# Patient Record
Sex: FEMALE | Race: WHITE | NOT HISPANIC OR LATINO | Employment: FULL TIME | URBAN - METROPOLITAN AREA
[De-identification: names, ages, dates, MRNs, and addresses within clinical notes are randomized per-mention and may not be internally consistent; named-entity substitution may affect disease eponyms.]

---

## 2017-01-05 ENCOUNTER — MYC MEDICAL ADVICE (OUTPATIENT)
Dept: ORTHOPEDICS | Facility: CLINIC | Age: 42
End: 2017-01-05

## 2017-01-05 DIAGNOSIS — Z98.890 S/P CARPAL TUNNEL RELEASE: Primary | ICD-10-CM

## 2017-01-11 ENCOUNTER — MYC MEDICAL ADVICE (OUTPATIENT)
Dept: ORTHOPEDICS | Facility: CLINIC | Age: 42
End: 2017-01-11

## 2017-01-11 ENCOUNTER — OFFICE VISIT (OUTPATIENT)
Dept: FAMILY MEDICINE | Facility: CLINIC | Age: 42
End: 2017-01-11
Payer: COMMERCIAL

## 2017-01-11 VITALS
BODY MASS INDEX: 34.83 KG/M2 | DIASTOLIC BLOOD PRESSURE: 78 MMHG | WEIGHT: 204 LBS | SYSTOLIC BLOOD PRESSURE: 130 MMHG | RESPIRATION RATE: 18 BRPM | HEIGHT: 64 IN | HEART RATE: 106 BPM | TEMPERATURE: 98.9 F | OXYGEN SATURATION: 98 %

## 2017-01-11 DIAGNOSIS — G56.03 BILATERAL CARPAL TUNNEL SYNDROME: Primary | ICD-10-CM

## 2017-01-11 DIAGNOSIS — G89.18 POST-OP PAIN: Primary | ICD-10-CM

## 2017-01-11 PROCEDURE — 99024 POSTOP FOLLOW-UP VISIT: CPT | Performed by: FAMILY MEDICINE

## 2017-01-11 RX ORDER — TRAMADOL HYDROCHLORIDE 50 MG/1
50-100 TABLET ORAL EVERY 6 HOURS PRN
Qty: 30 TABLET | Refills: 0 | Status: SHIPPED | OUTPATIENT
Start: 2017-01-11 | End: 2017-02-22

## 2017-01-11 NOTE — Clinical Note
[address] [address]  [phone]        January 11, 2017    Regarding:  Virginia BEN Valenzuela  [address] [facility] [place] [address]              To Whom It May Concern;  She is having some issues with recovery from her R wrist surgery. She needs time to attend appointments. She also needs to have restricted work: no use of R extremity for the next week. If improved, then may advance to 1 lb restriction, but minimal flexion/externsion. If not improved, she will continue her non use for another week. Expect healing process to take 3-4 wks.          Sincerely,        Marlon Marx MD

## 2017-01-11 NOTE — MR AVS SNAPSHOT
After Visit Summary   1/11/2017    Virginia Valenzuela    MRN: [id number]           Patient Information     Date Of Birth          1975        Visit Information        Provider Department      1/11/2017 10:00 AM Marlon Marx MD [facility]        Today's Diagnoses     Bilateral carpal tunnel syndrome    -  1        Follow-ups after your visit        Your next 10 appointments already scheduled     Jan 19, 2017  9:30 AM   Evaluation with Nidia Smith OT   [facility] ([facility])    [address] [place] [id number] [phone]            Jan 20, 2017  8:40 AM   SHORT with Marlon Marx MD   [facility] ([facility])    [address] [facility] [id number] [phone]              Who to contact     If you have questions or need follow up information about today's clinic visit or your schedule please contact [facility] directly at [phone].  Normal or non-critical lab and imaging results will be communicated to you by [facility], letter or phone within 4 business days after the clinic has received the results. If you do not hear from us within 7 days, please contact the clinic through [employer] or phone. If you have a critical or abnormal lab result, we will notify you by phone as soon as possible.  Submit refill requests through [employer] or call your pharmacy and they will forward the refill request to us. Please allow 3 business days for your refill to be completed.          Additional Information About Your Visit        [facility] Information     [employer]! gives you secure access to your electronic health record. If you see a primary care provider, you can also send messages to your care team and make appointments. If you have questions, please call your primary care clinic.  If you do not have a primary care provider, please call [phone] and they will assist  "you.        Care EveryWhere ID     This is your Care EveryWhere ID. This could be used by other organizations to access your [facility] medical records  [id number]        Your Vitals Were     Pulse Temperature Respirations Height BMI (Body Mass Index) Pulse Oximetry    106 98.9  F (37.2  C) (Temporal) 18 5' 4.33\" (1.634 m) 34.66 kg/m2 98%       Blood Pressure from Last 3 Encounters:   01/11/17 130/78   12/07/16 136/78   11/17/16 126/80    Weight from Last 3 Encounters:   01/11/17 204 lb (92.534 kg)   11/17/16 203 lb (92.08 kg)   11/04/16 190 lb (86.183 kg)              Today, you had the following     No orders found for display         Today's Medication Changes          These changes are accurate as of: 1/11/17 10:41 AM.  If you have any questions, ask your nurse or doctor.               Start taking these medicines.        Dose/Directions    traMADol 50 MG tablet   Commonly known as:  ULTRAM   Used for:  Bilateral carpal tunnel syndrome   Started by:  Marlon Marx MD        Dose:   mg   Take 1-2 tablets ( mg) by mouth every 6 hours as needed for pain   Quantity:  30 tablet   Refills:  0            Where to get your medicines      Some of these will need a paper prescription and others can be bought over the counter.  Ask your nurse if you have questions.     Bring a paper prescription for each of these medications    - traMADol 50 MG tablet             Primary Care Provider    None Specified       No primary provider on file.        Thank you!     Thank you for choosing [facility]  for your care. Our goal is always to provide you with excellent care. Hearing back from our patients is one way we can continue to improve our services. Please take a few minutes to complete the written survey that you may receive in the mail after your visit with us. Thank you!             Your Updated Medication List - Protect others around you: Learn how to safely use, store and throw away your " medicines at www.disposemymeds.org.          This list is accurate as of: 1/11/17 10:41 AM.  Always use your most recent med list.                   Brand Name Dispense Instructions for use    cetirizine 10 MG tablet    zyrTEC    90 tablet    Take 1 tablet (10 mg) by mouth every evening       EPINEPHrine 0.3 MG/0.3ML injection     1 each    Inject 0.3 mLs (0.3 mg) into the muscle once as needed       hydrOXYzine 25 MG capsule    VISTARIL    120 capsule    Take 1-2 capsules (25-50 mg) by mouth 3 times daily as needed for itching (hives)       ibuprofen 600 MG tablet    ADVIL/MOTRIN    60 tablet    Take 1 tablet (600 mg) by mouth every 6 hours as needed for moderate pain       losartan-hydrochlorothiazide 50-12.5 MG per tablet    HYZAAR     Take 1 tablet by mouth daily       NORVASC PO      Take 10 mg by mouth       oxyCODONE 5 MG IR tablet    ROXICODONE    30 tablet    Take 1-2 tablets (5-10 mg) by mouth every 4 hours as needed for pain or other (Moderate to Severe)       PRILOSEC PO      Take 20 mg by mouth every morning       PROBIOTIC DAILY PO      Take by mouth daily       ranitidine 150 MG tablet    ZANTAC    180 tablet    Take 1 tablet (150 mg) by mouth 2 times daily       traMADol 50 MG tablet    ULTRAM    30 tablet    Take 1-2 tablets ( mg) by mouth every 6 hours as needed for pain       vitamin D 2000 UNITS tablet     100 tablet    Take 2,000 Units by mouth daily

## 2017-01-11 NOTE — PROGRESS NOTES
"  SUBJECTIVE:                                                    Virginia Valenzuela is a 41 year old female who presents to clinic today for the following health issues:      Joint Pain     Onset: 12/19/2016     Description:   Location: right wrist  Character: Dull ache    Intensity: moderate, severe    Progression of Symptoms: worse    Accompanying Signs & Symptoms:  Other symptoms: swelling   History:   Previous similar pain: YES- Carp surgery on 12/07/2016    Precipitating factors:   Trauma or overuse: no     Alleviating factors:  Improved by: nothing       Therapies Tried and outcome: tylenol and ibuprofen, but hasn't been taking due to infectiveness.           Problem list and histories reviewed & adjusted, as indicated.  Additional history: as documented        ROS:      OBJECTIVE:                                                    /78 mmHg  Pulse 106  Temp(Src) 98.9  F (37.2  C) (Temporal)  Resp 18  Ht 5' 4.33\" (1.634 m)  Wt 204 lb (92.534 kg)  BMI 34.66 kg/m2  SpO2 98%  Body mass index is 34.66 kg/(m^2).  Risks bilaterally. Normal. There is a well-healed incision present. No drainage or redness or tenderness.    Diagnostic Test Results:  none      ASSESSMENT/PLAN:                                                              ICD-10-CM    1. Bilateral carpal tunnel syndrome G56.03 traMADol (ULTRAM) 50 MG tablet     She is enrolled in occupational therapy. She is following up with her orthopedics team. Continue conservative measures. Discussed options for pain control, and she elects for some tramadol which I think is reasonable. See me back as needed.      Marlon Marx MD  [facility]    "

## 2017-01-11 NOTE — NURSING NOTE
"Chief Complaint   Patient presents with     Musculoskeletal Problem       Initial /78 mmHg  Pulse 106  Temp(Src) 98.9  F (37.2  C) (Temporal)  Resp 18  Ht 5' 4.33\" (1.634 m)  Wt 204 lb (92.534 kg)  BMI 34.66 kg/m2  SpO2 98% Estimated body mass index is 34.66 kg/(m^2) as calculated from the following:    Height as of this encounter: 5' 4.33\" (1.634 m).    Weight as of this encounter: 204 lb (92.534 kg).  BP completed using cuff size: regular    "

## 2017-01-18 DIAGNOSIS — G89.18 POST-OP PAIN: Primary | ICD-10-CM

## 2017-01-18 RX ORDER — GABAPENTIN 300 MG/1
CAPSULE ORAL
Qty: 30 CAPSULE | Refills: 1 | Status: SHIPPED | OUTPATIENT
Start: 2017-01-18 | End: 2017-01-18

## 2017-01-18 RX ORDER — OXYCODONE HYDROCHLORIDE 5 MG/1
5 TABLET ORAL EVERY 4 HOURS PRN
Qty: 20 TABLET | Refills: 0 | Status: SHIPPED | OUTPATIENT
Start: 2017-01-18 | End: 2017-02-03

## 2017-01-19 ENCOUNTER — HOSPITAL ENCOUNTER (OUTPATIENT)
Dept: OCCUPATIONAL THERAPY | Facility: CLINIC | Age: 42
Setting detail: THERAPIES SERIES
End: 2017-01-19
Attending: PHYSICIAN ASSISTANT
Payer: COMMERCIAL

## 2017-01-19 DIAGNOSIS — Z98.890 S/P CARPAL TUNNEL RELEASE: Primary | ICD-10-CM

## 2017-01-19 PROCEDURE — 97165 OT EVAL LOW COMPLEX 30 MIN: CPT | Mod: GO | Performed by: OCCUPATIONAL THERAPIST

## 2017-01-19 PROCEDURE — 97140 MANUAL THERAPY 1/> REGIONS: CPT | Mod: GO | Performed by: OCCUPATIONAL THERAPIST

## 2017-01-19 PROCEDURE — 97035 APP MDLTY 1+ULTRASOUND EA 15: CPT | Mod: GO | Performed by: OCCUPATIONAL THERAPIST

## 2017-01-19 PROCEDURE — 40000839 ZZH STATISTIC HAND THERAPY VISIT: Performed by: OCCUPATIONAL THERAPIST

## 2017-01-19 PROCEDURE — 97110 THERAPEUTIC EXERCISES: CPT | Mod: GO | Performed by: OCCUPATIONAL THERAPIST

## 2017-01-19 NOTE — PROGRESS NOTES
01/19/17 0900   Quick Adds   Quick Adds Fall Risk Screen   General Information/History   Start Of Care Date 01/19/17   Referring Physician Dr. Gucci Hairston    Orders Evaluate And Treat As Indicated   Orders Date 01/05/17   Medical Diagnosis S/P right carpal tunnel release    Additional Occupational Profile Info/Pertinent history of current problem Patient is a left hand dominant female presenting with persistance pain and weakness post carpal tunnel release on 12/7/16     Previous treatment or current condition Splints, Cortizone injections, L carpal tunnel    How/Where did it occur With repetition/overuse   Onset date of current episode/exacerbation 12/07/16   Date of surgery 12/07/16   Surgical procedure Right Carpal Tunnel Release    Chronicity New   Hand Dominance Left   Affected side Right   Functional limitations perform desired leisure / sports activities;perform required work activities;perform activities of daily living   Reported Symptoms Pain;Loss of Motion/Stiffness;Loss of strength   Prior level of function Independent ADL;Independent IADL   Important Activities Working on her loom, work    Living environment [place]   Patient role/Employment history Employed   Occupation  at a hair salon    Employment Status Working in normal job without restrictions   Primary Job Tasks Keyboarding;Gripping/pinching;Pushing/pulling;Repetitive tasks;Prolonged sitting;Lifting;Reaching;Using a mouse;Carrying   Occupation Comments Patient works part time, 5 hour shifts.  Normal tasks include sweeping the floors, laundry and computer use for appts.    Patient/Family goals statement To be pain free    Fall Risk Screen   Fall screen completed by OT   Have you fallen 2 or more times in the last year? No   Have you fallen and had an injury in the past year? No   Is the patient a fall risk? No   Pain   Pain Primary Pain Report   Primary Pain Report   Location Volar surface (CT area) of right wrist and thenar  group    Radiation Does Not Radiate   Pain Quality Aching;Stabbing;Throbbing   Frequency Intermittent   Scale 6/10   Pain Is Worse In The P.m.   Pain Is Exacerbated By Lifting;Carrying;Pinching;Pushing;Gripping;Twisting , Pulling;Activity/movement   Pain Is Relieved By Splints;Immobilization;Heat;Rest   Progression Since Onset Gradually Improving   Edema   Edema Assessed;Distal Wrist Crease   Overall  - Left None   Overall  - Right Mild   Distal Wrist Crease (measured in cm)   Distal Wrist Crease - - Left 15.6    Distal Wrist Crease - - Right 16.0    Scar/Wound   Scar/Wound Scar   Scar   Scar location Volar right wrist    Appearance White And Pink   Sensitivity Mild   Quality Adhered;Hypertrophic   Tenderness   Overall - Right Tender along incision    ROM   ROM (Bilateral UE ROM WNL, painful at end range on right )   Sensation Findings   Sensation Findings Other (see comments)   Sensation Comments Patient reports improved sensation post-op, no reports of numbness/tinglying    Strength   Strength (Plan to test )   Education Assessment   Preferred Learning Style Listening;Demonstration;Pictures/video   Barriers to Learning No barriers   Therapy Interventions   Planned Therapy Interventions Cryotherapy;Ultrasound;Iontophoresis (list drug);Light Therapy;Paraffin;Strengthening;ROM;Coordination;Stretching;Manual Therapy;Scar Management;Desensitization;Edema Management;Home Program;Self Care/Home Management   Clinical Impression   Criteria for Skilled Therapeutic Interventions Met yes;treatment indicated   OT Diagnosis Decreased ease with I/ADL tasks    Influenced by the following impairments Pain;Edema;Decreased range of motion;Decreased strength   Assessment of Occupational Performance 1-3 Performance Deficits   Identified Performance Deficits Work tasks, household management tasks    Clinical Decision Making (Complexity) Low complexity   Therapy Frequency 1x/week    Predicted Duration of Therapy Intervention (days/wks)  3 weeks    Risks and Benefits of Treatment have been explained. Yes   Patient, Family & other staff in agreement with plan of care Yes   Clinical Impression Comments Virginia is a left hand dominant female presenting with persisting pain and weakness localized to the volar aspect and thenar musculature of her right wrist following carpal tunnel release on 12/7/16.  OT intervention is indicated for a brief time to educated on edema and scar management techniques as well as to progress ROM and strength as needed for activity.     Hand Goals   Hand Goals Work;Household Chores;Sleeping;Driving   Household Chores   Current Functional Task Sweeping;Cooking;Gripping;Turning;Carrying;Pushing   Previous Performance Level Independent   Current Performance Level Moderate difficulty   Goal Target Task Sweep floors;Hold spoon for stirring;Hold and lift pan;Hold and lift plate;Open a tight or new jar;Turn key in lock to open door; and turn to open a door;Push a shopping cart;Carry a shopping bag   Goal Target Performance Level Pain free   Due Date 02/18/17   Work   Current Functional Task Computer use;Keyboarding;Repetitive tasks;Lifting   Previous Performance Level Independent   Current Performance Level Moderate difficulty   Goal Target Task Type;Mouse;Hold and manipulate tools;Hold and assemble parts;Complete repetitive tasks;Lift to counter height - lbs   Goal Target Performance Level Pain free   Due Date 02/18/17   Driving   Current Functional Task Holding steering wheel;Shifting   Previous Performance Level Independent   Current Performance Level Moderate difficulty   Goal Target Task Shift into gear;Turn car key to start car; steering wheel   Goal Target Performance Level Pain free   Due Date 02/18/17   Sleeping   Current Functional Task Sleeping through the night   Previous Performance Level No difficulty   Current Performance Level Moderate difficulty   Goal Target Task Staying asleep   Goal Target Performance Level  No difficulty   Due Date 02/18/17   Total Evaluation Time   Total Evaluation Time (Minutes) 15        Thank you for referring Virginia to Occupational Therapy,     Nidia HICKMAN/LESLIE

## 2017-01-20 ENCOUNTER — OFFICE VISIT (OUTPATIENT)
Dept: FAMILY MEDICINE | Facility: CLINIC | Age: 42
End: 2017-01-20
Payer: COMMERCIAL

## 2017-01-20 VITALS
RESPIRATION RATE: 18 BRPM | DIASTOLIC BLOOD PRESSURE: 72 MMHG | TEMPERATURE: 98.9 F | BODY MASS INDEX: 34.28 KG/M2 | WEIGHT: 201.8 LBS | HEART RATE: 107 BPM | SYSTOLIC BLOOD PRESSURE: 112 MMHG | OXYGEN SATURATION: 98 %

## 2017-01-20 DIAGNOSIS — G56.03 BILATERAL CARPAL TUNNEL SYNDROME: Primary | ICD-10-CM

## 2017-01-20 PROCEDURE — 99024 POSTOP FOLLOW-UP VISIT: CPT | Performed by: FAMILY MEDICINE

## 2017-01-20 ASSESSMENT — PAIN SCALES - GENERAL: PAINLEVEL: MILD PAIN (3)

## 2017-01-20 NOTE — PROGRESS NOTES
SUBJECTIVE:                                                    Virginia Valenzuela is a 41 year old female who presents to clinic today for the following health issues:      Chief Complaint   Patient presents with     RECHECK     right wrist pain-states pain is arounda 3/10.  Pain is improving.      Returns for recheck of her right wrist and carpal tunnel surgery. She is improving occupational therapy. She is back in touch with orthopedics team. She is on the right track and I really have nothing else to offer her. She may return if she has anymore problems.

## 2017-01-25 ENCOUNTER — HOSPITAL ENCOUNTER (OUTPATIENT)
Dept: OCCUPATIONAL THERAPY | Facility: CLINIC | Age: 42
Setting detail: THERAPIES SERIES
End: 2017-01-25
Attending: PHYSICIAN ASSISTANT
Payer: COMMERCIAL

## 2017-01-25 PROCEDURE — 97140 MANUAL THERAPY 1/> REGIONS: CPT | Mod: GO | Performed by: OCCUPATIONAL THERAPIST

## 2017-01-25 PROCEDURE — 97035 APP MDLTY 1+ULTRASOUND EA 15: CPT | Mod: GO | Performed by: OCCUPATIONAL THERAPIST

## 2017-01-25 PROCEDURE — 40000839 ZZH STATISTIC HAND THERAPY VISIT: Performed by: OCCUPATIONAL THERAPIST

## 2017-01-25 PROCEDURE — 97110 THERAPEUTIC EXERCISES: CPT | Mod: GO | Performed by: OCCUPATIONAL THERAPIST

## 2017-01-31 ENCOUNTER — TELEPHONE (OUTPATIENT)
Dept: FAMILY MEDICINE | Facility: CLINIC | Age: 42
End: 2017-01-31

## 2017-01-31 NOTE — TELEPHONE ENCOUNTER
RN tried to reach pt , but NA. Unable to leave message as unidentified answering machine came on........RONNIE Hayes

## 2017-01-31 NOTE — TELEPHONE ENCOUNTER
----- Message from [employer] sent at 1/30/2017  4:44 PM CST -----  Regarding: Appointment scheduled from [place]  Contact: [phone]  Appointment For: DIXON MARQUES ([id number])  Visit Type: [facility] OFFICE VISIT SHORT (910)    2/7/2017    10:20 AM  20 mins.  Marlon Marx MD  FAMILY PRACTICE    Patient Comments:  Primary Care  Burning/pain in upper abdomen for last several months.   Have tried Prilosec with no change.

## 2017-02-01 ENCOUNTER — HOSPITAL ENCOUNTER (OUTPATIENT)
Dept: OCCUPATIONAL THERAPY | Facility: CLINIC | Age: 42
Setting detail: THERAPIES SERIES
End: 2017-02-01
Attending: PHYSICIAN ASSISTANT
Payer: COMMERCIAL

## 2017-02-01 PROCEDURE — 40000839 ZZH STATISTIC HAND THERAPY VISIT: Performed by: OCCUPATIONAL THERAPIST

## 2017-02-01 PROCEDURE — 97140 MANUAL THERAPY 1/> REGIONS: CPT | Mod: GO | Performed by: OCCUPATIONAL THERAPIST

## 2017-02-01 PROCEDURE — 97110 THERAPEUTIC EXERCISES: CPT | Mod: GO | Performed by: OCCUPATIONAL THERAPIST

## 2017-02-01 NOTE — TELEPHONE ENCOUNTER
RN tried cell phone. Got message she is unavailable. Unable to leave message. Will close this encounter. She has scheduled an appt........................................RONNIE Hayes

## 2017-02-01 NOTE — TELEPHONE ENCOUNTER
Second attempt to reach pt at home.  I see she has a My Chart office visit scheduled for 2/7/17 with DR. Marx.

## 2017-02-03 ENCOUNTER — HOSPITAL ENCOUNTER (EMERGENCY)
Facility: CLINIC | Age: 42
Discharge: HOME | End: 2017-02-03
Attending: EMERGENCY MEDICINE | Admitting: EMERGENCY MEDICINE
Payer: COMMERCIAL

## 2017-02-03 VITALS
DIASTOLIC BLOOD PRESSURE: 70 MMHG | OXYGEN SATURATION: 98 % | HEART RATE: 103 BPM | RESPIRATION RATE: 12 BRPM | TEMPERATURE: 97.6 F | SYSTOLIC BLOOD PRESSURE: 137 MMHG

## 2017-02-03 DIAGNOSIS — M25.512 ACUTE PAIN OF LEFT SHOULDER: ICD-10-CM

## 2017-02-03 DIAGNOSIS — M75.50 SUBACROMIAL BURSITIS: ICD-10-CM

## 2017-02-03 DIAGNOSIS — G89.18 POST-OP PAIN: ICD-10-CM

## 2017-02-03 PROCEDURE — 99284 EMERGENCY DEPT VISIT MOD MDM: CPT | Mod: 25

## 2017-02-03 PROCEDURE — 25000125 ZZHC RX 250: Performed by: EMERGENCY MEDICINE

## 2017-02-03 PROCEDURE — 25000132 ZZH RX MED GY IP 250 OP 250 PS 637: Performed by: EMERGENCY MEDICINE

## 2017-02-03 PROCEDURE — 20610 DRAIN/INJ JOINT/BURSA W/O US: CPT

## 2017-02-03 PROCEDURE — 99284 EMERGENCY DEPT VISIT MOD MDM: CPT | Mod: 25 | Performed by: EMERGENCY MEDICINE

## 2017-02-03 PROCEDURE — 20610 DRAIN/INJ JOINT/BURSA W/O US: CPT | Performed by: EMERGENCY MEDICINE

## 2017-02-03 PROCEDURE — 25000128 H RX IP 250 OP 636: Performed by: EMERGENCY MEDICINE

## 2017-02-03 RX ORDER — TRIAMCINOLONE ACETONIDE 40 MG/ML
40 INJECTION, SUSPENSION INTRA-ARTICULAR; INTRAMUSCULAR ONCE
Status: COMPLETED | OUTPATIENT
Start: 2017-02-03 | End: 2017-02-03

## 2017-02-03 RX ORDER — OXYCODONE HYDROCHLORIDE 5 MG/1
5 TABLET ORAL ONCE
Status: COMPLETED | OUTPATIENT
Start: 2017-02-03 | End: 2017-02-03

## 2017-02-03 RX ORDER — OXYCODONE HYDROCHLORIDE 5 MG/1
5 TABLET ORAL EVERY 6 HOURS PRN
Qty: 20 TABLET | Refills: 0 | Status: SHIPPED | OUTPATIENT
Start: 2017-02-03 | End: 2017-02-22

## 2017-02-03 RX ORDER — BUPIVACAINE HYDROCHLORIDE 2.5 MG/ML
4 INJECTION, SOLUTION INFILTRATION; PERINEURAL ONCE
Status: COMPLETED | OUTPATIENT
Start: 2017-02-03 | End: 2017-02-03

## 2017-02-03 RX ORDER — KETOROLAC TROMETHAMINE 30 MG/ML
30 INJECTION, SOLUTION INTRAMUSCULAR; INTRAVENOUS ONCE
Status: COMPLETED | OUTPATIENT
Start: 2017-02-03 | End: 2017-02-03

## 2017-02-03 RX ADMIN — TRIAMCINOLONE ACETONIDE 40 MG: 40 INJECTION, SUSPENSION INTRA-ARTICULAR; INTRAMUSCULAR at 13:09

## 2017-02-03 RX ADMIN — KETOROLAC TROMETHAMINE 30 MG: 30 INJECTION, SOLUTION INTRAMUSCULAR at 14:07

## 2017-02-03 RX ADMIN — BUPIVACAINE HYDROCHLORIDE 10 MG: 2.5 INJECTION, SOLUTION INFILTRATION; PERINEURAL at 13:09

## 2017-02-03 RX ADMIN — OXYCODONE HYDROCHLORIDE 5 MG: 5 TABLET ORAL at 14:56

## 2017-02-03 ASSESSMENT — ENCOUNTER SYMPTOMS
ARTHRALGIAS: 1
HEADACHES: 0
NUMBNESS: 0
NECK PAIN: 0
BACK PAIN: 0
WEAKNESS: 0
JOINT SWELLING: 1

## 2017-02-03 NOTE — ED AVS SNAPSHOT
[facility]    [address] [place] [address]    Phone:  [phone]    Fax:  [phone]                                       Virginia Valenzuela   MRN: [id number]    Department:  [facility]   Date of Visit:  2/3/2017           After Visit Summary Signature Page     I have received my discharge instructions, and my questions have been answered. I have discussed any challenges I see with this plan with the nurse or doctor.    ..........................................................................................................................................  Patient/Patient Representative Signature      ..........................................................................................................................................  Patient Representative Print Name and Relationship to Patient    ..................................................               ................................................  Date                                            Time    ..........................................................................................................................................  Reviewed by Signature/Title    ...................................................              ..............................................  Date                                                            Time

## 2017-02-03 NOTE — DISCHARGE INSTRUCTIONS
Bursitis  You have bursitis. This is an inflammation of the bursa. These are small, fluid-filled sacs that surround the larger joints of the body. The bursa help the muscles and tendons move smoothly over the joints.  Bursitis often happens in the shoulder. But it can also affect the elbows, hips, pelvis, knees, toes, and heels. Bursitis can be caused by injury, overuse of the joint, or infection of the bursa. Symptoms include pain and tenderness over a joint. Symptoms get worse with movement.  Bursitis is treated with an anti-inflammatory medicine and by resting the joint. More severe cases require injection of medicine directly into the bursa.    Home care    Rest the painful joint and protect it from movement. This will allow the inflammation to heal faster.    Apply an ice pack over the injured area for no more than 15 to 20 minutes. Do this every 3 to 6 hours for the first 24 to 48 hours. Keep using ice packs 3 to 4 times a day until the pain and swelling improves.     To make an ice pack, put ice cubes in a sealed plastic zip-lock bag. Wrap the bag in a clean, thin towel or cloth. Never put ice or an ice pack directly on the skin. As the ice melts, be careful to avoid getting any wrap or splint wet.    You may take over-the-counter pain medicine to treat pain and inflammation, unless another medicine was prescribed. Anti-inflammatory pain medicines may be more effective. Talk with your provider beforeusing these medicines if you have chronic liver or kidney disease, or ever had a stomach ulcer or GI (gastrointestinal) bleeding.    As your symptoms improve, slowly begin to move the joint. Do not overuse the joint. This may cause the symptoms to flare up again.  When to seek medical advice  Call your healthcare provider right away if any of these occur:    Redness over the painful area    Increasing pain or swelling at the joint    Fever of 100.4 F (38 C) or above lasting for 24 to 48 hours    [id number] [employer]. [address] [place] [address], [place] [address]. All rights reserved. This information is not intended as a substitute for professional medical care. Always follow your healthcare professional's instructions.

## 2017-02-03 NOTE — ED PROVIDER NOTES
History     Chief Complaint   Patient presents with     Shoulder Pain     The history is provided by the patient.     Virginia Valenzuela is a 41 year old female who presents to the ED for evaluation of left shoulder pain.  Patient reports that the left shoulder was bit stiff yesterday and thinks she may have slept on the shoulder with the arm extended above her head because she awoke this morning with severe left shoulder pain and marked reduction in range of motion.  She has a history in the past of subacromial bursitis which has been treated once before with a steroid injection.  She denies any significant trauma.  She denies any paresthesia or weakness.    I have reviewed the Medications, Allergies, Past Medical and Surgical History, and Social History in the [facility] system.    Past Medical History   Diagnosis Date     Unspecified essential hypertension      Obstructive sleep apnea (adult) (pediatric)      Tobacco use disorder      Unspecified vitamin D deficiency      Idiopathic urticaria      Special screening examination for human papillomavirus (HPV)      Other malaise and fatigue      Dysfunction of eustachian tube      Pain in joint, shoulder region      Encounter for therapeutic drug monitoring      Threatened , unspecified as to episode of care      Generalized anxiety disorder      Depressive disorder, not elsewhere classified      Other chronic allergic conjunctivitis      Allergic rhinitis, cause unspecified      History of appendectomy      S/P laparoscopic cholecystectomy      Sprain of tibiofibular (ligament), distal of ankle      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     ASCUS with positive high risk HPV 3/12/13     Hypertension        Past Surgical History   Procedure Laterality Date     Cholecystectomy       Appendectomy       Eye surgery       Genitourinary surgery  2013     Ovary, fallopian tube     Gyn surgery  2013     Ovary, Fallopian tube     Abdomen surgery  2013      ovaian cyst and right ovary removal     Laparoscopic lysis adhesions Left 3/1/2016     Procedure: LAPAROSCOPIC LYSIS ADHESIONS;  Surgeon: Nate Mishra MD;  Location: [facility] OR     Release carpal tunnel Left 8/31/2016     Procedure: RELEASE CARPAL TUNNEL;  Surgeon: Gucci Hairston MD;  Location: [facility] OR     Release carpal tunnel Right 12/7/2016     Procedure: RELEASE CARPAL TUNNEL;  Surgeon: Gucci Hairston MD;  Location: [facility] OR       Family History   Problem Relation Age of Onset     DIABETES Father      Hypertension Father      Cardiovascular Mother 29     myocarditis      Other Cancer Brother      unknown origin       Social History   Substance Use Topics     Smoking status: Current Every Day Smoker -- 0.75 packs/day for 10 years     Types: Cigarettes     Smokeless tobacco: Never Used     Alcohol Use: No        Immunization History   Administered Date(s) Administered     Influenza (IIV3) 09/23/2014     TD (ADULT, 7+) 09/19/2008          Allergies   Allergen Reactions     Amoxicillin Itching     Bees Swelling     FINGER TIPS TO ELBOWS       Current Outpatient Prescriptions   Medication Sig Dispense Refill     oxyCODONE (ROXICODONE) 5 MG IR tablet Take 1 tablet (5 mg) by mouth every 6 hours as needed for moderate to severe pain 20 tablet 0     Omeprazole (PRILOSEC PO) Take 20 mg by mouth every morning       AmLODIPine Besylate (NORVASC PO) Take 10 mg by mouth       losartan-hydrochlorothiazide (HYZAAR) 50-12.5 MG per tablet Take 1 tablet by mouth daily       Cholecalciferol (VITAMIN D) 2000 UNITS tablet Take 2,000 Units by mouth daily 100 tablet 5     cetirizine (ZYRTEC) 10 MG tablet Take 1 tablet (10 mg) by mouth every evening 90 tablet 1     Probiotic Product (PROBIOTIC DAILY PO) Take by mouth daily       ranitidine (ZANTAC) 150 MG tablet Take 1 tablet (150 mg) by mouth 2 times daily 180 tablet 0     traMADol (ULTRAM) 50 MG tablet Take 1-2 tablets ( mg) by mouth every 6 hours as needed for  "pain 30 tablet 0     ibuprofen (ADVIL,MOTRIN) 600 MG tablet Take 1 tablet (600 mg) by mouth every 6 hours as needed for moderate pain 60 tablet 1     EPINEPHrine (EPIPEN) 0.3 MG/0.3ML injection Inject 0.3 mLs (0.3 mg) into the muscle once as needed 1 each 0     hydrOXYzine (VISTARIL) 25 MG capsule Take 1-2 capsules (25-50 mg) by mouth 3 times daily as needed for itching (hives) 120 capsule 0      Review of Systems   Musculoskeletal: Positive for joint swelling and arthralgias. Negative for back pain and neck pain.   Neurological: Negative for weakness, numbness and headaches.   All other systems reviewed and are negative.      Physical Exam   BP: 133/79 mmHg  Pulse: 103  Temp: 97.6  F (36.4  C)  Resp: 12  SpO2: 98 %  Physical Exam   Constitutional: She is oriented to person, place, and time. No distress.   HENT:   Head: Normocephalic.   Musculoskeletal:        Left shoulder: She exhibits decreased range of motion, tenderness, swelling and pain. She exhibits normal pulse.        Arms:  Neurological: She is alert and oriented to person, place, and time. She has normal strength and normal reflexes. No sensory deficit. GCS eye subscore is 4. GCS verbal subscore is 5. GCS motor subscore is 6.   Skin: She is not diaphoretic.   Nursing note and vitals reviewed.      ED Course   Arthrocentesis  Date/Time: 2/3/2017 1:10 PM  Performed by: ADITHYA STACY  Authorized by: ADITHYA STACY  Consent: Written consent obtained.  Risks and benefits: risks, benefits and alternatives were discussed  Consent given by: patient  Patient identity confirmed: verbally with patient and arm band  Time out: Immediately prior to procedure a \"time out\" was called to verify the correct patient, procedure, equipment, support staff and site/side marked as required.  Indications: pain and joint swelling   Body area: shoulder  Joint: left subacromial bursa  Local anesthesia used: no  Patient sedated: no  Preparation: Patient was " prepped and draped in the usual sterile fashion.  Needle gauge: 22 G  Ultrasound guidance: no  Approach: posterior  Triamcinolone amount: 40 mg  Bupivacaine 0.25% amount: 3 ml  Patient tolerance: Patient tolerated the procedure well with no immediate complications                     Labs Ordered and Resulted from Time of ED Arrival Up to the Time of Departure from the ED - No data to display    Assessments & Plan (with Medical Decision Making)  Virginia Valenzuela is a 41 year old female who presents to the ED for evaluation of left shoulder pain.  Patient reports that the left shoulder was bit stiff yesterday and thinks she may have slept on the shoulder with the arm extended above her head because she awoke this morning with severe left shoulder pain and marked reduction in range of motion.  She has a history in the past of subacromial bursitis which has been treated once before with a steroid injection.  Her past history is also significant for hypertension, anxiety and depression, obstructive sleep apnea, appendectomy and cholecystectomy.  On examination, the patient has significant reduction in range of motion of her shoulder with lateral extension, forward flexion, and external rotation.  The pain is accentuated with palpation over the subacromial bursa.  I discussed the use of a steroid injection into this bursa which the patient is in approval to proceed with.  Informed consent was obtained and the patient was prepped and procedure was done.  Unfortunately, the patient did not immediately respond to the injection.  She was subsequently given Toradol 30 mg IM with little benefit and then given oxycodone 5 mg by mouth with improvement in her symptoms.  I do believe that we will need to give the story to chance to work, therefore, I am sending her home with a small prescription of oxycodone 5 mg that she may take every 6 hours as needed to control her pain.  Total of 20 tablets was distributed.  I did review with  her indications return to the ED for reevaluation.  She has a follow-up with her primary care provider, Dr. Marlon Marx in 5 days.  All questions from the patient are answered and she was suitable for discharge.       I have reviewed the nursing notes.    I have reviewed the findings, diagnosis, plan and need for follow up with the patient.    Discharge Medication List as of 2/3/2017  3:48 PM          Final diagnoses:   Acute pain of left shoulder   Subacromial bursitis       2/3/2017   [facility] EMERGENCY DEPARTMENT      Skyler Alejandro MD  02/03/17 [id number]

## 2017-02-03 NOTE — ED AVS SNAPSHOT
[facility] Emergency Department    [address] [place] [address]    Phone:  [phone]    Fax:  [phone]                                       Virginia Valenzuela   MRN: [id number]    Department:  [facility] Emergency Department   Date of Visit:  2/3/2017           Patient Information     Date Of Birth          1975        Your diagnoses for this visit were:     Acute pain of left shoulder     Subacromial bursitis     Post-op pain        You were seen by Skyler Alejandro MD.      Follow-up Information     Follow up with Clinic, [facility] In 5 days.    Contact information:    [phone]          Discharge Instructions         Bursitis  You have bursitis. This is an inflammation of the bursa. These are small, fluid-filled sacs that surround the larger joints of the body. The bursa help the muscles and tendons move smoothly over the joints.  Bursitis often happens in the shoulder. But it can also affect the elbows, hips, pelvis, knees, toes, and heels. Bursitis can be caused by injury, overuse of the joint, or infection of the bursa. Symptoms include pain and tenderness over a joint. Symptoms get worse with movement.  Bursitis is treated with an anti-inflammatory medicine and by resting the joint. More severe cases require injection of medicine directly into the bursa.    Home care    Rest the painful joint and protect it from movement. This will allow the inflammation to heal faster.    Apply an ice pack over the injured area for no more than 15 to 20 minutes. Do this every 3 to 6 hours for the first 24 to 48 hours. Keep using ice packs 3 to 4 times a day until the pain and swelling improves.     To make an ice pack, put ice cubes in a sealed plastic zip-lock bag. Wrap the bag in a clean, thin towel or cloth. Never put ice or an ice pack directly on the skin. As the ice melts, be careful to avoid getting any wrap or splint wet.    You may  take over-the-counter pain medicine to treat pain and inflammation, unless another medicine was prescribed. Anti-inflammatory pain medicines may be more effective. Talk with your provider beforeusing these medicines if you have chronic liver or kidney disease, or ever had a stomach ulcer or GI (gastrointestinal) bleeding.    As your symptoms improve, slowly begin to move the joint. Do not overuse the joint. This may cause the symptoms to flare up again.  When to seek medical advice  Call your healthcare provider right away if any of these occur:    Redness over the painful area    Increasing pain or swelling at the joint    Fever of 100.4 F (38 C) or above lasting for 24 to 48 hours    [id number] [employer] [address] [place] [address], [place] [address]. All rights reserved. This information is not intended as a substitute for professional medical care. Always follow your healthcare professional's instructions.          Future Appointments        Provider Department Dept Phone Center    2/7/2017 10:20 AM Marlon Marx MD [facility] [phone] [facility]    2/8/2017 2:45 PM Nidia Smith OT [facility] Occupational Therapy [phone] [place] [facility]    2/16/2017 9:00 AM Nidia Smith OT [facility] Occupational Therapy [phone] [facility]      24 Hour Appointment Hotline       To make an appointment at any [facility], call [phone] ([phone]). If you don't have a family doctor or clinic, we will help you find one. [facility] are conveniently located to serve the needs of you and your family.             Review of your medicines      Our records show that you are taking the medicines listed below. If these are incorrect, please call your family doctor or clinic.        Dose / Directions Last dose taken    cetirizine 10 MG tablet   Commonly known as:  zyrTEC   Dose:  10 mg   Quantity:  90 tablet        Take 1 tablet (10 mg) by mouth every evening    Refills:  1        EPINEPHrine 0.3 MG/0.3ML injection   Dose:  0.3 mg   Quantity:  1 each        Inject 0.3 mLs (0.3 mg) into the muscle once as needed   Refills:  0        hydrOXYzine 25 MG capsule   Commonly known as:  VISTARIL   Dose:  25-50 mg   Quantity:  120 capsule        Take 1-2 capsules (25-50 mg) by mouth 3 times daily as needed for itching (hives)   Refills:  0        ibuprofen 600 MG tablet   Commonly known as:  ADVIL/MOTRIN   Dose:  600 mg   Quantity:  60 tablet        Take 1 tablet (600 mg) by mouth every 6 hours as needed for moderate pain   Refills:  1        losartan-hydrochlorothiazide 50-12.5 MG per tablet   Commonly known as:  HYZAAR   Dose:  1 tablet        Take 1 tablet by mouth daily   Refills:  0        NORVASC PO   Dose:  10 mg        Take 10 mg by mouth   Refills:  0        oxyCODONE 5 MG IR tablet   Commonly known as:  ROXICODONE   Dose:  5 mg   Quantity:  20 tablet        Take 1 tablet (5 mg) by mouth every 4 hours as needed for pain   Refills:  0        PRILOSEC PO   Dose:  20 mg        Take 20 mg by mouth every morning   Refills:  0        PROBIOTIC DAILY PO        Take by mouth daily   Refills:  0        ranitidine 150 MG tablet   Commonly known as:  ZANTAC   Dose:  150 mg   Quantity:  180 tablet        Take 1 tablet (150 mg) by mouth 2 times daily   Refills:  0        traMADol 50 MG tablet   Commonly known as:  ULTRAM   Dose:   mg   Quantity:  30 tablet        Take 1-2 tablets ( mg) by mouth every 6 hours as needed for pain   Refills:  0        vitamin D 2000 UNITS tablet   Dose:  2000 Units   Quantity:  100 tablet        Take 2,000 Units by mouth daily   Refills:  5                Procedures and tests performed during your visit     Obtain affirmation of informed consent      Orders Needing Specimen Collection     None      Pending Results     No orders found from 2/2/2017 to 2/4/2017.            Pending Culture Results     No orders found from 2/2/2017 to 2/4/2017.             Thank you for choosing [facility]       Thank you for choosing [facility] for your care. Our goal is always to provide you with excellent care. Hearing back from our patients is one way we can continue to improve our services. Please take a few minutes to complete the written survey that you may receive in the mail after you visit with us. Thank you!        [employer] Information     [employer] gives you secure access to your electronic health record. If you see a primary care provider, you can also send messages to your care team and make appointments. If you have questions, please call your primary care clinic.  If you do not have a primary care provider, please call [phone] and they will assist you.        Care EveryWhere ID     This is your Care EveryWhere ID. This could be used by other organizations to access your [facility] medical records  [id number]        After Visit Summary       This is your record. Keep this with you and show to your community pharmacist(s) and doctor(s) at your next visit.

## 2017-02-03 NOTE — ED NOTES
Pt here with left shoulder pain. Started getting stiff yesterday, and then overnight when she tried to put her arm up she was awakened with severe left shoulder pain.

## 2017-02-07 ENCOUNTER — MYC MEDICAL ADVICE (OUTPATIENT)
Dept: FAMILY MEDICINE | Facility: CLINIC | Age: 42
End: 2017-02-07

## 2017-02-07 ENCOUNTER — OFFICE VISIT (OUTPATIENT)
Dept: FAMILY MEDICINE | Facility: CLINIC | Age: 42
End: 2017-02-07
Payer: COMMERCIAL

## 2017-02-07 VITALS
BODY MASS INDEX: 34.74 KG/M2 | SYSTOLIC BLOOD PRESSURE: 122 MMHG | DIASTOLIC BLOOD PRESSURE: 84 MMHG | OXYGEN SATURATION: 97 % | TEMPERATURE: 97.6 F | HEART RATE: 88 BPM | WEIGHT: 204.5 LBS | RESPIRATION RATE: 18 BRPM

## 2017-02-07 DIAGNOSIS — D72.829 LEUKOCYTOSIS, UNSPECIFIED TYPE: ICD-10-CM

## 2017-02-07 DIAGNOSIS — R10.13 ABDOMINAL PAIN, EPIGASTRIC: Primary | ICD-10-CM

## 2017-02-07 LAB
ALBUMIN SERPL-MCNC: 3.8 G/DL (ref 3.4–5)
ALP SERPL-CCNC: 127 U/L (ref 40–150)
ALT SERPL W P-5'-P-CCNC: 38 U/L (ref 0–50)
ANION GAP SERPL CALCULATED.3IONS-SCNC: 8 MMOL/L (ref 3–14)
AST SERPL W P-5'-P-CCNC: 21 U/L (ref 0–45)
BASOPHILS # BLD AUTO: 0.1 10E9/L (ref 0–0.2)
BASOPHILS NFR BLD AUTO: 0.6 %
BILIRUB SERPL-MCNC: 0.2 MG/DL (ref 0.2–1.3)
BUN SERPL-MCNC: 10 MG/DL (ref 7–30)
CALCIUM SERPL-MCNC: 9.3 MG/DL (ref 8.5–10.1)
CHLORIDE SERPL-SCNC: 100 MMOL/L (ref 94–109)
CO2 SERPL-SCNC: 33 MMOL/L (ref 20–32)
CREAT SERPL-MCNC: 0.63 MG/DL (ref 0.52–1.04)
DIFFERENTIAL METHOD BLD: ABNORMAL
EOSINOPHIL # BLD AUTO: 0.2 10E9/L (ref 0–0.7)
EOSINOPHIL NFR BLD AUTO: 1.1 %
ERYTHROCYTE [DISTWIDTH] IN BLOOD BY AUTOMATED COUNT: 12.6 % (ref 10–15)
GFR SERPL CREATININE-BSD FRML MDRD: ABNORMAL ML/MIN/1.7M2
GLUCOSE SERPL-MCNC: 90 MG/DL (ref 70–99)
HCT VFR BLD AUTO: 41.9 % (ref 35–47)
HGB BLD-MCNC: 14.4 G/DL (ref 11.7–15.7)
IMM GRANULOCYTES # BLD: 0.1 10E9/L (ref 0–0.4)
IMM GRANULOCYTES NFR BLD: 0.3 %
LIPASE SERPL-CCNC: 131 U/L (ref 73–393)
LYMPHOCYTES # BLD AUTO: 4.3 10E9/L (ref 0.8–5.3)
LYMPHOCYTES NFR BLD AUTO: 28.5 %
MCH RBC QN AUTO: 30 PG (ref 26.5–33)
MCHC RBC AUTO-ENTMCNC: 34.4 G/DL (ref 31.5–36.5)
MCV RBC AUTO: 87 FL (ref 78–100)
MONOCYTES # BLD AUTO: 1 10E9/L (ref 0–1.3)
MONOCYTES NFR BLD AUTO: 6.7 %
NEUTROPHILS # BLD AUTO: 9.5 10E9/L (ref 1.6–8.3)
NEUTROPHILS NFR BLD AUTO: 62.8 %
PLATELET # BLD AUTO: 377 10E9/L (ref 150–450)
POTASSIUM SERPL-SCNC: 3.1 MMOL/L (ref 3.4–5.3)
PROT SERPL-MCNC: 7.9 G/DL (ref 6.8–8.8)
RBC # BLD AUTO: 4.8 10E12/L (ref 3.8–5.2)
RETICS # AUTO: 91.1 10E9/L (ref 25–95)
RETICS/RBC NFR AUTO: 1.9 % (ref 0.5–2)
SODIUM SERPL-SCNC: 141 MMOL/L (ref 133–144)
WBC # BLD AUTO: 15.2 10E9/L (ref 4–11)

## 2017-02-07 PROCEDURE — 85045 AUTOMATED RETICULOCYTE COUNT: CPT | Performed by: FAMILY MEDICINE

## 2017-02-07 PROCEDURE — 85060 BLOOD SMEAR INTERPRETATION: CPT | Performed by: FAMILY MEDICINE

## 2017-02-07 PROCEDURE — 80053 COMPREHEN METABOLIC PANEL: CPT | Performed by: FAMILY MEDICINE

## 2017-02-07 PROCEDURE — 99214 OFFICE O/P EST MOD 30 MIN: CPT | Performed by: FAMILY MEDICINE

## 2017-02-07 PROCEDURE — 83690 ASSAY OF LIPASE: CPT | Performed by: FAMILY MEDICINE

## 2017-02-07 PROCEDURE — 36415 COLL VENOUS BLD VENIPUNCTURE: CPT | Performed by: FAMILY MEDICINE

## 2017-02-07 PROCEDURE — 85025 COMPLETE CBC W/AUTO DIFF WBC: CPT | Performed by: FAMILY MEDICINE

## 2017-02-07 PROCEDURE — 40000847 ZZHCL STATISTIC MORPHOLOGY W/INTERP HISTOLOGY TC 85060: Performed by: FAMILY MEDICINE

## 2017-02-07 RX ORDER — PANTOPRAZOLE SODIUM 40 MG/1
40 TABLET, DELAYED RELEASE ORAL DAILY
Qty: 30 TABLET | Refills: 1 | Status: SHIPPED | OUTPATIENT
Start: 2017-02-07 | End: 2017-02-22

## 2017-02-07 ASSESSMENT — ANXIETY QUESTIONNAIRES
IF YOU CHECKED OFF ANY PROBLEMS ON THIS QUESTIONNAIRE, HOW DIFFICULT HAVE THESE PROBLEMS MADE IT FOR YOU TO DO YOUR WORK, TAKE CARE OF THINGS AT HOME, OR GET ALONG WITH OTHER PEOPLE: NOT DIFFICULT AT ALL
2. NOT BEING ABLE TO STOP OR CONTROL WORRYING: NOT AT ALL
7. FEELING AFRAID AS IF SOMETHING AWFUL MIGHT HAPPEN: NOT AT ALL
GAD7 TOTAL SCORE: 0
5. BEING SO RESTLESS THAT IT IS HARD TO SIT STILL: NOT AT ALL
6. BECOMING EASILY ANNOYED OR IRRITABLE: NOT AT ALL
1. FEELING NERVOUS, ANXIOUS, OR ON EDGE: NOT AT ALL
3. WORRYING TOO MUCH ABOUT DIFFERENT THINGS: NOT AT ALL

## 2017-02-07 ASSESSMENT — PAIN SCALES - GENERAL: PAINLEVEL: MILD PAIN (2)

## 2017-02-07 ASSESSMENT — PATIENT HEALTH QUESTIONNAIRE - PHQ9: 5. POOR APPETITE OR OVEREATING: NOT AT ALL

## 2017-02-07 NOTE — PROGRESS NOTES
SUBJECTIVE:                                                    Virginia Valenzuela is a 41 year old female who presents to clinic today for the following health issues:    Upper Abdomen  Duration of pain-around 6 months  Intensity- Currently 2/10, Worst 8/10  Radiating- n/a  What relives the pain-No relief  Cause-unknown    Otherwise healthy 41-year-old comes in with 6 months of on and off epigastric pain. Can get severe, this is usually after meals in the late evening. Denies true reflux sensation. Pain can really come any time though. Minimal EtOH. No nausea or vomiting. No blood in her stools, black or red. She has taken Prilosec for the last 3 weeks, but not sure if there's been change. No constipation or diarrhea.        Problem list and histories reviewed & adjusted, as indicated.  Additional history: as documented        ROS:  Constitutional, HEENT, cardiovascular, pulmonary, gi and gu systems are negative, except as otherwise noted.    OBJECTIVE:                                                    /84 mmHg  Pulse 88  Temp(Src) 97.6  F (36.4  C) (Temporal)  Resp 18  Wt 204 lb 8 oz (92.761 kg)  SpO2 97%  Body mass index is 34.74 kg/(m^2).  Well-appearing and nontoxic. Neck supple lymphadenopathy. Oral mucosa pink and moist without lesion. Heart regular without murmur. Lungs clear and unlabored. Abdomen with mild epigastric tenderness, no distention rigidity or guarding. Extremities warm and perfused intact pulses.    Diagnostic Test Results:  none      ASSESSMENT/PLAN:                                                              ICD-10-CM    1. Abdominal pain, epigastric R10.13 Lipase     Comprehensive metabolic panel     CBC with platelets differential     pantoprazole (PROTONIX) 40 MG EC tablet     BLOOD MORPHOLOGY PATHOLOGIST REVIEW   2. Leukocytosis, unspecified type D72.829 RETICULOCYTE COUNT     BLOOD MORPHOLOGY PATHOLOGIST REVIEW       Likely gastritis versus PUD, but rule out indolent  pancreatitis. Given a handout on dietary triggers for reflux and gastritis. We'll place on a PPI and check routine labs. Some of those came back with leukocytosis, which looking back has been persistent. Will do a smear. She should start a record of her pain and return in 2-3 weeks for recheck.    Marlon Marx MD  [facility]

## 2017-02-07 NOTE — NURSING NOTE
"Chief Complaint   Patient presents with     Abdominal Pain       Initial /84 mmHg  Pulse 88  Temp(Src) 97.6  F (36.4  C) (Temporal)  Resp 18  Wt 204 lb 8 oz (92.761 kg)  SpO2 97% Estimated body mass index is 34.74 kg/(m^2) as calculated from the following:    Height as of 1/11/17: 5' 4.33\" (1.634 m).    Weight as of this encounter: 204 lb 8 oz (92.761 kg).  Medication Reconciliation: complete   Guerda Valdes CMA     "

## 2017-02-08 ENCOUNTER — HOSPITAL ENCOUNTER (OUTPATIENT)
Dept: OCCUPATIONAL THERAPY | Facility: CLINIC | Age: 42
Setting detail: THERAPIES SERIES
End: 2017-02-08
Attending: PHYSICIAN ASSISTANT
Payer: COMMERCIAL

## 2017-02-08 LAB — COPATH REPORT: NORMAL

## 2017-02-08 PROCEDURE — 97035 APP MDLTY 1+ULTRASOUND EA 15: CPT | Mod: GO | Performed by: OCCUPATIONAL THERAPIST

## 2017-02-08 PROCEDURE — 97110 THERAPEUTIC EXERCISES: CPT | Mod: GO | Performed by: OCCUPATIONAL THERAPIST

## 2017-02-08 PROCEDURE — 40000839 ZZH STATISTIC HAND THERAPY VISIT: Performed by: OCCUPATIONAL THERAPIST

## 2017-02-08 PROCEDURE — 97140 MANUAL THERAPY 1/> REGIONS: CPT | Mod: GO | Performed by: OCCUPATIONAL THERAPIST

## 2017-02-08 ASSESSMENT — PATIENT HEALTH QUESTIONNAIRE - PHQ9: SUM OF ALL RESPONSES TO PHQ QUESTIONS 1-9: 1

## 2017-02-08 ASSESSMENT — ANXIETY QUESTIONNAIRES: GAD7 TOTAL SCORE: 0

## 2017-02-09 ENCOUNTER — MYC MEDICAL ADVICE (OUTPATIENT)
Dept: FAMILY MEDICINE | Facility: CLINIC | Age: 42
End: 2017-02-09

## 2017-02-09 DIAGNOSIS — F17.200 TOBACCO USE DISORDER: Primary | ICD-10-CM

## 2017-02-10 RX ORDER — BUPROPION HYDROCHLORIDE 150 MG/1
150 TABLET ORAL EVERY MORNING
Qty: 30 TABLET | Refills: 1 | Status: SHIPPED | OUTPATIENT
Start: 2017-02-10 | End: 2017-07-11

## 2017-02-22 ENCOUNTER — OFFICE VISIT (OUTPATIENT)
Dept: FAMILY MEDICINE | Facility: CLINIC | Age: 42
End: 2017-02-22
Payer: COMMERCIAL

## 2017-02-22 VITALS
RESPIRATION RATE: 20 BRPM | OXYGEN SATURATION: 97 % | WEIGHT: 200.2 LBS | HEART RATE: 91 BPM | TEMPERATURE: 97.7 F | DIASTOLIC BLOOD PRESSURE: 80 MMHG | BODY MASS INDEX: 34.01 KG/M2 | SYSTOLIC BLOOD PRESSURE: 128 MMHG

## 2017-02-22 DIAGNOSIS — R10.9 ABDOMINAL WALL PAIN: Primary | ICD-10-CM

## 2017-02-22 PROCEDURE — 99213 OFFICE O/P EST LOW 20 MIN: CPT | Performed by: FAMILY MEDICINE

## 2017-02-22 ASSESSMENT — PAIN SCALES - GENERAL: PAINLEVEL: NO PAIN (1)

## 2017-02-22 NOTE — PROGRESS NOTES
SUBJECTIVE:                                                    Virginia Valenzuela is a 41 year old female who presents to clinic today for the following health issues:      Chief Complaint   Patient presents with     RECHECK     abdominal pain, states that it gets worse/better with movement.  Food doesn't really bother her.       Recheck abdominal pain. 8 seen her prior. Sound like reflux. Her labs were unremaluding a lipase. This is epigastric in nature. Since our discussion she has been watching more closely. She noticed more pain with movement essentially. No change in bowel movements or meals.    pelvic        Problem list and histories reviewed & adjusted, as indicated.  Additional history:         ROS:  Constitutional, HEENT, cardiovascular, pulmonary, gi and gu systems are negative, except as otherwise noted.    OBJECTIVE:                                                    /80 (BP Location: Right arm, Patient Position: Chair, Cuff Size: Adult Regular)  Pulse 91  Temp 97.7  F (36.5  C) (Temporal)  Resp 20  Wt 200 lb 3.2 oz (90.8 kg)  SpO2 97%  BMI 34.01 kg/m2  Body mass index is 34.01 kg/(m^2).  Well-appearing female. This time she is tender in the epigastrium, as before, but also more superficially. There is no swelling. There is no overlying skin change. No normal bowel sounds. No rigidity or guarding.    Diagnostic Test Results:  none      ASSESSMENT/PLAN:                                                              ICD-10-CM    1. Abdominal wall pain R10.9        I think this is more of an abdominal wall strain. We'll treat with observation for now. We may elect to pursue a physical therapy in the  Future.    Marlon Marx MD  [facility]

## 2017-02-22 NOTE — NURSING NOTE
"Chief Complaint   Patient presents with     RECHECK     abdominal pain, states that it gets worse/better with movement.  Food doesn't really bother her.         Initial /80 (BP Location: Right arm, Patient Position: Chair, Cuff Size: Adult Regular)  Pulse 91  Temp 97.7  F (36.5  C) (Temporal)  Resp 20  Wt 200 lb 3.2 oz (90.8 kg)  SpO2 97%  BMI 34.01 kg/m2 Estimated body mass index is 34.01 kg/(m^2) as calculated from the following:    Height as of 1/11/17: 5' 4.33\" (1.634 m).    Weight as of this encounter: 200 lb 3.2 oz (90.8 kg).  Medication Reconciliation: complete   Guerda Valdes CMA     "

## 2017-02-22 NOTE — MR AVS SNAPSHOT
After Visit Summary   2/22/2017    Virginia Valenzuela    MRN: [id number]           Patient Information     Date Of Birth          1975        Visit Information        Provider Department      2/22/2017 8:40 AM Marlon Marx MD [facility]        Today's Diagnoses     Abdominal wall pain    -  1       Follow-ups after your visit        Who to contact     If you have questions or need follow up information about today's clinic visit or your schedule please contact [facility] directly at [phone].  Normal or non-critical lab and imaging results will be communicated to you by epacubehart, letter or phone within 4 business days after the clinic has received the results. If you do not hear from us within 7 days, please contact the clinic through [employer] or phone. If you have a critical or abnormal lab result, we will notify you by phone as soon as possible.  Submit refill requests through [employer] or call your pharmacy and they will forward the refill request to us. Please allow 3 business days for your refill to be completed.          Additional Information About Your Visit        MyChart Information     [employer] gives you secure access to your electronic health record. If you see a primary care provider, you can also send messages to your care team and make appointments. If you have questions, please call your primary care clinic.  If you do not have a primary care provider, please call [phone] and they will assist you.        Care EveryWhere ID     This is your Care EveryWhere ID. This could be used by other organizations to access your [facility] medical records  [id number]        Your Vitals Were     Pulse Temperature Respirations Pulse Oximetry BMI (Body Mass Index)       91 97.7  F (36.5  C) (Temporal) 20 97% 34.01 kg/m2        Blood Pressure from Last 3 Encounters:   02/22/17 128/80   02/07/17 122/84   02/03/17 137/70    Weight from Last 3 Encounters:    02/22/17 200 lb 3.2 oz (90.8 kg)   02/07/17 204 lb 8 oz (92.8 kg)   01/20/17 201 lb 12.8 oz (91.5 kg)              Today, you had the following     No orders found for display         Today's Medication Changes          These changes are accurate as of: 2/22/17 11:59 PM.  If you have any questions, ask your nurse or doctor.               Stop taking these medicines if you haven't already. Please contact your care team if you have questions.     hydrOXYzine 25 MG capsule   Commonly known as:  VISTARIL   Stopped by:  Marlon Marx MD           ibuprofen 600 MG tablet   Commonly known as:  ADVIL/MOTRIN   Stopped by:  Marlon Marx MD           oxyCODONE 5 MG IR tablet   Commonly known as:  ROXICODONE   Stopped by:  Marlon Marx MD           pantoprazole 40 MG EC tablet   Commonly known as:  PROTONIX   Stopped by:  Marlon Marx MD           ranitidine 150 MG tablet   Commonly known as:  ZANTAC   Stopped by:  Marlon Marx MD           traMADol 50 MG tablet   Commonly known as:  ULTRAM   Stopped by:  Marlon Marx MD                    Primary Care Provider Office Phone #    [facility] [phone]       No address on file        Thank you!     Thank you for choosing [facility]  for your care. Our goal is always to provide you with excellent care. Hearing back from our patients is one way we can continue to improve our services. Please take a few minutes to complete the written survey that you may receive in the mail after your visit with us. Thank you!             Your Updated Medication List - Protect others around you: Learn how to safely use, store and throw away your medicines at www.disposemymeds.org.          This list is accurate as of: 2/22/17 11:59 PM.  Always use your most recent med list.                   Brand Name Dispense Instructions for use    buPROPion 150 MG 24 hr tablet    WELLBUTRIN XL    30 tablet     Take 1 tablet (150 mg) by mouth every morning       cetirizine 10 MG tablet    zyrTEC    90 tablet    Take 1 tablet (10 mg) by mouth every evening       EPINEPHrine 0.3 MG/0.3ML injection     1 each    Inject 0.3 mLs (0.3 mg) into the muscle once as needed       losartan-hydrochlorothiazide 50-12.5 MG per tablet    HYZAAR     Take 1 tablet by mouth daily       nicotine 7 MG/24HR 24 hr patch    NICODERM CQ    30 patch    Place 1-2 patches onto the skin every 24 hours       NORVASC PO      Take 10 mg by mouth       PROBIOTIC DAILY PO      Take by mouth daily       vitamin D 2000 UNITS tablet     100 tablet    Take 2,000 Units by mouth daily

## 2017-02-27 ENCOUNTER — MYC MEDICAL ADVICE (OUTPATIENT)
Dept: FAMILY MEDICINE | Facility: CLINIC | Age: 42
End: 2017-02-27

## 2017-02-27 DIAGNOSIS — B96.89 BV (BACTERIAL VAGINOSIS): ICD-10-CM

## 2017-02-27 DIAGNOSIS — N76.0 BV (BACTERIAL VAGINOSIS): ICD-10-CM

## 2017-03-01 ENCOUNTER — MYC MEDICAL ADVICE (OUTPATIENT)
Dept: OBGYN | Facility: CLINIC | Age: 42
End: 2017-03-01

## 2017-03-01 RX ORDER — METRONIDAZOLE 500 MG/1
500 TABLET ORAL 2 TIMES DAILY
Qty: 14 TABLET | Refills: 0 | Status: SHIPPED | OUTPATIENT
Start: 2017-03-01 | End: 2017-03-14

## 2017-03-01 NOTE — TELEPHONE ENCOUNTER
Yeni, does this patient need to be seen in clinic for a prescription? I assume yes, however, she is requesting a prescription without having a visit if possible. See [employer] message below.    Thanks,   RONNIE Salazar

## 2017-03-01 NOTE — TELEPHONE ENCOUNTER
Responded via [employer].    Guideline used: Vaginal discharge/pain/itching  Telephone Triage Protocols for Nurses, Fourth Edition, Yoly Zhang  Bacterial Vaginosis Clinical References    Marie Osorio, RN, BSN

## 2017-03-01 NOTE — TELEPHONE ENCOUNTER
DR. Marx, patient sent a my chart message asking for Flagyl for Sx of BV. This was last filled on 4/21/16 by DR. Eda Underwood. Would you be willing to refill it or do you want her to schedule appt?  RONNIE Hayes    Routing refill request to provider for review/approval because:  Drug not active on patient's medication list and NOT on the refill protocol list for RN's........................RONNIE Hayes

## 2017-03-02 NOTE — TELEPHONE ENCOUNTER
Message left for patient that prescription was filled and if symptoms are not improved to give us a call at the clinic. Virginia Carrillo LPN

## 2017-03-03 ENCOUNTER — TELEPHONE (OUTPATIENT)
Dept: FAMILY MEDICINE | Facility: CLINIC | Age: 42
End: 2017-03-03

## 2017-03-03 NOTE — TELEPHONE ENCOUNTER
"Virginia Valenzuela is a 41 year old female who calls with \"ovary pain\".    NURSING ASSESSMENT:  Description:  Left sided pain at ovary site.  Onset/duration:  One or two months  Precip. factors:  Has history of Ovarian cyst.  Associated symptoms:  n/a      NURSING PLAN: Nursing advice to patient Make appt to be seen.    RECOMMENDED DISPOSITION:  See within 2 weeks - Appt made  Will comply with recommendation: Yes  If further questions/concerns or if symptoms do not improve, worsen or new symptoms develop, call your PCP or [facility] as soon as possible.      Guideline used:  Telephone Triage Protocols for Nurses, Fifth Edition, Yoly Jay RN    "

## 2017-03-14 ENCOUNTER — OFFICE VISIT (OUTPATIENT)
Dept: FAMILY MEDICINE | Facility: CLINIC | Age: 42
End: 2017-03-14
Payer: COMMERCIAL

## 2017-03-14 VITALS
RESPIRATION RATE: 16 BRPM | WEIGHT: 198 LBS | HEIGHT: 64 IN | TEMPERATURE: 96.8 F | HEART RATE: 84 BPM | OXYGEN SATURATION: 97 % | DIASTOLIC BLOOD PRESSURE: 84 MMHG | SYSTOLIC BLOOD PRESSURE: 124 MMHG | BODY MASS INDEX: 33.8 KG/M2

## 2017-03-14 DIAGNOSIS — N92.1 MENORRHAGIA WITH IRREGULAR CYCLE: Primary | ICD-10-CM

## 2017-03-14 DIAGNOSIS — R10.2 PELVIC PAIN IN FEMALE: ICD-10-CM

## 2017-03-14 LAB
ALBUMIN UR-MCNC: NEGATIVE MG/DL
APPEARANCE UR: CLEAR
BILIRUB UR QL STRIP: NEGATIVE
COLOR UR AUTO: YELLOW
GLUCOSE UR STRIP-MCNC: NEGATIVE MG/DL
HGB UR QL STRIP: NEGATIVE
KETONES UR STRIP-MCNC: NEGATIVE MG/DL
LEUKOCYTE ESTERASE UR QL STRIP: NEGATIVE
NITRATE UR QL: NEGATIVE
PH UR STRIP: 7.5 PH (ref 5–7)
SP GR UR STRIP: 1.01 (ref 1–1.03)
URN SPEC COLLECT METH UR: ABNORMAL
UROBILINOGEN UR STRIP-ACNC: 0.2 EU/DL (ref 0.2–1)

## 2017-03-14 PROCEDURE — 81003 URINALYSIS AUTO W/O SCOPE: CPT | Performed by: OBSTETRICS & GYNECOLOGY

## 2017-03-14 PROCEDURE — 88305 TISSUE EXAM BY PATHOLOGIST: CPT | Performed by: OBSTETRICS & GYNECOLOGY

## 2017-03-14 PROCEDURE — 58100 BIOPSY OF UTERUS LINING: CPT | Performed by: OBSTETRICS & GYNECOLOGY

## 2017-03-14 PROCEDURE — 99214 OFFICE O/P EST MOD 30 MIN: CPT | Mod: 25 | Performed by: OBSTETRICS & GYNECOLOGY

## 2017-03-14 PROCEDURE — 88305 TISSUE EXAM BY PATHOLOGIST: CPT | Mod: 26 | Performed by: OBSTETRICS & GYNECOLOGY

## 2017-03-14 ASSESSMENT — PAIN SCALES - GENERAL: PAINLEVEL: MILD PAIN (3)

## 2017-03-14 NOTE — MR AVS SNAPSHOT
After Visit Summary   3/14/2017    Virginia Valenzuela    MRN: [id number]           Patient Information     Date Of Birth          1975        Visit Information        Provider Department      3/14/2017 9:50 AM Nate Mishra MD [facility]        Today's Diagnoses     Menorrhagia with irregular cycle    -  1    Pelvic pain in female           Follow-ups after your visit        Your next 10 appointments already scheduled     Mar 21, 2017 11:20 AM CDT   US PELVIC COMPLETE W TRANSVAGINAL with PHUS1   [facility] ([facility])    [address] [address]   [phone]           Please bring a list of your medicines (including vitamins, minerals and over-the-counter drugs). Also, tell your doctor about any allergies you may have. Wear comfortable clothes and leave your valuables at home.  Adults: Drink six 8-ounce glasses of fluid one hour before your exam. Do NOT empty your bladder.  If you need to empty your bladder before your exam, try to release only a little bit of urine. Then, drink another 8oz glass of fluid.  Children: Children who are potty trained should drink at least 4 cups (32 oz) of liquid 45 minutes to one hour prior to the exam. The child s bladder must be full in order to achieve a diagnostic exam. If your child is very uncomfortable or has an urgent need to pee, please notify a technologist; they will try to find out how much longer the wait may be and provide instructions to help relieve the pressure. Occasionally it is medically necessary to insert a urinary catheter to fill the bladder.  Please call the Imaging Department at your exam site with any questions.            Mar 22, 2017 10:50 AM CDT   Office Visit with Nate Mishra MD   [facility]    [address] [id number] [phone]           Bring a current list of meds  "and any records pertaining to this visit.  For Physicals, please bring immunization records and any forms needing to be filled out.  Please arrive 10 minutes early to complete paperwork.              Future tests that were ordered for you today     Open Future Orders        Priority Expected Expires Ordered    US Pelvic Complete w Transvaginal Routine 6/12/2017 3/14/2018 3/14/2017            Who to contact     If you have questions or need follow up information about today's clinic visit or your schedule please contact [facility] directly at [phone].  Normal or non-critical lab and imaging results will be communicated to you by [employer], letter or phone within 4 business days after the clinic has received the results. If you do not hear from us within 7 days, please contact the clinic through [employer] or phone. If you have a critical or abnormal lab result, we will notify you by phone as soon as possible.  Submit refill requests through [employer] or call your pharmacy and they will forward the refill request to us. Please allow 3 business days for your refill to be completed.          Additional Information About Your Visit        [employer] Information     [employer] gives you secure access to your electronic health record. If you see a primary care provider, you can also send messages to your care team and make appointments. If you have questions, please call your primary care clinic.  If you do not have a primary care provider, please call [phone] and they will assist you.        Care EveryWhere ID     This is your Care EveryWhere ID. This could be used by other organizations to access your [place] medical records  [id number]        Your Vitals Were     Pulse Temperature Respirations Height Last Period Pulse Oximetry    84 96.8  F (36  C) (Tympanic) 16 5' 4\" (1.626 m) 03/09/2017 97%    Breastfeeding? BMI (Body Mass Index)                No 33.99 kg/m2           Blood Pressure from Last 3 " Encounters:   03/14/17 124/84   02/22/17 128/80   02/07/17 122/84    Weight from Last 3 Encounters:   03/14/17 198 lb (89.8 kg)   02/22/17 200 lb 3.2 oz (90.8 kg)   02/07/17 204 lb 8 oz (92.8 kg)              We Performed the Following     *UA reflex to Microscopic and Culture ([facility], [place] and [facility] (except [place] and [place])     ENDOMETRIAL BIOPSY W/O CERVICAL DILATION     Surgical pathology exam        Primary Care Provider Office Phone # Fax #    Kerlinerae Santos Marx [phone]       [facility] [address] [place] [address]        Thank you!     Thank you for choosing [facility]  for your care. Our goal is always to provide you with excellent care. Hearing back from our patients is one way we can continue to improve our services. Please take a few minutes to complete the written survey that you may receive in the mail after your visit with us. Thank you!             Your Updated Medication List - Protect others around you: Learn how to safely use, store and throw away your medicines at www.disposemymeds.org.          This list is accurate as of: 3/14/17 10:20 AM.  Always use your most recent med list.                   Brand Name Dispense Instructions for use    buPROPion 150 MG 24 hr tablet    WELLBUTRIN XL    30 tablet    Take 1 tablet (150 mg) by mouth every morning       cetirizine 10 MG tablet    zyrTEC    90 tablet    Take 1 tablet (10 mg) by mouth every evening       EPINEPHrine 0.3 MG/0.3ML injection     1 each    Inject 0.3 mLs (0.3 mg) into the muscle once as needed       losartan-hydrochlorothiazide 50-12.5 MG per tablet    HYZAAR     Take 1 tablet by mouth daily       nicotine 7 MG/24HR 24 hr patch    NICODERM CQ    30 patch    Place 1-2 patches onto the skin every 24 hours       NORVASC PO      Take 10 mg by mouth       PROBIOTIC DAILY PO      Take by mouth daily       vitamin D 2000 UNITS tablet     100 tablet    Take  2,000 Units by mouth daily

## 2017-03-14 NOTE — PROGRESS NOTES
SUBJECTIVE:                                                      Referral from Marlon Marx       Reason for consultation:  Pelvic pain and irregular menses.    History:  Virginia  Is a 41  year old female  3 para ( 2 SVDs and 1 ectopic preg treated with MTX )   who presents today because of  Pelvic pain which is maily left sided but extends across the entire pelvis at times. Sx for about 2 months. Also having irregular periods. None last month. Spotting this month. It is unpredictable.    Last pap 7/6/15- NIL          Patient Active Problem List   Diagnosis     HAMIDA (obstructive sleep apnea)     Generalized anxiety disorder     Seasonal allergic rhinitis     GERD (gastroesophageal reflux disease)     Vitamin D deficiency     Papanicolaou smear of cervix with atypical squamous cells of undetermined significance (ASC-US)     Hypertension goal BP (blood pressure) < 140/90     Ovarian cyst     Female infertility     Tobacco use disorder     Bilateral carpal tunnel syndrome     Past Surgical History   Procedure Laterality Date     Cholecystectomy       Appendectomy       Eye surgery       Genitourinary surgery  2013     Ovary, fallopian tube     Gyn surgery  2013     Ovary, Fallopian tube     Abdomen surgery  2013     ovaian cyst and right ovary removal     Laparoscopic lysis adhesions Left 3/1/2016     Procedure: LAPAROSCOPIC LYSIS ADHESIONS;  Surgeon: Nate Mishra MD;  Location: [facility] OR     Release carpal tunnel Left 2016     Procedure: RELEASE CARPAL TUNNEL;  Surgeon: Gucci Hairston MD;  Location: [facility] OR     Release carpal tunnel Right 2016     Procedure: RELEASE CARPAL TUNNEL;  Surgeon: Gucci Hairston MD;  Location: [facility] OR       Social History   Substance Use Topics     Smoking status: Current Every Day Smoker     Packs/day: 0.75     Years: 10.00     Types: Cigarettes     Smokeless tobacco: Never Used     Alcohol use No     Family History   Problem  "Relation Age of Onset     DIABETES Father      Hypertension Father      Cardiovascular Mother 29     myocarditis      Other Cancer Brother      unknown origin         Current Outpatient Prescriptions   Medication Sig Dispense Refill     AmLODIPine Besylate (NORVASC PO) Take 10 mg by mouth       losartan-hydrochlorothiazide (HYZAAR) 50-12.5 MG per tablet Take 1 tablet by mouth daily       Cholecalciferol (VITAMIN D) 2000 UNITS tablet Take 2,000 Units by mouth daily 100 tablet 5     Probiotic Product (PROBIOTIC DAILY PO) Take by mouth daily       buPROPion (WELLBUTRIN XL) 150 MG 24 hr tablet Take 1 tablet (150 mg) by mouth every morning (Patient not taking: Reported on 2/22/2017) 30 tablet 1     nicotine (NICODERM CQ) 7 MG/24HR 24 hr patch Place 1-2 patches onto the skin every 24 hours (Patient not taking: Reported on 2/22/2017) 30 patch 1     cetirizine (ZYRTEC) 10 MG tablet Take 1 tablet (10 mg) by mouth every evening (Patient not taking: Reported on 3/14/2017) 90 tablet 1     EPINEPHrine (EPIPEN) 0.3 MG/0.3ML injection Inject 0.3 mLs (0.3 mg) into the muscle once as needed (Patient not taking: Reported on 2/22/2017) 1 each 0       ROS:  A 12 point systems review is negative except for what is listed above in the Subjective history.  No dysuria. No vaginal discharge.          OBJECTIVE:                                                    Vital signs: Blood pressure 124/84, pulse 84, temperature 96.8  F (36  C), temperature source Tympanic, resp. rate 16, height 5' 4\" (1.626 m), weight 198 lb (89.8 kg), last menstrual period 03/09/2017, SpO2 97 %, not currently breastfeeding.        HEENT is normal.      Neck is supple, mobile, no adenoapthy or masses palpable. Normal range of motion noted.     Chest is clear to auscultation. No wheezes, rales or rhonchi heard.  cardiac exam is normal with s1, s2, no murmurs or adventitious sounds.Normal rate and rhythm is heard.     Abdomen is soft,  nondistended, No masses felt.No " HSM. No guarding or rigidity or rebound noted. Palpation reveals  no    tenderness   Normal bowel sounds heard.     Pelvic exam:My nurse Brook   was present to chaperone the exam.    The external genitalia appeared normal.      The vaginal vault was without bleeding  or   discharge or odor.      The cervix was smooth and shiny and normal in appearance.          No vaginal support defects were noted,      Bimanual exam revealed a  8 week sized uterus. It does  descend somewhat  well in the vaginal vault.      No adnexal masses were felt.  There was minimal left sided adnexal tenderness.    There was no  cervical motion tenderness.      Exam was  limited by the patient's body habitus.        With my nurse present, and after receiving informed consent from the patient, I performed the endometrial biopsy in the usual fashion using a standard pipelle. The pipelle sounded to 6 cm. I sent the biopsy for pathology. She tolerated the procedure well. She was instructed to call or present to clinic or ER if she has unusual amount of cramping, bleeding, fever or vaginal discharge.                 ASSESSMENT/PLAN:                                                    1.41 year old female  with 2 month history of left sided pelvic pain r/o ovarian cyst.  2. Irregular menses- r/o endometrial hyperplasia although i suspect this is anovulatory cycles due to perimenopause.    I will arrange US and await the embx results and have her rechekc with me in 1 week and then decide further options for treatment.                                                                        Thank you for this consultation.    Copy to  Marlon Marx MD  [facility]

## 2017-03-14 NOTE — NURSING NOTE
"Chief Complaint   Patient presents with     Consult     pelvic pain       Initial /84 (BP Location: Left arm, Patient Position: Chair, Cuff Size: Adult Regular)  Pulse 84  Temp 96.8  F (36  C) (Tympanic)  Resp 16  Ht 5' 4\" (1.626 m)  Wt 198 lb (89.8 kg)  LMP 03/09/2017  SpO2 97%  Breastfeeding? No  BMI 33.99 kg/m2 Estimated body mass index is 33.99 kg/(m^2) as calculated from the following:    Height as of this encounter: 5' 4\" (1.626 m).    Weight as of this encounter: 198 lb (89.8 kg)..   BP completed using cuff size: regular    Brook Peter CMA    "

## 2017-03-15 LAB — COPATH REPORT: NORMAL

## 2017-03-21 ENCOUNTER — HOSPITAL ENCOUNTER (OUTPATIENT)
Dept: ULTRASOUND IMAGING | Facility: CLINIC | Age: 42
Discharge: HOME | End: 2017-03-21
Attending: OBSTETRICS & GYNECOLOGY | Admitting: OBSTETRICS & GYNECOLOGY
Payer: COMMERCIAL

## 2017-03-21 DIAGNOSIS — R10.2 PELVIC PAIN IN FEMALE: ICD-10-CM

## 2017-03-21 PROCEDURE — 93976 VASCULAR STUDY: CPT | Mod: XS

## 2017-03-22 ENCOUNTER — OFFICE VISIT (OUTPATIENT)
Dept: FAMILY MEDICINE | Facility: CLINIC | Age: 42
End: 2017-03-22
Payer: COMMERCIAL

## 2017-03-22 VITALS
OXYGEN SATURATION: 99 % | BODY MASS INDEX: 34.16 KG/M2 | WEIGHT: 199 LBS | SYSTOLIC BLOOD PRESSURE: 134 MMHG | TEMPERATURE: 97.3 F | HEART RATE: 96 BPM | DIASTOLIC BLOOD PRESSURE: 78 MMHG | RESPIRATION RATE: 16 BRPM

## 2017-03-22 DIAGNOSIS — R10.2 PELVIC PAIN IN FEMALE: Primary | ICD-10-CM

## 2017-03-22 PROCEDURE — 99213 OFFICE O/P EST LOW 20 MIN: CPT | Performed by: OBSTETRICS & GYNECOLOGY

## 2017-03-22 ASSESSMENT — PAIN SCALES - GENERAL: PAINLEVEL: SEVERE PAIN (7)

## 2017-03-22 NOTE — PROGRESS NOTES
Subjective: here to discuss results.      The past medical history, social history, past surgical history and family history as shown below have been reviewed by me today.  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     History of appendectomy      Hypertension 2000     Idiopathic urticaria      Obstructive sleep apnea (adult) (pediatric)      Other chronic allergic conjunctivitis      Other malaise and fatigue      Pain in joint, shoulder region      S/P laparoscopic cholecystectomy      Special screening examination for human papillomavirus (HPV)      Sprain of tibiofibular (ligament), distal of ankle      Threatened , unspecified as to episode of care      Tobacco use disorder      Unspecified essential hypertension      Unspecified vitamin D deficiency         Allergies   Allergen Reactions     Amoxicillin Itching     Bees Swelling     FINGER TIPS TO ELBOWS     Current Outpatient Prescriptions   Medication Sig Dispense Refill     buPROPion (WELLBUTRIN XL) 150 MG 24 hr tablet Take 1 tablet (150 mg) by mouth every morning 30 tablet 1     nicotine (NICODERM CQ) 7 MG/24HR 24 hr patch Place 1-2 patches onto the skin every 24 hours 30 patch 1     AmLODIPine Besylate (NORVASC PO) Take 10 mg by mouth       losartan-hydrochlorothiazide (HYZAAR) 50-12.5 MG per tablet Take 1 tablet by mouth daily       Cholecalciferol (VITAMIN D) 2000 UNITS tablet Take 2,000 Units by mouth daily 100 tablet 5     cetirizine (ZYRTEC) 10 MG tablet Take 1 tablet (10 mg) by mouth every evening 90 tablet 1     Probiotic Product (PROBIOTIC DAILY PO) Take by mouth daily       EPINEPHrine (EPIPEN) 0.3 MG/0.3ML injection Inject 0.3 mLs (0.3 mg) into the  muscle once as needed 1 each 0     Past Surgical History:   Procedure Laterality Date     ABDOMEN SURGERY  July 2013    ovaian cyst and right ovary removal     APPENDECTOMY       CHOLECYSTECTOMY       EYE SURGERY  1979     GENITOURINARY SURGERY  July 2013    Ovary, fallopian tube     GYN SURGERY  July 2013    Ovary, Fallopian tube     LAPAROSCOPIC LYSIS ADHESIONS Left 3/1/2016    Procedure: LAPAROSCOPIC LYSIS ADHESIONS;  Surgeon: Nate Mishra MD;  [facility]     RELEASE CARPAL TUNNEL Left 8/31/2016    Procedure: RELEASE CARPAL TUNNEL;  Surgeon: Gucci Hairston MD;  [facility]     RELEASE CARPAL TUNNEL Right 12/7/2016    Procedure: RELEASE CARPAL TUNNEL;  Surgeon: Gucci Hairston MD;  Location: [facility] OR     Social History     Social History     Marital status:      Spouse name: N/A     Number of children: N/A     Years of education: N/A     Social History Main Topics     Smoking status: Current Every Day Smoker     Packs/day: 0.75     Years: 10.00     Types: Cigarettes     Smokeless tobacco: Never Used     Alcohol use No     Drug use: No     Sexual activity: Yes     Partners: Male     Birth control/ protection: None      Comment: Trying to conceive     Other Topics Concern     Parent/Sibling W/ Cabg, Mi Or Angioplasty Before 65f 55m? No     Social History Narrative     Family History   Problem Relation Age of Onset     DIABETES Father      Hypertension Father      Cardiovascular Mother 29     myocarditis      Other Cancer Brother      unknown origin       ROS: A 12 point review of systems was done. Except for what is listed above in the HPI, the systems review is negative .      Objective: Vital signs: Blood pressure 134/78, pulse 96, temperature 97.3  F (36.3  C), temperature source Tympanic, resp. rate 16, weight 199 lb (90.3 kg), last menstrual period 03/09/2017, SpO2 99 %, not currently breastfeeding.    No other exam is repeated today        Assessment/Plan: A total of 20  minutes were spent face-to-face with this patient during today's consultation, with more than 50% of that time devoted to conversation and counseling about the management decisions.      1. Pelvic pain x 2 months- US was normal and the embx was negative. We discussed options for further eval including colonoscopy, MRI pelvis(or CT scan) and laparoscopy.  For now I suggested that she give it a couple of months to see if the pain resolves. If not, then we will consider laparoscopy. She will call and let us know if the pain worsens, then we may consider this sooner.        MARIETTA Mishra MD

## 2017-03-22 NOTE — NURSING NOTE
"Chief Complaint   Patient presents with     Results     US and Biopsy Follow-up       Initial /78 (BP Location: Left arm, Patient Position: Chair, Cuff Size: Adult Large)  Pulse 96  Temp 97.3  F (36.3  C) (Tympanic)  Resp 16  Wt 199 lb (90.3 kg)  LMP 03/09/2017  SpO2 99%  Breastfeeding? No  BMI 34.16 kg/m2 Estimated body mass index is 34.16 kg/(m^2) as calculated from the following:    Height as of 3/14/17: 5' 4\" (1.626 m).    Weight as of this encounter: 199 lb (90.3 kg).  Medication Reconciliation: complete   Alexis Palomino MA      "

## 2017-03-22 NOTE — MR AVS SNAPSHOT
After Visit Summary   3/22/2017    Virginia Valenzuela    MRN: [id number]           Patient Information     Date Of Birth          1975        Visit Information        Provider Department      3/22/2017 10:50 AM Nate Mishra MD [facility]         Follow-ups after your visit        Future tests that were ordered for you today     Open Future Orders        Priority Expected Expires Ordered    US Pelvic Complete w Transvaginal & Abd/Pel Duplex Limited Routine 6/12/2017 3/14/2018 3/14/2017            Who to contact     If you have questions or need follow up information about today's clinic visit or your schedule please contact [facility] directly at [phone].  Normal or non-critical lab and imaging results will be communicated to you by MyChart, letter or phone within 4 business days after the clinic has received the results. If you do not hear from us within 7 days, please contact the clinic through The[employer] or phone. If you have a critical or abnormal lab result, we will notify you by phone as soon as possible.  Submit refill requests through [employer] or call your pharmacy and they will forward the refill request to us. Please allow 3 business days for your refill to be completed.          Additional Information About Your Visit        MyChart Information     [employer] gives you secure access to your electronic health record. If you see a primary care provider, you can also send messages to your care team and make appointments. If you have questions, please call your primary care clinic.  If you do not have a primary care provider, please call [phone] and they will assist you.        Care EveryWhere ID     This is your Care EveryWhere ID. This could be used by other organizations to access your [place] medical records  [id number]        Your Vitals Were     Pulse Temperature Respirations Last Period Pulse Oximetry Breastfeeding?    96 97.3  F (36.3   C) (Tympanic) 16 03/09/2017 99% No    BMI (Body Mass Index)                   34.16 kg/m2            Blood Pressure from Last 3 Encounters:   03/22/17 134/78   03/14/17 124/84   02/22/17 128/80    Weight from Last 3 Encounters:   03/22/17 199 lb (90.3 kg)   03/14/17 198 lb (89.8 kg)   02/22/17 200 lb 3.2 oz (90.8 kg)              Today, you had the following     No orders found for display       Primary Care Provider Office Phone # Fax #    Marlon Marx -[phone]       [address] [place] [address]        Thank you!     Thank you for choosing [facility]  for your care. Our goal is always to provide you with excellent care. Hearing back from our patients is one way we can continue to improve our services. Please take a few minutes to complete the written survey that you may receive in the mail after your visit with us. Thank you!             Your Updated Medication List - Protect others around you: Learn how to safely use, store and throw away your medicines at www.disposemymeds.org.          This list is accurate as of: 3/22/17 11:06 AM.  Always use your most recent med list.                   Brand Name Dispense Instructions for use    buPROPion 150 MG 24 hr tablet    WELLBUTRIN XL    30 tablet    Take 1 tablet (150 mg) by mouth every morning       cetirizine 10 MG tablet    zyrTEC    90 tablet    Take 1 tablet (10 mg) by mouth every evening       EPINEPHrine 0.3 MG/0.3ML injection     1 each    Inject 0.3 mLs (0.3 mg) into the muscle once as needed       losartan-hydrochlorothiazide 50-12.5 MG per tablet    HYZAAR     Take 1 tablet by mouth daily       nicotine 7 MG/24HR 24 hr patch    NICODERM CQ    30 patch    Place 1-2 patches onto the skin every 24 hours       NORVASC PO      Take 10 mg by mouth       PROBIOTIC DAILY PO      Take by mouth daily       vitamin D 2000 UNITS tablet     100 tablet    Take 2,000 Units by mouth daily

## 2017-04-18 ENCOUNTER — OFFICE VISIT (OUTPATIENT)
Dept: FAMILY MEDICINE | Facility: CLINIC | Age: 42
End: 2017-04-18
Payer: COMMERCIAL

## 2017-04-18 ENCOUNTER — HOSPITAL ENCOUNTER (OUTPATIENT)
Dept: GENERAL RADIOLOGY | Facility: CLINIC | Age: 42
Discharge: HOME | End: 2017-04-18
Attending: NURSE PRACTITIONER | Admitting: NURSE PRACTITIONER
Payer: COMMERCIAL

## 2017-04-18 VITALS
BODY MASS INDEX: 34.84 KG/M2 | SYSTOLIC BLOOD PRESSURE: 122 MMHG | HEART RATE: 80 BPM | WEIGHT: 203 LBS | TEMPERATURE: 98.4 F | DIASTOLIC BLOOD PRESSURE: 80 MMHG

## 2017-04-18 DIAGNOSIS — M25.80 SESAMOIDITIS: Primary | ICD-10-CM

## 2017-04-18 DIAGNOSIS — M79.645 THUMB PAIN, LEFT: ICD-10-CM

## 2017-04-18 LAB
CRP SERPL-MCNC: 14 MG/L (ref 0–8)
ERYTHROCYTE [SEDIMENTATION RATE] IN BLOOD BY WESTERGREN METHOD: 17 MM/H (ref 0–20)

## 2017-04-18 PROCEDURE — 73140 X-RAY EXAM OF FINGER(S): CPT | Mod: TC

## 2017-04-18 PROCEDURE — 36415 COLL VENOUS BLD VENIPUNCTURE: CPT | Performed by: NURSE PRACTITIONER

## 2017-04-18 PROCEDURE — 99213 OFFICE O/P EST LOW 20 MIN: CPT | Performed by: NURSE PRACTITIONER

## 2017-04-18 PROCEDURE — 86140 C-REACTIVE PROTEIN: CPT | Performed by: NURSE PRACTITIONER

## 2017-04-18 PROCEDURE — 85652 RBC SED RATE AUTOMATED: CPT | Performed by: NURSE PRACTITIONER

## 2017-04-18 RX ORDER — NAPROXEN 500 MG/1
500 TABLET ORAL 2 TIMES DAILY PRN
Qty: 30 TABLET | Refills: 1 | Status: SHIPPED | OUTPATIENT
Start: 2017-04-18 | End: 2017-07-11

## 2017-04-18 RX ORDER — HYDROCODONE BITARTRATE AND ACETAMINOPHEN 5; 325 MG/1; MG/1
1 TABLET ORAL
Qty: 20 TABLET | Refills: 0 | Status: SHIPPED | OUTPATIENT
Start: 2017-04-18 | End: 2017-07-11

## 2017-04-18 NOTE — MR AVS SNAPSHOT
After Visit Summary   4/18/2017    Virginia Valenzuela    MRN: [id number]           Patient Information     Date Of Birth          1975        Visit Information        Provider Department      4/18/2017 1:00 PM Fariha Gu APRN CNP [facility]        Today's Diagnoses     Thumb pain, left    -  1       Follow-ups after your visit        Future tests that were ordered for you today     Open Future Orders        Priority Expected Expires Ordered    XR Finger Left G/E 2 Views Routine 4/18/2017 4/18/2018 4/18/2017            Who to contact     If you have questions or need follow up information about today's clinic visit or your schedule please contact [facility] directly at [phone].  Normal or non-critical lab and imaging results will be communicated to you by [employer], letter or phone within 4 business days after the clinic has received the results. If you do not hear from us within 7 days, please contact the clinic through [employer] or phone. If you have a critical or abnormal lab result, we will notify you by phone as soon as possible.  Submit refill requests through [employer] or call your pharmacy and they will forward the refill request to us. Please allow 3 business days for your refill to be completed.          Additional Information About Your Visit        MyChart Information     [employer] gives you secure access to your electronic health record. If you see a primary care provider, you can also send messages to your care team and make appointments. If you have questions, please call your primary care clinic.  If you do not have a primary care provider, please call [phone] and they will assist you.        Care EveryWhere ID     This is your Care EveryWhere ID. This could be used by other organizations to access your [place] medical records  [id number]        Your Vitals Were     Pulse Temperature BMI (Body Mass Index)             80 98.4  F (36.9  C)  (Tympanic) 34.84 kg/m2          Blood Pressure from Last 3 Encounters:   04/18/17 122/80   03/22/17 134/78   03/14/17 124/84    Weight from Last 3 Encounters:   04/18/17 203 lb (92.1 kg)   03/22/17 199 lb (90.3 kg)   03/14/17 198 lb (89.8 kg)              We Performed the Following     CRP, inflammation     Erythrocyte sedimentation rate auto        Primary Care Provider Office Phone # Fax #    Kerlinerae Santos Marx [phone]       [address] [place] [address]        Thank you!     Thank you for choosing [facility]  for your care. Our goal is always to provide you with excellent care. Hearing back from our patients is one way we can continue to improve our services. Please take a few minutes to complete the written survey that you may receive in the mail after your visit with us. Thank you!             Your Updated Medication List - Protect others around you: Learn how to safely use, store and throw away your medicines at www.disposemymeds.org.          This list is accurate as of: 4/18/17  1:29 PM.  Always use your most recent med list.                   Brand Name Dispense Instructions for use    buPROPion 150 MG 24 hr tablet    WELLBUTRIN XL    30 tablet    Take 1 tablet (150 mg) by mouth every morning       cetirizine 10 MG tablet    zyrTEC    90 tablet    Take 1 tablet (10 mg) by mouth every evening       EPINEPHrine 0.3 MG/0.3ML injection     1 each    Inject 0.3 mLs (0.3 mg) into the muscle once as needed       losartan-hydrochlorothiazide 50-12.5 MG per tablet    HYZAAR     Take 1 tablet by mouth daily       nicotine 7 MG/24HR 24 hr patch    NICODERM CQ    30 patch    Place 1-2 patches onto the skin every 24 hours       NORVASC PO      Take 10 mg by mouth       PROBIOTIC DAILY PO      Take by mouth daily       vitamin D 2000 UNITS tablet     100 tablet    Take 2,000 Units by mouth daily

## 2017-04-18 NOTE — PROGRESS NOTES
SUBJECTIVE:                                                    Virginia Valenzuela is a 42 year old female who presents to clinic today for the following health issues:      Concern - right thumb pain     Onset: about 1 month ago    Description:   Right thumb pain    Intensity: moderate    Progression of Symptoms:  same    Accompanying Signs & Symptoms:  Slight swelling, some radiating pain to index finger, palm area by thumb, decreased ROM       Previous history of similar problem:   none    Precipitating factors:   Worsened by: using it makes it more sore    Alleviating factors:  Improved by: none       Therapies Tried and outcome: brace didn't help, ice, ibuprofen      The patient is seen in clinic today for left thumb pain that began greater than 1 month ago. She denies history of trauma. She denies repetitive use. 7 history of carpal tunnel repair in August 2016. Has had no problems since that surgery. The pain is primarily along the ventral surface of the thumb at the MCP joint. It radiates through the entire shaft of the thumb, thenar eminence, and into the first digit at times. No particular activities seem to be worse than others, but pain is increased with use. No other joint pain issues. She went to an [facility] last week area she is provided with a splint supporting the thumb and started on an anti-inflammatory. She was told to be seen in her primary clinic if pain was not improving    Problem list and histories reviewed & adjusted, as indicated.  Additional history: as documented    BP Readings from Last 3 Encounters:   04/18/17 122/80   03/22/17 134/78   03/14/17 124/84    Wt Readings from Last 3 Encounters:   04/18/17 203 lb (92.1 kg)   03/22/17 199 lb (90.3 kg)   03/14/17 198 lb (89.8 kg)                    Reviewed and updated as needed this visit by clinical staff  Tobacco  Allergies  Meds  Med Hx  Surg Hx  Fam Hx  Soc Hx      Reviewed and updated as needed this visit by Provider          ROS:  Constitutional, HEENT, cardiovascular, pulmonary, gi and gu systems are negative, except as otherwise noted.    OBJECTIVE:                                                    /80  Pulse 80  Temp 98.4  F (36.9  C) (Tympanic)  Wt 203 lb (92.1 kg)  BMI 34.84 kg/m2  Body mass index is 34.84 kg/(m^2).  GENERAL: healthy, alert and no distress  MS: Visual inspection of the left thumb is unremarkable. There is no edema, erythema, increased warmth, or obvious deformity. She has full range of motion, including full extension, and ability to touch all fingertips to thumb, can fully flex the thumb. However, all of these movements increase pain. She has noted some locking and clicking of the IP joint, but pain is primarily in the CMP joint. Tenderness over the ventral surface of the PIP joint.                             Diagnostic Test Results:  Results for orders placed or performed in visit on 04/18/17 (from the past 24 hour(s))   Erythrocyte sedimentation rate auto   Result Value Ref Range    Sed Rate 17 0 - 20 mm/h   CRP, inflammation   Result Value Ref Range    CRP Inflammation 14.0 (H) 0.0 - 8.0 mg/L        X-ray of the thumb appears negative for acute bony injury or degenerative changes.  ASSESSMENT/PLAN:                                                      Problem List Items Addressed This Visit        Medium    Sesamoiditis - Primary    Relevant Medications    naproxen (NAPROSYN) 500 MG tablet    HYDROcodone-acetaminophen (NORCO) 5-325 MG per tablet      Other Visit Diagnoses     Thumb pain, left        Relevant Medications    HYDROcodone-acetaminophen (NORCO) 5-325 MG per tablet    Other Relevant Orders    XR Finger Left G/E 2 Views    Erythrocyte sedimentation rate auto (Completed)    CRP, inflammation (Completed)         Patient has a distinct sesamoid bone at precisely the area of tenderness. Light of the increased CRP level, I suspect sesamoiditis. Will treat conservatively with naproxen 500 mg  b.i.d. for 2 weeks. Apply ice to affected area 15 minutes t.i.d. I don't believe it's necessary to wear a splint. Follow-up in clinic in 2 weeks if not improved            LISE Shaw CNP  [facility]

## 2017-04-28 ENCOUNTER — OFFICE VISIT (OUTPATIENT)
Dept: ORTHOPEDICS | Facility: CLINIC | Age: 42
End: 2017-04-28
Payer: COMMERCIAL

## 2017-04-28 VITALS — HEIGHT: 64 IN | WEIGHT: 203 LBS | BODY MASS INDEX: 34.66 KG/M2 | TEMPERATURE: 98.8 F

## 2017-04-28 DIAGNOSIS — M65.30 TRIGGER FINGER, ACQUIRED: Primary | ICD-10-CM

## 2017-04-28 PROCEDURE — 20600 DRAIN/INJ JOINT/BURSA W/O US: CPT | Mod: FA | Performed by: ORTHOPAEDIC SURGERY

## 2017-04-28 PROCEDURE — 99213 OFFICE O/P EST LOW 20 MIN: CPT | Mod: 25 | Performed by: ORTHOPAEDIC SURGERY

## 2017-04-28 RX ORDER — BETAMETHASONE SODIUM PHOSPHATE AND BETAMETHASONE ACETATE 3; 3 MG/ML; MG/ML
6 INJECTION, SUSPENSION INTRA-ARTICULAR; INTRALESIONAL; INTRAMUSCULAR; SOFT TISSUE ONCE
Qty: 1 ML | Refills: 0
Start: 2017-04-28 | End: 2017-07-11

## 2017-04-28 ASSESSMENT — PAIN SCALES - GENERAL: PAINLEVEL: EXTREME PAIN (8)

## 2017-04-28 NOTE — NURSING NOTE
"Chief Complaint   Patient presents with     RECHECK     NEW ISSUE: Left thumb.  Pain started 2 months ago with stiffenss and a clicking sound.  1 month ago pain was very bad and she was given a brace.  Brace did not help.  She was given Naproxen and that has not helped       Initial Temp 98.8  F (37.1  C) (Temporal)  Ht 1.626 m (5' 4\")  Wt 92.1 kg (203 lb)  BMI 34.84 kg/m2 Estimated body mass index is 34.84 kg/(m^2) as calculated from the following:    Height as of this encounter: 1.626 m (5' 4\").    Weight as of this encounter: 92.1 kg (203 lb).  Medication Reconciliation: complete HENRI Storm  "

## 2017-04-28 NOTE — MR AVS SNAPSHOT
"              After Visit Summary   4/28/2017    Virginia Valenzuela    MRN: [id number]           Patient Information     Date Of Birth          1975        Visit Information        Provider Department      4/28/2017 9:10 AM Gucci Hairston MD [facility]        Today's Diagnoses     Trigger finger, left thumb    -  1       Follow-ups after your visit        Who to contact     If you have questions or need follow up information about today's clinic visit or your schedule please contact [facility] directly at [phone].  Normal or non-critical lab and imaging results will be communicated to you by [employer], letter or phone within 4 business days after the clinic has received the results. If you do not hear from us within 7 days, please contact the clinic through [email or web address] or phone. If you have a critical or abnormal lab result, we will notify you by phone as soon as possible.  Submit refill requests through [employer] or call your pharmacy and they will forward the refill request to us. Please allow 3 business days for your refill to be completed.          Additional Information About Your Visit        MyChart Information     [employer] gives you secure access to your electronic health record. If you see a primary care provider, you can also send messages to your care team and make appointments. If you have questions, please call your primary care clinic.  If you do not have a primary care provider, please call [phone] and they will assist you.        Care EveryWhere ID     This is your Care EveryWhere ID. This could be used by other organizations to access your [place] medical records  [id number]        Your Vitals Were     Temperature Height BMI (Body Mass Index)             98.8  F (37.1  C) (Temporal) 1.626 m (5' 4\") 34.84 kg/m2          Blood Pressure from Last 3 Encounters:   04/18/17 122/80   03/22/17 134/78   03/14/17 124/84    Weight from Last 3 Encounters:   04/28/17 " 92.1 kg (203 lb)   04/18/17 92.1 kg (203 lb)   03/22/17 90.3 kg (199 lb)              We Performed the Following     Celestone 6 mg Inj. []     Small Joint/Bursa injection and/or drainage (Finger)          Today's Medication Changes          These changes are accurate as of: 4/28/17 10:43 AM.  If you have any questions, ask your nurse or doctor.               Start taking these medicines.        Dose/Directions    betamethasone acet & sod phos 6 (3-3) MG/ML Susp injection   Commonly known as:  CELESTONE   Used for:  Trigger finger, acquired   Started by:  Gucci Hairston MD        Dose:  6 mg   1 mL (6 mg) by INTRA-ARTICULAR route once for 1 dose   Quantity:  1 mL   Refills:  0            Where to get your medicines      Some of these will need a paper prescription and others can be bought over the counter.  Ask your nurse if you have questions.     You don't need a prescription for these medications     betamethasone acet & sod phos 6 (3-3) MG/ML Susp injection                Primary Care Provider Office Phone # Fax #    Marlon Marx [phone]       [address] [place] [address]        Thank you!     Thank you for choosing [facility]  for your care. Our goal is always to provide you with excellent care. Hearing back from our patients is one way we can continue to improve our services. Please take a few minutes to complete the written survey that you may receive in the mail after your visit with us. Thank you!             Your Updated Medication List - Protect others around you: Learn how to safely use, store and throw away your medicines at www.disposemymeds.org.          This list is accurate as of: 4/28/17 10:43 AM.  Always use your most recent med list.                   Brand Name Dispense Instructions for use    betamethasone acet & sod phos 6 (3-3) MG/ML Susp injection    CELESTONE    1 mL    1 mL (6 mg) by INTRA-ARTICULAR  route once for 1 dose       buPROPion 150 MG 24 hr tablet    WELLBUTRIN XL    30 tablet    Take 1 tablet (150 mg) by mouth every morning       cetirizine 10 MG tablet    zyrTEC    90 tablet    Take 1 tablet (10 mg) by mouth every evening       EPINEPHrine 0.3 MG/0.3ML injection     1 each    Inject 0.3 mLs (0.3 mg) into the muscle once as needed       HYDROcodone-acetaminophen 5-325 MG per tablet    NORCO    20 tablet    Take 1 tablet by mouth nightly as needed for moderate to severe pain       losartan-hydrochlorothiazide 50-12.5 MG per tablet    HYZAAR     Take 1 tablet by mouth daily       naproxen 500 MG tablet    NAPROSYN    30 tablet    Take 1 tablet (500 mg) by mouth 2 times daily as needed for moderate pain       nicotine 7 MG/24HR 24 hr patch    NICODERM CQ    30 patch    Place 1-2 patches onto the skin every 24 hours       NORVASC PO      Take 10 mg by mouth       PROBIOTIC DAILY PO      Take by mouth daily       vitamin D 2000 UNITS tablet     100 tablet    Take 2,000 Units by mouth daily

## 2017-04-28 NOTE — PROGRESS NOTES
"HISTORY OF PRESENT ILLNESS:    Virginia Valenzuela is a 42 year old female who is seen in follow up for   Chief Complaint   Patient presents with     RECHECK     NEW ISSUE: Left thumb.  Pain started 2 months ago with stiffenss and a clicking sound.  1 month ago pain was very bad and she was given a brace.  Brace did not help.  She was given Naproxen and that has not helped   Present symptoms: Patient reports as above. On a side note patient stated that the occupational therapy help her recover from the carpal tunnel residual symptoms.  Patient reports at times though will lock up.  Patient reports at times the thumb is very painful on the inner web between the thumb and the index finger.  Treatments tried to this point: As above  Patient evaluation done with Dr. Hairston    Physical Exam:  Vitals: Temp 98.8  F (37.1  C) (Temporal)  Ht 1.626 m (5' 4\")  Wt 92.1 kg (203 lb)  BMI 34.84 kg/m2  BMI= Body mass index is 34.84 kg/(m^2).  Constitutional: healthy, alert and no acute distress   Psychiatric: mentation appears normal and affect normal/bright  NEURO: no focal deficits  SKIN: no excoriation or erythema. No signs of infection.  JOINT/EXTREMITIES:  Affected extremity pulses are easily palpable.  Left thumb:  Patient with pain at the inner base of the left thumb. A nodule can be palpated.  There is a palpable click and a visualized click with increased pressure at this nodule.    ASSESSMENT:    Chief Complaint   Patient presents with     RECHECK     NEW ISSUE: Left thumb.  Pain started 2 months ago with stiffenss and a clicking sound.  1 month ago pain was very bad and she was given a brace.  Brace did not help.  She was given Naproxen and that has not helped       ICD-10-CM    1. Trigger finger, left thumb M65.30      Patient with left thumb trigger finger that has been present for approximately 2 months    Plan:   Patient is advised that with the short time period that her finger has been locking and painful a " cortisone injection may be helpful in treatment of this. Trigger finger phenomenon was described to the patient. If the triggering does return postinjection then would need to consider a surgical release.  After risks and benefits were explained and questions answered, the patient agreed to undergo the recommended procedure .   Using aseptic technique the skin was prepped with betadine swabs, then sprayed with ethyl chloride for topical anesthesia.  The left  Finger  A1 pulley peritendinous  thumb: joint space was then infiltrated with 3cc of 1% Lidocaine without epinephrine and 1ml of Betamethasone.  There was a paradoxical finger movement with the needle to assure proper placement of the cortisone .There were no complications and the patient tolerated the procedure well.  Patient reports ibuprofen works better. She is advised to take ibuprofen at a therapeutic dose over the next few days until the cortisone kicks in. She should discontinue the use of the naproxen  Return to clinic as needed    Scribed by  Michelle Loya PA-C   4/28/2017  10:00 AM      I attest I have seen and evaluated the patient.  I agree with above impression and plan.    Gucci Hairston MD

## 2017-04-28 NOTE — LETTER
"  4/28/2017       RE: Virginia Valenzuela  [address] [facility] [address]           Dear Colleague,    Thank you for referring your patient, Virginia Valenzuela, to the [facility]. Please see a copy of my visit note below.    HISTORY OF PRESENT ILLNESS:    Virginia Valenzuela is a 42 year old female who is seen in follow up for   Chief Complaint   Patient presents with     RECHECK     NEW ISSUE: Left thumb.  Pain started 2 months ago with stiffenss and a clicking sound.  1 month ago pain was very bad and she was given a brace.  Brace did not help.  She was given Naproxen and that has not helped   Present symptoms: Patient reports as above. On a side note patient stated that the occupational therapy help her recover from the carpal tunnel residual symptoms.  Patient reports at times though will lock up.  Patient reports at times the thumb is very painful on the inner web between the thumb and the index finger.  Treatments tried to this point: As above  Patient evaluation done with Dr. Hairston    Physical Exam:  Vitals: Temp 98.8  F (37.1  C) (Temporal)  Ht 1.626 m (5' 4\")  Wt 92.1 kg (203 lb)  BMI 34.84 kg/m2  BMI= Body mass index is 34.84 kg/(m^2).  Constitutional: healthy, alert and no acute distress   Psychiatric: mentation appears normal and affect normal/bright  NEURO: no focal deficits  SKIN: no excoriation or erythema. No signs of infection.  JOINT/EXTREMITIES:  Affected extremity pulses are easily palpable.  Left thumb:  Patient with pain at the inner base of the left thumb. A nodule can be palpated.  There is a palpable click and a visualized click with increased pressure at this nodule.    ASSESSMENT:    Chief Complaint   Patient presents with     RECHECK     NEW ISSUE: Left thumb.  Pain started 2 months ago with stiffenss and a clicking sound.  1 month ago pain was very bad and she was given a brace.  Brace did not help.  She was given Naproxen and that has not helped       ICD-10-CM    1. " Trigger finger, left thumb M65.30      Patient with left thumb trigger finger that has been present for approximately 2 months    Plan:   Patient is advised that with the short time period that her finger has been locking and painful a cortisone injection may be helpful in treatment of this. Trigger finger phenomenon was described to the patient. If the triggering does return postinjection then would need to consider a surgical release.  After risks and benefits were explained and questions answered, the patient agreed to undergo the recommended procedure .   Using aseptic technique the skin was prepped with betadine swabs, then sprayed with ethyl chloride for topical anesthesia.  The left  Finger  A1 pulley peritendinous  thumb: joint space was then infiltrated with 3cc of 1% Lidocaine without epinephrine and 1ml of Betamethasone.  There was a paradoxical finger movement with the needle to assure proper placement of the cortisone .There were no complications and the patient tolerated the procedure well.  Patient reports ibuprofen works better. She is advised to take ibuprofen at a therapeutic dose over the next few days until the cortisone kicks in. She should discontinue the use of the naproxen  Return to clinic as needed    Scribed by  Michelle Loya PA-C   4/28/2017  10:00 AM      I attest I have seen and evaluated the patient.  I agree with above impression and plan.    Gucci Hairston MD    Again, thank you for allowing me to participate in the care of your patient.        Sincerely,              Gucci Hairston MD

## 2017-05-30 DIAGNOSIS — E55.9 VITAMIN D DEFICIENCY: ICD-10-CM

## 2017-05-31 RX ORDER — ACETAMINOPHEN 160 MG
TABLET,DISINTEGRATING ORAL
Qty: 100 CAPSULE | Refills: 10 | Status: SHIPPED | OUTPATIENT
Start: 2017-05-31 | End: 2020-12-10

## 2017-05-31 NOTE — TELEPHONE ENCOUNTER
Prescription approved per [facility] Refill Protocol.    Micheal Westfall, RN, BSN

## 2017-06-21 ENCOUNTER — MYC MEDICAL ADVICE (OUTPATIENT)
Dept: FAMILY MEDICINE | Facility: CLINIC | Age: 42
End: 2017-06-21

## 2017-07-06 ENCOUNTER — TELEPHONE (OUTPATIENT)
Dept: OBGYN | Facility: CLINIC | Age: 42
End: 2017-07-06

## 2017-07-06 ENCOUNTER — OFFICE VISIT (OUTPATIENT)
Dept: FAMILY MEDICINE | Facility: CLINIC | Age: 42
End: 2017-07-06
Payer: COMMERCIAL

## 2017-07-06 VITALS
WEIGHT: 200 LBS | DIASTOLIC BLOOD PRESSURE: 74 MMHG | HEART RATE: 92 BPM | TEMPERATURE: 97.5 F | HEIGHT: 64 IN | BODY MASS INDEX: 34.15 KG/M2 | RESPIRATION RATE: 14 BRPM | OXYGEN SATURATION: 100 % | SYSTOLIC BLOOD PRESSURE: 116 MMHG

## 2017-07-06 DIAGNOSIS — Z01.812 PRE-PROCEDURE LAB EXAM: Primary | ICD-10-CM

## 2017-07-06 DIAGNOSIS — R10.2 PELVIC PAIN IN FEMALE: Primary | ICD-10-CM

## 2017-07-06 PROCEDURE — 99214 OFFICE O/P EST MOD 30 MIN: CPT | Performed by: OBSTETRICS & GYNECOLOGY

## 2017-07-06 ASSESSMENT — PAIN SCALES - GENERAL: PAINLEVEL: SEVERE PAIN (6)

## 2017-07-06 NOTE — NURSING NOTE
"Chief Complaint   Patient presents with     RECHECK       Initial /74 (BP Location: Right arm, Patient Position: Chair, Cuff Size: Adult Regular)  Pulse 92  Temp 97.5  F (36.4  C) (Tympanic)  Resp 14  Ht 5' 4\" (1.626 m)  Wt 200 lb (90.7 kg)  LMP 07/01/2017  SpO2 100%  Breastfeeding? No  BMI 34.33 kg/m2 Estimated body mass index is 34.33 kg/(m^2) as calculated from the following:    Height as of this encounter: 5' 4\" (1.626 m).    Weight as of this encounter: 200 lb (90.7 kg)..   BP completed using cuff size: regular  Medication Rec Completed    Brook Peter CMA    "

## 2017-07-06 NOTE — PROGRESS NOTES
Subjective: she continues to have left sided pelvic pain- for the past 6 months- i asked Dr benites to review her last US because I noticed a typo on the rport- the uterus was mentioned but not the measurements of the left ovary- so he read it again today and told me that the left ovary is 4.7 cm x 3.3 cm- which is slightly large-     No hematuria or dysuria- she has the pain every day- she is requesting laparoscopy to search for possible causes for the pain.      The past medical history, social history, past surgical history and family history as shown below have been reviewed by me today.  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     History of appendectomy      Hypertension      Idiopathic urticaria      Obstructive sleep apnea (adult) (pediatric)      Other chronic allergic conjunctivitis      Other malaise and fatigue      Pain in joint, shoulder region      S/P laparoscopic cholecystectomy      Special screening examination for human papillomavirus (HPV)      Sprain of tibiofibular (ligament), distal of ankle      Threatened , unspecified as to episode of care      Tobacco use disorder      Unspecified essential hypertension      Unspecified vitamin D deficiency         Allergies   Allergen Reactions     Amoxicillin Itching     Bees Swelling     FINGER TIPS TO ELBOWS     Current Outpatient Prescriptions   Medication Sig Dispense Refill     Cholecalciferol (VITAMIN D3) 2000 UNITS CAPS TAKE ONE CAPSULE BY MOUTH EVERY  capsule 10     betamethasone acet & sod phos (CELESTONE) 6 (3-3) MG/ML SUSP injection 1 mL (6 mg) by INTRA-ARTICULAR route once for 1 dose 1 mL 0     naproxen (NAPROSYN) 500 MG tablet Take 1  tablet (500 mg) by mouth 2 times daily as needed for moderate pain 30 tablet 1     HYDROcodone-acetaminophen (NORCO) 5-325 MG per tablet Take 1 tablet by mouth nightly as needed for moderate to severe pain 20 tablet 0     buPROPion (WELLBUTRIN XL) 150 MG 24 hr tablet Take 1 tablet (150 mg) by mouth every morning 30 tablet 1     nicotine (NICODERM CQ) 7 MG/24HR 24 hr patch Place 1-2 patches onto the skin every 24 hours 30 patch 1     AmLODIPine Besylate (NORVASC PO) Take 10 mg by mouth       losartan-hydrochlorothiazide (HYZAAR) 50-12.5 MG per tablet Take 1 tablet by mouth daily       cetirizine (ZYRTEC) 10 MG tablet Take 1 tablet (10 mg) by mouth every evening 90 tablet 1     Probiotic Product (PROBIOTIC DAILY PO) Take by mouth daily       EPINEPHrine (EPIPEN) 0.3 MG/0.3ML injection Inject 0.3 mLs (0.3 mg) into the muscle once as needed 1 each 0     Past Surgical History:   Procedure Laterality Date     ABDOMEN SURGERY  July 2013    ovaian cyst and right ovary removal     APPENDECTOMY       CHOLECYSTECTOMY       EYE SURGERY  1979     GENITOURINARY SURGERY  July 2013    Ovary, fallopian tube     GYN SURGERY  July 2013    Ovary, Fallopian tube     LAPAROSCOPIC LYSIS ADHESIONS Left 3/1/2016    Procedure: LAPAROSCOPIC LYSIS ADHESIONS;  Surgeon: Nate Mishra MD;  Location: [facility] OR     RELEASE CARPAL TUNNEL Left 8/31/2016    Procedure: RELEASE CARPAL TUNNEL;  Surgeon: Gucci Hairston MD;  Location: [facility] OR     RELEASE CARPAL TUNNEL Right 12/7/2016    Procedure: RELEASE CARPAL TUNNEL;  Surgeon: Gucci Hairston MD;  Location: [facility] OR     Social History     Social History     Marital status:      Spouse name: N/A     Number of children: N/A     Years of education: N/A     Social History Main Topics     Smoking status: Current Every Day Smoker     Packs/day: 0.75     Years: 10.00     Types: Cigarettes     Smokeless tobacco: Never Used     Alcohol use No     Drug use: No     Sexual activity: Yes      "Partners: Male     Birth control/ protection: None      Comment: Trying to conceive     Other Topics Concern     Parent/Sibling W/ Cabg, Mi Or Angioplasty Before 65f 55m? No     Social History Narrative     Family History   Problem Relation Age of Onset     DIABETES Father      Hypertension Father      Cardiovascular Mother 29     myocarditis      Other Cancer Brother      unknown origin       ROS: A 12 point review of systems was done. Except for what is listed above in the HPI, the systems review is negative .      Objective: Vital signs: Blood pressure 116/74, pulse 92, temperature 97.5  F (36.4  C), temperature source Tympanic, resp. rate 14, height 5' 4\" (1.626 m), weight 200 lb (90.7 kg), last menstrual period 07/01/2017, SpO2 100 %, not currently breastfeeding.     Chest is clear to auscultation. No wheezes, rales or rhonchi heard.  cardiac exam is normal with s1, s2, no murmurs or adventitious sounds.Normal rate and rhythm is heard.  Abdomen is soft,  nondistended, No masses felt.No HSM. No guarding or rigidity or rebound noted. Palpation reveals   tenderness over the LLQ and into the left pelvis-  Normal bowel sounds heard.   i did not repeat the pelvic exam today.          Assessment/Plan:A total of 30 minutes were spent face-to-face with this patient during today's consultation, with more than 50% of that time devoted to conversation and counseling about the management decisions.      1. Persistent left sided lower abdominal and pelvic pain- for 6 months- almost daily. No change in BMs and no urinary sx- i wonder about adhesions form previous surgery. She has had appendectomy and cholecystectomy and also removal of right tube and ovary and drainage of previous left ovarian cyst.    2. I want to check a current US because of the new information i got that the ovary is slightly large-  This will be done soon    3. Laparoscopy was discussed with the patient in detail today. The purpose of the surgery is to  " Look for possible causes for her pain. SHE UNDERSTANDS THAT THERE IS A CHANCE I MAY SEE SOMETHING SUSPICIOUS OR ABNORMAL ABOUT THAT OVARY AND THEN I MIGHT NEED TO REMOVE IT. SHE IS OK WITH THAT IF NECESSARY BUT SHE WANTS TO BE CONSERVATIVE IF POSSIBLE.  Risks include but are not limited to bleeding, infection, injury to bowel and bladder and other organs. There is a chance that laparotomy will be needed if I suspect that an injury has occurred or if I see a problems such as a bleeding ovary or a situation that needs to be addressed urgently.She read over the consent form and signed it , witnessed by my nurse. I offered her a second opinion.     4. She will have a preop exam with Dr Marx. I told her that if the laparoscopy is negative then we would suggest a colonoscopy, which he can do for her.    MARIETTA Mishra MD

## 2017-07-06 NOTE — TELEPHONE ENCOUNTER
"Surgery Scheduled    Date of Surgery 7/13/17 Time of Surgery 1:00pm  Procedure: Laparoscopy, Possible Salping-Oophorectomy  Hospital/Surgical Facility: [facility]  Surgeon: Dr. Mishra, with Dr. Milligan Assisting  Type of Anesthesia Anticipated: GETA  Pre-Op: 7/11/17 with Dr. Marx   Pre-Op Labs: 7/12/17  \"\"Pre-op Labs Ordered\"\"  Post-Op: 7/18/17 with Dr. Mishra  Consent Signed 7/6/17      Surgery packet given to patient. Patient instructed to arrive 1 1/2 hour(s) prior to surgery.  Patient understood and agrees to the plan.      Alexis Palomino MA      "

## 2017-07-06 NOTE — MR AVS SNAPSHOT
After Visit Summary   7/6/2017    Virginia Valenzuela    MRN: [id number]           Patient Information     Date Of Birth          1975        Visit Information        Provider Department      7/6/2017 8:50 AM Nate Mishra MD [facility]        Today's Diagnoses     Pelvic pain in female    -  1       Follow-ups after your visit        Your next 10 appointments already scheduled     Jul 07, 2017  9:45 AM CDT   US PELVIC COMPLETE W TRANSVAGINAL with PHUS1   [facility] ([facility])    [address] [place] [address]   [phone]           Please bring a list of your medicines (including vitamins, minerals and over-the-counter drugs). Also, tell your doctor about any allergies you may have. Wear comfortable clothes and leave your valuables at home.  Adults: Drink six 8-ounce glasses of fluid one hour before your exam. Do NOT empty your bladder.  If you need to empty your bladder before your exam, try to release only a little bit of urine. Then, drink another 8oz glass of fluid.  Children: Children who are potty trained should drink at least 4 cups (32 oz) of liquid 45 minutes to one hour prior to the exam. The child s bladder must be full in order to achieve a diagnostic exam. If your child is very uncomfortable or has an urgent need to pee, please notify a technologist; they will try to find out how much longer the wait may be and provide instructions to help relieve the pressure. Occasionally it is medically necessary to insert a urinary catheter to fill the bladder.  Please call the Imaging Department at your exam site with any questions.            Jul 11, 2017 10:00 AM CDT   Pre-Op physical with Marlon Marx MD   [facility]    [address] [facility] [address]   [phone]            Jul 12, 2017  9:30 AM CDT   LAB with NL LAB [facility]  [place] ([address] [id number] [phone]           Patient must bring picture ID.  Patient should be prepared to give a urine specimen  Please do not eat 10-12 hours before your appointment if you are coming in fasting for labs on lipids, cholesterol, or glucose (sugar).  Pregnant women should follow their Care Team instructions. Water with medications is okay. Do not drink coffee or other fluids.   If you have concerns about taking  your medications, please ask at office or if scheduling via [employer], send a message by clicking on Secure Messaging, Message Your Care Team.            Jul 18, 2017  9:50 AM CDT   SHORT with Nate Mishra MD   [address] [address] [phone]              Future tests that were ordered for you today     Open Future Orders        Priority Expected Expires Ordered    US Pelvic Complete w Transvaginal Routine  7/6/2018 7/6/2017            Who to contact     If you have questions or need follow up information about today's clinic visit or your schedule please contact [facility] directly at [phone].  Normal or non-critical lab and imaging results will be communicated to you by MyChart, letter or phone within 4 business days after the clinic has received the results. If you do not hear from us within 7 days, please contact the clinic through [employer] or phone. If you have a critical or abnormal lab result, we will notify you by phone as soon as possible.  Submit refill requests through [employer] or call your pharmacy and they will forward the refill request to us. Please allow 3 business days for your refill to be completed.          Additional Information About Your Visit        [employer] Information     [employer] gives you secure access to your electronic health record. If you see a primary care provider, you can also  "send messages to your care team and make appointments. If you have questions, please call your primary care clinic.  If you do not have a primary care provider, please call [phone] and they will assist you.        Care EveryWhere ID     This is your Care EveryWhere ID. This could be used by other organizations to access your [place] medical records  [id number]        Your Vitals Were     Pulse Temperature Respirations Height Last Period Pulse Oximetry    92 97.5  F (36.4  C) (Tympanic) 14 5' 4\" (1.626 m) 07/01/2017 100%    Breastfeeding? BMI (Body Mass Index)                No 34.33 kg/m2           Blood Pressure from Last 3 Encounters:   07/06/17 116/74   04/18/17 122/80   03/22/17 134/78    Weight from Last 3 Encounters:   07/06/17 200 lb (90.7 kg)   04/28/17 203 lb (92.1 kg)   04/18/17 203 lb (92.1 kg)               Primary Care Provider Office Phone # Fax #    Marlon Santos Marx [phone]       [address]   [facility] [address]        Equal Access to Services     CYNTHIA BROWN AH: Hadii aad ku hadasho Soomaali, waaxda luqadaha, qaybta kaalmada adeegyada, waxay gabbiein hayaan adeeg laylaarabiju la'tammyn ah. So [facility] [phone].    ATENCIÓN: Si habla español, tiene a lewis disposición servicios gratuitos de asistencia lingüística. Llame al [phone].    We comply with applicable federal civil rights laws and [place] laws. We do not discriminate on the basis of race, color, national origin, age, disability sex, sexual orientation or gender identity.            Thank you!     Thank you for choosing [facility]  for your care. Our goal is always to provide you with excellent care. Hearing back from our patients is one way we can continue to improve our services. Please take a few minutes to complete the written survey that you may receive in the mail after your visit with us. Thank you!             Your Updated Medication List - Protect others around " you: Learn how to safely use, store and throw away your medicines at www.disposemymeds.org.          This list is accurate as of: 7/6/17  9:54 AM.  Always use your most recent med list.                   Brand Name Dispense Instructions for use Diagnosis    betamethasone acet & sod phos 6 (3-3) MG/ML Susp injection    CELESTONE    1 mL    1 mL (6 mg) by INTRA-ARTICULAR route once for 1 dose    Trigger finger, acquired       buPROPion 150 MG 24 hr tablet    WELLBUTRIN XL    30 tablet    Take 1 tablet (150 mg) by mouth every morning    Tobacco use disorder       cetirizine 10 MG tablet    zyrTEC    90 tablet    Take 1 tablet (10 mg) by mouth every evening    Seasonal allergic rhinitis       EPINEPHrine 0.3 MG/0.3ML injection     1 each    Inject 0.3 mLs (0.3 mg) into the muscle once as needed    History of bee sting allergy       HYDROcodone-acetaminophen 5-325 MG per tablet    NORCO    20 tablet    Take 1 tablet by mouth nightly as needed for moderate to severe pain    Sesamoiditis, Thumb pain, left       losartan-hydrochlorothiazide 50-12.5 MG per tablet    HYZAAR     Take 1 tablet by mouth daily        naproxen 500 MG tablet    NAPROSYN    30 tablet    Take 1 tablet (500 mg) by mouth 2 times daily as needed for moderate pain    Sesamoiditis       nicotine 7 MG/24HR 24 hr patch    NICODERM CQ    30 patch    Place 1-2 patches onto the skin every 24 hours    Tobacco use disorder       NORVASC PO      Take 10 mg by mouth        PROBIOTIC DAILY PO      Take by mouth daily        vitamin D3 2000 UNITS Caps     100 capsule    TAKE ONE CAPSULE BY MOUTH EVERY DAY    Vitamin D deficiency

## 2017-07-07 ENCOUNTER — HOSPITAL ENCOUNTER (OUTPATIENT)
Dept: ULTRASOUND IMAGING | Facility: CLINIC | Age: 42
Discharge: HOME | End: 2017-07-07
Attending: OBSTETRICS & GYNECOLOGY | Admitting: OBSTETRICS & GYNECOLOGY
Payer: COMMERCIAL

## 2017-07-07 DIAGNOSIS — R10.2 PELVIC PAIN IN FEMALE: ICD-10-CM

## 2017-07-07 PROCEDURE — 76830 TRANSVAGINAL US NON-OB: CPT

## 2017-07-11 ENCOUNTER — OFFICE VISIT (OUTPATIENT)
Dept: FAMILY MEDICINE | Facility: CLINIC | Age: 42
End: 2017-07-11
Payer: COMMERCIAL

## 2017-07-11 VITALS
WEIGHT: 200.4 LBS | TEMPERATURE: 98.7 F | DIASTOLIC BLOOD PRESSURE: 74 MMHG | OXYGEN SATURATION: 97 % | SYSTOLIC BLOOD PRESSURE: 110 MMHG | HEART RATE: 96 BPM | RESPIRATION RATE: 20 BRPM | BODY MASS INDEX: 34.4 KG/M2

## 2017-07-11 DIAGNOSIS — Z01.818 PREOP GENERAL PHYSICAL EXAM: Primary | ICD-10-CM

## 2017-07-11 DIAGNOSIS — R10.2 PELVIC PAIN IN FEMALE: ICD-10-CM

## 2017-07-11 PROCEDURE — 99214 OFFICE O/P EST MOD 30 MIN: CPT | Performed by: FAMILY MEDICINE

## 2017-07-11 ASSESSMENT — PAIN SCALES - GENERAL: PAINLEVEL: SEVERE PAIN (7)

## 2017-07-11 NOTE — NURSING NOTE
"Chief Complaint   Patient presents with     Pre-Op Exam       Initial /74 (BP Location: Left arm, Patient Position: Chair, Cuff Size: Adult Regular)  Pulse 96  Temp 98.7  F (37.1  C) (Temporal)  Resp 20  Wt 200 lb 6.4 oz (90.9 kg)  LMP 07/01/2017  SpO2 97%  BMI 34.4 kg/m2 Estimated body mass index is 34.4 kg/(m^2) as calculated from the following:    Height as of 7/6/17: 5' 4\" (1.626 m).    Weight as of this encounter: 200 lb 6.4 oz (90.9 kg).  Medication Reconciliation: complete   Guerda Valdes, MARYANN     "

## 2017-07-11 NOTE — PROGRESS NOTES
[address] [address]  [phone]  Dept: [phone]    PRE-OP EVALUATION:  Today's date: 2017    Virginia Valenzuela (: 1975) presents for pre-operative evaluation assessment as requested by Dr. Mishra.  She requires evaluation and anesthesia risk assessment prior to undergoing surgery/procedure for treatment of pain exploration .  Proposed procedure: Laparoscopy Diagnostic    Date of Surgery/ Procedure: 2017  Time of Surgery/ Procedure: 1:00pm  Hospital/Surgical Facility: [facility]    Primary Physician: Marlon Marx  Type of Anesthesia Anticipated: General    Patient has a Health Care Directive or Living Will:  NO    1. NO - Do you have a history of heart attack, stroke, stent, bypass or surgery on an artery in the head, neck, heart or legs?  2. NO - Do you ever have any pain or discomfort in your chest?  3. NO - Do you have a history of  Heart Failure?  4. NO - Are you troubled by shortness of breath when: walking on the level, up a slight hill or at night?  5. NO - Do you currently have a cold, bronchitis or other respiratory infection?  6. NO - Do you have a cough, shortness of breath or wheezing?  7. NO - Do you sometimes get pains in the calves of your legs when you walk?  8. YES - DO YOU OR ANYONE IN YOUR FAMILY HAVE PREVIOUS HISTORY OF BLOOD CLOTS? Sister  9. NO - Do you or does anyone in your family have a serious bleeding problem such as prolonged bleeding following surgeries or cuts?  10. NO - Have you ever had problems with anemia or been told to take iron pills?  11. NO - Have you had any abnormal blood loss such as black, tarry or bloody stools, or abnormal vaginal bleeding?  12. NO - Have you ever had a blood transfusion?  13. NO - Have you or any of your relatives ever had problems with anesthesia?  14. YES - DO YOU HAVE SLEEP APNEA, EXCESSIVE SNORING OR DAYTIME DROWSINESS? Sleep apnea  15. NO - Do you have any  prosthetic heart valves?  16. NO - Do you have prosthetic joints?  17. NO - Is there any chance that you may be pregnant?      HPI:                                                      Brief HPI related to upcoming procedure: having  6 mo pelvic pain, undergoing diagnostic lap to investigate      Can get up a single flight of stairs without dyspnea. Estimated METS > 4.      Needs a change of BP meds as she is child bearing age.     MEDICAL HISTORY:                                                      Patient Active Problem List    Diagnosis Date Noted     Trigger finger, left thumb 04/28/2017     Priority: Medium     Sesamoiditis 04/18/2017     Priority: Medium     Bilateral carpal tunnel syndrome 08/11/2016     Priority: Medium     Tobacco use disorder 08/17/2015     Priority: Medium     Ovarian cyst 01/17/2014     Priority: Medium     Problem list name updated by automated process. Provider to review       Female infertility 01/17/2014     Priority: Medium     Hypertension goal BP (blood pressure) < 140/90 11/07/2013     Priority: Medium     Papanicolaou smear of cervix with atypical squamous cells of undetermined significance (ASC-US) 06/27/2013     Priority: Medium     3/12/13 pap ASCUS HR HPV  8/5/13 colposcopy. ASCUS. Plan repeat pap in 6 months  7/6/15 pap NIL/neg HPV. Plan cotest in 3 yrs       GERD (gastroesophageal reflux disease) 06/03/2013     Priority: Medium     Vitamin D deficiency 06/03/2013     Priority: Medium     Problem list name updated by automated process. Provider to review and confirm  [employer] Update utility       HAMIDA (obstructive sleep apnea) 05/29/2013     Priority: Medium     Generalized anxiety disorder 05/29/2013     Priority: Medium     Seasonal allergic rhinitis 05/29/2013     Priority: Medium      Past Medical History:   Diagnosis Date     Allergic rhinitis, cause unspecified      ASCUS with positive high risk HPV 3/12/13     Depressive disorder, not elsewhere classified      Dysfunction  of eustachian tube      Encounter for therapeutic drug monitoring      Generalized anxiety disorder      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     History of appendectomy      Hypertension 2000     Idiopathic urticaria      Obstructive sleep apnea (adult) (pediatric)      Other chronic allergic conjunctivitis      Other malaise and fatigue      Pain in joint, shoulder region      S/P laparoscopic cholecystectomy      Special screening examination for human papillomavirus (HPV)      Sprain of tibiofibular (ligament), distal of ankle      Threatened , unspecified as to episode of care      Tobacco use disorder      Unspecified essential hypertension      Unspecified vitamin D deficiency      Past Surgical History:   Procedure Laterality Date     ABDOMEN SURGERY  2013    ovaian cyst and right ovary removal     APPENDECTOMY       CHOLECYSTECTOMY       EYE SURGERY       GENITOURINARY SURGERY  2013    Ovary, fallopian tube     GYN SURGERY  2013    Ovary, Fallopian tube     LAPAROSCOPIC LYSIS ADHESIONS Left 3/1/2016    Procedure: LAPAROSCOPIC LYSIS ADHESIONS;  Surgeon: Nate Mishra MD;  Location: [facility] OR     RELEASE CARPAL TUNNEL Left 2016    Procedure: RELEASE CARPAL TUNNEL;  Surgeon: Gucci Hairston MD;  Location: [facility] OR     RELEASE CARPAL TUNNEL Right 2016    Procedure: RELEASE CARPAL TUNNEL;  Surgeon: Gucci Hairston MD;  Location: [facility]     Current Outpatient Prescriptions   Medication Sig Dispense Refill     Cholecalciferol (VITAMIN D3) 2000 UNITS CAPS TAKE ONE CAPSULE BY MOUTH EVERY  capsule 10     AmLODIPine Besylate (NORVASC PO) Take 10 mg by mouth       losartan-hydrochlorothiazide (HYZAAR) 50-12.5 MG per tablet Take 1 tablet by mouth daily       cetirizine (ZYRTEC) 10 MG tablet Take 1 tablet (10 mg) by mouth every evening 90 tablet 1     Probiotic Product (PROBIOTIC DAILY PO) Take by mouth daily       naproxen (NAPROSYN) 500 MG tablet Take 1 tablet (500  mg) by mouth 2 times daily as needed for moderate pain (Patient not taking: Reported on 7/11/2017) 30 tablet 1     HYDROcodone-acetaminophen (NORCO) 5-325 MG per tablet Take 1 tablet by mouth nightly as needed for moderate to severe pain (Patient not taking: Reported on 7/11/2017) 20 tablet 0     buPROPion (WELLBUTRIN XL) 150 MG 24 hr tablet Take 1 tablet (150 mg) by mouth every morning (Patient not taking: Reported on 7/11/2017) 30 tablet 1     nicotine (NICODERM CQ) 7 MG/24HR 24 hr patch Place 1-2 patches onto the skin every 24 hours (Patient not taking: Reported on 7/11/2017) 30 patch 1     EPINEPHrine (EPIPEN) 0.3 MG/0.3ML injection Inject 0.3 mLs (0.3 mg) into the muscle once as needed (Patient not taking: Reported on 7/6/2017) 1 each 0     OTC products: None, except as noted above    Allergies   Allergen Reactions     Amoxicillin Itching     Bees Swelling     FINGER TIPS TO ELBOWS      Latex Allergy: NO    Social History   Substance Use Topics     Smoking status: Current Every Day Smoker     Packs/day: 0.75     Years: 10.00     Types: Cigarettes     Smokeless tobacco: Never Used     Alcohol use No     History   Drug Use No       REVIEW OF SYSTEMS:                                                    C: NEGATIVE for fever, chills, change in weight  E/M: NEGATIVE for ear, mouth and throat problems  R: NEGATIVE for significant cough or SOB  CV: NEGATIVE for chest pain, palpitations or peripheral edema    EXAM:                                                    /74 (BP Location: Left arm, Patient Position: Chair, Cuff Size: Adult Regular)  Pulse 96  Temp 98.7  F (37.1  C) (Temporal)  Resp 20  Wt 200 lb 6.4 oz (90.9 kg)  LMP 07/01/2017  SpO2 97%  BMI 34.4 kg/m2  GENERAL APPEARANCE: healthy, alert and no distress  HENT: ear canals and TM's normal and nose and mouth without ulcers or lesions  RESP: lungs clear to auscultation - no rales, rhonchi or wheezes  CV: regular rate and rhythm, normal S1 S2, no S3  or S4 and no murmur, click or rub   ABDOMEN: soft, nontender, no HSM or masses and bowel sounds normal  NEURO: Normal strength and tone, sensory exam grossly normal, mentation intact and speech normal    DIAGNOSTICS:                                                    EKG: Not indicated due to non-vascular surgery and low risk of event (age <65 and without cardiac risk factors)    Recent Labs   Lab Test  02/07/17   1119  02/25/16   1204  01/18/11   HGB  14.4  13.7   < >   --    PLT  377  355   < >   --    NA  141  139   < >   --    POTASSIUM  3.1*  3.9   < >  3.9   CR  0.63  0.61   < >  0.8   A1C   --    --    --   5.6    < > = values in this interval not displayed.        IMPRESSION:                                                        The proposed surgical procedure is considered LOW risk.    REVISED CARDIAC RISK INDEX  The patient has the following serious cardiovascular risks for perioperative complications such as (MI, PE, VFib and 3  AV Block):  No serious cardiac risks  INTERPRETATION: 0 risks: Class I (very low risk - 0.4% complication rate)    The patient has the following additional risks for perioperative complications:  No identified additional risks      ICD-10-CM    1. Preop general physical exam Z01.818    2. Pelvic pain in female R10.2        RECOMMENDATIONS:                                                        After surgery, rtc to address a change in BP meds to better preg category    Stop ARB for 48 hrs.    APPROVAL GIVEN to proceed with proposed procedure, without further diagnostic evaluation       Signed Electronically by: Marlon Marx MD    Copy of this evaluation report is provided to requesting physician.    [place] Preop Guidelines

## 2017-07-11 NOTE — MR AVS SNAPSHOT
After Visit Summary   7/11/2017    Virginia Valenzuela    MRN: [id number]           Patient Information     Date Of Birth          1975        Visit Information        Provider Department      7/11/2017 10:00 AM Marlon Marx MD [facility]        Today's Diagnoses     Preop general physical exam    -  1    Pelvic pain in female          Care Instructions      Before Your Surgery      Call your surgeon if there is any change in your health. This includes signs of a cold or flu (such as a sore throat, runny nose, cough, rash or fever).    Do not smoke, drink alcohol or take over the counter medicine (unless your surgeon or primary care doctor tells you to) for the 24 hours before and after surgery.    If you take prescribed drugs: Follow your doctor s orders about which medicines to take and which to stop until after surgery.    Eating and drinking prior to surgery: follow the instructions from your surgeon    Take a shower or bath the night before surgery. Use the soap your surgeon gave you to gently clean your skin. If you do not have soap from your surgeon, use your regular soap. Do not shave or scrub the surgery site.  Wear clean pajamas and have clean sheets on your bed.           Follow-ups after your visit        Your next 10 appointments already scheduled     Jul 12, 2017  9:30 AM CDT   LAB with [facility]   [facility]    [address] [place] [address] [phone]           Patient must bring picture ID.  Patient should be prepared to give a urine specimen  Please do not eat 10-12 hours before your appointment if you are coming in fasting for labs on lipids, cholesterol, or glucose (sugar).  Pregnant women should follow their Care Team instructions. Water with medications is okay. Do not drink coffee or other fluids.   If you have concerns about taking  your medications, please ask at office or if scheduling  via [employer], send a message by clicking on Secure Messaging, Message Your Care Team.            Jul 13, 2017   Procedure with Nate Mishra MD   [facility] ([facility])    [address]  [place] [id number] [phone]           From [address]: Exit at [address] on south side of [place]. Turn right on [address]. Turn left at stoplight on [address]. [facility] will be in view two blocks ahead            Jul 18, 2017  9:50 AM CDT   [facility]   [facility]    [address] [address] [phone]              Who to contact     If you have questions or need follow up information about today's clinic visit or your schedule please contact [facility] directly at [phone].  Normal or non-critical lab and imaging results will be communicated to you by [employer], letter or phone within 4 business days after the clinic has received the results. If you do not hear from us within 7 days, please contact the clinic through [email or web address] or phone. If you have a critical or abnormal lab result, we will notify you by phone as soon as possible.  Submit refill requests through [employer] or call your pharmacy and they will forward the refill request to us. Please allow 3 business days for your refill to be completed.          Additional Information About Your Visit        [employer] Information     [employer] gives you secure access to your electronic health record. If you see a primary care provider, you can also send messages to your care team and make appointments. If you have questions, please call your primary care clinic.  If you do not have a primary care provider, please call [phone] and they will assist you.        Care EveryWhere ID     This is your Care EveryWhere ID. This could be used by other organizations to access your [facility]  medical records  [id number]        Your Vitals Were     Pulse Temperature Respirations Last Period Pulse Oximetry BMI (Body Mass Index)    96 98.7  F (37.1  C) (Temporal) 20 07/01/2017 97% 34.4 kg/m2       Blood Pressure from Last 3 Encounters:   07/11/17 110/74   07/06/17 116/74   04/18/17 122/80    Weight from Last 3 Encounters:   07/11/17 200 lb 6.4 oz (90.9 kg)   07/06/17 200 lb (90.7 kg)   04/28/17 203 lb (92.1 kg)              Today, you had the following     No orders found for display         Today's Medication Changes          These changes are accurate as of: 7/11/17 10:48 AM.  If you have any questions, ask your nurse or doctor.               Stop taking these medicines if you haven't already. Please contact your care team if you have questions.     buPROPion 150 MG 24 hr tablet   Commonly known as:  WELLBUTRIN XL   Stopped by:  Marlon Marx MD           HYDROcodone-acetaminophen 5-325 MG per tablet   Commonly known as:  NORCO   Stopped by:  Marlon Marx MD           naproxen 500 MG tablet   Commonly known as:  NAPROSYN   Stopped by:  Marlon Marx MD                    Primary Care Provider Office Phone # Fax #    Marlon Marx [phone]       [address] [place] [address]        Equal Access to Services     [facility] Center[facility]IETTA AH: Hadii aad ku hadasho Soomaali, waaxda luqadaha, qaybta kaalmada adeegyada, wax hiro herman. So [facility] [phone].    ATENCIÓN: Si habla español, tiene a lewis disposición servicios gratuitos de asistencia lingüística. Llame al [phone].    We comply with applicable federal civil rights laws and [place] laws. We do not discriminate on the basis of race, color, national origin, age, disability sex, sexual orientation or gender identity.            Thank you!     Thank you for choosing [facility]  for your care. Our goal is always to provide you with  excellent care. Hearing back from our patients is one way we can continue to improve our services. Please take a few minutes to complete the written survey that you may receive in the mail after your visit with us. Thank you!             Your Updated Medication List - Protect others around you: Learn how to safely use, store and throw away your medicines at www.disposemymeds.org.          This list is accurate as of: 7/11/17 10:48 AM.  Always use your most recent med list.                   Brand Name Dispense Instructions for use Diagnosis    cetirizine 10 MG tablet    zyrTEC    90 tablet    Take 1 tablet (10 mg) by mouth every evening    Seasonal allergic rhinitis       EPINEPHrine 0.3 MG/0.3ML injection     1 each    Inject 0.3 mLs (0.3 mg) into the muscle once as needed    History of bee sting allergy       losartan-hydrochlorothiazide 50-12.5 MG per tablet    HYZAAR     Take 1 tablet by mouth daily        nicotine 7 MG/24HR 24 hr patch    NICODERM CQ    30 patch    Place 1-2 patches onto the skin every 24 hours    Tobacco use disorder       NORVASC PO      Take 10 mg by mouth        PROBIOTIC DAILY PO      Take by mouth daily        vitamin D3 2000 UNITS Caps     100 capsule    TAKE ONE CAPSULE BY MOUTH EVERY DAY    Vitamin D deficiency

## 2017-07-12 DIAGNOSIS — Z01.812 PRE-PROCEDURE LAB EXAM: ICD-10-CM

## 2017-07-12 LAB — B-HCG SERPL-ACNC: <1 IU/L (ref 0–5)

## 2017-07-12 PROCEDURE — 36415 COLL VENOUS BLD VENIPUNCTURE: CPT | Performed by: OBSTETRICS & GYNECOLOGY

## 2017-07-12 PROCEDURE — 84702 CHORIONIC GONADOTROPIN TEST: CPT | Performed by: OBSTETRICS & GYNECOLOGY

## 2017-07-12 NOTE — H&P (VIEW-ONLY)
[address] [address]  [phone]  Dept: [phone]    PRE-OP EVALUATION:  Today's date: 2017    Virginia Valenzuela (: 1975) presents for pre-operative evaluation assessment as requested by Dr. Mishra.  She requires evaluation and anesthesia risk assessment prior to undergoing surgery/procedure for treatment of pain exploration .  Proposed procedure: Laparoscopy Diagnostic    Date of Surgery/ Procedure: 2017  Time of Surgery/ Procedure: 1:00pm  Hospital/Surgical Facility: [facility]    Primary Physician: Marlon Marx  Type of Anesthesia Anticipated: General    Patient has a Health Care Directive or Living Will:  NO    1. NO - Do you have a history of heart attack, stroke, stent, bypass or surgery on an artery in the head, neck, heart or legs?  2. NO - Do you ever have any pain or discomfort in your chest?  3. NO - Do you have a history of  Heart Failure?  4. NO - Are you troubled by shortness of breath when: walking on the level, up a slight hill or at night?  5. NO - Do you currently have a cold, bronchitis or other respiratory infection?  6. NO - Do you have a cough, shortness of breath or wheezing?  7. NO - Do you sometimes get pains in the calves of your legs when you walk?  8. YES - DO YOU OR ANYONE IN YOUR FAMILY HAVE PREVIOUS HISTORY OF BLOOD CLOTS? Sister  9. NO - Do you or does anyone in your family have a serious bleeding problem such as prolonged bleeding following surgeries or cuts?  10. NO - Have you ever had problems with anemia or been told to take iron pills?  11. NO - Have you had any abnormal blood loss such as black, tarry or bloody stools, or abnormal vaginal bleeding?  12. NO - Have you ever had a blood transfusion?  13. NO - Have you or any of your relatives ever had problems with anesthesia?  14. YES - DO YOU HAVE SLEEP APNEA, EXCESSIVE SNORING OR DAYTIME DROWSINESS? Sleep apnea  15. NO - Do you have any  prosthetic heart valves?  16. NO - Do you have prosthetic joints?  17. NO - Is there any chance that you may be pregnant?      HPI:                                                      Brief HPI related to upcoming procedure: having  6 mo pelvic pain, undergoing diagnostic lap to investigate      Can get up a single flight of stairs without dyspnea. Estimated METS > 4.      Needs a change of BP meds as she is child bearing age.     MEDICAL HISTORY:                                                      Patient Active Problem List    Diagnosis Date Noted     Trigger finger, left thumb 04/28/2017     Priority: Medium     Sesamoiditis 04/18/2017     Priority: Medium     Bilateral carpal tunnel syndrome 08/11/2016     Priority: Medium     Tobacco use disorder 08/17/2015     Priority: Medium     Ovarian cyst 01/17/2014     Priority: Medium     Problem list name updated by automated process. Provider to review       Female infertility 01/17/2014     Priority: Medium     Hypertension goal BP (blood pressure) < 140/90 11/07/2013     Priority: Medium     Papanicolaou smear of cervix with atypical squamous cells of undetermined significance (ASC-US) 06/27/2013     Priority: Medium     3/12/13 pap ASCUS HR HPV  8/5/13 colposcopy. ASCUS. Plan repeat pap in 6 months  7/6/15 pap NIL/neg HPV. Plan cotest in 3 yrs       GERD (gastroesophageal reflux disease) 06/03/2013     Priority: Medium     Vitamin D deficiency 06/03/2013     Priority: Medium     Problem list name updated by automated process. Provider to review and confirm  [employer] Update utility       HAMIDA (obstructive sleep apnea) 05/29/2013     Priority: Medium     Generalized anxiety disorder 05/29/2013     Priority: Medium     Seasonal allergic rhinitis 05/29/2013     Priority: Medium      Past Medical History:   Diagnosis Date     Allergic rhinitis, cause unspecified      ASCUS with positive high risk HPV 3/12/13     Depressive disorder, not elsewhere classified      Dysfunction  of eustachian tube      Encounter for therapeutic drug monitoring      Generalized anxiety disorder      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     History of appendectomy      Hypertension 2000     Idiopathic urticaria      Obstructive sleep apnea (adult) (pediatric)      Other chronic allergic conjunctivitis      Other malaise and fatigue      Pain in joint, shoulder region      S/P laparoscopic cholecystectomy      Special screening examination for human papillomavirus (HPV)      Sprain of tibiofibular (ligament), distal of ankle      Threatened , unspecified as to episode of care      Tobacco use disorder      Unspecified essential hypertension      Unspecified vitamin D deficiency      Past Surgical History:   Procedure Laterality Date     ABDOMEN SURGERY  2013    ovaian cyst and right ovary removal     APPENDECTOMY       CHOLECYSTECTOMY       EYE SURGERY       GENITOURINARY SURGERY  2013    Ovary, fallopian tube     GYN SURGERY  2013    Ovary, Fallopian tube     LAPAROSCOPIC LYSIS ADHESIONS Left 3/1/2016    Procedure: LAPAROSCOPIC LYSIS ADHESIONS;  Surgeon: Nate Mishra MD;  Location: [facility] OR     RELEASE CARPAL TUNNEL Left 2016    Procedure: RELEASE CARPAL TUNNEL;  Surgeon: Gucci Hairston MD;  Location: [facility] OR     RELEASE CARPAL TUNNEL Right 2016    Procedure: RELEASE CARPAL TUNNEL;  Surgeon: Gucci Hairston MD;  Location: [facility] OR     Current Outpatient Prescriptions   Medication Sig Dispense Refill     Cholecalciferol (VITAMIN D3) 2000 UNITS CAPS TAKE ONE CAPSULE BY MOUTH EVERY  capsule 10     AmLODIPine Besylate (NORVASC PO) Take 10 mg by mouth       losartan-hydrochlorothiazide (HYZAAR) 50-12.5 MG per tablet Take 1 tablet by mouth daily       cetirizine (ZYRTEC) 10 MG tablet Take 1 tablet (10 mg) by mouth every evening 90 tablet 1     Probiotic Product (PROBIOTIC DAILY PO) Take by mouth daily       naproxen (NAPROSYN) 500 MG tablet Take 1 tablet (500  mg) by mouth 2 times daily as needed for moderate pain (Patient not taking: Reported on 7/11/2017) 30 tablet 1     HYDROcodone-acetaminophen (NORCO) 5-325 MG per tablet Take 1 tablet by mouth nightly as needed for moderate to severe pain (Patient not taking: Reported on 7/11/2017) 20 tablet 0     buPROPion (WELLBUTRIN XL) 150 MG 24 hr tablet Take 1 tablet (150 mg) by mouth every morning (Patient not taking: Reported on 7/11/2017) 30 tablet 1     nicotine (NICODERM CQ) 7 MG/24HR 24 hr patch Place 1-2 patches onto the skin every 24 hours (Patient not taking: Reported on 7/11/2017) 30 patch 1     EPINEPHrine (EPIPEN) 0.3 MG/0.3ML injection Inject 0.3 mLs (0.3 mg) into the muscle once as needed (Patient not taking: Reported on 7/6/2017) 1 each 0     OTC products: None, except as noted above    Allergies   Allergen Reactions     Amoxicillin Itching     Bees Swelling     FINGER TIPS TO ELBOWS      Latex Allergy: NO    Social History   Substance Use Topics     Smoking status: Current Every Day Smoker     Packs/day: 0.75     Years: 10.00     Types: Cigarettes     Smokeless tobacco: Never Used     Alcohol use No     History   Drug Use No       REVIEW OF SYSTEMS:                                                    C: NEGATIVE for fever, chills, change in weight  E/M: NEGATIVE for ear, mouth and throat problems  R: NEGATIVE for significant cough or SOB  CV: NEGATIVE for chest pain, palpitations or peripheral edema    EXAM:                                                    /74 (BP Location: Left arm, Patient Position: Chair, Cuff Size: Adult Regular)  Pulse 96  Temp 98.7  F (37.1  C) (Temporal)  Resp 20  Wt 200 lb 6.4 oz (90.9 kg)  LMP 07/01/2017  SpO2 97%  BMI 34.4 kg/m2  GENERAL APPEARANCE: healthy, alert and no distress  HENT: ear canals and TM's normal and nose and mouth without ulcers or lesions  RESP: lungs clear to auscultation - no rales, rhonchi or wheezes  CV: regular rate and rhythm, normal S1 S2, no S3  or S4 and no murmur, click or rub   ABDOMEN: soft, nontender, no HSM or masses and bowel sounds normal  NEURO: Normal strength and tone, sensory exam grossly normal, mentation intact and speech normal    DIAGNOSTICS:                                                    EKG: Not indicated due to non-vascular surgery and low risk of event (age <65 and without cardiac risk factors)    Recent Labs   Lab Test  02/07/17   1119  02/25/16   1204  01/18/11   HGB  14.4  13.7   < >   --    PLT  377  355   < >   --    NA  141  139   < >   --    POTASSIUM  3.1*  3.9   < >  3.9   CR  0.63  0.61   < >  0.8   A1C   --    --    --   5.6    < > = values in this interval not displayed.        IMPRESSION:                                                        The proposed surgical procedure is considered LOW risk.    REVISED CARDIAC RISK INDEX  The patient has the following serious cardiovascular risks for perioperative complications such as (MI, PE, VFib and 3  AV Block):  No serious cardiac risks  INTERPRETATION: 0 risks: Class I (very low risk - 0.4% complication rate)    The patient has the following additional risks for perioperative complications:  No identified additional risks      ICD-10-CM    1. Preop general physical exam Z01.818    2. Pelvic pain in female R10.2        RECOMMENDATIONS:                                                        After surgery, rtc to address a change in BP meds to better preg category    Stop ARB for 48 hrs.    APPROVAL GIVEN to proceed with proposed procedure, without further diagnostic evaluation       Signed Electronically by: Marlon Marx MD    Copy of this evaluation report is provided to requesting physician.    [facility] Preop Guidelines

## 2017-07-13 ENCOUNTER — ANESTHESIA EVENT (OUTPATIENT)
Dept: SURGERY | Facility: CLINIC | Age: 42
End: 2017-07-13
Payer: COMMERCIAL

## 2017-07-13 ENCOUNTER — CARE COORDINATION (OUTPATIENT)
Dept: CARE COORDINATION | Facility: CLINIC | Age: 42
End: 2017-07-13

## 2017-07-13 ENCOUNTER — HOSPITAL ENCOUNTER (OUTPATIENT)
Facility: CLINIC | Age: 42
Discharge: HOME | End: 2017-07-13
Attending: OBSTETRICS & GYNECOLOGY | Admitting: OBSTETRICS & GYNECOLOGY
Payer: COMMERCIAL

## 2017-07-13 ENCOUNTER — SURGERY (OUTPATIENT)
Age: 42
End: 2017-07-13
Payer: COMMERCIAL

## 2017-07-13 ENCOUNTER — ANESTHESIA (OUTPATIENT)
Dept: SURGERY | Facility: CLINIC | Age: 42
End: 2017-07-13
Payer: COMMERCIAL

## 2017-07-13 VITALS
BODY MASS INDEX: 34.21 KG/M2 | OXYGEN SATURATION: 92 % | WEIGHT: 200.4 LBS | RESPIRATION RATE: 16 BRPM | SYSTOLIC BLOOD PRESSURE: 116 MMHG | HEIGHT: 64 IN | DIASTOLIC BLOOD PRESSURE: 63 MMHG | TEMPERATURE: 98.1 F

## 2017-07-13 DIAGNOSIS — Z98.890 S/P LAPAROSCOPIC SURGERY: Primary | ICD-10-CM

## 2017-07-13 PROCEDURE — 36000056 ZZH SURGERY LEVEL 3 1ST 30 MIN: Performed by: OBSTETRICS & GYNECOLOGY

## 2017-07-13 PROCEDURE — 25000128 H RX IP 250 OP 636: Performed by: NURSE ANESTHETIST, CERTIFIED REGISTERED

## 2017-07-13 PROCEDURE — 25000566 ZZH SEVOFLURANE, EA 15 MIN: Performed by: OBSTETRICS & GYNECOLOGY

## 2017-07-13 PROCEDURE — 58660 LAPAROSCOPY LYSIS: CPT | Mod: 62 | Performed by: OBSTETRICS & GYNECOLOGY

## 2017-07-13 PROCEDURE — 25000125 ZZHC RX 250: Performed by: OBSTETRICS & GYNECOLOGY

## 2017-07-13 PROCEDURE — 36000058 ZZH SURGERY LEVEL 3 EA 15 ADDTL MIN: Performed by: OBSTETRICS & GYNECOLOGY

## 2017-07-13 PROCEDURE — 25000125 ZZHC RX 250: Performed by: NURSE ANESTHETIST, CERTIFIED REGISTERED

## 2017-07-13 PROCEDURE — 37000009 ZZH ANESTHESIA TECHNICAL FEE, EACH ADDTL 15 MIN: Performed by: OBSTETRICS & GYNECOLOGY

## 2017-07-13 PROCEDURE — 25000132 ZZH RX MED GY IP 250 OP 250 PS 637: Performed by: OBSTETRICS & GYNECOLOGY

## 2017-07-13 PROCEDURE — 71000027 ZZH RECOVERY PHASE 2 EACH 15 MINS: Performed by: OBSTETRICS & GYNECOLOGY

## 2017-07-13 PROCEDURE — 37000008 ZZH ANESTHESIA TECHNICAL FEE, 1ST 30 MIN: Performed by: OBSTETRICS & GYNECOLOGY

## 2017-07-13 PROCEDURE — 71000014 ZZH RECOVERY PHASE 1 LEVEL 2 FIRST HR: Performed by: OBSTETRICS & GYNECOLOGY

## 2017-07-13 PROCEDURE — 58660 LAPAROSCOPY LYSIS: CPT | Mod: 62 | Performed by: SPECIALIST

## 2017-07-13 PROCEDURE — 40000306 ZZH STATISTIC PRE PROC ASSESS II: Performed by: OBSTETRICS & GYNECOLOGY

## 2017-07-13 PROCEDURE — 27210794 ZZH OR GENERAL SUPPLY STERILE: Performed by: OBSTETRICS & GYNECOLOGY

## 2017-07-13 PROCEDURE — 27110028 ZZH OR GENERAL SUPPLY NON-STERILE: Performed by: OBSTETRICS & GYNECOLOGY

## 2017-07-13 RX ORDER — ONDANSETRON 2 MG/ML
4 INJECTION INTRAMUSCULAR; INTRAVENOUS EVERY 30 MIN PRN
Status: DISCONTINUED | OUTPATIENT
Start: 2017-07-13 | End: 2017-07-13 | Stop reason: HOSPADM

## 2017-07-13 RX ORDER — FENTANYL CITRATE 50 UG/ML
25-50 INJECTION, SOLUTION INTRAMUSCULAR; INTRAVENOUS
Status: DISCONTINUED | OUTPATIENT
Start: 2017-07-13 | End: 2017-07-13 | Stop reason: HOSPADM

## 2017-07-13 RX ORDER — IBUPROFEN 600 MG/1
600 TABLET, FILM COATED ORAL EVERY 6 HOURS PRN
Qty: 60 TABLET | Refills: 1 | Status: SHIPPED | OUTPATIENT
Start: 2017-07-13 | End: 2018-03-02

## 2017-07-13 RX ORDER — NEOSTIGMINE METHYLSULFATE 1 MG/ML
VIAL (ML) INJECTION PRN
Status: DISCONTINUED | OUTPATIENT
Start: 2017-07-13 | End: 2017-07-13

## 2017-07-13 RX ORDER — NALOXONE HYDROCHLORIDE 0.4 MG/ML
.1-.4 INJECTION, SOLUTION INTRAMUSCULAR; INTRAVENOUS; SUBCUTANEOUS
Status: DISCONTINUED | OUTPATIENT
Start: 2017-07-13 | End: 2017-07-13 | Stop reason: HOSPADM

## 2017-07-13 RX ORDER — SODIUM CHLORIDE, SODIUM LACTATE, POTASSIUM CHLORIDE, CALCIUM CHLORIDE 600; 310; 30; 20 MG/100ML; MG/100ML; MG/100ML; MG/100ML
INJECTION, SOLUTION INTRAVENOUS CONTINUOUS
Status: DISCONTINUED | OUTPATIENT
Start: 2017-07-13 | End: 2017-07-13 | Stop reason: HOSPADM

## 2017-07-13 RX ORDER — LIDOCAINE 40 MG/G
CREAM TOPICAL
Status: DISCONTINUED | OUTPATIENT
Start: 2017-07-13 | End: 2017-07-13 | Stop reason: HOSPADM

## 2017-07-13 RX ORDER — LIDOCAINE HYDROCHLORIDE 20 MG/ML
INJECTION, SOLUTION INFILTRATION; PERINEURAL PRN
Status: DISCONTINUED | OUTPATIENT
Start: 2017-07-13 | End: 2017-07-13

## 2017-07-13 RX ORDER — FENTANYL CITRATE 50 UG/ML
INJECTION, SOLUTION INTRAMUSCULAR; INTRAVENOUS PRN
Status: DISCONTINUED | OUTPATIENT
Start: 2017-07-13 | End: 2017-07-13

## 2017-07-13 RX ORDER — ONDANSETRON 2 MG/ML
INJECTION INTRAMUSCULAR; INTRAVENOUS PRN
Status: DISCONTINUED | OUTPATIENT
Start: 2017-07-13 | End: 2017-07-13

## 2017-07-13 RX ORDER — ONDANSETRON 4 MG/1
4 TABLET, ORALLY DISINTEGRATING ORAL EVERY 30 MIN PRN
Status: DISCONTINUED | OUTPATIENT
Start: 2017-07-13 | End: 2017-07-13 | Stop reason: HOSPADM

## 2017-07-13 RX ORDER — OXYCODONE AND ACETAMINOPHEN 5; 325 MG/1; MG/1
1-2 TABLET ORAL EVERY 6 HOURS PRN
Qty: 40 TABLET | Refills: 0 | Status: SHIPPED | OUTPATIENT
Start: 2017-07-13 | End: 2017-10-24

## 2017-07-13 RX ORDER — AMOXICILLIN 250 MG
1-2 CAPSULE ORAL DAILY PRN
Qty: 30 TABLET | Refills: 3 | Status: SHIPPED | OUTPATIENT
Start: 2017-07-13 | End: 2017-10-24

## 2017-07-13 RX ORDER — BUPIVACAINE HYDROCHLORIDE AND EPINEPHRINE 2.5; 5 MG/ML; UG/ML
INJECTION, SOLUTION INFILTRATION; PERINEURAL PRN
Status: DISCONTINUED | OUTPATIENT
Start: 2017-07-13 | End: 2017-07-13 | Stop reason: HOSPADM

## 2017-07-13 RX ORDER — MEPERIDINE HYDROCHLORIDE 50 MG/ML
12.5 INJECTION INTRAMUSCULAR; INTRAVENOUS; SUBCUTANEOUS
Status: DISCONTINUED | OUTPATIENT
Start: 2017-07-13 | End: 2017-07-13 | Stop reason: HOSPADM

## 2017-07-13 RX ORDER — OXYCODONE AND ACETAMINOPHEN 5; 325 MG/1; MG/1
1-2 TABLET ORAL EVERY 6 HOURS PRN
Status: COMPLETED | OUTPATIENT
Start: 2017-07-13 | End: 2017-07-13

## 2017-07-13 RX ORDER — PROPOFOL 10 MG/ML
INJECTION, EMULSION INTRAVENOUS PRN
Status: DISCONTINUED | OUTPATIENT
Start: 2017-07-13 | End: 2017-07-13

## 2017-07-13 RX ORDER — DEXAMETHASONE SODIUM PHOSPHATE 10 MG/ML
INJECTION, SOLUTION INTRAMUSCULAR; INTRAVENOUS PRN
Status: DISCONTINUED | OUTPATIENT
Start: 2017-07-13 | End: 2017-07-13

## 2017-07-13 RX ORDER — GLYCOPYRROLATE 0.2 MG/ML
INJECTION, SOLUTION INTRAMUSCULAR; INTRAVENOUS PRN
Status: DISCONTINUED | OUTPATIENT
Start: 2017-07-13 | End: 2017-07-13

## 2017-07-13 RX ADMIN — PROPOFOL 50 MG: 10 INJECTION, EMULSION INTRAVENOUS at 13:31

## 2017-07-13 RX ADMIN — MIDAZOLAM HYDROCHLORIDE 2 MG: 1 INJECTION, SOLUTION INTRAMUSCULAR; INTRAVENOUS at 13:06

## 2017-07-13 RX ADMIN — SODIUM CHLORIDE, POTASSIUM CHLORIDE, SODIUM LACTATE AND CALCIUM CHLORIDE: 600; 310; 30; 20 INJECTION, SOLUTION INTRAVENOUS at 13:06

## 2017-07-13 RX ADMIN — PROPOFOL 200 MG: 10 INJECTION, EMULSION INTRAVENOUS at 13:10

## 2017-07-13 RX ADMIN — Medication 25 ML: at 13:51

## 2017-07-13 RX ADMIN — FENTANYL CITRATE 50 MCG: 50 INJECTION, SOLUTION INTRAMUSCULAR; INTRAVENOUS at 13:06

## 2017-07-13 RX ADMIN — FENTANYL CITRATE 50 MCG: 50 INJECTION, SOLUTION INTRAMUSCULAR; INTRAVENOUS at 13:34

## 2017-07-13 RX ADMIN — NEOSTIGMINE METHYLSULFATE 2.5 MG: 1 INJECTION INTRAMUSCULAR; INTRAVENOUS; SUBCUTANEOUS at 13:49

## 2017-07-13 RX ADMIN — OXYCODONE HYDROCHLORIDE AND ACETAMINOPHEN 1 TABLET: 5; 325 TABLET ORAL at 14:50

## 2017-07-13 RX ADMIN — GLYCOPYRROLATE 0.2 MG: 0.2 INJECTION, SOLUTION INTRAMUSCULAR; INTRAVENOUS at 13:49

## 2017-07-13 RX ADMIN — FENTANYL CITRATE 50 MCG: 50 INJECTION, SOLUTION INTRAMUSCULAR; INTRAVENOUS at 14:51

## 2017-07-13 RX ADMIN — SODIUM CHLORIDE, POTASSIUM CHLORIDE, SODIUM LACTATE AND CALCIUM CHLORIDE: 600; 310; 30; 20 INJECTION, SOLUTION INTRAVENOUS at 12:06

## 2017-07-13 RX ADMIN — SODIUM CHLORIDE, POTASSIUM CHLORIDE, SODIUM LACTATE AND CALCIUM CHLORIDE: 600; 310; 30; 20 INJECTION, SOLUTION INTRAVENOUS at 13:39

## 2017-07-13 RX ADMIN — LIDOCAINE HYDROCHLORIDE 80 MG: 20 INJECTION, SOLUTION INFILTRATION; PERINEURAL at 13:10

## 2017-07-13 RX ADMIN — FENTANYL CITRATE 50 MCG: 50 INJECTION, SOLUTION INTRAMUSCULAR; INTRAVENOUS at 14:22

## 2017-07-13 RX ADMIN — ONDANSETRON 4 MG: 2 INJECTION INTRAMUSCULAR; INTRAVENOUS at 13:14

## 2017-07-13 RX ADMIN — FENTANYL CITRATE 50 MCG: 50 INJECTION, SOLUTION INTRAMUSCULAR; INTRAVENOUS at 14:12

## 2017-07-13 RX ADMIN — HYDROMORPHONE HYDROCHLORIDE 0.5 MG: 1 INJECTION, SOLUTION INTRAMUSCULAR; INTRAVENOUS; SUBCUTANEOUS at 14:34

## 2017-07-13 RX ADMIN — LIDOCAINE HYDROCHLORIDE 1 ML: 10 INJECTION, SOLUTION EPIDURAL; INFILTRATION; INTRACAUDAL; PERINEURAL at 12:05

## 2017-07-13 RX ADMIN — OXYCODONE HYDROCHLORIDE AND ACETAMINOPHEN 1 TABLET: 5; 325 TABLET ORAL at 15:40

## 2017-07-13 RX ADMIN — ROCURONIUM BROMIDE 30 MG: 10 INJECTION INTRAVENOUS at 13:10

## 2017-07-13 RX ADMIN — FENTANYL CITRATE 50 MCG: 50 INJECTION, SOLUTION INTRAMUSCULAR; INTRAVENOUS at 14:43

## 2017-07-13 RX ADMIN — DEXAMETHASONE SODIUM PHOSPHATE 10 MG: 10 INJECTION, SOLUTION INTRAMUSCULAR; INTRAVENOUS at 13:14

## 2017-07-13 ASSESSMENT — LIFESTYLE VARIABLES: TOBACCO_USE: 1

## 2017-07-13 NOTE — IP AVS SNAPSHOT
[facility] Phase II    [address]     [place] [address]    Phone:  [phone]                                       After Visit Summary   7/13/2017    Virginia Valenzuela    MRN: [id number]           After Visit Summary Signature Page     I have received my discharge instructions, and my questions have been answered. I have discussed any challenges I see with this plan with the nurse or doctor.    ..........................................................................................................................................  Patient/Patient Representative Signature      ..........................................................................................................................................  Patient Representative Print Name and Relationship to Patient    ..................................................               ................................................  Date                                            Time    ..........................................................................................................................................  Reviewed by Signature/Title    ...................................................              ..............................................  Date                                                            Time

## 2017-07-13 NOTE — DISCHARGE INSTRUCTIONS
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 Please call [phone] to speak to Dr Mishra's nurse  or else call the main appointments line at [phone] if she is not available.         If you have unusually heavy vaginal bleeding, severe nausea with vomiting, or increased pain, or fever, call us at that same number to be seen earlier, or go to the emergency room if after hours or we are unavailable.          you should avoid intercourse for 1   week   after surgery.           you should not drive for 1 day   after surgery  .         you should limit lifting to 20 pounds for 2 weeks   after surgery.           you should not shower today but may resume showering tomorrow after the effects of anesthesia wear off.  Do not take a bath for 1  week   after surgery.    [facility]   Adult Discharge Orders & Instructions     For 24 hours after surgery    1. Get plenty of rest.  A responsible adult must stay with you for at least 24 hours after you leave the hospital.   2. Do not drive or use heavy equipment.  If you have weakness or tingling, don't drive or use heavy equipment until this feeling goes away.  3. Do not drink alcohol.  4. Avoid strenuous or risky activities.  Ask for help when climbing stairs.   5. You may feel lightheaded.  IF so, sit for a few minutes before standing.  Have someone help you get up.   6. If you have nausea (feel sick to your stomach): Drink only clear liquids such as apple juice, ginger ale, broth or 7-Up.  Rest may also help.  Be sure to drink enough fluids.  Move to a regular diet as you feel able.  7. You may have a slight fever. Call the doctor if your fever is over 100 F (37.7 C) (taken under the tongue) or lasts longer than 24 hours.  8. You may have a dry mouth, a sore throat, muscle aches or trouble sleeping.  These should go away after 24 hours.  9. Do not make important or legal  decisions.   Call your doctor for any of the followin.  Signs of infection (fever, growing tenderness at the surgery site, a large amount of drainage or bleeding, severe pain, foul-smelling drainage, redness, swelling).    2. It has been over 8 to 10 hours since surgery and you are still not able to urinate (pass water).    3.  Headache for over 24 hours.    To contact a nurse, call __________[phone]______________________________        If you have questions do not hesitate to call the office at above number. Thank you.         Nate Mishra MD, FACOG, FAAFP              , OB/GYN  Department.

## 2017-07-13 NOTE — ANESTHESIA POSTPROCEDURE EVALUATION
Patient: Virginia Valenzuela    Procedure(s):   laparoscopic lysis of adhesions - Wound Class: I-Clean   laparoscopic lysis of adhesions - Wound Class: I-Clean    Diagnosis:pelvic pain  Diagnosis Additional Information: No value filed.    Anesthesia Type:  General, ETT    Note:  Anesthesia Post Evaluation    Patient location during evaluation: Bedside and Phase 2  Patient participation: Able to fully participate in evaluation  Level of consciousness: awake  Pain management: adequate  Airway patency: patent  Cardiovascular status: acceptable and hemodynamically stable  Respiratory status: acceptable, room air and nonlabored ventilation  Hydration status: stable  PONV: none     Anesthetic complications: None    Comments: Patient was happy with the anesthesia care received and no anesthesia related complications were noted.  I will follow up with the patient again if it is needed.        Last vitals:  Vitals:    07/13/17 1530 07/13/17 1545 07/13/17 1600   BP: 128/72  116/63   Resp: 16     Temp:      SpO2: 97% 94% 92%         Electronically Signed By: LISE Erickson CRNA  July 13, 2017  4:48 PM

## 2017-07-13 NOTE — OP NOTE
[facility] Gynecology Operative Note  SURGEON: MP    CO-SURGEON:  RHONDA    PREOPERATIVE DIAGNOSIS:  Pelvic pain     POSTOPERATIVE DIAGNOSIS: 1. left pelvic adnexal adhesions   2. Left sided uterine adhesions to bowel omentum    PROCEDURE:  Laparoscopic lysis of adhesions( I lysed the adnexal adhesions and Dr Milligan lysed the omental/uterine adhesions)      ANESTHESIA:  General Endotracheal Anesthesia    PATIENT PROFILE:  Virginia Valenzuela is a 42 year old female  With chronic left side pelvic pain and known adhesions on that side based upon a previous laparoscopy     OPERATIVE FINDINGS:  Laparoscopy revealed that she has an 8 week uterine size and the left tube and ovary are normal in appearance excpet that the ovary was attached to the anterior abdominal wall via a thick peritoneal fatty band of tissue which I did free up with the ligasure.  Also the descending colon omentum was attached to the uterus and there were other adhesions on the uterus to the posterior cul-de-sac and these were freed up by Dr Milligan- see his note.  Otherwise the pelvis appeared normal. No endometriosis seen. The right tube and ovary surgically absent.  No hernias seen.  Based upon today's surgery success I do now feel that she would be an acceptable candidate for Total laparoscopic hysterectomy if was ever needed.     OPERATIVE DESCRIPTION:  After obtaining informed consent and after the establishment of satisfactory general endotracheal anesthesia, an exam was performed with findings as listed above under  operative findings.   The patient was then prepped and draped in the usual fashion for laparoscopy and the bladder was drained of urine.      A 5 mm incision was made just above   the umbilicus and the  Veress needle was inserted.  CO2 gas was then used to insufflate the abdomen.  Then the 5 mm bladed trocar was introduced and the laparoscope inserted to verify correct placement.  There was no bleeding or evidence of  trauma from trocar insertion.  The patient was then placed in slight Trendelenburg position.  Then a 5 mm incision was made in the left  lower abdomen and a 5 mm bladed trocar was introduced under direct visualization and there was no evidence of trauma noted from trocar insertion.  A blunt probe was inserted and a sweep of the pelvic organs revealed findings as listed above under  operative findings.     Then, Dr Milligan inserted a 5 mm bladed trocar in the right lateral abdomen . I employed the ligasure device across a thick band of fatty tissue which had scarred the left tube and ovary to the anterior abdominal wall. i applied it 4 times to secure removal of the adhesion.  Then, Dr Milligan employed the endoshears across adhesions in the posterior culdesac and over the surface of the uterine fundus where omentum was attached.See his dictation. I assisted him by elevating the uterus out of the culdesac and away from the bowel.    After completing the laparoscopy, the instruments were removed and the CO2 gas was thoroughly expressed from the abdomen and then the trocars were removed.  Each trocar site was repaired with subcuticular 5-0 Vicryl. The incisions were injected with 1/4% marcaine with dilute epinephrine.   .  A total of 25 cc was used.    The incisions were then dressed.  The final sponge, needle and instrument counts were reported to me as correct.  The patient was taken to the Recovery Room in good condition.  The estimated blood loss was 10 cc.  There were no complications.     Nate Mishra M.D.

## 2017-07-13 NOTE — ANESTHESIA PREPROCEDURE EVALUATION
Anesthesia Evaluation     . Pt has had prior anesthetic.            ROS/MED HX    ENT/Pulmonary:     (+)sleep apnea, HAMIDA risk factors hypertension, allergic rhinitis, tobacco use, Current use , . .    Neurologic:  - neg neurologic ROS     Cardiovascular:     (+) hypertension----. : . . . :. . No previous cardiac testing       METS/Exercise Tolerance:  >4 METS   Hematologic:  - neg hematologic  ROS       Musculoskeletal:  - neg musculoskeletal ROS       GI/Hepatic:     (+) GERD Asymptomatic on medication,       Renal/Genitourinary:  - ROS Renal section negative       Endo:  - neg endo ROS       Psychiatric:     (+) psychiatric history anxiety and depression      Infectious Disease:  - neg infectious disease ROS       Malignancy:      - no malignancy   Other:    (+) C-spine cleared: N/A, no H/O Chronic Pain,no other significant disability   - neg other ROS                 Physical Exam  Normal systems: cardiovascular, pulmonary and dental    Airway   Mallampati: I  TM distance: >3 FB  Neck ROM: full    Dental     Cardiovascular   Rhythm and rate: regular and normal      Pulmonary    breath sounds clear to auscultation                    Anesthesia Plan      History & Physical Review  History and physical reviewed and following examination; no interval change.    ASA Status:  2 .    NPO Status:  > 8 hours    Plan for General and ETT with Propofol and Intravenous induction. Maintenance will be Balanced.    PONV prophylaxis:  Ondansetron (or other 5HT-3) and Dexamethasone or Solumedrol       Postoperative Care  Postoperative pain management:  IV analgesics, Oral pain medications and Multi-modal analgesia.      Consents  Anesthetic plan, risks, benefits and alternatives discussed with:  Patient.  Use of blood products discussed: No .   .                          .

## 2017-07-13 NOTE — ANESTHESIA CARE TRANSFER NOTE
Patient: Virginia Valenzuela    Procedure(s):   laparoscopic lysis of adhesions - Wound Class: I-Clean   laparoscopic lysis of adhesions - Wound Class: I-Clean    Diagnosis: pelvic pain  Diagnosis Additional Information: No value filed.    Anesthesia Type:   General, ETT     Note:  Airway :Face Mask  Patient transferred to:PACU  Comments: /50, HR 75, Temp 98.1 RR 12, Sats 96%      Vitals: (Last set prior to Anesthesia Care Transfer)    CRNA VITALS  7/13/2017 1330 - 7/13/2017 1405      7/13/2017             EKG: Sinus rhythm                Electronically Signed By: LISE Al CRNA  July 13, 2017  2:05 PM

## 2017-07-13 NOTE — IP AVS SNAPSHOT
MRN:[id number]                      After Visit Summary   7/13/2017    Virginia Valenzuela    MRN: [id number]           Thank you!     Thank you for choosing [facility] for your care. Our goal is always to provide you with excellent care. Hearing back from our patients is one way we can continue to improve our services. Please take a few minutes to complete the written survey that you may receive in the mail after you visit with us. Thank you!        Patient Information     Date Of Birth          1975        About your hospital stay     You were admitted on:  July 13, 2017 You last received care in the:  [facility] II    You were discharged on:  July 13, 2017       Who to Call     For medical emergencies, please call 911.  For non-urgent questions about your medical care, please call your primary care provider or clinic, [phone]  For questions related to your surgery, please call your surgery clinic        Attending Provider     Provider Specialty    Nate Mishra MD [facility]       Primary Care Provider Office Phone # Fax #    Marlon Santos Marx [phone]      Your next 10 appointments already scheduled     Jul 18, 2017  9:50 AM CDT   [facility]    [address] [place] [id number] [phone]              Further instructions from your care team       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 Please call [phone] to speak to Dr Mishra's nurse  or else call the main appointments line at [phone] if she is not available.         If you have unusually heavy vaginal bleeding, severe nausea with vomiting, or increased pain, or fever, call us at that same number to be seen earlier, or go to the emergency room if after hours or we are  unavailable.          you should avoid intercourse for 1   week   after surgery.           you should not drive for 1 day   after surgery  .         you should limit lifting to 20 pounds for 2 weeks   after surgery.           you should not shower today but may resume showering tomorrow after the effects of anesthesia wear off.  Do not take a bath for 1  week   after surgery.    [facility]   Adult Discharge Orders & Instructions     For 24 hours after surgery    1. Get plenty of rest.  A responsible adult must stay with you for at least 24 hours after you leave the hospital.   2. Do not drive or use heavy equipment.  If you have weakness or tingling, don't drive or use heavy equipment until this feeling goes away.  3. Do not drink alcohol.  4. Avoid strenuous or risky activities.  Ask for help when climbing stairs.   5. You may feel lightheaded.  IF so, sit for a few minutes before standing.  Have someone help you get up.   6. If you have nausea (feel sick to your stomach): Drink only clear liquids such as apple juice, ginger ale, broth or 7-Up.  Rest may also help.  Be sure to drink enough fluids.  Move to a regular diet as you feel able.  7. You may have a slight fever. Call the doctor if your fever is over 100 F (37.7 C) (taken under the tongue) or lasts longer than 24 hours.  8. You may have a dry mouth, a sore throat, muscle aches or trouble sleeping.  These should go away after 24 hours.  9. Do not make important or legal decisions.   Call your doctor for any of the followin.  Signs of infection (fever, growing tenderness at the surgery site, a large amount of drainage or bleeding, severe pain, foul-smelling drainage, redness, swelling).    2. It has been over 8 to 10 hours since surgery and you are still not able to urinate (pass water).    3.  Headache for over 24 hours.    To contact a nurse, call __________[phone]______________________________        If you have questions do not  "hesitate to call the office at above number. Thank you.         Nate Mishra MD, FACOG, FAAFP              , OB/GYN  Department.               Pending Results     No orders found from 7/11/2017 to 7/14/2017.            Admission Information     Date & Time Provider Department Dept. Phone    7/13/2017 Nate Mishra MD [facility] Phase -[id number]      Your Vitals Were     Blood Pressure Temperature Respirations Height Weight Last Period    128/72 98.1  F (36.7  C) (Axillary) 16 1.626 m (5' 4\") 90.9 kg (200 lb 6.4 oz) 07/01/2017    Pulse Oximetry BMI (Body Mass Index)                97% 34.4 kg/m2          Audioairhart Information     [employer] gives you secure access to your electronic health record. If you see a primary care provider, you can also send messages to your care team and make appointments. If you have questions, please call your primary care clinic.  If you do not have a primary care provider, please call [phone] and they will assist you.        Care EveryWhere ID     This is your Care EveryWhere ID. This could be used by other organizations to access your [place] medical records  [id number]        Equal Access to Services     CYNTHIA BROWN AH: Dwayne Ramos, chadwickda raymon, qaajitta kaalmada dahiana, martinez herman. So [facility] [phone].    ATENCIÓN: Si habla español, tiene a lewis disposición servicios gratuitos de asistencia lingüística. Llame al [phone].    We comply with applicable federal civil rights laws and [place] laws. We do not discriminate on the basis of race, color, national origin, age, disability sex, sexual orientation or gender identity.               Review of your medicines      START taking        Dose / Directions    ibuprofen 600 MG tablet   Commonly known as:  ADVIL/MOTRIN   Used for:  S/P laparoscopic surgery        Dose:  600 mg   Take 1 tablet (600 mg) by mouth every 6 hours as needed for " moderate pain   Quantity:  60 tablet   Refills:  1       oxyCODONE-acetaminophen 5-325 MG per tablet   Commonly known as:  PERCOCET   Used for:  S/P laparoscopic surgery        Dose:  1-2 tablet   Take 1-2 tablets by mouth every 6 hours as needed for moderate to severe pain   Quantity:  40 tablet   Refills:  0       senna-docusate 8.6-50 MG per tablet   Commonly known as:  SENOKOT-S;PERICOLACE   Used for:  S/P laparoscopic surgery        Dose:  1-2 tablet   Take 1-2 tablets by mouth daily as needed for constipation   Quantity:  30 tablet   Refills:  3         CONTINUE these medicines which have NOT CHANGED        Dose / Directions    cetirizine 10 MG tablet   Commonly known as:  zyrTEC   Used for:  Seasonal allergic rhinitis        Dose:  10 mg   Take 1 tablet (10 mg) by mouth every evening   Quantity:  90 tablet   Refills:  1       EPINEPHrine 0.3 MG/0.3ML injection   Used for:  History of bee sting allergy        Dose:  0.3 mg   Inject 0.3 mLs (0.3 mg) into the muscle once as needed   Quantity:  1 each   Refills:  0       losartan-hydrochlorothiazide 50-12.5 MG per tablet   Commonly known as:  HYZAAR        Dose:  1 tablet   Take 1 tablet by mouth daily   Refills:  0       nicotine 7 MG/24HR 24 hr patch   Commonly known as:  NICODERM CQ   Used for:  Tobacco use disorder        Dose:  1-2 patch   Place 1-2 patches onto the skin every 24 hours   Quantity:  30 patch   Refills:  1       NORVASC PO        Dose:  10 mg   Take 10 mg by mouth   Refills:  0       PROBIOTIC DAILY PO        Take by mouth daily   Refills:  0       vitamin D3 2000 UNITS Caps   Used for:  Vitamin D deficiency        TAKE ONE CAPSULE BY MOUTH EVERY DAY   Quantity:  100 capsule   Refills:  10            Where to get your medicines      These medications were sent to [facility] [place], [place] - [address], [facility] [address]     Phone:  [phone]     ibuprofen 600 MG tablet    senna-docusate 8.6-50 MG  per tablet         Some of these will need a paper prescription and others can be bought over the counter. Ask your nurse if you have questions.     Bring a paper prescription for each of these medications     oxyCODONE-acetaminophen 5-325 MG per tablet                Protect others around you: Learn how to safely use, store and throw away your medicines at www.disposemymeds.org.             Medication List: This is a list of all your medications and when to take them. Check marks below indicate your daily home schedule. Keep this list as a reference.      Medications           Morning Afternoon Evening Bedtime As Needed    cetirizine 10 MG tablet   Commonly known as:  zyrTEC   Take 1 tablet (10 mg) by mouth every evening                                EPINEPHrine 0.3 MG/0.3ML injection   Inject 0.3 mLs (0.3 mg) into the muscle once as needed                                ibuprofen 600 MG tablet   Commonly known as:  ADVIL/MOTRIN   Take 1 tablet (600 mg) by mouth every 6 hours as needed for moderate pain                                losartan-hydrochlorothiazide 50-12.5 MG per tablet   Commonly known as:  HYZAAR   Take 1 tablet by mouth daily                                nicotine 7 MG/24HR 24 hr patch   Commonly known as:  NICODERM CQ   Place 1-2 patches onto the skin every 24 hours                                NORVASC PO   Take 10 mg by mouth                                oxyCODONE-acetaminophen 5-325 MG per tablet   Commonly known as:  PERCOCET   Take 1-2 tablets by mouth every 6 hours as needed for moderate to severe pain   Last time this was given:  1 tablet on 7/13/2017  3:40 PM                                PROBIOTIC DAILY PO   Take by mouth daily                                senna-docusate 8.6-50 MG per tablet   Commonly known as:  SENOKOT-S;PERICOLACE   Take 1-2 tablets by mouth daily as needed for constipation                                vitamin D3 2000 UNITS Caps   TAKE ONE CAPSULE BY MOUTH  EVERY DAY

## 2017-07-13 NOTE — PROGRESS NOTES
Clinic Care Coordination Contact 7/13/17  [facility]    Referral Source: Pro-Active Outreach  Clinical Data: Care Coordinator Outreach  Outreach attempted x 1.  Chart review as pt is currently in clinic having a procedure.   Plan: Care Coordinator will try to reach patient again in 3-5 business days.    LIMA Tineo  Care Coordinator Social Work    [facility]  [phone]  7/13/2017 2:37 PM

## 2017-07-14 DIAGNOSIS — R11.0 NAUSEA: Primary | ICD-10-CM

## 2017-07-14 RX ORDER — ONDANSETRON 4 MG/1
4 TABLET, FILM COATED ORAL EVERY 8 HOURS PRN
Qty: 30 TABLET | Refills: 0 | Status: CANCELLED | OUTPATIENT
Start: 2017-07-14

## 2017-07-14 NOTE — TELEPHONE ENCOUNTER
----- Message from Jordon Sanchez RN sent at 7/13/2017  4:31 PM CDT -----  Regarding: RX  [facility] States Percocet has made her nauseous in the past.

## 2017-07-18 ENCOUNTER — OFFICE VISIT (OUTPATIENT)
Dept: FAMILY MEDICINE | Facility: CLINIC | Age: 42
End: 2017-07-18
Payer: COMMERCIAL

## 2017-07-18 VITALS
WEIGHT: 201 LBS | DIASTOLIC BLOOD PRESSURE: 78 MMHG | OXYGEN SATURATION: 98 % | SYSTOLIC BLOOD PRESSURE: 130 MMHG | HEART RATE: 90 BPM | TEMPERATURE: 97.7 F | RESPIRATION RATE: 16 BRPM | BODY MASS INDEX: 34.5 KG/M2

## 2017-07-18 DIAGNOSIS — Z09 POSTOPERATIVE EXAMINATION: Primary | ICD-10-CM

## 2017-07-18 PROCEDURE — 99024 POSTOP FOLLOW-UP VISIT: CPT | Performed by: OBSTETRICS & GYNECOLOGY

## 2017-07-18 ASSESSMENT — PAIN SCALES - GENERAL: PAINLEVEL: MODERATE PAIN (5)

## 2017-07-18 NOTE — NURSING NOTE
"Chief Complaint   Patient presents with     Surgical Followup     Laparoscopy 7/13/17       Initial /78 (Cuff Size: Adult Regular)  Pulse 90  Temp 97.7  F (36.5  C) (Tympanic)  Resp 16  Wt 201 lb (91.2 kg)  LMP 07/01/2017  SpO2 98%  Breastfeeding? No  BMI 34.5 kg/m2 Estimated body mass index is 34.5 kg/(m^2) as calculated from the following:    Height as of 7/13/17: 5' 4\" (1.626 m).    Weight as of this encounter: 201 lb (91.2 kg).  Medication Reconciliation: complete   Alexis Palomino MA      "

## 2017-07-18 NOTE — MR AVS SNAPSHOT
After Visit Summary   7/18/2017    Virginia Valenzuela    MRN: [id number]           Patient Information     Date Of Birth          1975        Visit Information        Provider Department      7/18/2017 9:50 AM Nate Mishra MD [facility]         Follow-ups after your visit        Who to contact     If you have questions or need follow up information about today's clinic visit or your schedule please contact [facility] directly at [phone].  Normal or non-critical lab and imaging results will be communicated to you by MyChart, letter or phone within 4 business days after the clinic has received the results. If you do not hear from us within 7 days, please contact the clinic through [place] [employer] or phone. If you have a critical or abnormal lab result, we will notify you by phone as soon as possible.  Submit refill requests through [employer] or call your pharmacy and they will forward the refill request to us. Please allow 3 business days for your refill to be completed.          Additional Information About Your Visit        MyChart Information     In[employer] gives you secure access to your electronic health record. If you see a primary care provider, you can also send messages to your care team and make appointments. If you have questions, please call your primary care clinic.  If you do not have a primary care provider, please call [phone] and they will assist you.        Care EveryWhere ID     This is your Care EveryWhere ID. This could be used by other organizations to access your [place] medical records  [id number]        Your Vitals Were     Pulse Temperature Respirations Last Period Pulse Oximetry Breastfeeding?    90 97.7  F (36.5  C) (Tympanic) 16 07/01/2017 98% No    BMI (Body Mass Index)                   34.5 kg/m2            Blood Pressure from Last 3 Encounters:   07/18/17 130/78   07/13/17 116/63   07/11/17 110/74    Weight from Last 3  Encounters:   07/18/17 201 lb (91.2 kg)   07/13/17 200 lb 6.4 oz (90.9 kg)   07/11/17 200 lb 6.4 oz (90.9 kg)              Today, you had the following     No orders found for display       Primary Care Provider Office Phone # Fax #    Kerlinerae Santos Marx [phone]       [address]   [facility] [address]        Equal Access to Services     CYNTHIA BROWN : Hadii aad ku hadasho Soomaali, waaxda luqadaha, qaybta kaalmada adeegyada, waxay idiin hayaan adeeg kharash la'jen herman. So [facility] [phone].    ATENCIÓN: Si habla español, tiene a lewis disposición servicios gratuitos de asistencia lingüística. [facility] [phone].    We comply with applicable federal civil rights laws and [place] laws. We do not discriminate on the basis of race, color, national origin, age, disability sex, sexual orientation or gender identity.            Thank you!     Thank you for choosing [facility]  for your care. Our goal is always to provide you with excellent care. Hearing back from our patients is one way we can continue to improve our services. Please take a few minutes to complete the written survey that you may receive in the mail after your visit with us. Thank you!             Your Updated Medication List - Protect others around you: Learn how to safely use, store and throw away your medicines at www.disposemymeds.org.          This list is accurate as of: 7/18/17 10:13 AM.  Always use your most recent med list.                   Brand Name Dispense Instructions for use Diagnosis    cetirizine 10 MG tablet    zyrTEC    90 tablet    Take 1 tablet (10 mg) by mouth every evening    Seasonal allergic rhinitis       EPINEPHrine 0.3 MG/0.3ML injection 2-pack    EPIPEN/ADRENACLICK/or ANY BX GENERIC EQUIV    1 each    Inject 0.3 mLs (0.3 mg) into the muscle once as needed    History of bee sting allergy       ibuprofen 600 MG tablet    ADVIL/MOTRIN    60 tablet    Take 1  tablet (600 mg) by mouth every 6 hours as needed for moderate pain    S/P laparoscopic surgery       losartan-hydrochlorothiazide 50-12.5 MG per tablet    HYZAAR     Take 1 tablet by mouth daily        nicotine 7 MG/24HR 24 hr patch    NICODERM CQ    30 patch    Place 1-2 patches onto the skin every 24 hours    Tobacco use disorder       NORVASC PO      Take 10 mg by mouth        oxyCODONE-acetaminophen 5-325 MG per tablet    PERCOCET    40 tablet    Take 1-2 tablets by mouth every 6 hours as needed for moderate to severe pain    S/P laparoscopic surgery       PROBIOTIC DAILY PO      Take by mouth daily        senna-docusate 8.6-50 MG per tablet    SENOKOT-S;PERICOLACE    30 tablet    Take 1-2 tablets by mouth daily as needed for constipation    S/P laparoscopic surgery       vitamin D3 2000 UNITS Caps     100 capsule    TAKE ONE CAPSULE BY MOUTH EVERY DAY    Vitamin D deficiency

## 2017-07-18 NOTE — PROGRESS NOTES
S: The patient is here for postop check from laparoscopy. She reports normal bowel and bladder function and pain control is adequate. No fevers or other complaints.     O: VSS as shown in chart /78 (Cuff Size: Adult Regular)  Pulse 90  Temp 97.7  F (36.5  C) (Tympanic)  Resp 16  Wt 201 lb (91.2 kg)  LMP 07/01/2017  SpO2 98%  Breastfeeding? No  BMI 34.5 kg/m2       Chest is clear to auscultation and cardiac exam is normal. Abdomen is soft, nondistended, and the incisions are clean, dry, and intact, and healing well. Normal bowel sounds are heard.    A: stable post op check      P: laparoscopy findings discussed with patient.  She'll follow up in 3-4 weeks if the pain persists- if so I advised her to discuss colonoscopy with Dr berman since the pain is LLQ- and if colonoscopy is normal then we could consider Total laparoscopic hysterectomy  - and she is advised to recheck acutely if the incisions become red or discarge noted or any sx of infection or  nausea, vomiting, or pain.       MARIETTA Mishra MD

## 2017-08-17 ENCOUNTER — HOSPITAL ENCOUNTER (OUTPATIENT)
Dept: GENERAL RADIOLOGY | Facility: CLINIC | Age: 42
Discharge: HOME | End: 2017-08-17
Attending: FAMILY MEDICINE | Admitting: FAMILY MEDICINE
Payer: COMMERCIAL

## 2017-08-17 ENCOUNTER — OFFICE VISIT (OUTPATIENT)
Dept: FAMILY MEDICINE | Facility: CLINIC | Age: 42
End: 2017-08-17
Payer: COMMERCIAL

## 2017-08-17 VITALS
OXYGEN SATURATION: 99 % | HEART RATE: 107 BPM | DIASTOLIC BLOOD PRESSURE: 76 MMHG | TEMPERATURE: 98 F | SYSTOLIC BLOOD PRESSURE: 130 MMHG | RESPIRATION RATE: 20 BRPM | WEIGHT: 202.5 LBS | BODY MASS INDEX: 34.76 KG/M2

## 2017-08-17 DIAGNOSIS — R10.32 LLQ ABDOMINAL PAIN: Primary | ICD-10-CM

## 2017-08-17 DIAGNOSIS — R10.32 LLQ ABDOMINAL PAIN: ICD-10-CM

## 2017-08-17 PROCEDURE — 99024 POSTOP FOLLOW-UP VISIT: CPT | Performed by: FAMILY MEDICINE

## 2017-08-17 PROCEDURE — 74010 XR KUB: CPT | Mod: TC

## 2017-08-17 ASSESSMENT — ANXIETY QUESTIONNAIRES
1. FEELING NERVOUS, ANXIOUS, OR ON EDGE: NOT AT ALL
IF YOU CHECKED OFF ANY PROBLEMS ON THIS QUESTIONNAIRE, HOW DIFFICULT HAVE THESE PROBLEMS MADE IT FOR YOU TO DO YOUR WORK, TAKE CARE OF THINGS AT HOME, OR GET ALONG WITH OTHER PEOPLE: SOMEWHAT DIFFICULT
6. BECOMING EASILY ANNOYED OR IRRITABLE: NEARLY EVERY DAY
5. BEING SO RESTLESS THAT IT IS HARD TO SIT STILL: NOT AT ALL
GAD7 TOTAL SCORE: 3
2. NOT BEING ABLE TO STOP OR CONTROL WORRYING: NOT AT ALL
7. FEELING AFRAID AS IF SOMETHING AWFUL MIGHT HAPPEN: NOT AT ALL
3. WORRYING TOO MUCH ABOUT DIFFERENT THINGS: NOT AT ALL

## 2017-08-17 ASSESSMENT — PATIENT HEALTH QUESTIONNAIRE - PHQ9
5. POOR APPETITE OR OVEREATING: NOT AT ALL
SUM OF ALL RESPONSES TO PHQ QUESTIONS 1-9: 0

## 2017-08-17 ASSESSMENT — PAIN SCALES - GENERAL: PAINLEVEL: MODERATE PAIN (5)

## 2017-08-17 NOTE — MR AVS SNAPSHOT
After Visit Summary   8/17/2017    Virginia Valenzuela    MRN: [id number]           Patient Information     Date Of Birth          1975        Visit Information        Provider Department      8/17/2017 12:40 PM Marlon Marx MD [facility]        Today's Diagnoses     LLQ abdominal pain    -  1       Follow-ups after your visit        Your next 10 appointments already scheduled     Sep 15, 2017  9:00 AM CDT   SHORT with Marlon Marx MD   [facility]    [address] [place] [id number] [phone]              Future tests that were ordered for you today     Open Future Orders        Priority Expected Expires Ordered    XR KUB Routine 8/17/2017 8/17/2018 8/17/2017            Who to contact     If you have questions or need follow up information about today's clinic visit or your schedule please contact [facility] directly at [phone].  Normal or non-critical lab and imaging results will be communicated to you by TRAN.SLhart, letter or phone within 4 business days after the clinic has received the results. If you do not hear from us within 7 days, please contact the clinic through [employer] or phone. If you have a critical or abnormal lab result, we will notify you by phone as soon as possible.  Submit refill requests through Bandhappy or call your pharmacy and they will forward the refill request to us. Please allow 3 business days for your refill to be completed.          Additional Information About Your Visit        MyChart Information     Bandhappy gives you secure access to your electronic health record. If you see a primary care provider, you can also send messages to your care team and make appointments. If you have questions, please call your primary care clinic.  If you do not have a primary care provider, please call [phone] and they will assist you.        Care EveryWhere ID     This  is your Care EveryWhere ID. This could be used by other organizations to access your [place] medical records  [id number]        Your Vitals Were     Pulse Temperature Respirations Pulse Oximetry BMI (Body Mass Index)       107 98  F (36.7  C) (Temporal) 20 99% 34.76 kg/m2        Blood Pressure from Last 3 Encounters:   08/17/17 130/76   07/18/17 130/78   07/13/17 116/63    Weight from Last 3 Encounters:   08/17/17 202 lb 8 oz (91.9 kg)   07/18/17 201 lb (91.2 kg)   07/13/17 200 lb 6.4 oz (90.9 kg)               Primary Care Provider Office Phone # Fax #    Marlon Marx [phone] [address] [place] [address]        Equal Access to Services     MARCUS [facility]TA : Hadii iker govea hadasho Somichelle, waaxda luqadaha, qaybta kaalmada adeegyada, martinez montoya . So [facility] [phone].    ATENCIÓN: Si habla español, tiene a lewis disposición servicios gratuitos de asistencia lingüística. Luiz al [phone].    We comply with applicable federal civil rights laws and [place] laws. We do not discriminate on the basis of race, color, national origin, age, disability sex, sexual orientation or gender identity.            Thank you!     Thank you for choosing [facility]  for your care. Our goal is always to provide you with excellent care. Hearing back from our patients is one way we can continue to improve our services. Please take a few minutes to complete the written survey that you may receive in the mail after your visit with us. Thank you!             Your Updated Medication List - Protect others around you: Learn how to safely use, store and throw away your medicines at www.disposemymeds.org.          This list is accurate as of: 8/17/17  7:34 PM.  Always use your most recent med list.                   Brand Name Dispense Instructions for use Diagnosis    cetirizine 10 MG tablet    zyrTEC    90 tablet    Take 1 tablet (10 mg) by mouth every  evening    Seasonal allergic rhinitis       EPINEPHrine 0.3 MG/0.3ML injection 2-pack    EPIPEN/ADRENACLICK/or ANY BX GENERIC EQUIV    1 each    Inject 0.3 mLs (0.3 mg) into the muscle once as needed    History of bee sting allergy       ibuprofen 600 MG tablet    ADVIL/MOTRIN    60 tablet    Take 1 tablet (600 mg) by mouth every 6 hours as needed for moderate pain    S/P laparoscopic surgery       losartan-hydrochlorothiazide 50-12.5 MG per tablet    HYZAAR     Take 1 tablet by mouth daily        nicotine 7 MG/24HR 24 hr patch    NICODERM CQ    30 patch    Place 1-2 patches onto the skin every 24 hours    Tobacco use disorder       NORVASC PO      Take 10 mg by mouth        oxyCODONE-acetaminophen 5-325 MG per tablet    PERCOCET    40 tablet    Take 1-2 tablets by mouth every 6 hours as needed for moderate to severe pain    S/P laparoscopic surgery       PROBIOTIC DAILY PO      Take by mouth daily        senna-docusate 8.6-50 MG per tablet    SENOKOT-S;PERICOLACE    30 tablet    Take 1-2 tablets by mouth daily as needed for constipation    S/P laparoscopic surgery       vitamin D3 2000 UNITS Caps     100 capsule    TAKE ONE CAPSULE BY MOUTH EVERY DAY    Vitamin D deficiency

## 2017-08-17 NOTE — PROGRESS NOTES
SUBJECTIVE:                                                    Virginia Valenzuela is a 42 year old female who presents to clinic today for the following health issues:    Chief Complaint   Patient presents with     Recheck Medication     Post-surgery     concerns        Hypertension Follow-up      Outpatient blood pressures are not being checked.    Low Salt Diet: no added salt        Amount of exercise or physical activity: 4-5 days/week for an average of 30-45 minutes    Problems taking medications regularly: No    Medication side effects: none  Diet: low salt    She returns for a recheck of her left lower quadrant abdominal pain. She has had many years of this. She is having 3-4 days a week of 4 out of 5 pain that is continuous for the day. At times it gets worse to proximally 7 or 8 in the late afternoon. Usually run dinnertime. Not affected by meals. Mild nausea associated with this. No change of bowel movements. If she is less active her pain is better. She is avoiding intercourse, although there is no pain with penetration. She has not found a pattern except for this late afternoon increase. It was thought to be related to left-sided pelvic adhesions. She had this laparoscopically taken down, but there's been no change in her pain. Review of the operative note shows normal-appearing colon on that side and no other concerns. No endometriosis. The surgeon mentioned removing her uterus. She has had no change in bowel habits. Denies constipation. There's been no blood in her stool.    ROS:  Constitutional, HEENT, cardiovascular, pulmonary, gi and gu systems are negative, except as otherwise noted.      OBJECTIVE:                                                    /76 (BP Location: Left arm, Patient Position: Chair, Cuff Size: Adult Regular)  Pulse 107  Temp 98  F (36.7  C) (Temporal)  Resp 20  Wt 202 lb 8 oz (91.9 kg)  SpO2 99%  BMI 34.76 kg/m2  Body mass index is 34.76 kg/(m^2).    Well-appearing  similar distress. Neck supple lymphadenopathy thyromegaly. Eyes clear. Heart regular. Lungs clear unlabored. Abdomen is mildly diffusely tender, maybe more so on the left lower quadrant. But in general fairly unremarkable without rigidity or distention or guarding. Extremities warm and perfused no edema.         ASSESSMENT/PLAN:                                                        ICD-10-CM    1. LLQ abdominal pain R10.32 XR KUB       Left lower quadrant abdominal pain of unclear etiology. Reviewed her CT scan from back in 2015 which did not show diverticulosis or other cause. There was a large ovary at that time was cyst. She's had that taken care of but continues to have pain. Recent subacute adhesion was not helpful. Will recheck an x-ray to look at stool burden. If normal, would like her to start a pain journal to more accurately assess any pattern. We'll also talk with her surgeon about the reason for hysterectomy and if there is endometriosis present. Return to clinic in 3-4 weeks with her pain journal. Consider amitriptyline etc.    Marlon Marx MD  [facility]

## 2017-08-17 NOTE — NURSING NOTE
"Chief Complaint   Patient presents with     Recheck Medication     Post-surgery     concerns       Initial /76 (BP Location: Left arm, Patient Position: Chair, Cuff Size: Adult Regular)  Pulse 107  Temp 98  F (36.7  C) (Temporal)  Resp 20  Wt 202 lb 8 oz (91.9 kg)  SpO2 99%  BMI 34.76 kg/m2 Estimated body mass index is 34.76 kg/(m^2) as calculated from the following:    Height as of 7/13/17: 5' 4\" (1.626 m).    Weight as of this encounter: 202 lb 8 oz (91.9 kg).  Medication Reconciliation: complete   Guerda Valdes CMA    Health Maintenance Due   Topic Date Due     TOBACCO CESSATION COUNSELING Q1 YR  1975     MARY QUESTIONNAIRE 6 MONTHS  08/07/2017       Health Maintenance reviewed at today's visit patient asked to schedule/complete:   Patient give MARY and TCC option given.      Guerda Valdes CMA       "

## 2017-08-18 ASSESSMENT — ANXIETY QUESTIONNAIRES: GAD7 TOTAL SCORE: 3

## 2017-09-07 NOTE — BRIEF OP NOTE
[facility] Brief Operative Note    Pre-operative diagnosis: pelvic pain   Post-operative diagnosis Same   Procedure: Procedure(s):   laparoscopic lysis of adhesions - Wound Class: I-Clean   laparoscopic lysis of adhesions - Wound Class: I-Clean   Surgeon: Nate Milligan MD, FACS   Assistants(s): Nate Mishra MD   Estimated blood loss: Less than 10 ml    Specimens: None   Findings: Post Uterine adhesions       Nate Milligan MD, FACS    #[id number]

## 2017-09-08 NOTE — OP NOTE
DATE OF PROCEDURE:  2017      PREOPERATIVE DIAGNOSIS:  As per Dr. Mishra, pelvic pain.      POSTOPERATIVE DIAGNOSIS:  As per Dr. Mishra, pelvic pain, posterior uterine adhesions.      PROCEDURE PERFORMED:   1.  Assisted Dr. Mishra.   2.  Lysis of single adhesion.      SURGEON:  Nate Milligan MD      ASSISTANT:  Nate Mishra MD      ANESTHESIA:  General via endotracheal tube.      INDICATION FOR PROCEDURE:  Dixon Marques is a 42-year-old lady who initially presented to Dr. Mishra with pelvic pain.  I was asked to assist Dr. Mishra with a hysterectomy.  During the dissection, he found several posterior uterine adhesions, and he did not feel comfortable lysing them himself, so he asked me to take them down.      OPERATIVE FINDINGS:  Posterior uterine adhesions.      DETAILS OF PROCEDURE:  Please see Dr. Mishra's note.  He entered the abdomen and placed the scopes.  About [facility] through the procedure, Dr. Mishra did lift up the uterus.  There were two small filmy adhesions between the uterus and the colon.  Dr. Mishra asked me to take them down.  They were taken down using EndoShears.  The remainder of the procedure was completed by Dr. Mishra.  Please see his dictation.         NATE MILLIGAN MD             D: 2017 13:24   T: 2017 00:43   MT: [id number]      Name:     DIXON MARQUES   MRN:      [id number]        Account:        [id number]   :      1975           Procedure Date: 2017      Document: [id number]       cc: Nate Mishra MD

## 2017-09-12 ENCOUNTER — HOSPITAL ENCOUNTER (OUTPATIENT)
Dept: MAMMOGRAPHY | Facility: CLINIC | Age: 42
Discharge: HOME | End: 2017-09-12
Attending: OBSTETRICS & GYNECOLOGY | Admitting: OBSTETRICS & GYNECOLOGY
Payer: COMMERCIAL

## 2017-09-12 DIAGNOSIS — Z12.31 VISIT FOR SCREENING MAMMOGRAM: ICD-10-CM

## 2017-09-12 PROCEDURE — G0202 SCR MAMMO BI INCL CAD: HCPCS

## 2017-09-15 ENCOUNTER — OFFICE VISIT (OUTPATIENT)
Dept: FAMILY MEDICINE | Facility: CLINIC | Age: 42
End: 2017-09-15
Payer: COMMERCIAL

## 2017-09-15 VITALS
DIASTOLIC BLOOD PRESSURE: 72 MMHG | SYSTOLIC BLOOD PRESSURE: 128 MMHG | HEART RATE: 88 BPM | TEMPERATURE: 97.6 F | OXYGEN SATURATION: 99 % | WEIGHT: 200 LBS | BODY MASS INDEX: 34.33 KG/M2

## 2017-09-15 DIAGNOSIS — R10.2 CHRONIC PELVIC PAIN IN FEMALE: Primary | ICD-10-CM

## 2017-09-15 DIAGNOSIS — G89.29 CHRONIC PELVIC PAIN IN FEMALE: Primary | ICD-10-CM

## 2017-09-15 PROCEDURE — 99214 OFFICE O/P EST MOD 30 MIN: CPT | Performed by: FAMILY MEDICINE

## 2017-09-15 ASSESSMENT — PAIN SCALES - GENERAL: PAINLEVEL: MODERATE PAIN (4)

## 2017-09-15 NOTE — MR AVS SNAPSHOT
After Visit Summary   9/15/2017    Virginia Valenzuela    MRN: [id number]           Patient Information     Date Of Birth          1975        Visit Information        Provider Department      9/15/2017 9:00 AM Marlon Marx MD [facility]        Today's Diagnoses     Chronic pelvic pain in female    -  1       Follow-ups after your visit        Who to contact     If you have questions or need follow up information about today's clinic visit or your schedule please contact [facility] directly at [phone].  Normal or non-critical lab and imaging results will be communicated to you by Hipcrickethart, letter or phone within 4 business days after the clinic has received the results. If you do not hear from us within 7 days, please contact the clinic through [employer] or phone. If you have a critical or abnormal lab result, we will notify you by phone as soon as possible.  Submit refill requests through [employer] or call your pharmacy and they will forward the refill request to us. Please allow 3 business days for your refill to be completed.          Additional Information About Your Visit        MyChart Information     [employer] gives you secure access to your electronic health record. If you see a primary care provider, you can also send messages to your care team and make appointments. If you have questions, please call your primary care clinic.  If you do not have a primary care provider, please call [phone] and they will assist you.        Care EveryWhere ID     This is your Care EveryWhere ID. This could be used by other organizations to access your [facility] medical records  [id number]        Your Vitals Were     Pulse Temperature Last Period Pulse Oximetry Breastfeeding? BMI (Body Mass Index)    88 97.6  F (36.4  C) (Temporal) 08/27/2017 (Exact Date) 99% No 34.33 kg/m2       Blood Pressure from Last 3 Encounters:   09/15/17 128/72   08/17/17 130/76   07/18/17  130/78    Weight from Last 3 Encounters:   09/15/17 200 lb (90.7 kg)   08/17/17 202 lb 8 oz (91.9 kg)   07/18/17 201 lb (91.2 kg)              Today, you had the following     No orders found for display       Primary Care Provider Office Phone # Fax #    Kerlinerae Santos Marx [phone] [address] [place] [address]        Equal Access to Services     [facility] : Hadii aad ku hadasho Soomaali, waaxda luqadaha, qaybta kaalmada adeegyada, waxay idiin hayaan adeeg kharash la'aan . So [facility] [phone].    ATENCIÓN: Si habla español, tiene a lewis disposición servicios gratuitos de asistencia lingüística. [facility] [phone].    We comply with applicable federal civil rights laws and [place] laws. We do not discriminate on the basis of race, color, national origin, age, disability sex, sexual orientation or gender identity.            Thank you!     Thank you for choosing [facility]  for your care. Our goal is always to provide you with excellent care. Hearing back from our patients is one way we can continue to improve our services. Please take a few minutes to complete the written survey that you may receive in the mail after your visit with us. Thank you!             Your Updated Medication List - Protect others around you: Learn how to safely use, store and throw away your medicines at www.disposemymeds.org.          This list is accurate as of: 9/15/17 12:13 PM.  Always use your most recent med list.                   Brand Name Dispense Instructions for use Diagnosis    cetirizine 10 MG tablet    zyrTEC    90 tablet    Take 1 tablet (10 mg) by mouth every evening    Seasonal allergic rhinitis       EPINEPHrine 0.3 MG/0.3ML injection 2-pack    EPIPEN/ADRENACLICK/or ANY BX GENERIC EQUIV    1 each    Inject 0.3 mLs (0.3 mg) into the muscle once as needed    History of bee sting allergy       ibuprofen 600 MG tablet    ADVIL/MOTRIN    60 tablet    Take 1 tablet (600  mg) by mouth every 6 hours as needed for moderate pain    S/P laparoscopic surgery       losartan-hydrochlorothiazide 50-12.5 MG per tablet    HYZAAR     Take 1 tablet by mouth daily        nicotine 7 MG/24HR 24 hr patch    NICODERM CQ    30 patch    Place 1-2 patches onto the skin every 24 hours    Tobacco use disorder       NORVASC PO      Take 10 mg by mouth        oxyCODONE-acetaminophen 5-325 MG per tablet    PERCOCET    40 tablet    Take 1-2 tablets by mouth every 6 hours as needed for moderate to severe pain    S/P laparoscopic surgery       PROBIOTIC DAILY PO      Take by mouth daily        senna-docusate 8.6-50 MG per tablet    SENOKOT-S;PERICOLACE    30 tablet    Take 1-2 tablets by mouth daily as needed for constipation    S/P laparoscopic surgery       vitamin D3 2000 UNITS Caps     100 capsule    TAKE ONE CAPSULE BY MOUTH EVERY DAY    Vitamin D deficiency

## 2017-09-15 NOTE — PROGRESS NOTES
SUBJECTIVE:   Virginia Valenzuela is a 42 year old female who presents to clinic today for the following health issues:    Chronic Pain Follow-Up       Type / Location of Pain: left lower abdomen  Analgesia/pain control:       Recent changes:  same      Overall control: Inadequate pain control  Activity level/function:      Daily activities:  Able to do light housework, cooking    Work:  Able to work part time with limitations  Adverse effects:  No  Adherance    Taking medication as directed?  Yes    Participating in other treatments: not applicable  Risk Factors:    Sleep:  Poor    Mood/anxiety:  controlled    Recent family or social stressors:  none noted    Other aggravating factors: none  PHQ-9 SCORE 2/3/2016 2/7/2017 8/17/2017   Total Score - - -   Total Score 0 1 0     MARY-7 SCORE 2/3/2016 2/7/2017 8/17/2017   Total Score - - -   Total Score 0 0 3     Encounter-Level CSA:     There are no encounter-level csa.            Amount of exercise or physical activity: 2-3 days/week for an average of 30-45 minutes    Problems taking medications regularly: No    Medication side effects: none  Diet: regular (no restrictions)    Very pleasant 42-year-old returns with ongoing left low pelvis pain. See prior note for details. We reviewed her pain diary today. It shows 3-4 days out of the week with mild to moderate consistent, constant left low pelvis pain, accompanied sometimes by mid suprapubic pain that seems related to urination. She also has some pain there postcoital. But they don't seem related to the left low pelvis pain. A few days out of the week it gets more severe, perhaps 8-910. Not related to meals, bowel movements, or time of day. Although several times she is waking at night with it. She had several years of this.    Problem list and histories reviewed & adjusted, as indicated.  Additional history: as documented        Reviewed and updated as needed this visit by clinical staffTobacco  Allergies  Soc Hx       Reviewed and updated as needed this visit by Provider         ROS:  Constitutional, HEENT, cardiovascular, pulmonary, gi and gu systems are negative, except as otherwise noted.      OBJECTIVE:   /72 (BP Location: Right arm, Patient Position: Chair, Cuff Size: Adult Large)  Pulse 88  Temp 97.6  F (36.4  C) (Temporal)  Wt 200 lb (90.7 kg)  LMP 08/27/2017 (Exact Date)  SpO2 99%  Breastfeeding? No  BMI 34.33 kg/m2  Body mass index is 34.33 kg/(m^2).  No exam        ASSESSMENT/PLAN:               ICD-10-CM    1. Chronic pelvic pain in female R10.2     G89.29        Chronic pelvic pain, unclear etiology. We discussed possibly finding a chronic pelvic pain provider for further consultation. I'm not clear etiology for this. I will contact her OB/GYN who did her laparoscopically to get more detail on the surgery and why there is recommendation for hysterectomy. I don't see any notes of endometriosis. Certainly that would be a much different etiology to treat. We discussed both pharmacological and nonpharmacological ways to manage her pain. There are pain counselors to help increase her threshold. We did discuss different chronic pain medication such as a TCA, or antiepileptic, oriented and muscle relaxer. I gave her some names and she'll take that home to consider. See her back at her discretion for further treatment    > 50% of this 25 min visit spent in counseling pertaining to her current symptoms, medical history, treatment options moving forward     Marlon Marx MD  [facility] [place]

## 2017-09-20 ENCOUNTER — TELEPHONE (OUTPATIENT)
Dept: FAMILY MEDICINE | Facility: CLINIC | Age: 42
End: 2017-09-20

## 2017-10-04 ENCOUNTER — MYC MEDICAL ADVICE (OUTPATIENT)
Dept: FAMILY MEDICINE | Facility: CLINIC | Age: 42
End: 2017-10-04

## 2017-10-09 NOTE — TELEPHONE ENCOUNTER
Patient is scheduled to have the cyst removed on 10/24/17, however it is quite painful and she is really hoping she can be seen as soon as she can.    Is there anyway she can be seen sooner?  We did check to see if there even were other providers with openings, and there really are not.     It would be fine to mychart her as well.  Thank you,  Dorothy CONTRERAS

## 2017-10-10 NOTE — TELEPHONE ENCOUNTER
Called patient and let he rknow that that was the earliest we could get her in to see Dr. Marx for the cyst removal.  She asked if she could have anything for pain management. Will forward this message to Dr. Marx.     Guerda Valdes, MARYANN

## 2017-10-11 ENCOUNTER — OFFICE VISIT (OUTPATIENT)
Dept: FAMILY MEDICINE | Facility: CLINIC | Age: 42
End: 2017-10-11
Payer: COMMERCIAL

## 2017-10-11 VITALS
DIASTOLIC BLOOD PRESSURE: 74 MMHG | BODY MASS INDEX: 34.79 KG/M2 | SYSTOLIC BLOOD PRESSURE: 120 MMHG | WEIGHT: 202.7 LBS | HEART RATE: 97 BPM | OXYGEN SATURATION: 97 % | TEMPERATURE: 97 F

## 2017-10-11 DIAGNOSIS — R10.32 ABDOMINAL PAIN, LEFT LOWER QUADRANT: ICD-10-CM

## 2017-10-11 DIAGNOSIS — L72.9 INFECTED CYST OF SKIN: ICD-10-CM

## 2017-10-11 DIAGNOSIS — L72.3 SEBACEOUS CYST: Primary | ICD-10-CM

## 2017-10-11 DIAGNOSIS — L08.9 INFECTED CYST OF SKIN: ICD-10-CM

## 2017-10-11 PROCEDURE — 99213 OFFICE O/P EST LOW 20 MIN: CPT | Performed by: FAMILY MEDICINE

## 2017-10-11 ASSESSMENT — PAIN SCALES - GENERAL: PAINLEVEL: MILD PAIN (2)

## 2017-10-11 NOTE — PROGRESS NOTES
SUBJECTIVE:                                                    Virginia Valenzuela is a 42 year old female who presents to clinic today for the following health issues:    Chief Complaint   Patient presents with     Derm Problem     cyst removal from the chest        Patient returns to clinic to evaluate this. Last few days gotten increasingly painful, tender, warm.      ROS:      OBJECTIVE:                                                    /74 (BP Location: Right arm, Patient Position: Chair, Cuff Size: Adult Regular)  Pulse 97  Temp 97  F (36.1  C) (Temporal)  Wt 202 lb 11.2 oz (91.9 kg)  LMP 08/27/2017 (Exact Date)  SpO2 97%  BMI 34.79 kg/m2  Body mass index is 34.79 kg/(m^2).    In the center of her chest there is a palpable subcutaneous lump that is tender, mildly warm, erythematous.         ASSESSMENT/PLAN:                                                        ICD-10-CM    1. Sebaceous cyst L72.3    2. Infected cyst of skin L72.9 amoxicillin-clavulanate (AUGMENTIN) 875-125 MG per tablet    L08.9        Antibiotics for a suspected infected sebaceous cyst. Give this a week and then return in a couple weeks for removal. She agrees.    Marlon Marx MD  [facility]

## 2017-10-11 NOTE — MR AVS SNAPSHOT
After Visit Summary   10/11/2017    Virginia Valenzuela    MRN: [id number]           Patient Information     Date Of Birth          1975        Visit Information        Provider Department      10/11/2017 11:40 AM Marlon Marx MD [facility]         Follow-ups after your visit        Who to contact     If you have questions or need follow up information about today's clinic visit or your schedule please contact [facility] directly at [phone].  Normal or non-critical lab and imaging results will be communicated to you by MyChart, letter or phone within 4 business days after the clinic has received the results. If you do not hear from us within 7 days, please contact the clinic through [employer]Oh[employer] or phone. If you have a critical or abnormal lab result, we will notify you by phone as soon as possible.  Submit refill requests through [employer] or call your pharmacy and they will forward the refill request to us. Please allow 3 business days for your refill to be completed.          Additional Information About Your Visit        MyChart Information     [employer] gives you secure access to your electronic health record. If you see a primary care provider, you can also send messages to your care team and make appointments. If you have questions, please call your primary care clinic.  If you do not have a primary care provider, please call [phone] and they will assist you.        Care EveryWhere ID     This is your Care EveryWhere ID. This could be used by other organizations to access your [facility] medical records  [id number]        Your Vitals Were     Last Period                   08/27/2017 (Exact Date)            Blood Pressure from Last 3 Encounters:   09/15/17 128/72   08/17/17 130/76   07/18/17 130/78    Weight from Last 3 Encounters:   09/15/17 200 lb (90.7 kg)   08/17/17 202 lb 8 oz (91.9 kg)   07/18/17 201 lb (91.2 kg)              Today, you had the  following     No orders found for display       Primary Care Provider Office Phone # Fax #    Marlon Marx [phone] [address] [place] [address]        Equal Access to Services     CYNTHIA BROWN : Hadii iker ku carloso Soagataali, waaxda luqadaha, qaybta kaalmada adeegyada, martinez sepulveda laAbbeyjen herman. So [facility] [phone].    ATENCIÓN: Si habla español, tiene a lewis disposición servicios gratuitos de asistencia lingüística. Llame al [phone].    We comply with applicable federal civil rights laws and [place] laws. We do not discriminate on the basis of race, color, national origin, age, disability, sex, sexual orientation, or gender identity.            Thank you!     Thank you for choosing [facility]  for your care. Our goal is always to provide you with excellent care. Hearing back from our patients is one way we can continue to improve our services. Please take a few minutes to complete the written survey that you may receive in the mail after your visit with us. Thank you!             Your Updated Medication List - Protect others around you: Learn how to safely use, store and throw away your medicines at www.disposemymeds.org.          This list is accurate as of: 10/11/17 11:44 AM.  Always use your most recent med list.                   Brand Name Dispense Instructions for use Diagnosis    cetirizine 10 MG tablet    zyrTEC    90 tablet    TAKE ONE TABLET BY MOUTH EVERY EVENING    Chronic seasonal allergic rhinitis, unspecified trigger       EPINEPHrine 0.3 MG/0.3ML injection 2-pack    EPIPEN/ADRENACLICK/or ANY BX GENERIC EQUIV    1 each    Inject 0.3 mLs (0.3 mg) into the muscle once as needed    History of bee sting allergy       ibuprofen 600 MG tablet    ADVIL/MOTRIN    60 tablet    Take 1 tablet (600 mg) by mouth every 6 hours as needed for moderate pain    S/P laparoscopic surgery       losartan-hydrochlorothiazide 50-12.5 MG per  tablet    HYZAAR     Take 1 tablet by mouth daily        nicotine 7 MG/24HR 24 hr patch    NICODERM CQ    30 patch    Place 1-2 patches onto the skin every 24 hours    Tobacco use disorder       NORVASC PO      Take 10 mg by mouth        oxyCODONE-acetaminophen 5-325 MG per tablet    PERCOCET    40 tablet    Take 1-2 tablets by mouth every 6 hours as needed for moderate to severe pain    S/P laparoscopic surgery       PROBIOTIC DAILY PO      Take by mouth daily        senna-docusate 8.6-50 MG per tablet    SENOKOT-S;PERICOLACE    30 tablet    Take 1-2 tablets by mouth daily as needed for constipation    S/P laparoscopic surgery       vitamin D3 2000 UNITS Caps     100 capsule    TAKE ONE CAPSULE BY MOUTH EVERY DAY    Vitamin D deficiency

## 2017-10-11 NOTE — NURSING NOTE
"Chief Complaint   Patient presents with     Derm Problem     cyst removal from the chest       Initial /74 (BP Location: Right arm, Patient Position: Chair, Cuff Size: Adult Regular)  Pulse 97  Temp 97  F (36.1  C) (Temporal)  Wt 202 lb 11.2 oz (91.9 kg)  LMP 08/27/2017 (Exact Date)  SpO2 97%  BMI 34.79 kg/m2 Estimated body mass index is 34.79 kg/(m^2) as calculated from the following:    Height as of 7/13/17: 5' 4\" (1.626 m).    Weight as of this encounter: 202 lb 11.2 oz (91.9 kg).  Medication Reconciliation: complete   Guerda Valdes CMA    Health Maintenance Due   Topic Date Due     TOBACCO CESSATION COUNSELING Q1 YR  1975     URINE DRUG SCREEN Q1 YR  04/09/1991     INFLUENZA VACCINE (SYSTEM ASSIGNED)  09/01/2017       Health Maintenance reviewed at today's visit patient asked to schedule/complete:   Patient is aware.       "

## 2017-10-17 ENCOUNTER — TELEPHONE (OUTPATIENT)
Dept: SURGERY | Facility: CLINIC | Age: 42
End: 2017-10-17

## 2017-10-20 ENCOUNTER — TELEPHONE (OUTPATIENT)
Dept: FAMILY MEDICINE | Facility: CLINIC | Age: 42
End: 2017-10-20

## 2017-10-24 ENCOUNTER — OFFICE VISIT (OUTPATIENT)
Dept: FAMILY MEDICINE | Facility: CLINIC | Age: 42
End: 2017-10-24
Payer: COMMERCIAL

## 2017-10-24 VITALS
WEIGHT: 204 LBS | SYSTOLIC BLOOD PRESSURE: 128 MMHG | DIASTOLIC BLOOD PRESSURE: 72 MMHG | RESPIRATION RATE: 14 BRPM | TEMPERATURE: 97.5 F | OXYGEN SATURATION: 98 % | HEART RATE: 96 BPM | BODY MASS INDEX: 35.02 KG/M2

## 2017-10-24 DIAGNOSIS — L72.3 SEBACEOUS CYST: Primary | ICD-10-CM

## 2017-10-24 PROCEDURE — 11400 EXC TR-EXT B9+MARG 0.5 CM<: CPT | Performed by: FAMILY MEDICINE

## 2017-10-24 ASSESSMENT — PAIN SCALES - GENERAL: PAINLEVEL: NO PAIN (0)

## 2017-10-24 NOTE — PROGRESS NOTES
SUBJECTIVE:                                                    Virginia Valenzuela is a 42 year old female who presents to clinic today for the following health issues:    Chief Complaint   Patient presents with     Procedure     cyst removal        Cyst removal. Returns today after she was antibiotics for suspected infected sebaceous cyst.      ROS:      OBJECTIVE:                                                    /72 (BP Location: Left arm, Patient Position: Sitting, Cuff Size: Adult Regular)  Pulse 96  Temp 97.5  F (36.4  C) (Temporal)  Resp 14  Wt 204 lb (92.5 kg)  LMP 09/26/2017 (Exact Date)  SpO2 98%  BMI 35.02 kg/m2  Body mass index is 35.02 kg/(m^2).    After informed consent, prepped the skin of the anterior chest just above the left breast with chlorhexidine just over the area of the subcutaneous mass. There is a central punctation present consistent with sebaceous cyst. After 3 mL 1% lidocaine with epinephrine and good anesthesia, I used a #15 blade to go through the skin and subcutaneous tissue. Isolated the cyst which is about the size of a small grape and removed it in its entirety. I then closed the subcutaneous tissue with a single 5-0 Vicryl. Suture. I closed the skin with a running 5-0 Vicryl suture. Steri-Strips applied over that. Sterile bandage applied. Tolerated well. Minimal EBL.         ASSESSMENT/PLAN:                                                        ICD-10-CM    1. Sebaceous cyst L72.3        Sebaceous cyst removed today in its entirety. Follow-up if any problems with drainage, infection, etc.    Marlon Marx MD  [facility]

## 2017-10-24 NOTE — NURSING NOTE
"Chief Complaint   Patient presents with     Procedure     cyst removal       Initial /72 (BP Location: Left arm, Patient Position: Sitting, Cuff Size: Adult Regular)  Pulse 96  Temp 97.5  F (36.4  C) (Temporal)  Resp 14  Wt 204 lb (92.5 kg)  LMP 09/26/2017 (Exact Date)  SpO2 98%  BMI 35.02 kg/m2 Estimated body mass index is 35.02 kg/(m^2) as calculated from the following:    Height as of 7/13/17: 5' 4\" (1.626 m).    Weight as of this encounter: 204 lb (92.5 kg).  Medication Reconciliation: complete     Juliana Sofia MA 10/24/2017        "

## 2017-10-24 NOTE — MR AVS SNAPSHOT
After Visit Summary   10/24/2017    Virginia Valenzuela    MRN: [id number]           Patient Information     Date Of Birth          1975        Visit Information        Provider Department      10/24/2017 11:40 AM Marlon Marx MD [facility]        Today's Diagnoses     Sebaceous cyst    -  1       Follow-ups after your visit        Your next 10 appointments already scheduled     Oct 26, 2017   Procedure with Fred Faust DO   [facility] Endoscopy ([facility])    [address] [place] [id number] [phone]              Who to contact     If you have questions or need follow up information about today's clinic visit or your schedule please contact [facility] directly at [phone].  Normal or non-critical lab and imaging results will be communicated to you by MyChart, letter or phone within 4 business days after the clinic has received the results. If you do not hear from us within 7 days, please contact the clinic through MyChart or phone. If you have a critical or abnormal lab result, we will notify you by phone as soon as possible.  Submit refill requests through [employer] or call your pharmacy and they will forward the refill request to us. Please allow 3 business days for your refill to be completed.          Additional Information About Your Visit        MyChart Information     [employer] gives you secure access to your electronic health record. If you see a primary care provider, you can also send messages to your care team and make appointments. If you have questions, please call your primary care clinic.  If you do not have a primary care provider, please call [phone] and they will assist you.        Care EveryWhere ID     This is your Care EveryWhere ID. This could be used by other organizations to access your [facility] medical records  [id number]        Your Vitals Were     Pulse Temperature  Respirations Last Period Pulse Oximetry BMI (Body Mass Index)    96 97.5  F (36.4  C) (Temporal) 14 09/26/2017 (Exact Date) 98% 35.02 kg/m2       Blood Pressure from Last 3 Encounters:   10/24/17 128/72   10/11/17 120/74   09/15/17 128/72    Weight from Last 3 Encounters:   10/24/17 204 lb (92.5 kg)   10/11/17 202 lb 11.2 oz (91.9 kg)   09/15/17 200 lb (90.7 kg)              Today, you had the following     No orders found for display       Primary Care Provider Office Phone # Fax #    Marlon Marx [phone] [address] [place] [address]        Equal Access to Services     CYNTHIA BROWN : Hadii aad ku hadasho Soomaali, waaxda luqadaha, qaybta kaalmada adeegyada, martinez bosch hayjen montoya . So [facility] [phone].    ATENCIÓN: Si habla español, tiene a lewis disposición servicios gratuitos de asistencia lingüística. Llame al [phone].    We comply with applicable federal civil rights laws and [place] laws. We do not discriminate on the basis of race, color, national origin, age, disability, sex, sexual orientation, or gender identity.            Thank you!     Thank you for choosing [facility]  for your care. Our goal is always to provide you with excellent care. Hearing back from our patients is one way we can continue to improve our services. Please take a few minutes to complete the written survey that you may receive in the mail after your visit with us. Thank you!             Your Updated Medication List - Protect others around you: Learn how to safely use, store and throw away your medicines at www.disposemymeds.org.          This list is accurate as of: 10/24/17 11:59 PM.  Always use your most recent med list.                   Brand Name Dispense Instructions for use Diagnosis    cetirizine 10 MG tablet    zyrTEC    90 tablet    TAKE ONE TABLET BY MOUTH EVERY EVENING    Chronic seasonal allergic rhinitis, unspecified trigger        EPINEPHrine 0.3 MG/0.3ML injection 2-pack    EPIPEN/ADRENACLICK/or ANY BX GENERIC EQUIV    1 each    Inject 0.3 mLs (0.3 mg) into the muscle once as needed    History of bee sting allergy       ibuprofen 600 MG tablet    ADVIL/MOTRIN    60 tablet    Take 1 tablet (600 mg) by mouth every 6 hours as needed for moderate pain    S/P laparoscopic surgery       losartan-hydrochlorothiazide 50-12.5 MG per tablet    HYZAAR     Take 1 tablet by mouth daily        NORVASC PO      Take 10 mg by mouth        PROBIOTIC DAILY PO      Take by mouth daily        vitamin D3 2000 UNITS Caps     100 capsule    TAKE ONE CAPSULE BY MOUTH EVERY DAY    Vitamin D deficiency

## 2017-10-25 ENCOUNTER — ANESTHESIA EVENT (OUTPATIENT)
Dept: GASTROENTEROLOGY | Facility: CLINIC | Age: 42
End: 2017-10-25
Payer: COMMERCIAL

## 2017-10-25 ASSESSMENT — LIFESTYLE VARIABLES: TOBACCO_USE: 1

## 2017-10-25 NOTE — ANESTHESIA PREPROCEDURE EVALUATION
Anesthesia Evaluation     . Pt has had prior anesthetic. Type: General and MAC           ROS/MED HX    ENT/Pulmonary:     (+)sleep apnea, allergic rhinitis, tobacco use, Current use 0.75 PPD packs/day  doesn't use CPAP , . .    Neurologic:  - neg neurologic ROS     Cardiovascular:     (+) hypertension----. : . . . :. . No previous cardiac testing       METS/Exercise Tolerance:     Hematologic:  - neg hematologic  ROS       Musculoskeletal:  - neg musculoskeletal ROS       GI/Hepatic:     (+) GERD Asymptomatic on medication, bowel prep,       Renal/Genitourinary:  - ROS Renal section negative       Endo:  - neg endo ROS       Psychiatric:     (+) psychiatric history anxiety and depression      Infectious Disease:  - neg infectious disease ROS       Malignancy:      - no malignancy   Other:    (+) No chance of pregnancy C-spine cleared: N/A, no H/O Chronic Pain,no other significant disability                    Physical Exam  Normal systems: cardiovascular, pulmonary and dental    Airway   Mallampati: II  TM distance: >3 FB  Neck ROM: full    Dental     Cardiovascular   Rhythm and rate: regular and normal      Pulmonary    breath sounds clear to auscultation                    Anesthesia Plan      History & Physical Review  History and physical reviewed and following examination; no interval change.    ASA Status:  2 .    NPO Status:  > 8 hours    Plan for MAC with Intravenous and Propofol induction. Maintenance will be TIVA.  Reason for MAC:  Deep or markedly invasive procedure (G8)  PONV prophylaxis:  Ondansetron (or other 5HT-3) and Dexamethasone or Solumedrol  Performed by Dr Faust pre-op      Postoperative Care  Postoperative pain management:  IV analgesics.      Consents  Anesthetic plan, risks, benefits and alternatives discussed with:  Patient.  Use of blood products discussed: No .   .                          .

## 2017-10-26 ENCOUNTER — HOSPITAL ENCOUNTER (OUTPATIENT)
Facility: CLINIC | Age: 42
Discharge: HOME | End: 2017-10-26
Attending: SURGERY | Admitting: SURGERY
Payer: COMMERCIAL

## 2017-10-26 ENCOUNTER — ANESTHESIA (OUTPATIENT)
Dept: GASTROENTEROLOGY | Facility: CLINIC | Age: 42
End: 2017-10-26
Payer: COMMERCIAL

## 2017-10-26 ENCOUNTER — SURGERY (OUTPATIENT)
Age: 42
End: 2017-10-26

## 2017-10-26 VITALS
OXYGEN SATURATION: 96 % | DIASTOLIC BLOOD PRESSURE: 82 MMHG | SYSTOLIC BLOOD PRESSURE: 145 MMHG | RESPIRATION RATE: 16 BRPM | BODY MASS INDEX: 35.02 KG/M2 | TEMPERATURE: 98.4 F | WEIGHT: 204 LBS

## 2017-10-26 LAB
COLONOSCOPY: NORMAL
HCG UR QL: NEGATIVE

## 2017-10-26 PROCEDURE — 88305 TISSUE EXAM BY PATHOLOGIST: CPT | Mod: 26 | Performed by: SURGERY

## 2017-10-26 PROCEDURE — 40000296 ZZH STATISTIC ENDO RECOVERY CLASS 1:2 FIRST HOUR: Performed by: SURGERY

## 2017-10-26 PROCEDURE — 81025 URINE PREGNANCY TEST: CPT | Performed by: NURSE ANESTHETIST, CERTIFIED REGISTERED

## 2017-10-26 PROCEDURE — 25000128 H RX IP 250 OP 636: Performed by: NURSE ANESTHETIST, CERTIFIED REGISTERED

## 2017-10-26 PROCEDURE — 25000125 ZZHC RX 250: Performed by: NURSE ANESTHETIST, CERTIFIED REGISTERED

## 2017-10-26 PROCEDURE — 88305 TISSUE EXAM BY PATHOLOGIST: CPT | Performed by: SURGERY

## 2017-10-26 PROCEDURE — 45380 COLONOSCOPY AND BIOPSY: CPT | Performed by: SURGERY

## 2017-10-26 RX ORDER — PROPOFOL 10 MG/ML
INJECTION, EMULSION INTRAVENOUS CONTINUOUS PRN
Status: DISCONTINUED | OUTPATIENT
Start: 2017-10-26 | End: 2017-10-26

## 2017-10-26 RX ORDER — ONDANSETRON 2 MG/ML
4 INJECTION INTRAMUSCULAR; INTRAVENOUS EVERY 30 MIN PRN
Status: CANCELLED | OUTPATIENT
Start: 2017-10-26

## 2017-10-26 RX ORDER — ONDANSETRON 4 MG/1
4 TABLET, ORALLY DISINTEGRATING ORAL EVERY 30 MIN PRN
Status: CANCELLED | OUTPATIENT
Start: 2017-10-26

## 2017-10-26 RX ORDER — ALBUTEROL SULFATE 0.83 MG/ML
2.5 SOLUTION RESPIRATORY (INHALATION) EVERY 4 HOURS PRN
Status: CANCELLED | OUTPATIENT
Start: 2017-10-26

## 2017-10-26 RX ORDER — NALOXONE HYDROCHLORIDE 0.4 MG/ML
.1-.4 INJECTION, SOLUTION INTRAMUSCULAR; INTRAVENOUS; SUBCUTANEOUS
Status: CANCELLED | OUTPATIENT
Start: 2017-10-26 | End: 2017-10-27

## 2017-10-26 RX ORDER — LIDOCAINE HYDROCHLORIDE 20 MG/ML
INJECTION, SOLUTION INFILTRATION; PERINEURAL PRN
Status: DISCONTINUED | OUTPATIENT
Start: 2017-10-26 | End: 2017-10-26

## 2017-10-26 RX ORDER — LIDOCAINE 40 MG/G
CREAM TOPICAL
Status: DISCONTINUED | OUTPATIENT
Start: 2017-10-26 | End: 2017-10-26 | Stop reason: HOSPADM

## 2017-10-26 RX ORDER — SODIUM CHLORIDE, SODIUM LACTATE, POTASSIUM CHLORIDE, CALCIUM CHLORIDE 600; 310; 30; 20 MG/100ML; MG/100ML; MG/100ML; MG/100ML
INJECTION, SOLUTION INTRAVENOUS CONTINUOUS
Status: CANCELLED | OUTPATIENT
Start: 2017-10-26

## 2017-10-26 RX ORDER — ONDANSETRON 2 MG/ML
4 INJECTION INTRAMUSCULAR; INTRAVENOUS
Status: DISCONTINUED | OUTPATIENT
Start: 2017-10-26 | End: 2017-10-26 | Stop reason: HOSPADM

## 2017-10-26 RX ORDER — MEPERIDINE HYDROCHLORIDE 25 MG/ML
12.5 INJECTION INTRAMUSCULAR; INTRAVENOUS; SUBCUTANEOUS
Status: CANCELLED | OUTPATIENT
Start: 2017-10-26

## 2017-10-26 RX ORDER — SODIUM CHLORIDE, SODIUM LACTATE, POTASSIUM CHLORIDE, CALCIUM CHLORIDE 600; 310; 30; 20 MG/100ML; MG/100ML; MG/100ML; MG/100ML
INJECTION, SOLUTION INTRAVENOUS CONTINUOUS
Status: DISCONTINUED | OUTPATIENT
Start: 2017-10-26 | End: 2017-10-26 | Stop reason: HOSPADM

## 2017-10-26 RX ORDER — PROPOFOL 10 MG/ML
INJECTION, EMULSION INTRAVENOUS PRN
Status: DISCONTINUED | OUTPATIENT
Start: 2017-10-26 | End: 2017-10-26

## 2017-10-26 RX ORDER — ONDANSETRON 4 MG/1
4 TABLET, ORALLY DISINTEGRATING ORAL EVERY 6 HOURS PRN
Status: CANCELLED | OUTPATIENT
Start: 2017-10-26

## 2017-10-26 RX ORDER — LOSARTAN POTASSIUM AND HYDROCHLOROTHIAZIDE 25; 100 MG/1; MG/1
1 TABLET ORAL DAILY
Refills: 10 | COMMUNITY
Start: 2017-10-02 | End: 2020-12-10

## 2017-10-26 RX ORDER — NAPROXEN 500 MG/1
500 TABLET ORAL EVERY 12 HOURS PRN
Refills: 1 | COMMUNITY
Start: 2017-10-02 | End: 2017-12-12

## 2017-10-26 RX ORDER — FLUMAZENIL 0.1 MG/ML
0.2 INJECTION, SOLUTION INTRAVENOUS
Status: CANCELLED | OUTPATIENT
Start: 2017-10-26 | End: 2017-10-26

## 2017-10-26 RX ORDER — ONDANSETRON 2 MG/ML
4 INJECTION INTRAMUSCULAR; INTRAVENOUS EVERY 6 HOURS PRN
Status: CANCELLED | OUTPATIENT
Start: 2017-10-26

## 2017-10-26 RX ADMIN — LIDOCAINE HYDROCHLORIDE 60 MG: 20 INJECTION, SOLUTION INFILTRATION; PERINEURAL at 09:54

## 2017-10-26 RX ADMIN — PROPOFOL 50 MG: 10 INJECTION, EMULSION INTRAVENOUS at 09:54

## 2017-10-26 RX ADMIN — SODIUM CHLORIDE, POTASSIUM CHLORIDE, SODIUM LACTATE AND CALCIUM CHLORIDE: 600; 310; 30; 20 INJECTION, SOLUTION INTRAVENOUS at 09:32

## 2017-10-26 RX ADMIN — LIDOCAINE HYDROCHLORIDE 0.1 ML: 10 INJECTION, SOLUTION EPIDURAL; INFILTRATION; INTRACAUDAL; PERINEURAL at 09:32

## 2017-10-26 RX ADMIN — PROPOFOL 150 MCG/KG/MIN: 10 INJECTION, EMULSION INTRAVENOUS at 09:54

## 2017-10-26 RX ADMIN — PROPOFOL 30 MG: 10 INJECTION, EMULSION INTRAVENOUS at 09:57

## 2017-10-26 NOTE — IP AVS SNAPSHOT
[facility] Endoscopy    [address] [address]    Phone:  [phone]                                       After Visit Summary   10/26/2017    Virginia Valenzuela    MRN: [id number]           After Visit Summary Signature Page     I have received my discharge instructions, and my questions have been answered. I have discussed any challenges I see with this plan with the nurse or doctor.    ..........................................................................................................................................  Patient/Patient Representative Signature      ..........................................................................................................................................  Patient Representative Print Name and Relationship to Patient    ..................................................               ................................................  Date                                            Time    ..........................................................................................................................................  Reviewed by Signature/Title    ...................................................              ..............................................  Date                                                            Time

## 2017-10-26 NOTE — IP AVS SNAPSHOT
MRN:[id number]                      After Visit Summary   10/26/2017    Virginia Valenzuela    MRN: [id number]           Thank you!     Thank you for choosing [facility] for your care. Our goal is always to provide you with excellent care. Hearing back from our patients is one way we can continue to improve our services. Please take a few minutes to complete the written survey that you may receive in the mail after you visit with us. Thank you!        Patient Information     Date Of Birth          1975        About your hospital stay     You were admitted on:  October 26, 2017 You last received care in the:  [facility] End[facility]    You were discharged on:  October 26, 2017       Who to Call     For medical emergencies, please call 911.  For non-urgent questions about your medical care, please call your primary care provider or clinic, [phone]  For questions related to your surgery, please call your surgery clinic        Attending Provider     Provider Fred Shepherd, DO Surgery       Primary Care Provider Office Phone # Fax #    Marlon Marx [phone]      Further instructions from your care team           Endoscopy Discharge Instructions  Dr. Faust    The procedure you have just completed was: Colonoscopy    TO AVOID COMPLICATIONS, TAKE SPECIAL NOTE THE ITEMS BELOW.     Small pieces of tissue (biopsies) have been removed.  Do not drive a car or use mechanized equipment for 24 hours, because medications may cause drowsiness or slow reflexes.  You may resume normal activity tomorrow, Friday.       Call Dr. Faust at [phone]     ? Any bleeding exceeding one tablespoon occurs.     ? If any unusual chest or abdominal pain develops.     ? You develop a fever of 101   or more.     ? You develop shortness of breath.     ? You have any further questions.      Diet: High fiber, regular     Medications: Resume as normal                 Pending Results     No orders found from 10/24/2017 to 10/27/2017.            Admission Information     Date & Time Provider Department Dept. Phone    10/26/2017 Fred Faust DO [facility] Endoscopy [phone]      Your Vitals Were     Blood Pressure Temperature Respirations Weight Last Period Pulse Oximetry    110/76 98.4  F (36.9  C) (Oral) 16 92.5 kg (204 lb) 09/26/2017 (Exact Date) 93%    BMI (Body Mass Index)                   35.02 kg/m2           MyChart Information     [employer] gives you secure access to your electronic health record. If you see a primary care provider, you can also send messages to your care team and make appointments. If you have questions, please call your primary care clinic.  If you do not have a primary care provider, please call [phone] and they will assist you.        Care EveryWhere ID     This is your Care EveryWhere ID. This could be used by other organizations to access your [facility] medical records  [id number]        Equal Access to Services     CYNTHIA BROWN : Hadii iker govea hadasho Soomaali, waaxda luqadaha, qaybta kaalmada adeegyada, martinez montoya . So [facility] [phone].    ATENCIÓN: Si habla español, tiene a lewis disposición servicios gratuitos de asistencia lingüística. Luiz al [phone].    We comply with applicable federal civil rights laws and [place] laws. We do not discriminate on the basis of race, color, national origin, age, disability, sex, sexual orientation, or gender identity.               Review of your medicines      CONTINUE these medicines which have NOT CHANGED        Dose / Directions    cetirizine 10 MG tablet   Commonly known as:  zyrTEC   Used for:  Chronic seasonal allergic rhinitis, unspecified trigger        TAKE ONE TABLET BY MOUTH EVERY EVENING   Quantity:  90 tablet   Refills:  1       EPINEPHrine 0.3 MG/0.3ML injection 2-pack   Commonly known as:  EPIPEN/ADRENACLICK/or ANY BX GENERIC  EQUIV   Used for:  History of bee sting allergy        Dose:  0.3 mg   Inject 0.3 mLs (0.3 mg) into the muscle once as needed   Quantity:  1 each   Refills:  0       ibuprofen 600 MG tablet   Commonly known as:  ADVIL/MOTRIN   Used for:  S/P laparoscopic surgery        Dose:  600 mg   Take 1 tablet (600 mg) by mouth every 6 hours as needed for moderate pain   Quantity:  60 tablet   Refills:  1       * losartan-hydrochlorothiazide 50-12.5 MG per tablet   Commonly known as:  HYZAAR        Dose:  1 tablet   Take 1 tablet by mouth daily   Refills:  0       * losartan-hydrochlorothiazide 100-25 MG per tablet   Commonly known as:  HYZAAR        Dose:  1 tablet   Take 1 tablet by mouth daily   Refills:  10       naproxen 500 MG tablet   Commonly known as:  NAPROSYN        Dose:  500 mg   Take 500 mg by mouth every 12 hours as needed   Refills:  1       NORVASC PO        Dose:  10 mg   Take 10 mg by mouth   Refills:  0       PROBIOTIC DAILY PO        Take by mouth daily   Refills:  0       vitamin D3 2000 UNITS Caps   Used for:  Vitamin D deficiency        TAKE ONE CAPSULE BY MOUTH EVERY DAY   Quantity:  100 capsule   Refills:  10       * Notice:  This list has 2 medication(s) that are the same as other medications prescribed for you. Read the directions carefully, and ask your doctor or other care provider to review them with you.             Protect others around you: Learn how to safely use, store and throw away your medicines at www.disposemymeds.org.             Medication List: This is a list of all your medications and when to take them. Check marks below indicate your daily home schedule. Keep this list as a reference.      Medications           Morning Afternoon Evening Bedtime As Needed    cetirizine 10 MG tablet   Commonly known as:  zyrTEC   TAKE ONE TABLET BY MOUTH EVERY EVENING                                EPINEPHrine 0.3 MG/0.3ML injection 2-pack   Commonly known as:  EPIPEN/ADRENACLICK/or ANY BX GENERIC  EQUIV   Inject 0.3 mLs (0.3 mg) into the muscle once as needed                                ibuprofen 600 MG tablet   Commonly known as:  ADVIL/MOTRIN   Take 1 tablet (600 mg) by mouth every 6 hours as needed for moderate pain                                * losartan-hydrochlorothiazide 50-12.5 MG per tablet   Commonly known as:  HYZAAR   Take 1 tablet by mouth daily                                * losartan-hydrochlorothiazide 100-25 MG per tablet   Commonly known as:  HYZAAR   Take 1 tablet by mouth daily                                naproxen 500 MG tablet   Commonly known as:  NAPROSYN   Take 500 mg by mouth every 12 hours as needed                                NORVASC PO   Take 10 mg by mouth                                PROBIOTIC DAILY PO   Take by mouth daily                                vitamin D3 2000 UNITS Caps   TAKE ONE CAPSULE BY MOUTH EVERY DAY                                * Notice:  This list has 2 medication(s) that are the same as other medications prescribed for you. Read the directions carefully, and ask your doctor or other care provider to review them with you.

## 2017-10-26 NOTE — DISCHARGE INSTRUCTIONS
Endoscopy Discharge Instructions  Dr. Faust    The procedure you have just completed was: Colonoscopy    TO AVOID COMPLICATIONS, TAKE SPECIAL NOTE THE ITEMS BELOW.     Small pieces of tissue (biopsies) have been removed.  Do not drive a car or use mechanized equipment for 24 hours, because medications may cause drowsiness or slow reflexes.  You may resume normal activity tomorrow, Friday.       Call Dr. Faust at [phone]     ? Any bleeding exceeding one tablespoon occurs.     ? If any unusual chest or abdominal pain develops.     ? You develop a fever of 101   or more.     ? You develop shortness of breath.     ? You have any further questions.      Diet: High fiber, regular     Medications: Resume as normal

## 2017-10-26 NOTE — ANESTHESIA POSTPROCEDURE EVALUATION
Patient: Virginia Valenzuela    Procedure(s):  Colonoscopy with biopsies by biopsy - Wound Class: II-Clean Contaminated    Diagnosis:abdominal pain, left lower quadrant  Diagnosis Additional Information: No value filed.    Anesthesia Type:  MAC    Note:  Anesthesia Post Evaluation    Patient location during evaluation: Phase 2  Patient participation: Able to fully participate in evaluation  Level of consciousness: awake  Pain management: adequate  Airway patency: patent  Cardiovascular status: acceptable and hemodynamically stable  Respiratory status: acceptable, room air and nonlabored ventilation  Hydration status: stable  PONV: none     Anesthetic complications: None    Comments: Patient was happy with the anesthesia care received and no anesthesia related complications were noted.  I will follow up with the patient again if it is needed.        Last vitals:  Vitals:    10/26/17 1035 10/26/17 1045 10/26/17 1100   BP:  128/86 145/82   Resp:      Temp:      SpO2: 96%           Electronically Signed By: LISE Hassan CRNA  October 26, 2017  11:25 AM

## 2017-10-26 NOTE — CONSULTS
Patient seen in consultation for diagnostic colonoscopy by Ishmael Mishra MD  HPI:  Patient is a 42 year old female with several year history of abdominal pain. Ultimately her diagnosis is still unclear but her other doctors think it is possibly related to adenomyosis of the uterus. She is having the colonoscopy now prior to any definitive surgery for her pain to make sure there is no other obvious etiology. No bloody BMs, No hemorrhoids. No diarrhea or constipation.    Review Of Systems    Skin: negative  Ears/Nose/Throat: negative  Respiratory: No shortness of breath, dyspnea on exertion, cough, or hemoptysis  Cardiovascular: negative  Gastrointestinal: as above  Genitourinary: negative  Musculoskeletal: negative  Neurologic: negative  Hematologic/Lymphatic/Immunologic: negative  Endocrine: negative      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     History of appendectomy      Hypertension 2000     Idiopathic urticaria      Obstructive sleep apnea (adult) (pediatric)      Other chronic allergic conjunctivitis      Other malaise and fatigue      Pain in joint, shoulder region      S/P laparoscopic cholecystectomy      Special screening examination for human papillomavirus (HPV)      Sprain of tibiofibular (ligament), distal of ankle      Threatened , unspecified as to episode of care      Tobacco use disorder      Unspecified essential hypertension      Unspecified vitamin D deficiency        Past Surgical History:   Procedure Laterality Date     ABDOMEN SURGERY  2013    ovaian cyst and right ovary removal     APPENDECTOMY       CHOLECYSTECTOMY       EYE SURGERY       GENITOURINARY SURGERY  2013    Ovary,  fallopian tube     GYN SURGERY  July 2013    Ovary, Fallopian tube     LAPAROSCOPIC LYSIS ADHESIONS Left 3/1/2016    Procedure: LAPAROSCOPIC LYSIS ADHESIONS;  Surgeon: Nate Mishra MD;  Location: [facility] OR     LAPAROSCOPIC LYSIS ADHESIONS N/A 7/13/2017    Procedure: LAPAROSCOPIC LYSIS ADHESIONS;   laparoscopic lysis of adhesions;  Surgeon: Ntae Mishra MD;  Location: [facility] OR     LAPAROSCOPIC LYSIS ADHESIONS N/A 7/13/2017    Procedure: LAPAROSCOPIC LYSIS ADHESIONS;   laparoscopic lysis of adhesions;  Surgeon: Nate Milligan MD;  Location: [facility] OR     RELEASE CARPAL TUNNEL Left 8/31/2016    Procedure: RELEASE CARPAL TUNNEL;  Surgeon: Gucci Hairston MD;  Location: [facility] OR     RELEASE CARPAL TUNNEL Right 12/7/2016    Procedure: RELEASE CARPAL TUNNEL;  Surgeon: Gucci Hairston MD;  Location: [facility] OR       Family History   Problem Relation Age of Onset     DIABETES Father      Hypertension Father      Cardiovascular Mother 29     myocarditis      Other Cancer Brother      unknown origin       Social History     Social History     Marital status:      Spouse name: N/A     Number of children: N/A     Years of education: N/A     Occupational History     Not on file.     Social History Main Topics     Smoking status: Current Every Day Smoker     Packs/day: 0.75     Years: 10.00     Types: Cigarettes     Smokeless tobacco: Never Used     Alcohol use No     Drug use: No     Sexual activity: Yes     Partners: Male     Birth control/ protection: None      Comment: Trying to conceive     Other Topics Concern     Parent/Sibling W/ Cabg, Mi Or Angioplasty Before 65f 55m? No     Social History Narrative       No current outpatient prescriptions on file.       Medications and history reviewed    Physical exam:  Vitals: /79 (Cuff Size: Adult Regular)  Temp 98.4  F (36.9  C) (Oral)  Resp 16  Wt 204 lb (92.5 kg)  LMP 09/26/2017 (Exact Date)  SpO2 97%  BMI 35.02 kg/m2  BMI= Body mass index  is 35.02 kg/(m^2).    Constitutional: healthy, alert and no distress  Head: Normocephalic. No masses, lesions, tenderness or abnormalities  Cardiovascular: negative, PMI normal. No lifts, heaves, or thrills. RRR. No murmurs, clicks gallops or rub  Respiratory: negative, Percussion normal. Good diaphragmatic excursion. Lungs clear  Gastrointestinal: Abdomen soft, non-tender. BS normal. No masses, organomegaly  : Deferred  Musculoskeletal: extremities normal- no gross deformities noted, gait normal and normal muscle tone  Skin: no suspicious lesions or rashes  Psychiatric: mentation appears normal and affect normal/bright  Hematologic/Lymphatic/Immunologic: Normal cervical lymph nodes  Patient able to get up on table without difficulty.      Assessment: Abdominal pain    Plan: We will do a diagnostic colonoscopy to r/o colon pathology. Based on today's H&P she is cleared for her proposed procedure.    Fred Faust, DO

## 2017-10-26 NOTE — LETTER
Virginia Valenzuela  [address] [place] [id number]      October 27, 2017    Dear Virginia,  This letter is to inform you of the results of your pathology report from your colonoscopy.  Your pathology report was:  FINAL DIAGNOSIS:   A. Cecal biopsy:   - Colonic mucosa with no diagnostic alterations.     B. Ileocecal valve biopsy:   - Colonic mucosa with no diagnostic alterations.    The biopsies taken revealed normal colon. If you have further questions please don t hesitate to call our clinic at [phone].   Sincerely,     Fred Faust, DO

## 2017-10-26 NOTE — ANESTHESIA CARE TRANSFER NOTE
Patient: Virginia Valenzuela    Procedure(s):  Colonoscopy with biopsies by biopsy - Wound Class: II-Clean Contaminated    Diagnosis: abdominal pain, left lower quadrant  Diagnosis Additional Information: No value filed.    Anesthesia Type:   MAC     Note:  Airway :Room Air  Patient transferred to:Phase II  Handoff Report: Identifed the Patient, Identified the Reponsible Provider, Reviewed the pertinent medical history, Discussed the surgical course, Reviewed Intra-OP anesthesia mangement and issues during anesthesia, Set expectations for post-procedure period and Allowed opportunity for questions and acknowledgement of understanding      Vitals: (Last set prior to Anesthesia Care Transfer)    CRNA VITALS  10/26/2017 0950 - 10/26/2017 1050      10/26/2017             Pulse: 86    SpO2: 98 %    Resp Rate (observed): 23                Electronically Signed By: LISE Hassan CRNA  October 26, 2017  11:24 AM

## 2017-10-27 LAB — COPATH REPORT: NORMAL

## 2017-11-04 ENCOUNTER — HOSPITAL ENCOUNTER (EMERGENCY)
Facility: CLINIC | Age: 42
Discharge: HOME | End: 2017-11-04
Attending: NURSE PRACTITIONER | Admitting: NURSE PRACTITIONER
Payer: COMMERCIAL

## 2017-11-04 ENCOUNTER — APPOINTMENT (OUTPATIENT)
Dept: CT IMAGING | Facility: CLINIC | Age: 42
End: 2017-11-04
Attending: NURSE PRACTITIONER
Payer: COMMERCIAL

## 2017-11-04 VITALS
SYSTOLIC BLOOD PRESSURE: 120 MMHG | BODY MASS INDEX: 32.44 KG/M2 | OXYGEN SATURATION: 100 % | HEART RATE: 101 BPM | TEMPERATURE: 97.4 F | WEIGHT: 190 LBS | RESPIRATION RATE: 18 BRPM | DIASTOLIC BLOOD PRESSURE: 74 MMHG | HEIGHT: 64 IN

## 2017-11-04 DIAGNOSIS — N83.202 CYST OF LEFT OVARY: ICD-10-CM

## 2017-11-04 DIAGNOSIS — R10.2 PELVIC PAIN IN FEMALE: ICD-10-CM

## 2017-11-04 DIAGNOSIS — R10.9 BILATERAL FLANK PAIN: ICD-10-CM

## 2017-11-04 LAB
ALBUMIN SERPL-MCNC: 3.6 G/DL (ref 3.4–5)
ALBUMIN UR-MCNC: NEGATIVE MG/DL
ALP SERPL-CCNC: 127 U/L (ref 40–150)
ALT SERPL W P-5'-P-CCNC: 40 U/L (ref 0–50)
ANION GAP SERPL CALCULATED.3IONS-SCNC: 10 MMOL/L (ref 3–14)
APPEARANCE UR: ABNORMAL
AST SERPL W P-5'-P-CCNC: 24 U/L (ref 0–45)
BACTERIA #/AREA URNS HPF: ABNORMAL /HPF
BASOPHILS # BLD AUTO: 0.1 10E9/L (ref 0–0.2)
BASOPHILS NFR BLD AUTO: 0.4 %
BILIRUB SERPL-MCNC: 0.2 MG/DL (ref 0.2–1.3)
BILIRUB UR QL STRIP: NEGATIVE
BUN SERPL-MCNC: 6 MG/DL (ref 7–30)
CALCIUM SERPL-MCNC: 8.6 MG/DL (ref 8.5–10.1)
CHLORIDE SERPL-SCNC: 102 MMOL/L (ref 94–109)
CO2 SERPL-SCNC: 28 MMOL/L (ref 20–32)
COLOR UR AUTO: YELLOW
CREAT SERPL-MCNC: 0.56 MG/DL (ref 0.52–1.04)
DIFFERENTIAL METHOD BLD: ABNORMAL
EOSINOPHIL # BLD AUTO: 0.4 10E9/L (ref 0–0.7)
EOSINOPHIL NFR BLD AUTO: 2.7 %
ERYTHROCYTE [DISTWIDTH] IN BLOOD BY AUTOMATED COUNT: 12.7 % (ref 10–15)
GFR SERPL CREATININE-BSD FRML MDRD: >90 ML/MIN/1.7M2
GLUCOSE SERPL-MCNC: 153 MG/DL (ref 70–99)
GLUCOSE UR STRIP-MCNC: NEGATIVE MG/DL
HBA1C MFR BLD: 5.8 % (ref 4.3–6)
HCG UR QL: NEGATIVE
HCT VFR BLD AUTO: 40.5 % (ref 35–47)
HGB BLD-MCNC: 13.7 G/DL (ref 11.7–15.7)
HGB UR QL STRIP: NEGATIVE
IMM GRANULOCYTES # BLD: 0 10E9/L (ref 0–0.4)
IMM GRANULOCYTES NFR BLD: 0.2 %
KETONES UR STRIP-MCNC: NEGATIVE MG/DL
LEUKOCYTE ESTERASE UR QL STRIP: NEGATIVE
LYMPHOCYTES # BLD AUTO: 4.6 10E9/L (ref 0.8–5.3)
LYMPHOCYTES NFR BLD AUTO: 28.1 %
MCH RBC QN AUTO: 30 PG (ref 26.5–33)
MCHC RBC AUTO-ENTMCNC: 33.8 G/DL (ref 31.5–36.5)
MCV RBC AUTO: 89 FL (ref 78–100)
MONOCYTES # BLD AUTO: 0.9 10E9/L (ref 0–1.3)
MONOCYTES NFR BLD AUTO: 5.7 %
MUCOUS THREADS #/AREA URNS LPF: PRESENT /LPF
NEUTROPHILS # BLD AUTO: 10.2 10E9/L (ref 1.6–8.3)
NEUTROPHILS NFR BLD AUTO: 62.9 %
NITRATE UR QL: NEGATIVE
PH UR STRIP: 5 PH (ref 5–7)
PLATELET # BLD AUTO: 359 10E9/L (ref 150–450)
POTASSIUM SERPL-SCNC: 3.2 MMOL/L (ref 3.4–5.3)
PROT SERPL-MCNC: 7.5 G/DL (ref 6.8–8.8)
RBC # BLD AUTO: 4.57 10E12/L (ref 3.8–5.2)
RBC #/AREA URNS AUTO: <1 /HPF (ref 0–2)
SODIUM SERPL-SCNC: 140 MMOL/L (ref 133–144)
SOURCE: ABNORMAL
SP GR UR STRIP: 1.01 (ref 1–1.03)
SQUAMOUS #/AREA URNS AUTO: 12 /HPF (ref 0–1)
UROBILINOGEN UR STRIP-MCNC: 0 MG/DL (ref 0–2)
WBC # BLD AUTO: 16.2 10E9/L (ref 4–11)
WBC #/AREA URNS AUTO: 1 /HPF (ref 0–2)

## 2017-11-04 PROCEDURE — 96361 HYDRATE IV INFUSION ADD-ON: CPT | Performed by: NURSE PRACTITIONER

## 2017-11-04 PROCEDURE — 85025 COMPLETE CBC W/AUTO DIFF WBC: CPT | Performed by: NURSE PRACTITIONER

## 2017-11-04 PROCEDURE — 99284 EMERGENCY DEPT VISIT MOD MDM: CPT | Mod: Z6 | Performed by: NURSE PRACTITIONER

## 2017-11-04 PROCEDURE — 81025 URINE PREGNANCY TEST: CPT | Performed by: FAMILY MEDICINE

## 2017-11-04 PROCEDURE — 96374 THER/PROPH/DIAG INJ IV PUSH: CPT | Mod: 59 | Performed by: NURSE PRACTITIONER

## 2017-11-04 PROCEDURE — 80053 COMPREHEN METABOLIC PANEL: CPT | Performed by: NURSE PRACTITIONER

## 2017-11-04 PROCEDURE — 83036 HEMOGLOBIN GLYCOSYLATED A1C: CPT | Performed by: NURSE PRACTITIONER

## 2017-11-04 PROCEDURE — 74177 CT ABD & PELVIS W/CONTRAST: CPT

## 2017-11-04 PROCEDURE — 96375 TX/PRO/DX INJ NEW DRUG ADDON: CPT | Performed by: NURSE PRACTITIONER

## 2017-11-04 PROCEDURE — 99285 EMERGENCY DEPT VISIT HI MDM: CPT | Mod: 25 | Performed by: NURSE PRACTITIONER

## 2017-11-04 PROCEDURE — 25000125 ZZHC RX 250: Performed by: NURSE PRACTITIONER

## 2017-11-04 PROCEDURE — 81001 URINALYSIS AUTO W/SCOPE: CPT | Performed by: FAMILY MEDICINE

## 2017-11-04 PROCEDURE — 25000128 H RX IP 250 OP 636: Performed by: NURSE PRACTITIONER

## 2017-11-04 RX ORDER — ONDANSETRON 4 MG/1
4 TABLET, ORALLY DISINTEGRATING ORAL EVERY 8 HOURS PRN
Qty: 20 TABLET | Refills: 0 | Status: SHIPPED | OUTPATIENT
Start: 2017-11-04 | End: 2017-11-07

## 2017-11-04 RX ORDER — IOPAMIDOL 755 MG/ML
100 INJECTION, SOLUTION INTRAVASCULAR ONCE
Status: COMPLETED | OUTPATIENT
Start: 2017-11-04 | End: 2017-11-04

## 2017-11-04 RX ORDER — OXYCODONE AND ACETAMINOPHEN 5; 325 MG/1; MG/1
TABLET ORAL
Qty: 16 TABLET | Refills: 0 | Status: SHIPPED | OUTPATIENT
Start: 2017-11-04 | End: 2017-11-10

## 2017-11-04 RX ORDER — ONDANSETRON 2 MG/ML
4 INJECTION INTRAMUSCULAR; INTRAVENOUS EVERY 30 MIN PRN
Status: DISCONTINUED | OUTPATIENT
Start: 2017-11-04 | End: 2017-11-05 | Stop reason: HOSPADM

## 2017-11-04 RX ORDER — KETOROLAC TROMETHAMINE 30 MG/ML
30 INJECTION, SOLUTION INTRAMUSCULAR; INTRAVENOUS ONCE
Status: COMPLETED | OUTPATIENT
Start: 2017-11-04 | End: 2017-11-04

## 2017-11-04 RX ADMIN — KETOROLAC TROMETHAMINE 30 MG: 30 INJECTION, SOLUTION INTRAMUSCULAR at 22:04

## 2017-11-04 RX ADMIN — SODIUM CHLORIDE 1000 ML: 9 INJECTION, SOLUTION INTRAVENOUS at 22:04

## 2017-11-04 RX ADMIN — ONDANSETRON 4 MG: 2 SOLUTION INTRAMUSCULAR; INTRAVENOUS at 22:05

## 2017-11-04 RX ADMIN — SODIUM CHLORIDE 60 ML: 9 INJECTION, SOLUTION INTRAVENOUS at 22:42

## 2017-11-04 RX ADMIN — IOPAMIDOL 95 ML: 755 INJECTION, SOLUTION INTRAVENOUS at 22:42

## 2017-11-04 ASSESSMENT — ENCOUNTER SYMPTOMS
MYALGIAS: 1
NAUSEA: 1
FLANK PAIN: 1
ACTIVITY CHANGE: 1
APPETITE CHANGE: 1
ABDOMINAL PAIN: 1
BACK PAIN: 1

## 2017-11-04 NOTE — ED AVS SNAPSHOT
[facility] Emergency Department    [address] [place] [address]    Phone:  [phone]    Fax:  [phone]                                       iVrginia Valenzuela   MRN: [id number]    Department:  [facility] Emergency Department   Date of Visit:  11/4/2017           After Visit Summary Signature Page     I have received my discharge instructions, and my questions have been answered. I have discussed any challenges I see with this plan with the nurse or doctor.    ..........................................................................................................................................  Patient/Patient Representative Signature      ..........................................................................................................................................  Patient Representative Print Name and Relationship to Patient    ..................................................               ................................................  Date                                            Time    ..........................................................................................................................................  Reviewed by Signature/Title    ...................................................              ..............................................  Date                                                            Time

## 2017-11-04 NOTE — ED AVS SNAPSHOT
[facility] Emergency Department    [address] [place] [address]    Phone:  [phone]    Fax:  [phone]                                       Virginia Valenzuela   MRN: [id number]    Department:  [facility] Emergency Department   Date of Visit:  11/4/2017           Patient Information     Date Of Birth          1975        Your diagnoses for this visit were:     Bilateral flank pain     Pelvic pain in female     Cyst of left ovary        You were seen by Viviana Ding, LISE CNP.      Follow-up Information     Follow up with Marlon Marx MD In 1 week.    Specialty:  Family Practice    Contact information:    [address] [place] [id number] [phone]          Follow up with [facility] Emergency Department.    Specialty:  EMERGENCY MEDICINE    Why:  If symptoms worsen    Contact information:    [address] [place] [id number] [phone]    Additional information:    From [address]: Exit at [email or web address] on [place] side of [place]. Turn right on [address]. Turn left at stoplight on [address]. [facility] will be in view two blocks ahead        Discharge Instructions         Flank Pain, Uncertain Cause  The flank is the area between your upper abdomen and your back. Pain there is often caused by a problem with your kidneys. It might be a kidney infection or a kidney stone. Other causes of flank pain include spinal arthritis, a pinched nerve from a back injury, or a back muscle strain or spasm.  The cause of your flank pain is not certain. You may need other tests.  Home care  Follow these tips when caring for yourself at home:    You may use acetaminophen or ibuprofen to control pain, unless your health care provider prescribed another medicine. If you have chronic liver or kidney disease, talk with your provider before taking these medicines. Also talk with your provider first if you ve ever had a stomach  ulcer or GI bleeding.    If the pain is coming from your muscles, you may get relief with ice or heat. During the first 2 days after the injury, put an ice pack on the painful area for 20 minutes every 2 to 4 hours. This will reduce swelling and pain. A hot shower, hot bath, or heating pad works well for a muscle spasm. You can start with ice, then switch to heat after 2 days. You might find that alternating ice and heat works well. Use the method that feels the best to you.  Follow-up care  Follow up with your healthcare provider if your symptoms don t get better over the next few days.  When to seek medical advice  Call your healthcare provider right away if any of these happen:    Repeated vomiting    Fever of 100.4 F (38 C) or higher, or as directed by your health care provider    Flank pain that gets worse    Pain that spreads to the front of your belly (abdomen)    Dizziness, weakness, or fainting    Blood in your urine    Burning feeling when you urinate or the need to urinate often    Pain in one of your legs that gets worse    Numbness or weakness in a leg  Date Last Reviewed: 10/1/2016    5592-3892 [employer]. [address] [place] [address]. All rights reserved. This information is not intended as a substitute for professional medical care. Always follow your healthcare professional's instructions.        Ovarian Cysts    Ovarian cysts are sacs filled with fluid or tissue that form on or inside the ovaries. The ovaries are two small organs located on each side of a woman s uterus (womb). They are part of the female reproductive system.  Ovarian cysts are common in women, especially during childbearing years. There are different types of cysts. Most are harmless (benign) and go away on their own. They often cause no symptoms. If symptoms do occur, they can include mild pain or pressure in the lower belly (abdomen).  Cysts that are large or break (rupture) may cause more severe pain  and symptoms. In these cases, hospital care or treatment such as surgery may be needed. More extensive treatment may also be needed if a cyst causes an ovary to twist (called torsion) or if a cyst is suspected to be cancerous. Keep in mind that most cysts are not cancerous, however.  General care    To help relieve pain, your healthcare provider may recommend using over-the-counter pain medicine. If needed, stronger pain medicine may be prescribed.    Depending on the type of cyst you have, your healthcare provider may advise taking birth control pills. These help shrink cysts in certain cases. They may also help prevent new cysts from forming. Be sure to take these medicines as directed if they are prescribed.    Your healthcare provider may advise you to watch your symptoms over time to see if they go away or worsen. Regular ultrasound tests may also be advised. These can help check if a cyst goes away or grows in size.  Follow-up care  Follow up with your healthcare provider, or as advised.  When to seek medical advice  Call your healthcare provider right away if any of these occur:    Pain worsens or fails to get better with home treatment    Fever of 100.4 F (38 C) or higher (or other fever amount directed by your healthcare provider)    Nausea and vomiting    Weakness, dizziness, or fainting    Abnormal vaginal bleeding  Date Last Reviewed: 6/11/2015 2000-2017 [employer]. [address] [place] [address]. All rights reserved. This information is not intended as a substitute for professional medical care. Always follow your healthcare professional's instructions.          Future Appointments        Provider Department Dept Phone Center    11/9/2017 8:50 AM Nate Mishra MD [facility] [phone] [facility]      24 Hour Appointment Hotline       To make an appointment at any [facility], call [phone] ([phone]). If you don't have a family  doctor or clinic, we will help you find one. [facility] clinics are conveniently located to serve the needs of you and your family.             Review of your medicines      START taking        Dose / Directions Last dose taken    ondansetron 4 MG ODT tab   Commonly known as:  ZOFRAN ODT   Dose:  4 mg   Quantity:  20 tablet        Take 1 tablet (4 mg) by mouth every 8 hours as needed   Refills:  0        oxyCODONE-acetaminophen 5-325 MG per tablet   Commonly known as:  PERCOCET   Quantity:  16 tablet        Take 1-2 tablets every 4-6 hours for severe pain   Refills:  0          Our records show that you are taking the medicines listed below. If these are incorrect, please call your family doctor or clinic.        Dose / Directions Last dose taken    cetirizine 10 MG tablet   Commonly known as:  zyrTEC   Quantity:  90 tablet        TAKE ONE TABLET BY MOUTH EVERY EVENING   Refills:  1        EPINEPHrine 0.3 MG/0.3ML injection 2-pack   Commonly known as:  EPIPEN/ADRENACLICK/or ANY BX GENERIC EQUIV   Dose:  0.3 mg   Quantity:  1 each        Inject 0.3 mLs (0.3 mg) into the muscle once as needed   Refills:  0        ibuprofen 600 MG tablet   Commonly known as:  ADVIL/MOTRIN   Dose:  600 mg   Quantity:  60 tablet        Take 1 tablet (600 mg) by mouth every 6 hours as needed for moderate pain   Refills:  1        * losartan-hydrochlorothiazide 50-12.5 MG per tablet   Commonly known as:  HYZAAR   Dose:  1 tablet        Take 1 tablet by mouth daily   Refills:  0        * losartan-hydrochlorothiazide 100-25 MG per tablet   Commonly known as:  HYZAAR   Dose:  1 tablet        Take 1 tablet by mouth daily   Refills:  10        naproxen 500 MG tablet   Commonly known as:  NAPROSYN   Dose:  500 mg        Take 500 mg by mouth every 12 hours as needed   Refills:  1        NORVASC PO   Dose:  10 mg        Take 10 mg by mouth   Refills:  0        PROBIOTIC DAILY PO        Take by mouth daily   Refills:  0        vitamin D3 2000 UNITS  Caps   Quantity:  100 capsule        TAKE ONE CAPSULE BY MOUTH EVERY DAY   Refills:  10        * Notice:  This list has 2 medication(s) that are the same as other medications prescribed for you. Read the directions carefully, and ask your doctor or other care provider to review them with you.            Prescriptions were sent or printed at these locations (2 Prescriptions)                   [facility]   [address] [address]    Telephone:  [phone]   Fax:  [phone]   Hours:                  Printed at Department/Unit printer (1 of 2)         oxyCODONE-acetaminophen (PERCOCET) 5-325 MG per tablet                 These medications are not ready yet because we are checking if your insurance will help you pay for them. Call your pharmacy to confirm that your medication is ready for pickup. It may take up to 24 hours for them to receive the prescription. If the prescription is not ready within 3 business days, please contact your clinic or your provider (1 of 2)         ondansetron (ZOFRAN ODT) 4 MG ODT tab                Procedures and tests performed during your visit     CBC with platelets differential    CT Abdomen Pelvis w Contrast    Comprehensive metabolic panel    Give 20 ounces of water 15 minutes before CT of abdomen    HCG qualitative urine    Hemoglobin A1c    Peripheral IV: Standard    UA with Microscopic      Orders Needing Specimen Collection     None      Pending Results     No orders found from 11/2/2017 to 11/5/2017.            Pending Culture Results     No orders found from 11/2/2017 to 11/5/2017.            Pending Results Instructions     If you had any lab results that were not finalized at the time of your Discharge, you can call the ED Lab Result RN at [phone]. You will be contacted by this team for any positive Lab results or changes in treatment. The nurses are available 7 days a week from 10A to 6:30P.  You can leave a message 24  hours per day and they will return your call.        Thank you for choosing [facility]       Thank you for choosing [facility] for your care. Our goal is always to provide you with excellent care. Hearing back from our patients is one way we can continue to improve our services. Please take a few minutes to complete the written survey that you may receive in the mail after you visit with us. Thank you!        [employer] Information     [employer] gives you secure access to your electronic health record. If you see a primary care provider, you can also send messages to your care team and make appointments. If you have questions, please call your primary care clinic.  If you do not have a primary care provider, please call [phone] and they will assist you.        Care EveryWhere ID     This is your Care EveryWhere ID. This could be used by other organizations to access your [facility] medical records  [id number]        Equal Access to Services     CYNTHIA BROWN : Dwayne Ramos, jae ennis, martinez caro. So [facility] [phone].    ATENCIÓN: Si habla español, tiene a lewis disposición servicios gratuitos de asistencia lingüística. Luiz al [phone].    We comply with applicable federal civil rights laws and [place] laws. We do not discriminate on the basis of race, color, national origin, age, disability, sex, sexual orientation, or gender identity.            After Visit Summary       This is your record. Keep this with you and show to your community pharmacist(s) and doctor(s) at your next visit.

## 2017-11-05 NOTE — ED PROVIDER NOTES
"  History     Chief Complaint   Patient presents with     Flank Pain     HPI  Virginia Valenzuela is a 42 year old female who presents to the ED today with c/o what started out as left sided flank pain yesterday radiating to left groin that has now spread into mid back and across and lower abdomen.  Patient endorses nausea but denies any vomiting, diarrhea, or hematochezia.  Patient denies any dysuria or hx of ureteral stones. Patient reports that overall today she just feels \"blah\".  Patient has been taking ibuprofen with minimal relief.  Patient really denies anything making her symptoms worse.  She has had a decreased appetite.  Patient denies any fever/chills.      Problem List:    Patient Active Problem List    Diagnosis Date Noted     Chronic pelvic pain in female 09/15/2017     Priority: Medium     Trigger finger, left thumb 04/28/2017     Priority: Medium     Sesamoiditis 04/18/2017     Priority: Medium     Bilateral carpal tunnel syndrome 08/11/2016     Priority: Medium     Tobacco use disorder 08/17/2015     Priority: Medium     Ovarian cyst 01/17/2014     Priority: Medium     Problem list name updated by automated process. Provider to review       Female infertility 01/17/2014     Priority: Medium     Hypertension goal BP (blood pressure) < 140/90 11/07/2013     Priority: Medium     Papanicolaou smear of cervix with atypical squamous cells of undetermined significance (ASC-US) 06/27/2013     Priority: Medium     3/12/13 pap ASCUS HR HPV  8/5/13 colposcopy. ASCUS. Plan repeat pap in 6 months  7/6/15 pap NIL/neg HPV. Plan cotest in 3 yrs       GERD (gastroesophageal reflux disease) 06/03/2013     Priority: Medium     Vitamin D deficiency 06/03/2013     Priority: Medium     Problem list name updated by automated process. Provider to review and confirm  Imo Update utility       HAMIDA (obstructive sleep apnea) 05/29/2013     Priority: Medium     Generalized anxiety disorder 05/29/2013     Priority: Medium     " Seasonal allergic rhinitis 2013     Priority: Medium        Past Medical History:    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     History of appendectomy      Hypertension 2000     Idiopathic urticaria      Obstructive sleep apnea (adult) (pediatric)      Other chronic allergic conjunctivitis      Other malaise and fatigue      Pain in joint, shoulder region      S/P laparoscopic cholecystectomy      Special screening examination for human papillomavirus (HPV)      Sprain of tibiofibular (ligament), distal of ankle      Threatened , unspecified as to episode of care      Tobacco use disorder      Unspecified essential hypertension      Unspecified vitamin D deficiency        Past Surgical History:    Past Surgical History:   Procedure Laterality Date     ABDOMEN SURGERY  2013    ovaian cyst and right ovary removal     APPENDECTOMY       CHOLECYSTECTOMY       COLONOSCOPY N/A 10/26/2017    Procedure: COMBINED COLONOSCOPY, SINGLE OR MULTIPLE BIOPSY/POLYPECTOMY BY BIOPSY;  Colonoscopy with biopsies by biopsy;  Surgeon: Fred Faust DO;  Location:  GI     EYE SURGERY  1979     GENITOURINARY SURGERY  2013    Ovary, fallopian tube     GYN SURGERY  2013    Ovary, Fallopian tube     LAPAROSCOPIC LYSIS ADHESIONS Left 3/1/2016    Procedure: LAPAROSCOPIC LYSIS ADHESIONS;  Surgeon: Nate Mishra MD;  Location: [facility]     LAPAROSCOPIC LYSIS ADHESIONS N/A 2017    Procedure: LAPAROSCOPIC LYSIS ADHESIONS;   laparoscopic lysis of adhesions;  Surgeon: Nate Mishra MD;  Location: [facility]     LAPAROSCOPIC LYSIS ADHESIONS N/A 2017    Procedure: LAPAROSCOPIC LYSIS ADHESIONS;    "laparoscopic lysis of adhesions;  Surgeon: Nate Milligan MD;  Location: [facility]     RELEASE CARPAL TUNNEL Left 8/31/2016    Procedure: RELEASE CARPAL TUNNEL;  Surgeon: Gucci Hairston MD;  Location: [facility] OR     RELEASE CARPAL TUNNEL Right 12/7/2016    Procedure: RELEASE CARPAL TUNNEL;  Surgeon: Gucci Hairston MD;  Location: [facility] OR       Family History:    Family History   Problem Relation Age of Onset     DIABETES Father      Hypertension Father      Cardiovascular Mother 29     myocarditis      Other Cancer Brother      unknown origin       Social History:  Marital Status:   [2]  Social History   Substance Use Topics     Smoking status: Current Every Day Smoker     Packs/day: 0.75     Years: 10.00     Types: Cigarettes     Smokeless tobacco: Never Used     Alcohol use No        Medications:      losartan-hydrochlorothiazide (HYZAAR) 100-25 MG per tablet   naproxen (NAPROSYN) 500 MG tablet   cetirizine (ZYRTEC) 10 MG tablet   ibuprofen (ADVIL/MOTRIN) 600 MG tablet   Cholecalciferol (VITAMIN D3) 2000 UNITS CAPS   AmLODIPine Besylate (NORVASC PO)   losartan-hydrochlorothiazide (HYZAAR) 50-12.5 MG per tablet   Probiotic Product (PROBIOTIC DAILY PO)   EPINEPHrine (EPIPEN) 0.3 MG/0.3ML injection         Review of Systems   Constitutional: Positive for activity change and appetite change.   Gastrointestinal: Positive for abdominal pain and nausea.   Genitourinary: Positive for flank pain.   Musculoskeletal: Positive for back pain and myalgias.   All other systems reviewed and are negative.      Physical Exam   BP: 148/82  Pulse: 101  Heart Rate: 101  Temp: 97.4  F (36.3  C)  Resp: 18  Height: 162.6 cm (5' 4\")  Weight: 86.2 kg (190 lb)  SpO2: 100 %      Physical Exam   Constitutional: She is oriented to person, place, and time. She appears well-developed and well-nourished. No distress.   HENT:   Head: Normocephalic.   Mouth/Throat: Oropharynx is clear and moist.   Eyes: Conjunctivae are normal.   Neck: " Normal range of motion.   Cardiovascular: Regular rhythm.    No murmur heard.  Heart rate 101   Pulmonary/Chest: Effort normal and breath sounds normal.   Abdominal: Soft. Bowel sounds are normal. She exhibits no distension and no mass. There is tenderness (lower abdomen, bilateral flank). There is no rebound and no guarding.   Musculoskeletal: Normal range of motion.   Neurological: She is alert and oriented to person, place, and time.   Skin: Skin is warm and dry. She is not diaphoretic.   Psychiatric: She has a normal mood and affect.       ED Course     ED Course     Procedures    Results for orders placed or performed during the hospital encounter of 11/04/17   CT Abdomen Pelvis w Contrast    Narrative    CT ABDOMEN PELVIS W CONTRAST 11/4/2017 10:52 PM    HISTORY: Abdominal pain.    TECHNIQUE: CT of the abdomen and pelvis is performed with 95 mL-  Isovue 370 intravenously. Radiation dose for this scan was reduced  using automated exposure control, adjustment of the mA and/or kV  according to patient size, or iterative reconstruction technique.    COMPARISON: August 18, 2015.    FINDINGS:    Liver: Normal.  Gallbladder: Surgically absent.  Pancreas:Normal.  Spleen:Normal.  Adrenals:Normal.  Ascites:None.    Kidneys:Normal.  Bladder:Normal.  Pelvic free fluid:None.    Bowels:Unremarkable.  No evidence of obstruction.  Appendix: Not well visualized but there are no findings to suggest  appendicitis..    Abdominal or pelvic lymphadenopathy:None.    Miscellaneous findings: There is a 5.3 cm hypodense mass in the left  adnexal region, possibly a cyst.      Impression    IMPRESSION:    1. 5.3 cm hypodense mass in the left adnexa, possibly a cyst.  Recommend further evaluation with ultrasound.  2. Status post cholecystectomy.    CHAD VELEZ MD   UA with Microscopic   Result Value Ref Range    Color Urine Yellow     Appearance Urine Slightly Cloudy     Glucose Urine Negative NEG^Negative mg/dL    Bilirubin Urine  Negative NEG^Negative    Ketones Urine Negative NEG^Negative mg/dL    Specific Gravity Urine 1.014 1.003 - 1.035    Blood Urine Negative NEG^Negative    pH Urine 5.0 5.0 - 7.0 pH    Protein Albumin Urine Negative NEG^Negative mg/dL    Urobilinogen mg/dL 0.0 0.0 - 2.0 mg/dL    Nitrite Urine Negative NEG^Negative    Leukocyte Esterase Urine Negative NEG^Negative    Source Midstream Urine     WBC Urine 1 0 - 2 /HPF    RBC Urine <1 0 - 2 /HPF    Bacteria Urine Few (A) NEG^Negative /HPF    Squamous Epithelial /HPF Urine 12 (H) 0 - 1 /HPF    Mucous Urine Present (A) NEG^Negative /LPF   HCG qualitative urine   Result Value Ref Range    HCG Qual Urine Negative NEG^Negative   CBC with platelets differential   Result Value Ref Range    WBC 16.2 (H) 4.0 - 11.0 10e9/L    RBC Count 4.57 3.8 - 5.2 10e12/L    Hemoglobin 13.7 11.7 - 15.7 g/dL    Hematocrit 40.5 35.0 - 47.0 %    MCV 89 78 - 100 fl    MCH 30.0 26.5 - 33.0 pg    MCHC 33.8 31.5 - 36.5 g/dL    RDW 12.7 10.0 - 15.0 %    Platelet Count 359 150 - 450 10e9/L    Diff Method Automated Method     % Neutrophils 62.9 %    % Lymphocytes 28.1 %    % Monocytes 5.7 %    % Eosinophils 2.7 %    % Basophils 0.4 %    % Immature Granulocytes 0.2 %    Absolute Neutrophil 10.2 (H) 1.6 - 8.3 10e9/L    Absolute Lymphocytes 4.6 0.8 - 5.3 10e9/L    Absolute Monocytes 0.9 0.0 - 1.3 10e9/L    Absolute Eosinophils 0.4 0.0 - 0.7 10e9/L    Absolute Basophils 0.1 0.0 - 0.2 10e9/L    Abs Immature Granulocytes 0.0 0 - 0.4 10e9/L   Comprehensive metabolic panel   Result Value Ref Range    Sodium 140 133 - 144 mmol/L    Potassium 3.2 (L) 3.4 - 5.3 mmol/L    Chloride 102 94 - 109 mmol/L    Carbon Dioxide 28 20 - 32 mmol/L    Anion Gap 10 3 - 14 mmol/L    Glucose 153 (H) 70 - 99 mg/dL    Urea Nitrogen 6 (L) 7 - 30 mg/dL    Creatinine 0.56 0.52 - 1.04 mg/dL    GFR Estimate >90 >60 mL/min/1.7m2    GFR Estimate If Black >90 >60 mL/min/1.7m2    Calcium 8.6 8.5 - 10.1 mg/dL    Bilirubin Total 0.2 0.2 - 1.3  mg/dL    Albumin 3.6 3.4 - 5.0 g/dL    Protein Total 7.5 6.8 - 8.8 g/dL    Alkaline Phosphatase 127 40 - 150 U/L    ALT 40 0 - 50 U/L    AST 24 0 - 45 U/L   Hemoglobin A1c   Result Value Ref Range    Hemoglobin A1C 5.8 4.3 - 6.0 %       Assessments & Plan (with Medical Decision Making)  Virginia is a 42 year old female, hx of ovarian cysts, presents to the ED today with c/o left sided flank pain radiating to left pelvic region yesterday that has since spread to lower abdomen and across mid back region.  Please refer to HPI and focused exam.    Differentials include ureteral stone, pyelonephritis, ovarian cyst may be playing a role.  Patient may have a viral process, her pain distribution is not typical for stone or pyelo or ovarian cyst.  She denies any unusual activity.   PIV established, patient was given IV fluids, Toradol, and Zofran for her symptoms with good relief.  Blood work and UA obtained.  UA negative for infection/blood.  Urine pregnancy is negative. CBC returns with a white count of 16.2 with a left shift.  CMP returns with mildly low potassium of 3.2, glucose is elevated at 153, because of this I elected to obtain a HGB A1C which was normal at 5.8.    CT of abdomen pelvis obtained secondary to leukocytosis and confirms left adnexal cyst but is otherwise unremarkable.  I feel patient's work up here today is reassuring.  I do not have a definitive answer for her pain today but as mentioned a viral process or inflammatory process may be playing a role.  I will send patient home with Zofran for nausea.  She can continue to take Ibuprofen for pain and I will give her a small supply of Percocet for severe pain as needed.  Narcotic safety discussed, no opioid addiction issues upon review of the [place] .   Patient encouraged to f/u in clinic this next week.  Reasons to return to the ED discussed in detail.  Patient is agreeable to plan of care and was discharged in stable condition.      I have reviewed the  nursing notes.    I have reviewed the findings, diagnosis, plan and need for follow up with the patient.    Discharge Medication List as of 11/4/2017 11:06 PM      START taking these medications    Details   ondansetron (ZOFRAN ODT) 4 MG ODT tab Take 1 tablet (4 mg) by mouth every 8 hours as needed, Disp-20 tablet, R-0, E-Prescribe      oxyCODONE-acetaminophen (PERCOCET) 5-325 MG per tablet Take 1-2 tablets every 4-6 hours for severe pain, Disp-16 tablet, R-0, Local Print             Final diagnoses:   Bilateral flank pain   Pelvic pain in female   Cyst of left ovary       11/4/2017   [facility] EMERGENCY DEPARTMENT     Viviana Ding, LISE CNP  11/05/17 0102

## 2017-11-05 NOTE — DISCHARGE INSTRUCTIONS
Flank Pain, Uncertain Cause  The flank is the area between your upper abdomen and your back. Pain there is often caused by a problem with your kidneys. It might be a kidney infection or a kidney stone. Other causes of flank pain include spinal arthritis, a pinched nerve from a back injury, or a back muscle strain or spasm.  The cause of your flank pain is not certain. You may need other tests.  Home care  Follow these tips when caring for yourself at home:    You may use acetaminophen or ibuprofen to control pain, unless your health care provider prescribed another medicine. If you have chronic liver or kidney disease, talk with your provider before taking these medicines. Also talk with your provider first if you ve ever had a stomach ulcer or GI bleeding.    If the pain is coming from your muscles, you may get relief with ice or heat. During the first 2 days after the injury, put an ice pack on the painful area for 20 minutes every 2 to 4 hours. This will reduce swelling and pain. A hot shower, hot bath, or heating pad works well for a muscle spasm. You can start with ice, then switch to heat after 2 days. You might find that alternating ice and heat works well. Use the method that feels the best to you.  Follow-up care  Follow up with your healthcare provider if your symptoms don t get better over the next few days.  When to seek medical advice  Call your healthcare provider right away if any of these happen:    Repeated vomiting    Fever of 100.4 F (38 C) or higher, or as directed by your health care provider    Flank pain that gets worse    Pain that spreads to the front of your belly (abdomen)    Dizziness, weakness, or fainting    Blood in your urine    Burning feeling when you urinate or the need to urinate often    Pain in one of your legs that gets worse    Numbness or weakness in a leg  Date Last Reviewed: 10/1/2016    [id number] [employer] [address] [facility] [facility], [place] [address]. All  rights reserved. This information is not intended as a substitute for professional medical care. Always follow your healthcare professional's instructions.        Ovarian Cysts    Ovarian cysts are sacs filled with fluid or tissue that form on or inside the ovaries. The ovaries are two small organs located on each side of a woman s uterus (womb). They are part of the female reproductive system.  Ovarian cysts are common in women, especially during childbearing years. There are different types of cysts. Most are harmless (benign) and go away on their own. They often cause no symptoms. If symptoms do occur, they can include mild pain or pressure in the lower belly (abdomen).  Cysts that are large or break (rupture) may cause more severe pain and symptoms. In these cases, hospital care or treatment such as surgery may be needed. More extensive treatment may also be needed if a cyst causes an ovary to twist (called torsion) or if a cyst is suspected to be cancerous. Keep in mind that most cysts are not cancerous, however.  General care    To help relieve pain, your healthcare provider may recommend using over-the-counter pain medicine. If needed, stronger pain medicine may be prescribed.    Depending on the type of cyst you have, your healthcare provider may advise taking birth control pills. These help shrink cysts in certain cases. They may also help prevent new cysts from forming. Be sure to take these medicines as directed if they are prescribed.    Your healthcare provider may advise you to watch your symptoms over time to see if they go away or worsen. Regular ultrasound tests may also be advised. These can help check if a cyst goes away or grows in size.  Follow-up care  Follow up with your healthcare provider, or as advised.  When to seek medical advice  Call your healthcare provider right away if any of these occur:    Pain worsens or fails to get better with home treatment    Fever of 100.4 F (38 C) or higher  (or other fever amount directed by your healthcare provider)    Nausea and vomiting    Weakness, dizziness, or fainting    Abnormal vaginal bleeding  Date Last Reviewed: 6/11/2015 2000-2017 [employer]. [address] [place] [address], [place] [address]. All rights reserved. This information is not intended as a substitute for professional medical care. Always follow your healthcare professional's instructions.

## 2017-11-06 ENCOUNTER — CARE COORDINATION (OUTPATIENT)
Dept: CARE COORDINATION | Facility: CLINIC | Age: 42
End: 2017-11-06

## 2017-11-06 NOTE — LETTER
[address]   [address]      November 6, 2017      Virginia Valenzuela  [address] [place] [address]    Dear Virginia,  I am a Clinic Care Coordinator that works with [facility]. I wanted to introduce myself and provide you with my contact information for you to be able to call me with any questions or concerns. Below is a description of what Clinic Care Coordination is and how I can further assist you.     The Clinic Care Coordinator role is a Registered Nurse and/or  who understands the health care system. The goal of Clinic Care Coordination is to help you manage your health and improve access to the [facility] in the most efficient manner.  The Registered Nurse can assist you in meeting your health care goals by providing education, coordinating services, and strengthening the communication among your providers. The  can assist you with financial, behavioral, psychosocial, and chemical dependency and counseling/psychiatric resources.    Please feel free to keep this letter and contact information to contact me at [phone] with any further questions or concerns that may arise. We at [facility] are focused on providing you with the highest-quality healthcare experience possible and that all starts with you.       Sincerely,     Melissa Behl BSN, RN, PHN  [facility] Care Coordinator  [phone]  [email or web address]      Enclosed: I have enclosed a copy of a 24 Hour Access Plan. This has helpful phone numbers for you to call when needed. Please keep this in an easy to access place to use as needed.

## 2017-11-06 NOTE — PROGRESS NOTES
Clinic Care Coordination Contact  [facility]/Voicemail    Referral Source: ED Follow-Up  Clinical Data: Care Coordinator Outreach  Outreach attempted x 1.  Left message on voicemail with call back information and requested return call.  Plan: Care Coordinator will mail out care coordination introduction letter with care coordinator contact information and explanation of care coordination services. Care Coordinator will try to reach patient again in 1-2 business days.    Melissa Behl BSN, RN, PHN  [facility] Care Coordinator  [phone]  [email or web address]

## 2017-11-06 NOTE — LETTER
Health Care Home - Access Care Plan    About Me  Patient Name:  Virginia Marques    YOB: 1975  Age:                            42 year old   Michael MRN:         [id number] Telephone Information:     Home Phone [phone]   Mobile [phone]       Address:    [address] [facility] [address] Email address:  [email or web address]      Emergency Contact(s)  Name Relationship Lgl Grd Work Phone Home Phone Mobile Phone   1. LORI MARQUES  Spouse   [phone]   2. NO SECONDARY C*    none              Health Maintenance: Routine Health maintenance Reviewed: Due/Overdue     My Access Plan  Medical Emergency 911   Questions or concerns during clinic hours Primary Clinic Line, I will call the clinic directly: Primary Clinic: [facility] [phone]   24 Hour Appointment Line [phone] or  [phone])  (toll free)   24 Hour Nurse Line [phone] (toll free)   Questions or concerns outside clinic hours 24 Hour Appointment Line, I will call the after-hours on-call line:   [facility] [phone] or [phone]) (toll-free)   Preferred Urgent Care     Preferred Hospital Preferred Hospital: [facility]  [phone]   Preferred Pharmacy [facility] [address] [facility]      Behavioral Health Crisis Line The National Suicide Prevention Lifeline at 1-400.605.7674 or 911     My Care Team Members  Patient Care Team       Relationship Specialty Notifications Start End    Marlon Marx MD PCP - General Family Practice  3/3/17     Phone: [phone] Fax: [phone]         [address] [facility]  [facility] [address]    [facility] Care Coordinator  Admissions 7/13/17     Comment:  [phone]        My Medical and Care Information  Problem List   Patient Active Problem List   Diagnosis     HAMIDA (obstructive sleep apnea)     Generalized anxiety disorder      Seasonal allergic rhinitis     GERD (gastroesophageal reflux disease)     Vitamin D deficiency     Papanicolaou smear of cervix with atypical squamous cells of undetermined significance (ASC-US)     Hypertension goal BP (blood pressure) < 140/90     Ovarian cyst     Female infertility     Tobacco use disorder     Bilateral carpal tunnel syndrome     Sesamoiditis     Trigger finger, left thumb     Chronic pelvic pain in female      Current Medications and Allergies:  See printed Medication Report

## 2017-11-06 NOTE — PROGRESS NOTES
Clinic Care Coordination Contact  OUTREACH    Referral Information:  Referral Source: ED Follow-Up  Reason for Contact: RN CC call to patient for ED f/u.  Care Conference: No     Universal Utilization:   ED Visits in last year: 2  Hospital visits in last year: 0  Last PCP appointment: 10/24/17  Missed Appointments: 0          Clinical Concerns:  Current Medical Concerns: Patient was in [facility] 11/4/17 for flank pain, leukocytosis and CT scan which showed adnexal cyst.  Patient verbalizes frustration that they weren't able to find any reason for her elevated WBC.  Patient reports no improvement in symptoms, but has managed with ibuprofen, naproxen and Percocet.  Patient states she only has a few Percocet left, but is trying to make them last until Thursday.  RN CC offered to inquire if a sooner appointment was available or if the provider would provide any refills, but patient declined.  Patient denied any other needs or questions for care coordination at this time.  Current Behavioral Concerns: Patient with diagnosis of anxiety.    Education Provided to patient: Patient educated on care coordination services and reviewed ED discharge instructions.      Clinical Pathway: None    Medication Management:  Patient independent in medication management and verbalizes adherence and understanding of medication regimen.       Functional Status:  Mobility Status: Independent     Transportation: denies concerns           Psychosocial:     Financial/Insurance: denies concerns       Resources and Interventions:  Current Resources:  ;          Advanced Care Plans/Directives on file:: No       Patient/Caregiver understanding: Patient verbalizes understanding of care coordination services and ED discharge instructions and declines the need for care coordination.     Upcoming appointment: 11/09/17     Plan: RN CC will do no further outreaches at this time.    Melissa Behl BSN, RN, PHN  [facility]  Coordinator  [phone]  [email or web address]

## 2017-11-10 ENCOUNTER — OFFICE VISIT (OUTPATIENT)
Dept: FAMILY MEDICINE | Facility: CLINIC | Age: 42
End: 2017-11-10
Payer: COMMERCIAL

## 2017-11-10 VITALS
SYSTOLIC BLOOD PRESSURE: 132 MMHG | DIASTOLIC BLOOD PRESSURE: 78 MMHG | WEIGHT: 206 LBS | HEART RATE: 88 BPM | RESPIRATION RATE: 16 BRPM | BODY MASS INDEX: 35.36 KG/M2 | OXYGEN SATURATION: 98 % | TEMPERATURE: 98.7 F

## 2017-11-10 DIAGNOSIS — R10.2 PELVIC PAIN IN FEMALE: Primary | ICD-10-CM

## 2017-11-10 PROCEDURE — 99213 OFFICE O/P EST LOW 20 MIN: CPT | Performed by: OBSTETRICS & GYNECOLOGY

## 2017-11-10 ASSESSMENT — PAIN SCALES - GENERAL: PAINLEVEL: SEVERE PAIN (6)

## 2017-11-10 NOTE — NURSING NOTE
"Chief Complaint   Patient presents with     RECHECK     Follow-up from colonoscopy       Initial /78 (Cuff Size: Adult Regular)  Pulse 88  Temp 98.7  F (37.1  C) (Tympanic)  Resp 16  Wt 206 lb (93.4 kg)  LMP 10/28/2017 (Approximate)  SpO2 98%  Breastfeeding? No  BMI 35.36 kg/m2 Estimated body mass index is 35.36 kg/(m^2) as calculated from the following:    Height as of 11/4/17: 5' 4\" (1.626 m).    Weight as of this encounter: 206 lb (93.4 kg).  Medication Reconciliation: complete   Alexis Heard MA      "

## 2017-11-10 NOTE — MR AVS SNAPSHOT
After Visit Summary   11/10/2017    Virginia Valenzuela    MRN: [id number]           Patient Information     Date Of Birth          1975        Visit Information        Provider Department      11/10/2017 11:20 AM Nate Mishra MD [facility]         Follow-ups after your visit        Your next 10 appointments already scheduled     Nov 14, 2017  2:40 PM CST   SHORT with Nate Mishra MD   [facility]    [address] [place] [id number] [phone]              Who to contact     If you have questions or need follow up information about today's clinic visit or your schedule please contact [facility] directly at [phone].  Normal or non-critical lab and imaging results will be communicated to you by MyChart, letter or phone within 4 business days after the clinic has received the results. If you do not hear from us within 7 days, please contact the clinic through [facility] or phone. If you have a critical or abnormal lab result, we will notify you by phone as soon as possible.  Submit refill requests through [facility] or call your pharmacy and they will forward the refill request to us. Please allow 3 business days for your refill to be completed.          Additional Information About Your Visit        MyChart Information     [facility] gives you secure access to your electronic health record. If you see a primary care provider, you can also send messages to your care team and make appointments. If you have questions, please call your primary care clinic.  If you do not have a primary care provider, please call [phone] and they will assist you.        Care EveryWhere ID     This is your Care EveryWhere ID. This could be used by other organizations to access your [place] medical records  [id number]        Your Vitals Were     Pulse Temperature Respirations Last Period Pulse Oximetry  Breastfeeding?    88 98.7  F (37.1  C) (Tympanic) 16 10/28/2017 (Approximate) 98% No    BMI (Body Mass Index)                   35.36 kg/m2            Blood Pressure from Last 3 Encounters:   11/10/17 132/78   11/04/17 120/74   10/26/17 145/82    Weight from Last 3 Encounters:   11/10/17 206 lb (93.4 kg)   11/04/17 190 lb (86.2 kg)   10/26/17 204 lb (92.5 kg)              Today, you had the following     No orders found for display       Primary Care Provider Office Phone # Fax #    Marlon Marx [phone] [address] [place] [address]        Equal Access to Services     MARCUS BROWN : Hadii aad ku hadasho Soomaali, waaxda luqadaha, qaybta kaalmada adeegyada, waxpastor herman. So [facility] [phone].    ATENCIÓN: Si habla español, tiene a lewis disposición servicios gratuitos de asistencia lingüística. Llame al [phone].    We comply with applicable federal civil rights laws and [place] laws. We do not discriminate on the basis of race, color, national origin, age, disability, sex, sexual orientation, or gender identity.            Thank you!     Thank you for choosing [facility]  for your care. Our goal is always to provide you with excellent care. Hearing back from our patients is one way we can continue to improve our services. Please take a few minutes to complete the written survey that you may receive in the mail after your visit with us. Thank you!             Your Updated Medication List - Protect others around you: Learn how to safely use, store and throw away your medicines at www.disposemymeds.org.          This list is accurate as of: 11/10/17 12:00 PM.  Always use your most recent med list.                   Brand Name Dispense Instructions for use Diagnosis    cetirizine 10 MG tablet    zyrTEC    90 tablet    TAKE ONE TABLET BY MOUTH EVERY EVENING    Chronic seasonal allergic rhinitis, unspecified trigger        EPINEPHrine 0.3 MG/0.3ML injection 2-pack    EPIPEN/ADRENACLICK/or ANY BX GENERIC EQUIV    1 each    Inject 0.3 mLs (0.3 mg) into the muscle once as needed    History of bee sting allergy       ibuprofen 600 MG tablet    ADVIL/MOTRIN    60 tablet    Take 1 tablet (600 mg) by mouth every 6 hours as needed for moderate pain    S/P laparoscopic surgery       * losartan-hydrochlorothiazide 50-12.5 MG per tablet    HYZAAR     Take 1 tablet by mouth daily        * losartan-hydrochlorothiazide 100-25 MG per tablet    HYZAAR     Take 1 tablet by mouth daily        naproxen 500 MG tablet    NAPROSYN     Take 500 mg by mouth every 12 hours as needed        NORVASC PO      Take 10 mg by mouth        PROBIOTIC DAILY PO      Take by mouth daily        vitamin D3 2000 UNITS Caps     100 capsule    TAKE ONE CAPSULE BY MOUTH EVERY DAY    Vitamin D deficiency       * Notice:  This list has 2 medication(s) that are the same as other medications prescribed for you. Read the directions carefully, and ask your doctor or other care provider to review them with you.

## 2017-11-10 NOTE — PROGRESS NOTES
Subjective: she had a colonboscopy and it was normal. She continues to have chronic pelvic pain. She is fairly convinced that it is coming from the uterus. She would like to discuss hysterctomy.      The past medical history, social history, past surgical history and family history as shown below have been reviewed by me today.  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     History of appendectomy      Hypertension      Idiopathic urticaria      Obstructive sleep apnea (adult) (pediatric)      Other chronic allergic conjunctivitis      Other malaise and fatigue      Pain in joint, shoulder region      S/P laparoscopic cholecystectomy      Special screening examination for human papillomavirus (HPV)      Sprain of tibiofibular (ligament), distal of ankle      Threatened , unspecified as to episode of care      Tobacco use disorder      Unspecified essential hypertension      Unspecified vitamin D deficiency         Allergies   Allergen Reactions     Bees Swelling     FINGER TIPS TO ELBOWS     Current Outpatient Prescriptions   Medication Sig Dispense Refill     losartan-hydrochlorothiazide (HYZAAR) 100-25 MG per tablet Take 1 tablet by mouth daily  10     naproxen (NAPROSYN) 500 MG tablet Take 500 mg by mouth every 12 hours as needed  1     cetirizine (ZYRTEC) 10 MG tablet TAKE ONE TABLET BY MOUTH EVERY EVENING 90 tablet 1     ibuprofen (ADVIL/MOTRIN) 600 MG tablet Take 1 tablet (600 mg) by mouth every 6 hours as needed for moderate pain 60 tablet 1     Cholecalciferol (VITAMIN D3) 2000 UNITS CAPS TAKE ONE CAPSULE BY MOUTH EVERY  capsule 10     AmLODIPine Besylate (NORVASC PO) Take 10 mg by mouth        losartan-hydrochlorothiazide (HYZAAR) 50-12.5 MG per tablet Take 1 tablet by mouth daily       Probiotic Product (PROBIOTIC DAILY PO) Take by mouth daily       EPINEPHrine (EPIPEN) 0.3 MG/0.3ML injection Inject 0.3 mLs (0.3 mg) into the muscle once as needed 1 each 0     Past Surgical History:   Procedure Laterality Date     ABDOMEN SURGERY  July 2013    ovaian cyst and right ovary removal     APPENDECTOMY       CHOLECYSTECTOMY       COLONOSCOPY N/A 10/26/2017    Procedure: COMBINED COLONOSCOPY, SINGLE OR MULTIPLE BIOPSY/POLYPECTOMY BY BIOPSY;  Colonoscopy with biopsies by biopsy;  Surgeon: Fred Faust DO;  Location: [facility]     EYE SURGERY  1979     GENITOURINARY SURGERY  July 2013    Ovary, fallopian tube     GYN SURGERY  July 2013    Ovary, Fallopian tube     LAPAROSCOPIC LYSIS ADHESIONS Left 3/1/2016    Procedure: LAPAROSCOPIC LYSIS ADHESIONS;  Surgeon: Nate Mishra MD;  [facility]     LAPAROSCOPIC LYSIS ADHESIONS N/A 7/13/2017    Procedure: LAPAROSCOPIC LYSIS ADHESIONS;   laparoscopic lysis of adhesions;  Surgeon: Nate Mishra MD;  [facility]     LAPAROSCOPIC LYSIS ADHESIONS N/A 7/13/2017    Procedure: LAPAROSCOPIC LYSIS ADHESIONS;   laparoscopic lysis of adhesions;  Surgeon: Nate Milligan MD;  [facility]     RELEASE CARPAL TUNNEL Left 8/31/2016    Procedure: RELEASE CARPAL TUNNEL;  Surgeon: Gucci Hairston MD;  [facility]     RELEASE CARPAL TUNNEL Right 12/7/2016    Procedure: RELEASE CARPAL TUNNEL;  Surgeon: Gucci Hairston MD;  [facility]     Social History     Social History     Marital status:      Spouse name: N/A     Number of children: N/A     Years of education: N/A     Social History Main Topics     Smoking status: Current Every Day Smoker     Packs/day: 0.75     Years: 10.00     Types: Cigarettes     Smokeless tobacco: Never Used     Alcohol use No     Drug use: No     Sexual activity: Yes     Partners: Male      Birth control/ protection: None      Comment: Trying to conceive     Other Topics Concern     Parent/Sibling W/ Cabg, Mi Or Angioplasty Before 65f 55m? No     Social History Narrative     Family History   Problem Relation Age of Onset     DIABETES Father      Hypertension Father      Cardiovascular Mother 29     myocarditis      Other Cancer Brother      unknown origin       ROS: A 12 point review of systems was done. Except for what is listed above in the HPI, the systems review is negative .      Objective: Vital signs: Blood pressure 132/78, pulse 88, temperature 98.7  F (37.1  C), temperature source Tympanic, resp. rate 16, weight 206 lb (93.4 kg), last menstrual period 10/28/2017, SpO2 98 %, not currently breastfeeding.    No exam is repeated today.        Assessment/Plan:A total of 20 minutes were spent face-to-face with this patient during today's consultation, with more than 50% of that time devoted to conversation and counseling about the management decisions.      1. 42 year old female with chronic pelvic pain. Laparoscopy revelaed adhesions which wer lysed. I explained that the pain may be related to adenomyosis and then a hysterectomy may resolve it but that there is no guarantee of that- I told her there is probably a 50% chance that the pain would resolve after hysterectomy.    2. I gave her bookelts to read. I advised her to consider this carefully and if she wants to proceed she will come back to see me sometime in the next week.        MARIETTA Mishra MD

## 2017-11-20 ENCOUNTER — OFFICE VISIT (OUTPATIENT)
Dept: FAMILY MEDICINE | Facility: CLINIC | Age: 42
End: 2017-11-20
Payer: COMMERCIAL

## 2017-11-20 ENCOUNTER — MYC MEDICAL ADVICE (OUTPATIENT)
Dept: FAMILY MEDICINE | Facility: CLINIC | Age: 42
End: 2017-11-20

## 2017-11-20 VITALS
DIASTOLIC BLOOD PRESSURE: 74 MMHG | OXYGEN SATURATION: 98 % | HEIGHT: 64 IN | RESPIRATION RATE: 16 BRPM | HEART RATE: 86 BPM | TEMPERATURE: 98.2 F | BODY MASS INDEX: 35.34 KG/M2 | SYSTOLIC BLOOD PRESSURE: 126 MMHG | WEIGHT: 207 LBS

## 2017-11-20 DIAGNOSIS — N39.3 SUI (STRESS URINARY INCONTINENCE, FEMALE): ICD-10-CM

## 2017-11-20 DIAGNOSIS — R10.2 PELVIC PAIN IN FEMALE: Primary | ICD-10-CM

## 2017-11-20 DIAGNOSIS — N83.202 CYST OF LEFT OVARY: ICD-10-CM

## 2017-11-20 PROCEDURE — 99214 OFFICE O/P EST MOD 30 MIN: CPT | Performed by: OBSTETRICS & GYNECOLOGY

## 2017-11-20 RX ORDER — OXYBUTYNIN CHLORIDE 5 MG/1
5 TABLET, EXTENDED RELEASE ORAL DAILY
Qty: 30 TABLET | Refills: 1 | Status: SHIPPED | OUTPATIENT
Start: 2017-11-20 | End: 2018-01-08

## 2017-11-20 ASSESSMENT — PAIN SCALES - GENERAL: PAINLEVEL: NO PAIN (0)

## 2017-11-20 NOTE — PROGRESS NOTES
Subjective: she is here to discuss recent increase in pelvic pain-  She is requesting a hysterectomy due to the pain- it is generalizewd in the region of the pelvis and not unilateral. Recent CT scan showed a 5 cm left ovarin cyst.   also she has SILVANA- hasnt tried any meds yet- it happens with jumping, running, coughing-       The past medical history, social history, past surgical history and family history as shown below have been reviewed by me today.  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     History of appendectomy      Hypertension      Idiopathic urticaria      Obstructive sleep apnea (adult) (pediatric)      Other chronic allergic conjunctivitis      Other malaise and fatigue      Pain in joint, shoulder region      S/P laparoscopic cholecystectomy      Special screening examination for human papillomavirus (HPV)      Sprain of tibiofibular (ligament), distal of ankle      Threatened , unspecified as to episode of care      Tobacco use disorder      Unspecified essential hypertension      Unspecified vitamin D deficiency         Allergies   Allergen Reactions     Bees Swelling     FINGER TIPS TO ELBOWS     Current Outpatient Prescriptions   Medication Sig Dispense Refill     oxybutynin (DITROPAN XL) 5 MG 24 hr tablet Take 1 tablet (5 mg) by mouth daily 30 tablet 1     losartan-hydrochlorothiazide (HYZAAR) 100-25 MG per tablet Take 1 tablet by mouth daily  10     naproxen (NAPROSYN) 500 MG tablet Take 500 mg by mouth every 12 hours as needed  1     cetirizine (ZYRTEC) 10 MG tablet TAKE ONE TABLET BY MOUTH EVERY EVENING 90 tablet 1     ibuprofen (ADVIL/MOTRIN) 600 MG tablet Take 1 tablet (600 mg) by mouth every  6 hours as needed for moderate pain 60 tablet 1     Cholecalciferol (VITAMIN D3) 2000 UNITS CAPS TAKE ONE CAPSULE BY MOUTH EVERY  capsule 10     AmLODIPine Besylate (NORVASC PO) Take 10 mg by mouth       losartan-hydrochlorothiazide (HYZAAR) 50-12.5 MG per tablet Take 1 tablet by mouth daily       Probiotic Product (PROBIOTIC DAILY PO) Take by mouth daily       EPINEPHrine (EPIPEN) 0.3 MG/0.3ML injection Inject 0.3 mLs (0.3 mg) into the muscle once as needed 1 each 0     Past Surgical History:   Procedure Laterality Date     ABDOMEN SURGERY  July 2013    ovaian cyst and right ovary removal     APPENDECTOMY       CHOLECYSTECTOMY       COLONOSCOPY N/A 10/26/2017    Procedure: COMBINED COLONOSCOPY, SINGLE OR MULTIPLE BIOPSY/POLYPECTOMY BY BIOPSY;  Colonoscopy with biopsies by biopsy;  Surgeon: Fred Faust DO;  [facility]: [facility]     EYE SURGERY  1979     GENITOURINARY SURGERY  July 2013    Ovary, fallopian tube     GYN SURGERY  July 2013    Ovary, Fallopian tube     LAPAROSCOPIC LYSIS ADHESIONS Left 3/1/2016    Procedure: LAPAROSCOPIC LYSIS ADHESIONS;  Surgeon: Nate Mishra MD;  [facility]     LAPAROSCOPIC LYSIS ADHESIONS N/A 7/13/2017    Procedure: LAPAROSCOPIC LYSIS ADHESIONS;   laparoscopic lysis of adhesions;  Surgeon: Nate Mishra MD;  [facility]     LAPAROSCOPIC LYSIS ADHESIONS N/A 7/13/2017    Procedure: LAPAROSCOPIC LYSIS ADHESIONS;   laparoscopic lysis of adhesions;  Surgeon: Nate Milligan MD;  [facility]     RELEASE CARPAL TUNNEL Left 8/31/2016    Procedure: RELEASE CARPAL TUNNEL;  Surgeon: Gucci Hairston MD;  [facility]     RELEASE CARPAL TUNNEL Right 12/7/2016    Procedure: RELEASE CARPAL TUNNEL;  Surgeon: Gucci Hairston MD;  [facility]     Social History     Social History     Marital status:      Spouse name: N/A     Number of children: N/A     Years of education: N/A     Social History Main Topics     Smoking  "status: Current Every Day Smoker     Packs/day: 0.75     Years: 10.00     Types: Cigarettes     Smokeless tobacco: Never Used     Alcohol use No     Drug use: No     Sexual activity: Yes     Partners: Male     Birth control/ protection: None      Comment: Trying to conceive     Other Topics Concern     Parent/Sibling W/ Cabg, Mi Or Angioplasty Before 65f 55m? No     Social History Narrative     Family History   Problem Relation Age of Onset     DIABETES Father      Hypertension Father      Cardiovascular Mother 29     myocarditis      Other Cancer Brother      unknown origin       ROS: A 12 point review of systems was done. Except for what is listed above in the HPI, the systems review is negative .      Objective: Vital signs: Blood pressure 126/74, pulse 86, temperature 98.2  F (36.8  C), temperature source Temporal, resp. rate 16, height 5' 4\" (1.626 m), weight 207 lb (93.9 kg), last menstrual period 10/28/2017, SpO2 98 %, not currently breastfeeding.    No exam is repeated today-        Assessment/Plan: A total of 25 minutes were spent face-to-face with this patient during today's consultation, with more than 50% of that time devoted to conversation and counseling about the management decisions.      1. Pelvic pain- it could be related to the uterus or the ovarian cyst- hard to tell- but the pain has gone on for months so it may be the uterus.    2. SILVANA- will start oxybutynin- see rx- The potential side effects and risks of the medication were thoroughly discussed with the patient.  Will refer to Dr Guan to consider if bladder sling would be indicated when/if  hysterectomy is done.    3. Left ovarian cyst- will check pelvic US to confirm the CT findings and better delineate the type of cyst    4. If the ovary needs to be removed, she wants to take HRT. Hormone replacement issues discussed. With estrogen use, risks  include but are not limited to risk of breast cancer, deep vein thrombosis, and possible " coronary artery disease, but benefits that offset those risks include less bone calcium loss, improvement of hot flashes and vaginal dryness. The patient has decided for    hormone replacement.     5. rechekc in 1 week to discuss US results-      MARIETTA Mishra MD    Addendum: she clarified for me that the majority of her pain IS left sided so I really wonder if this is related to the ovarian cyst- we will dsicuss this further in 1 week after the US shows us more what is going on with the ovary- that may not be pain related to the uterus- after the US I may do another exam to sort this out further.MARIETTA Mishra MD

## 2017-11-20 NOTE — NURSING NOTE
"Chief Complaint   Patient presents with     RECHECK       Initial /74  Pulse 86  Temp 98.2  F (36.8  C) (Temporal)  Resp 16  Ht 5' 4\" (1.626 m)  Wt 207 lb (93.9 kg)  LMP 10/28/2017 (Approximate)  SpO2 98%  Breastfeeding? No  BMI 35.53 kg/m2 Estimated body mass index is 35.53 kg/(m^2) as calculated from the following:    Height as of this encounter: 5' 4\" (1.626 m).    Weight as of this encounter: 207 lb (93.9 kg)..   BP completed using cuff size: regular  Medication Rec Completed    Brook Peter CMA    "

## 2017-11-20 NOTE — MR AVS SNAPSHOT
After Visit Summary   11/20/2017    Virginia Valenzuela    MRN: [id number]           Patient Information     Date Of Birth          1975        Visit Information        Provider Department      11/20/2017 6:00 PM Nate Mishra MD [facility]        Today's Diagnoses     Pelvic pain in female    -  1    SILVANA (stress urinary incontinence, female)        Cyst of left ovary           Follow-ups after your visit        Additional Services     UROLOGY ADULT REFERRAL       Your provider has referred you to: N: [facility] [phone]   [email or web address]    Please be aware that coverage of these services is subject to the terms and limitations of your health insurance plan.  Call member services at your health plan with any benefit or coverage questions.      Please bring the following with you to your appointment:    (1) Any X-Rays, CTs or MRIs which have been performed.  Contact the facility where they were done to arrange for  prior to your scheduled appointment.    (2) List of current medications  (3) This referral request   (4) Any documents/labs given to you for this referral                  Your next 10 appointments already scheduled     Nov 20, 2017  6:00 PM CST   SHORT with Nate Mishra MD   [facility]    [address] [id number] [phone]            Nov 27, 2017  3:15 PM CST   US PELVIC COMPLETE W TRANSVAGINAL with PHUS1   [facility] ([facility])    [address] [place] [id number] [phone]           Please bring a list of your medicines (including vitamins, minerals and over-the-counter drugs). Also, tell your doctor about any allergies you may have. Wear comfortable clothes and leave your valuables at home.  Adults: Drink six 8-ounce glasses of fluid one hour before your exam. Do NOT empty your  bladder.  If you need to empty your bladder before your exam, try to release only a little bit of urine. Then, drink another 8oz glass of fluid.  Children: Children who are potty trained should drink at least 4 cups (32 oz) of liquid 45 minutes to one hour prior to the exam. The child s bladder must be full in order to achieve a diagnostic exam. If your child is very uncomfortable or has an urgent need to pee, please notify a technologist; they will try to find out how much longer the wait may be and provide instructions to help relieve the pressure. Occasionally it is medically necessary to insert a urinary catheter to fill the bladder.  Please call the Imaging Department at your exam site with any questions.              Future tests that were ordered for you today     Open Future Orders        Priority Expected Expires Ordered    US Pelvic Complete w Transvaginal Routine  11/20/2018 11/20/2017            Who to contact     If you have questions or need follow up information about today's clinic visit or your schedule please contact [facility] directly at [phone].  Normal or non-critical lab and imaging results will be communicated to you by Spotigohart, letter or phone within 4 business days after the clinic has received the results. If you do not hear from us within 7 days, please contact the clinic through BovControlt or phone. If you have a critical or abnormal lab result, we will notify you by phone as soon as possible.  Submit refill requests through SquareHub or call your pharmacy and they will forward the refill request to us. Please allow 3 business days for your refill to be completed.          Additional Information About Your Visit        SquareHub Information     SquareHub gives you secure access to your electronic health record. If you see a primary care provider, you can also send messages to your care team and make appointments. If you have questions, please call your primary care clinic.   "If you do not have a primary care provider, please call [phone] and they will assist you.        Care EveryWhere ID     This is your Care EveryWhere ID. This could be used by other organizations to access your [facility] medical records  [id number]        Your Vitals Were     Pulse Temperature Respirations Height Last Period Pulse Oximetry    86 98.2  F (36.8  C) (Temporal) 16 5' 4\" (1.626 m) 10/28/2017 (Approximate) 98%    Breastfeeding? BMI (Body Mass Index)                No 35.53 kg/m2           Blood Pressure from Last 3 Encounters:   11/20/17 126/74   11/10/17 132/78   11/04/17 120/74    Weight from Last 3 Encounters:   11/20/17 207 lb (93.9 kg)   11/10/17 206 lb (93.4 kg)   11/04/17 190 lb (86.2 kg)              We Performed the Following     UROLOGY ADULT REFERRAL          Today's Medication Changes          These changes are accurate as of: 11/20/17  4:50 PM.  If you have any questions, ask your nurse or doctor.               Start taking these medicines.        Dose/Directions    oxybutynin 5 MG 24 hr tablet   Commonly known as:  DITROPAN XL   Used for:  SILVANA (stress urinary incontinence, female)   Started by:  Nate Mishra MD        Dose:  5 mg   Take 1 tablet (5 mg) by mouth daily   Quantity:  30 tablet   Refills:  1            Where to get your medicines      These medications were sent to [facility] [place] [address] [facility]   [address] [place] [address] , [facility] [address]     Phone:  [phone]     oxybutynin 5 MG 24 hr tablet                Primary Care Provider Office Phone # Fax #    Marlon Marx [phone]       [address]   [facility] [address]        Equal Access to Services     CYNTHIA BROWN : Dwayne Ramos, wathereseda luqadaha, qaybta kaalmada adeegyada, martinez herman. So [facility] [phone].    ATENCIÓN: Si habla español, tiene a lewis disposición servicios gratuitos de asistencia lingüística. " Luiz billings [phone].    We comply with applicable federal civil rights laws and [place] laws. We do not discriminate on the basis of race, color, national origin, age, disability, sex, sexual orientation, or gender identity.            Thank you!     Thank you for choosing [facility]  for your care. Our goal is always to provide you with excellent care. Hearing back from our patients is one way we can continue to improve our services. Please take a few minutes to complete the written survey that you may receive in the mail after your visit with us. Thank you!             Your Updated Medication List - Protect others around you: Learn how to safely use, store and throw away your medicines at www.disposemymeds.org.          This list is accurate as of: 11/20/17  4:50 PM.  Always use your most recent med list.                   Brand Name Dispense Instructions for use Diagnosis    cetirizine 10 MG tablet    zyrTEC    90 tablet    TAKE ONE TABLET BY MOUTH EVERY EVENING    Chronic seasonal allergic rhinitis, unspecified trigger       EPINEPHrine 0.3 MG/0.3ML injection 2-pack    EPIPEN/ADRENACLICK/or ANY BX GENERIC EQUIV    1 each    Inject 0.3 mLs (0.3 mg) into the muscle once as needed    History of bee sting allergy       ibuprofen 600 MG tablet    ADVIL/MOTRIN    60 tablet    Take 1 tablet (600 mg) by mouth every 6 hours as needed for moderate pain    S/P laparoscopic surgery       * losartan-hydrochlorothiazide 50-12.5 MG per tablet    HYZAAR     Take 1 tablet by mouth daily        * losartan-hydrochlorothiazide 100-25 MG per tablet    HYZAAR     Take 1 tablet by mouth daily        naproxen 500 MG tablet    NAPROSYN     Take 500 mg by mouth every 12 hours as needed        NORVASC PO      Take 10 mg by mouth        oxybutynin 5 MG 24 hr tablet    DITROPAN XL    30 tablet    Take 1 tablet (5 mg) by mouth daily    SILVANA (stress urinary incontinence, female)       PROBIOTIC DAILY PO      Take by mouth  daily        vitamin D3 2000 UNITS Caps     100 capsule    TAKE ONE CAPSULE BY MOUTH EVERY DAY    Vitamin D deficiency       * Notice:  This list has 2 medication(s) that are the same as other medications prescribed for you. Read the directions carefully, and ask your doctor or other care provider to review them with you.

## 2017-11-21 NOTE — TELEPHONE ENCOUNTER
Per RM ok for tylenol #3 to utilize at bedtime not to be used during the day. OTC measures needed during the day as she is working. Patient agreeable to plan. RX called to [facility] pharmacy per patient request.  Brook Peter, CMA

## 2017-11-27 ENCOUNTER — HOSPITAL ENCOUNTER (OUTPATIENT)
Dept: ULTRASOUND IMAGING | Facility: CLINIC | Age: 42
Discharge: HOME | End: 2017-11-27
Attending: OBSTETRICS & GYNECOLOGY | Admitting: OBSTETRICS & GYNECOLOGY
Payer: COMMERCIAL

## 2017-11-27 DIAGNOSIS — N83.202 CYST OF LEFT OVARY: ICD-10-CM

## 2017-11-27 DIAGNOSIS — R10.2 PELVIC PAIN IN FEMALE: ICD-10-CM

## 2017-11-27 PROCEDURE — 76830 TRANSVAGINAL US NON-OB: CPT

## 2017-11-28 ENCOUNTER — OFFICE VISIT (OUTPATIENT)
Dept: FAMILY MEDICINE | Facility: CLINIC | Age: 42
End: 2017-11-28
Payer: COMMERCIAL

## 2017-11-28 VITALS
HEIGHT: 64 IN | RESPIRATION RATE: 16 BRPM | BODY MASS INDEX: 34.83 KG/M2 | WEIGHT: 204 LBS | SYSTOLIC BLOOD PRESSURE: 136 MMHG | TEMPERATURE: 96.9 F | DIASTOLIC BLOOD PRESSURE: 78 MMHG | HEART RATE: 94 BPM | OXYGEN SATURATION: 97 %

## 2017-11-28 DIAGNOSIS — E87.6 HYPOKALEMIA: Primary | ICD-10-CM

## 2017-11-28 DIAGNOSIS — R10.2 PELVIC PAIN IN FEMALE: ICD-10-CM

## 2017-11-28 LAB
ALBUMIN UR-MCNC: NEGATIVE MG/DL
ANION GAP SERPL CALCULATED.3IONS-SCNC: 8 MMOL/L (ref 3–14)
APPEARANCE UR: CLEAR
BASOPHILS # BLD AUTO: 0.1 10E9/L (ref 0–0.2)
BASOPHILS NFR BLD AUTO: 0.4 %
BILIRUB UR QL STRIP: NEGATIVE
BUN SERPL-MCNC: 6 MG/DL (ref 7–30)
CALCIUM SERPL-MCNC: 9.1 MG/DL (ref 8.5–10.1)
CHLORIDE SERPL-SCNC: 101 MMOL/L (ref 94–109)
CO2 SERPL-SCNC: 28 MMOL/L (ref 20–32)
COLOR UR AUTO: ABNORMAL
CREAT SERPL-MCNC: 0.54 MG/DL (ref 0.52–1.04)
CRP SERPL-MCNC: 11.6 MG/L (ref 0–8)
DIFFERENTIAL METHOD BLD: ABNORMAL
EOSINOPHIL # BLD AUTO: 0.3 10E9/L (ref 0–0.7)
EOSINOPHIL NFR BLD AUTO: 2.6 %
ERYTHROCYTE [DISTWIDTH] IN BLOOD BY AUTOMATED COUNT: 13.1 % (ref 10–15)
GFR SERPL CREATININE-BSD FRML MDRD: >90 ML/MIN/1.7M2
GLUCOSE SERPL-MCNC: 98 MG/DL (ref 70–99)
GLUCOSE UR STRIP-MCNC: NEGATIVE MG/DL
HCT VFR BLD AUTO: 43.1 % (ref 35–47)
HGB BLD-MCNC: 14.5 G/DL (ref 11.7–15.7)
HGB UR QL STRIP: NEGATIVE
IMM GRANULOCYTES # BLD: 0 10E9/L (ref 0–0.4)
IMM GRANULOCYTES NFR BLD: 0.2 %
KETONES UR STRIP-MCNC: NEGATIVE MG/DL
LEUKOCYTE ESTERASE UR QL STRIP: ABNORMAL
LYMPHOCYTES # BLD AUTO: 3.6 10E9/L (ref 0.8–5.3)
LYMPHOCYTES NFR BLD AUTO: 29.8 %
MCH RBC QN AUTO: 29.6 PG (ref 26.5–33)
MCHC RBC AUTO-ENTMCNC: 33.6 G/DL (ref 31.5–36.5)
MCV RBC AUTO: 88 FL (ref 78–100)
MONOCYTES # BLD AUTO: 0.7 10E9/L (ref 0–1.3)
MONOCYTES NFR BLD AUTO: 5.5 %
MUCOUS THREADS #/AREA URNS LPF: PRESENT /LPF
NEUTROPHILS # BLD AUTO: 7.4 10E9/L (ref 1.6–8.3)
NEUTROPHILS NFR BLD AUTO: 61.5 %
NITRATE UR QL: NEGATIVE
PH UR STRIP: 8 PH (ref 5–7)
PLATELET # BLD AUTO: 365 10E9/L (ref 150–450)
POTASSIUM SERPL-SCNC: 2.9 MMOL/L (ref 3.4–5.3)
RBC # BLD AUTO: 4.9 10E12/L (ref 3.8–5.2)
RBC #/AREA URNS AUTO: <1 /HPF (ref 0–2)
SODIUM SERPL-SCNC: 137 MMOL/L (ref 133–144)
SOURCE: ABNORMAL
SP GR UR STRIP: 1 (ref 1–1.03)
SQUAMOUS #/AREA URNS AUTO: 4 /HPF (ref 0–1)
UROBILINOGEN UR STRIP-MCNC: 0 MG/DL (ref 0–2)
WBC # BLD AUTO: 12.1 10E9/L (ref 4–11)
WBC #/AREA URNS AUTO: 1 /HPF (ref 0–2)

## 2017-11-28 PROCEDURE — 99214 OFFICE O/P EST MOD 30 MIN: CPT | Performed by: OBSTETRICS & GYNECOLOGY

## 2017-11-28 PROCEDURE — 86140 C-REACTIVE PROTEIN: CPT | Performed by: OBSTETRICS & GYNECOLOGY

## 2017-11-28 PROCEDURE — 85025 COMPLETE CBC W/AUTO DIFF WBC: CPT | Performed by: OBSTETRICS & GYNECOLOGY

## 2017-11-28 PROCEDURE — 36415 COLL VENOUS BLD VENIPUNCTURE: CPT | Performed by: OBSTETRICS & GYNECOLOGY

## 2017-11-28 PROCEDURE — 81001 URINALYSIS AUTO W/SCOPE: CPT | Performed by: OBSTETRICS & GYNECOLOGY

## 2017-11-28 PROCEDURE — 81003 URINALYSIS AUTO W/O SCOPE: CPT | Performed by: OBSTETRICS & GYNECOLOGY

## 2017-11-28 PROCEDURE — 80048 BASIC METABOLIC PNL TOTAL CA: CPT | Performed by: OBSTETRICS & GYNECOLOGY

## 2017-11-28 RX ORDER — POTASSIUM CHLORIDE 750 MG/1
10 TABLET, EXTENDED RELEASE ORAL DAILY
Qty: 90 TABLET | Refills: 3 | Status: SHIPPED | OUTPATIENT
Start: 2017-11-28 | End: 2018-12-20

## 2017-11-28 ASSESSMENT — PAIN SCALES - GENERAL: PAINLEVEL: MODERATE PAIN (4)

## 2017-11-28 NOTE — NURSING NOTE
"Chief Complaint   Patient presents with     RECHECK       Initial /78  Pulse 94  Temp 96.9  F (36.1  C) (Temporal)  Resp 16  Ht 5' 4\" (1.626 m)  Wt 204 lb (92.5 kg)  LMP 10/28/2017 (Approximate)  SpO2 97%  Breastfeeding? No  BMI 35.02 kg/m2 Estimated body mass index is 35.02 kg/(m^2) as calculated from the following:    Height as of this encounter: 5' 4\" (1.626 m).    Weight as of this encounter: 204 lb (92.5 kg)..   BP completed using cuff size: regular  Medication Rec Completed    Brook Peter CMA    "

## 2017-11-28 NOTE — PROGRESS NOTES
Brook Please inform Virginia/ or caretaker  that this result(s) is/are normal.  The ovarian cyst has resolved-Thanks. MARIETTA Mishra MD

## 2017-11-28 NOTE — MR AVS SNAPSHOT
After Visit Summary   11/28/2017    Virginia Valenzuela    MRN: [id number]           Patient Information     Date Of Birth          1975        Visit Information        Provider Department      11/28/2017 10:10 AM Nate Mishra MD [facility]        Today's Diagnoses     Hypokalemia    -  1    Pelvic pain in female           Follow-ups after your visit        Your next 10 appointments already scheduled     Dec 07, 2017  9:50 AM CST   SHORT with Nate Mishra MD   [facility]    [address] [place] [id number] [phone]              Who to contact     If you have questions or need follow up information about today's clinic visit or your schedule please contact [facility] directly at [phone].  Normal or non-critical lab and imaging results will be communicated to you by LeanStream Mediahart, letter or phone within 4 business days after the clinic has received the results. If you do not hear from us within 7 days, please contact the clinic through LeanStream Mediahart or phone. If you have a critical or abnormal lab result, we will notify you by phone as soon as possible.  Submit refill requests through Blip or call your pharmacy and they will forward the refill request to us. Please allow 3 business days for your refill to be completed.          Additional Information About Your Visit        MyChart Information     Blip gives you secure access to your electronic health record. If you see a primary care provider, you can also send messages to your care team and make appointments. If you have questions, please call your primary care clinic.  If you do not have a primary care provider, please call [phone] and they will assist you.        Care EveryWhere ID     This is your Care EveryWhere ID. This could be used by other organizations to access your [facility] medical records  [id number]        Your  "Vitals Were     Pulse Temperature Respirations Height Last Period Pulse Oximetry    94 96.9  F (36.1  C) (Temporal) 16 5' 4\" (1.626 m) 10/28/2017 (Approximate) 97%    Breastfeeding? BMI (Body Mass Index)                No 35.02 kg/m2           Blood Pressure from Last 3 Encounters:   11/28/17 136/78   11/20/17 126/74   11/10/17 132/78    Weight from Last 3 Encounters:   11/28/17 204 lb (92.5 kg)   11/20/17 207 lb (93.9 kg)   11/10/17 206 lb (93.4 kg)              We Performed the Following     Basic metabolic panel     CBC with platelets and differential     CRP, inflammation     UA reflex to Microscopic and Culture        Primary Care Provider Office Phone # Fax #    Marlon Marx [phone] [address] [place] [address]        Equal Access to Services     [facility]: Hadii aad jeferson hadasho Soomaali, waaxda luqadaha, qaybta kaalmada adeegyada, martinez cartwrightin haytammyn karl montoya . So [facility] [phone].    ATENCIÓN: Si habla español, tiene a lewis disposición servicios gratuitos de asistencia lingüística. Llame al [phone].    We comply with applicable federal civil rights laws and [place] laws. We do not discriminate on the basis of race, color, national origin, age, disability, sex, sexual orientation, or gender identity.            Thank you!     Thank you for choosing [facility]  for your care. Our goal is always to provide you with excellent care. Hearing back from our patients is one way we can continue to improve our services. Please take a few minutes to complete the written survey that you may receive in the mail after your visit with us. Thank you!             Your Updated Medication List - Protect others around you: Learn how to safely use, store and throw away your medicines at www.disposemymeds.org.          This list is accurate as of: 11/28/17 10:30 AM.  Always use your most recent med list.                   Brand Name Dispense " Instructions for use Diagnosis    cetirizine 10 MG tablet    zyrTEC    90 tablet    TAKE ONE TABLET BY MOUTH EVERY EVENING    Chronic seasonal allergic rhinitis, unspecified trigger       EPINEPHrine 0.3 MG/0.3ML injection 2-pack    EPIPEN/ADRENACLICK/or ANY BX GENERIC EQUIV    1 each    Inject 0.3 mLs (0.3 mg) into the muscle once as needed    History of bee sting allergy       ibuprofen 600 MG tablet    ADVIL/MOTRIN    60 tablet    Take 1 tablet (600 mg) by mouth every 6 hours as needed for moderate pain    S/P laparoscopic surgery       * losartan-hydrochlorothiazide 50-12.5 MG per tablet    HYZAAR     Take 1 tablet by mouth daily        * losartan-hydrochlorothiazide 100-25 MG per tablet    HYZAAR     Take 1 tablet by mouth daily        naproxen 500 MG tablet    NAPROSYN     Take 500 mg by mouth every 12 hours as needed        NORVASC PO      Take 10 mg by mouth        oxybutynin 5 MG 24 hr tablet    DITROPAN XL    30 tablet    Take 1 tablet (5 mg) by mouth daily    SILVANA (stress urinary incontinence, female)       PROBIOTIC DAILY PO      Take by mouth daily        vitamin D3 2000 UNITS Caps     100 capsule    TAKE ONE CAPSULE BY MOUTH EVERY DAY    Vitamin D deficiency       * Notice:  This list has 2 medication(s) that are the same as other medications prescribed for you. Read the directions carefully, and ask your doctor or other care provider to review them with you.

## 2017-11-28 NOTE — PROGRESS NOTES
Subjective: here to discuss results- the US was normal-  She continues to have pain along the left side of the pelvis- she is thinking it is her uterus.  No dysuria. No hematuria.      The past medical history, social history, past surgical history and family history as shown below have been reviewed by me today.  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     History of appendectomy      Hypertension      Idiopathic urticaria      Obstructive sleep apnea (adult) (pediatric)      Other chronic allergic conjunctivitis      Other malaise and fatigue      Pain in joint, shoulder region      S/P laparoscopic cholecystectomy      Special screening examination for human papillomavirus (HPV)      Sprain of tibiofibular (ligament), distal of ankle      Threatened , unspecified as to episode of care      Tobacco use disorder      Unspecified essential hypertension      Unspecified vitamin D deficiency         Allergies   Allergen Reactions     Bees Swelling     FINGER TIPS TO ELBOWS     Tylenol W/Codeine [Acetaminophen-Codeine]      Current Outpatient Prescriptions   Medication Sig Dispense Refill     oxybutynin (DITROPAN XL) 5 MG 24 hr tablet Take 1 tablet (5 mg) by mouth daily 30 tablet 1     losartan-hydrochlorothiazide (HYZAAR) 100-25 MG per tablet Take 1 tablet by mouth daily  10     naproxen (NAPROSYN) 500 MG tablet Take 500 mg by mouth every 12 hours as needed  1     cetirizine (ZYRTEC) 10 MG tablet TAKE ONE TABLET BY MOUTH EVERY EVENING 90 tablet 1     ibuprofen (ADVIL/MOTRIN) 600 MG tablet Take 1 tablet (600 mg) by mouth every 6 hours as needed for moderate pain 60 tablet 1     Cholecalciferol (VITAMIN D3) 2000 UNITS CAPS  TAKE ONE CAPSULE BY MOUTH EVERY  capsule 10     AmLODIPine Besylate (NORVASC PO) Take 10 mg by mouth       losartan-hydrochlorothiazide (HYZAAR) 50-12.5 MG per tablet Take 1 tablet by mouth daily       Probiotic Product (PROBIOTIC DAILY PO) Take by mouth daily       EPINEPHrine (EPIPEN) 0.3 MG/0.3ML injection Inject 0.3 mLs (0.3 mg) into the muscle once as needed 1 each 0     Past Surgical History:   Procedure Laterality Date     ABDOMEN SURGERY  July 2013    ovaian cyst and right ovary removal     APPENDECTOMY       CHOLECYSTECTOMY       COLONOSCOPY N/A 10/26/2017    Procedure: COMBINED COLONOSCOPY, SINGLE OR MULTIPLE BIOPSY/POLYPECTOMY BY BIOPSY;  Colonoscopy with biopsies by biopsy;  Surgeon: Fred Faust DO;  Location: [facility]     EYE SURGERY  1979     GENITOURINARY SURGERY  July 2013    Ovary, fallopian tube     GYN SURGERY  July 2013    Ovary, Fallopian tube     LAPAROSCOPIC LYSIS ADHESIONS Left 3/1/2016    Procedure: LAPAROSCOPIC LYSIS ADHESIONS;  Surgeon: Nate Mishra MD;  Location: [facility]     LAPAROSCOPIC LYSIS ADHESIONS N/A 7/13/2017    Procedure: LAPAROSCOPIC LYSIS ADHESIONS;   laparoscopic lysis of adhesions;  Surgeon: Nate Mishra MD;  Location: [facility]     LAPAROSCOPIC LYSIS ADHESIONS N/A 7/13/2017    Procedure: LAPAROSCOPIC LYSIS ADHESIONS;   laparoscopic lysis of adhesions;  Surgeon: Nate Milligan MD;  Location: [facility]     RELEASE CARPAL TUNNEL Left 8/31/2016    Procedure: RELEASE CARPAL TUNNEL;  Surgeon: Gucci Hairston MD;  Location: [facility]     RELEASE CARPAL TUNNEL Right 12/7/2016    Procedure: RELEASE CARPAL TUNNEL;  Surgeon: Gucci Hairston MD;  Location: [facility]     Social History     Social History     Marital status:      Spouse name: N/A     Number of children: N/A     Years of education: N/A     Social History Main Topics     Smoking status: Current Every Day Smoker     Packs/day: 0.75     Years: 10.00     Types: Cigarettes      "Smokeless tobacco: Never Used     Alcohol use No     Drug use: No     Sexual activity: Yes     Partners: Male     Birth control/ protection: None      Comment: Trying to conceive     Other Topics Concern     Parent/Sibling W/ Cabg, Mi Or Angioplasty Before 65f 55m? No     Social History Narrative     Family History   Problem Relation Age of Onset     DIABETES Father      Hypertension Father      Cardiovascular Mother 29     myocarditis      Other Cancer Brother      unknown origin       ROS: A 12 point review of systems was done. Except for what is listed above in the HPI, the systems review is negative .      Objective: Vital signs: Blood pressure 136/78, pulse 94, temperature 96.9  F (36.1  C), temperature source Temporal, resp. rate 16, height 5' 4\" (1.626 m), weight 204 lb (92.5 kg), last menstrual period 10/28/2017, SpO2 97 %, not currently breastfeeding.    Pelvic exam:My nurse Brook   was present to chaperone the exam.  The external genitalia appeared normal.    The vaginal vault was without bleeding  or   discharge   or odor.   The cervix was smooth  No vaginal support defects were noted,    Bimanual exam revealed a 6-7 week sized uterus. It does not   descend   well in the vaginal vault.    No adnexal masses were felt.  She was minimally tender over the left adnexal area but no masses felt  There was no  cervical motion tenderness.    Exam was MODERATELY   limited by the patient's body habitus.                        Assessment/Plan:  A total of 25 minutes were spent face-to-face with this patient during today's consultation, with more than 50% of that time devoted to conversation and counseling about the management decisions.      1. Pelvic pain- left sided- I havent figured out a cause- she had a normal colonoscopy( per pt) and had ER visit recently which showed a normal CT of abdomen and pelvis except for a 5 cm left ovarian cyst however the US done recently showed it resolved- in fact she felt a sudden " pain in the area 2 weeks ago and it was intense for a few hours then resolved- I suspect the cyst broke open then. On exam today she had no tenderness when manipulating the uterus so I doubt this is a uterine problem- therefore I dont want to subject her to the risks of hysterectomy-  I advised her to keep her appt with dr Guan on Thursday and see me next week after chekcing with him. See lab tests today- cbc( wbc was high in ER 2 weeks ago) and also CRP today- also check UA to r/o ureteral stone.    2. Hypokalemia on recent blood test- repreat basic panel today.    3. rechekc with me in 1 week        MARIETTA Mishra MD

## 2017-11-30 ENCOUNTER — TRANSFERRED RECORDS (OUTPATIENT)
Dept: HEALTH INFORMATION MANAGEMENT | Facility: CLINIC | Age: 42
End: 2017-11-30

## 2017-12-07 ENCOUNTER — OFFICE VISIT (OUTPATIENT)
Dept: FAMILY MEDICINE | Facility: CLINIC | Age: 42
End: 2017-12-07
Payer: COMMERCIAL

## 2017-12-07 VITALS
BODY MASS INDEX: 35 KG/M2 | RESPIRATION RATE: 16 BRPM | HEART RATE: 98 BPM | HEIGHT: 64 IN | TEMPERATURE: 97.7 F | DIASTOLIC BLOOD PRESSURE: 74 MMHG | WEIGHT: 205 LBS | OXYGEN SATURATION: 98 % | SYSTOLIC BLOOD PRESSURE: 114 MMHG

## 2017-12-07 DIAGNOSIS — R10.2 FEMALE PELVIC PAIN: Primary | ICD-10-CM

## 2017-12-07 PROCEDURE — 99214 OFFICE O/P EST MOD 30 MIN: CPT | Performed by: OBSTETRICS & GYNECOLOGY

## 2017-12-07 NOTE — NURSING NOTE
"Chief Complaint   Patient presents with     RECHECK       Initial /74  Pulse 98  Temp 97.7  F (36.5  C) (Temporal)  Resp 16  Ht 5' 4\" (1.626 m)  Wt 205 lb (93 kg)  SpO2 98%  Breastfeeding? No  BMI 35.19 kg/m2 Estimated body mass index is 35.19 kg/(m^2) as calculated from the following:    Height as of this encounter: 5' 4\" (1.626 m).    Weight as of this encounter: 205 lb (93 kg)..   BP completed using cuff size: regular  Medication Rec Completed    Brook Peter CMA    "

## 2017-12-07 NOTE — MR AVS SNAPSHOT
After Visit Summary   12/7/2017    Virginia Valenzuela    MRN: [id number]           Patient Information     Date Of Birth          1975        Visit Information        Provider Department      12/7/2017 9:50 AM Nate Mishra MD [facility]        Today's Diagnoses     Female pelvic pain    -  1       Follow-ups after your visit        Your next 10 appointments already scheduled     Dec 08, 2017  8:45 AM CST   (Arrive by 8:30 AM)   MR PELVIS W/O CONTRAST with PHMR1   [facility] ([facility])    [address] [address]   [phone]           Take your medicines as usual, unless your doctor tells you not to. Bring a list of your current medicines to your exam (including vitamins, minerals and over-the-counter drugs). Also bring the results of similar scans you may have had.  Please remove any body piercings and hair extensions before you arrive.  Follow your doctor s orders. If you do not, we may have to postpone your exam.  You will not have contrast for this exam. You do not need to do anything special to prepare.  The MRI machine uses a strong magnet. Please wear clothes without metal (snaps, zippers). A sweatsuit works well, or we may give you a hospital gown.   **IMPORTANT** THE INSTRUCTIONS BELOW ARE ONLY FOR THOSE PATIENTS WHO HAVE BEEN TOLD THEY WILL RECEIVE SEDATION OR GENERAL ANESTHESIA DURING THEIR MRI PROCEDURE:  IF YOU WILL RECEIVE SEDATION (take medicine to help you relax during your exam):   You must get the medicine from your doctor before you arrive. Bring the medicine to the exam. Do not take it at home.   Arrive one hour early. Bring someone who can take you home after the test. Your medicine will make you sleepy. After the exam, you may not drive, take a bus or take a taxi by yourself.   No eating 8 hours before your exam. You may have clear liquids up until 4 hours before your exam. (Clear liquids include  water, clear tea, black coffee and fruit juice without pulp.)  IF YOU WILL RECEIVE ANESTHESIA (be asleep for your exam):   Arrive 1 1/2 hours early. Bring someone who can take you home after the test. You may not drive, take a bus or take a taxi by yourself.   No eating 8 hours before your exam. You may have clear liquids up until 4 hours before your exam. (Clear liquids include water, clear tea, black coffee and fruit juice without pulp.)   You will spend four to five hours in the recovery room.  Please call the Imaging Department at your exam site with any questions.              Future tests that were ordered for you today     Open Future Orders        Priority Expected Expires Ordered    MR Pelvis w/o Contrast Routine  12/7/2018 12/7/2017            Who to contact     If you have questions or need follow up information about today's clinic visit or your schedule please contact [facility] directly at [phone].  Normal or non-critical lab and imaging results will be communicated to you by [employer], letter or phone within 4 business days after the clinic has received the results. If you do not hear from us within 7 days, please contact the clinic through [employer] or phone. If you have a critical or abnormal lab result, we will notify you by phone as soon as possible.  Submit refill requests through [employer] or call your pharmacy and they will forward the refill request to us. Please allow 3 business days for your refill to be completed.          Additional Information About Your Visit        [employer] Information     [employer] gives you secure access to your electronic health record. If you see a primary care provider, you can also send messages to your care team and make appointments. If you have questions, please call your primary care clinic.  If you do not have a primary care provider, please call [phone] and they will assist you.        Care EveryWhere ID     This is your Care EveryWhere ID.  "This could be used by other organizations to access your [place] medical records  [id number]        Your Vitals Were     Pulse Temperature Respirations Height Pulse Oximetry Breastfeeding?    98 97.7  F (36.5  C) (Temporal) 16 5' 4\" (1.626 m) 98% No    BMI (Body Mass Index)                   35.19 kg/m2            Blood Pressure from Last 3 Encounters:   12/07/17 114/74   11/28/17 136/78   11/20/17 126/74    Weight from Last 3 Encounters:   12/07/17 205 lb (93 kg)   11/28/17 204 lb (92.5 kg)   11/20/17 207 lb (93.9 kg)               Primary Care Provider Office Phone # Fax #    Kerlinerae Santos Marx [phone] [address] [place] [address]        Equal Access to Services     [facility] : Hadii iker govea hadasho Soomaali, waaxda luqadaha, qaybta kaalmada adeegyada, martinez bosch hayjen montoya . So [facility] [phone].    ATENCIÓN: Si habla español, tiene a lewis disposición servicios gratuitos de asistencia lingüística. Luiz al [phone].    We comply with applicable federal civil rights laws and [place] laws. We do not discriminate on the basis of race, color, national origin, age, disability, sex, sexual orientation, or gender identity.            Thank you!     Thank you for choosing [facility]  for your care. Our goal is always to provide you with excellent care. Hearing back from our patients is one way we can continue to improve our services. Please take a few minutes to complete the written survey that you may receive in the mail after your visit with us. Thank you!             Your Updated Medication List - Protect others around you: Learn how to safely use, store and throw away your medicines at www.disposemymeds.org.          This list is accurate as of: 12/7/17 10:12 AM.  Always use your most recent med list.                   Brand Name Dispense Instructions for use Diagnosis    cetirizine 10 MG tablet    zyrTEC    90 tablet    TAKE ONE TABLET " BY MOUTH EVERY EVENING    Chronic seasonal allergic rhinitis, unspecified trigger       EPINEPHrine 0.3 MG/0.3ML injection 2-pack    EPIPEN/ADRENACLICK/or ANY BX GENERIC EQUIV    1 each    Inject 0.3 mLs (0.3 mg) into the muscle once as needed    History of bee sting allergy       ibuprofen 600 MG tablet    ADVIL/MOTRIN    60 tablet    Take 1 tablet (600 mg) by mouth every 6 hours as needed for moderate pain    S/P laparoscopic surgery       * losartan-hydrochlorothiazide 50-12.5 MG per tablet    HYZAAR     Take 1 tablet by mouth daily        * losartan-hydrochlorothiazide 100-25 MG per tablet    HYZAAR     Take 1 tablet by mouth daily        naproxen 500 MG tablet    NAPROSYN     Take 500 mg by mouth every 12 hours as needed        NORVASC PO      Take 10 mg by mouth        oxybutynin 5 MG 24 hr tablet    DITROPAN XL    30 tablet    Take 1 tablet (5 mg) by mouth daily    SILVANA (stress urinary incontinence, female)       potassium chloride 10 MEQ tablet    K-TAB,KLOR-CON    90 tablet    Take 1 tablet (10 mEq) by mouth daily    Hypokalemia       PROBIOTIC DAILY PO      Take by mouth daily        vitamin D3 2000 UNITS Caps     100 capsule    TAKE ONE CAPSULE BY MOUTH EVERY DAY    Vitamin D deficiency       * Notice:  This list has 2 medication(s) that are the same as other medications prescribed for you. Read the directions carefully, and ask your doctor or other care provider to review them with you.

## 2017-12-07 NOTE — PROGRESS NOTES
Subjective: she saw Dr Guan and he told her the bladder was normal.  She continues to have chronic left sided pelvic pain. Almost daily. It is painful with intercourse. Most activities hurt.      The past medical history, social history, past surgical history and family history as shown below have been reviewed by me today.  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     History of appendectomy      Hypertension      Idiopathic urticaria      Obstructive sleep apnea (adult) (pediatric)      Other chronic allergic conjunctivitis      Other malaise and fatigue      Pain in joint, shoulder region      S/P laparoscopic cholecystectomy      Special screening examination for human papillomavirus (HPV)      Sprain of tibiofibular (ligament), distal of ankle      Threatened , unspecified as to episode of care      Tobacco use disorder      Unspecified essential hypertension      Unspecified vitamin D deficiency         Allergies   Allergen Reactions     Bees Swelling     FINGER TIPS TO ELBOWS     Tylenol W/Codeine [Acetaminophen-Codeine]      Current Outpatient Prescriptions   Medication Sig Dispense Refill     potassium chloride (K-TAB,KLOR-CON) 10 MEQ tablet Take 1 tablet (10 mEq) by mouth daily 90 tablet 3     oxybutynin (DITROPAN XL) 5 MG 24 hr tablet Take 1 tablet (5 mg) by mouth daily 30 tablet 1     losartan-hydrochlorothiazide (HYZAAR) 100-25 MG per tablet Take 1 tablet by mouth daily  10     naproxen (NAPROSYN) 500 MG tablet Take 500 mg by mouth every 12 hours as needed  1     cetirizine (ZYRTEC) 10 MG tablet TAKE ONE TABLET BY MOUTH EVERY EVENING 90 tablet 1     ibuprofen (ADVIL/MOTRIN) 600 MG tablet Take 1 tablet  (600 mg) by mouth every 6 hours as needed for moderate pain 60 tablet 1     Cholecalciferol (VITAMIN D3) 2000 UNITS CAPS TAKE ONE CAPSULE BY MOUTH EVERY  capsule 10     AmLODIPine Besylate (NORVASC PO) Take 10 mg by mouth       losartan-hydrochlorothiazide (HYZAAR) 50-12.5 MG per tablet Take 1 tablet by mouth daily       Probiotic Product (PROBIOTIC DAILY PO) Take by mouth daily       EPINEPHrine (EPIPEN) 0.3 MG/0.3ML injection Inject 0.3 mLs (0.3 mg) into the muscle once as needed 1 each 0     Past Surgical History:   Procedure Laterality Date     ABDOMEN SURGERY  July 2013    ovaian cyst and right ovary removal     APPENDECTOMY       CHOLECYSTECTOMY       COLONOSCOPY N/A 10/26/2017    Procedure: COMBINED COLONOSCOPY, SINGLE OR MULTIPLE BIOPSY/POLYPECTOMY BY BIOPSY;  Colonoscopy with biopsies by biopsy;  Surgeon: Fred Faust DO;  Location:  GI     EYE SURGERY  1979     GENITOURINARY SURGERY  July 2013    Ovary, fallopian tube     GYN SURGERY  July 2013    Ovary, Fallopian tube     LAPAROSCOPIC LYSIS ADHESIONS Left 3/1/2016    Procedure: LAPAROSCOPIC LYSIS ADHESIONS;  Surgeon: Nate Mishra MD;  Location: [facility] OR     LAPAROSCOPIC LYSIS ADHESIONS N/A 7/13/2017    Procedure: LAPAROSCOPIC LYSIS ADHESIONS;   laparoscopic lysis of adhesions;  Surgeon: Nate Mishra MD;  Location: [facility] OR     LAPAROSCOPIC LYSIS ADHESIONS N/A 7/13/2017    Procedure: LAPAROSCOPIC LYSIS ADHESIONS;   laparoscopic lysis of adhesions;  Surgeon: Nate Milligan MD;  Location: [facility] OR     RELEASE CARPAL TUNNEL Left 8/31/2016    Procedure: RELEASE CARPAL TUNNEL;  Surgeon: Gucci Hairston MD;  Location: [facility] OR     RELEASE CARPAL TUNNEL Right 12/7/2016    Procedure: RELEASE CARPAL TUNNEL;  Surgeon: Gucci Hairston MD;  Location: [facility] OR     Social History     Social History     Marital status:      Spouse name: N/A     Number of children: N/A     Years of education: N/A     Social History  "Main Topics     Smoking status: Current Every Day Smoker     Packs/day: 0.75     Years: 10.00     Types: Cigarettes     Smokeless tobacco: Never Used     Alcohol use No     Drug use: No     Sexual activity: Yes     Partners: Male     Birth control/ protection: None      Comment: Trying to conceive     Other Topics Concern     Parent/Sibling W/ Cabg, Mi Or Angioplasty Before 65f 55m? No     Social History Narrative     Family History   Problem Relation Age of Onset     DIABETES Father      Hypertension Father      Cardiovascular Mother 29     myocarditis      Other Cancer Brother      unknown origin       ROS: A 12 point review of systems was done. Except for what is listed above in the HPI, the systems review is negative .      Objective: Vital signs: Blood pressure 114/74, pulse 98, temperature 97.7  F (36.5  C), temperature source Temporal, resp. rate 16, height 5' 4\" (1.626 m), weight 205 lb (93 kg), SpO2 98 %, not currently breastfeeding.    Chest is clear to auscultation.  No wheezes, rales or rhonchi heard.  Cardiac exam is normal with s1, s2, no murmurs or adventitious sounds.Normal rate and rhythm is heard.   Abdomen is soft,  nondistended, No masses felt.No HSM. No guarding or rigidity or rebound   noted. Palpation reveals  no    tenderness   Normal bowel sounds heard.   The pelvis is tender along the left adnexal area.        Assessment/Plan:A total of 25 minutes were spent face-to-face with this patient during today's consultation, with more than 50% of that time devoted to conversation and counseling about the management decisions.      1. Chronic left sided pelvic and lower abdominal pain. She has had normal CT of abdomen/pelvis, normal pelvic US and normal cystoscopy and colonoscopy. I did a laparoscopy  Last summer which was normal except for some adhesions which were freed up. The right adnexal structures were absent(surgery)  And the left side looked normal. She had a recent US showing ovarian cyst " but the repeat US was negative.    2. We have decided to do a pelvic MRI scan. If the scan is negative I will refer her to another gynecologist for a second opinion about whether a hysterectomy and LSO makes sense because of chronic pain especially with intercourse. I told her that at this point I cannot make a good case for taking the risk of surgery because I don't see an obvious explanation for her pain that relates to the uterus or ovary or tube.. If another gynecologist would suggest hysterectomy that I would be happy to offer it to her but if another gynecologist opinion also fails to advise hysterectomy that I don't think it should be done. She is okay with that plan.        MARIETTA Mishra MD

## 2017-12-08 ENCOUNTER — HOSPITAL ENCOUNTER (OUTPATIENT)
Dept: MRI IMAGING | Facility: CLINIC | Age: 42
Discharge: HOME | End: 2017-12-08
Attending: OBSTETRICS & GYNECOLOGY | Admitting: OBSTETRICS & GYNECOLOGY
Payer: COMMERCIAL

## 2017-12-08 DIAGNOSIS — R10.2 FEMALE PELVIC PAIN: ICD-10-CM

## 2017-12-08 PROCEDURE — 72197 MRI PELVIS W/O & W/DYE: CPT

## 2017-12-08 PROCEDURE — A9585 GADOBUTROL INJECTION: HCPCS | Performed by: RADIOLOGY

## 2017-12-08 PROCEDURE — 25000128 H RX IP 250 OP 636: Performed by: RADIOLOGY

## 2017-12-08 RX ORDER — GADOBUTROL 604.72 MG/ML
10 INJECTION INTRAVENOUS ONCE
Status: COMPLETED | OUTPATIENT
Start: 2017-12-08 | End: 2017-12-08

## 2017-12-08 RX ADMIN — GADOBUTROL 10 ML: 604.72 INJECTION INTRAVENOUS at 09:25

## 2017-12-11 ENCOUNTER — TELEPHONE (OUTPATIENT)
Dept: OBGYN | Facility: CLINIC | Age: 42
End: 2017-12-11

## 2017-12-11 NOTE — PROGRESS NOTES
Brook Please inform Virginia/ or caretaker  that this result(s) is/are normal. If she would like a referral to another OB/GYN regarding this pelvic pain ask her please and we will set it up- Thanks. MARIETTA Mishra MD

## 2017-12-11 NOTE — TELEPHONE ENCOUNTER
Per RM patient can see whatever OB/GYN she would like to. Please let patient know she can see Paresh in [place] as that is whom she was thinking of seeing and assist in scheduling  Brook Peter CMA

## 2017-12-11 NOTE — TELEPHONE ENCOUNTER
Spoke to patient, she is interested in seeing another OB/GYN and would like you to give your input on who she should see, she mentioned seeing Greil in the past and would be ok with seeing her again. Will review with RM when he is available  Brook Peter CMA

## 2017-12-11 NOTE — TELEPHONE ENCOUNTER
----- Message from Nate Mishra MD sent at 12/10/2017  6:04 PM CST -----  Brook Please inform Virginia/ or caretaker  that this result(s) is/are normal. If she would like a referral to another OB/GYN regarding this pelvic pain ask her please and we will set it up- Thanks. MARIETTA Mishra MD

## 2017-12-12 ENCOUNTER — HOSPITAL ENCOUNTER (EMERGENCY)
Facility: CLINIC | Age: 42
Discharge: HOME | End: 2017-12-12
Attending: EMERGENCY MEDICINE | Admitting: EMERGENCY MEDICINE
Payer: COMMERCIAL

## 2017-12-12 ENCOUNTER — APPOINTMENT (OUTPATIENT)
Dept: ULTRASOUND IMAGING | Facility: CLINIC | Age: 42
End: 2017-12-12
Attending: EMERGENCY MEDICINE
Payer: COMMERCIAL

## 2017-12-12 VITALS
TEMPERATURE: 98 F | OXYGEN SATURATION: 100 % | DIASTOLIC BLOOD PRESSURE: 75 MMHG | HEIGHT: 64 IN | HEART RATE: 73 BPM | BODY MASS INDEX: 35 KG/M2 | WEIGHT: 205 LBS | SYSTOLIC BLOOD PRESSURE: 129 MMHG | RESPIRATION RATE: 18 BRPM

## 2017-12-12 DIAGNOSIS — N83.202 LEFT OVARIAN CYST: ICD-10-CM

## 2017-12-12 DIAGNOSIS — B96.89 BACTERIAL VAGINOSIS: ICD-10-CM

## 2017-12-12 DIAGNOSIS — E87.6 HYPOKALEMIA: ICD-10-CM

## 2017-12-12 DIAGNOSIS — N76.0 BACTERIAL VAGINOSIS: ICD-10-CM

## 2017-12-12 LAB
ALBUMIN SERPL-MCNC: 3.5 G/DL (ref 3.4–5)
ALBUMIN UR-MCNC: NEGATIVE MG/DL
ALP SERPL-CCNC: 105 U/L (ref 40–150)
ALT SERPL W P-5'-P-CCNC: 42 U/L (ref 0–50)
ANION GAP SERPL CALCULATED.3IONS-SCNC: 7 MMOL/L (ref 3–14)
APPEARANCE UR: ABNORMAL
AST SERPL W P-5'-P-CCNC: 31 U/L (ref 0–45)
BACTERIA #/AREA URNS HPF: ABNORMAL /HPF
BASOPHILS # BLD AUTO: 0.1 10E9/L (ref 0–0.2)
BASOPHILS NFR BLD AUTO: 0.4 %
BILIRUB SERPL-MCNC: 0.2 MG/DL (ref 0.2–1.3)
BILIRUB UR QL STRIP: NEGATIVE
BUN SERPL-MCNC: 6 MG/DL (ref 7–30)
CALCIUM SERPL-MCNC: 8.5 MG/DL (ref 8.5–10.1)
CHLORIDE SERPL-SCNC: 106 MMOL/L (ref 94–109)
CO2 SERPL-SCNC: 27 MMOL/L (ref 20–32)
COLOR UR AUTO: YELLOW
CREAT SERPL-MCNC: 0.51 MG/DL (ref 0.52–1.04)
DIFFERENTIAL METHOD BLD: ABNORMAL
EOSINOPHIL # BLD AUTO: 0.3 10E9/L (ref 0–0.7)
EOSINOPHIL NFR BLD AUTO: 2.4 %
ERYTHROCYTE [DISTWIDTH] IN BLOOD BY AUTOMATED COUNT: 13 % (ref 10–15)
GFR SERPL CREATININE-BSD FRML MDRD: >90 ML/MIN/1.7M2
GLUCOSE SERPL-MCNC: 88 MG/DL (ref 70–99)
GLUCOSE UR STRIP-MCNC: NEGATIVE MG/DL
HCG UR QL: NEGATIVE
HCT VFR BLD AUTO: 39.2 % (ref 35–47)
HGB BLD-MCNC: 13.2 G/DL (ref 11.7–15.7)
HGB UR QL STRIP: NEGATIVE
IMM GRANULOCYTES # BLD: 0 10E9/L (ref 0–0.4)
IMM GRANULOCYTES NFR BLD: 0.2 %
KETONES UR STRIP-MCNC: NEGATIVE MG/DL
LEUKOCYTE ESTERASE UR QL STRIP: ABNORMAL
LYMPHOCYTES # BLD AUTO: 3.5 10E9/L (ref 0.8–5.3)
LYMPHOCYTES NFR BLD AUTO: 28.2 %
MCH RBC QN AUTO: 29.6 PG (ref 26.5–33)
MCHC RBC AUTO-ENTMCNC: 33.7 G/DL (ref 31.5–36.5)
MCV RBC AUTO: 88 FL (ref 78–100)
MONOCYTES # BLD AUTO: 0.7 10E9/L (ref 0–1.3)
MONOCYTES NFR BLD AUTO: 5.3 %
MUCOUS THREADS #/AREA URNS LPF: PRESENT /LPF
NEUTROPHILS # BLD AUTO: 7.8 10E9/L (ref 1.6–8.3)
NEUTROPHILS NFR BLD AUTO: 63.5 %
NITRATE UR QL: NEGATIVE
PH UR STRIP: 7 PH (ref 5–7)
PLATELET # BLD AUTO: 290 10E9/L (ref 150–450)
POTASSIUM SERPL-SCNC: 3.1 MMOL/L (ref 3.4–5.3)
PROT SERPL-MCNC: 7.6 G/DL (ref 6.8–8.8)
RBC # BLD AUTO: 4.46 10E12/L (ref 3.8–5.2)
RBC #/AREA URNS AUTO: 1 /HPF (ref 0–2)
SODIUM SERPL-SCNC: 140 MMOL/L (ref 133–144)
SOURCE: ABNORMAL
SP GR UR STRIP: 1.01 (ref 1–1.03)
SPECIMEN SOURCE: NORMAL
SQUAMOUS #/AREA URNS AUTO: 19 /HPF (ref 0–1)
UROBILINOGEN UR STRIP-MCNC: 0 MG/DL (ref 0–2)
WBC # BLD AUTO: 12.3 10E9/L (ref 4–11)
WBC #/AREA URNS AUTO: 2 /HPF (ref 0–2)
WET PREP SPEC: NORMAL

## 2017-12-12 PROCEDURE — 80053 COMPREHEN METABOLIC PANEL: CPT | Performed by: EMERGENCY MEDICINE

## 2017-12-12 PROCEDURE — 96376 TX/PRO/DX INJ SAME DRUG ADON: CPT | Performed by: EMERGENCY MEDICINE

## 2017-12-12 PROCEDURE — 93976 VASCULAR STUDY: CPT | Mod: XS

## 2017-12-12 PROCEDURE — 81025 URINE PREGNANCY TEST: CPT | Performed by: EMERGENCY MEDICINE

## 2017-12-12 PROCEDURE — 81001 URINALYSIS AUTO W/SCOPE: CPT | Performed by: FAMILY MEDICINE

## 2017-12-12 PROCEDURE — 96375 TX/PRO/DX INJ NEW DRUG ADDON: CPT | Performed by: EMERGENCY MEDICINE

## 2017-12-12 PROCEDURE — 96374 THER/PROPH/DIAG INJ IV PUSH: CPT | Performed by: EMERGENCY MEDICINE

## 2017-12-12 PROCEDURE — 87591 N.GONORRHOEAE DNA AMP PROB: CPT | Performed by: EMERGENCY MEDICINE

## 2017-12-12 PROCEDURE — 87210 SMEAR WET MOUNT SALINE/INK: CPT | Performed by: EMERGENCY MEDICINE

## 2017-12-12 PROCEDURE — 25000128 H RX IP 250 OP 636: Performed by: EMERGENCY MEDICINE

## 2017-12-12 PROCEDURE — 85025 COMPLETE CBC W/AUTO DIFF WBC: CPT | Performed by: EMERGENCY MEDICINE

## 2017-12-12 PROCEDURE — 99285 EMERGENCY DEPT VISIT HI MDM: CPT | Mod: 25 | Performed by: EMERGENCY MEDICINE

## 2017-12-12 PROCEDURE — 87491 CHLMYD TRACH DNA AMP PROBE: CPT | Performed by: EMERGENCY MEDICINE

## 2017-12-12 PROCEDURE — 99285 EMERGENCY DEPT VISIT HI MDM: CPT | Mod: Z6 | Performed by: EMERGENCY MEDICINE

## 2017-12-12 RX ORDER — HYDROMORPHONE HYDROCHLORIDE 1 MG/ML
0.5 INJECTION, SOLUTION INTRAMUSCULAR; INTRAVENOUS; SUBCUTANEOUS
Status: DISCONTINUED | OUTPATIENT
Start: 2017-12-12 | End: 2017-12-12 | Stop reason: HOSPADM

## 2017-12-12 RX ORDER — METRONIDAZOLE 500 MG/1
500 TABLET ORAL 2 TIMES DAILY
Qty: 14 TABLET | Refills: 0 | Status: SHIPPED | OUTPATIENT
Start: 2017-12-12 | End: 2017-12-19

## 2017-12-12 RX ORDER — HYDROCODONE BITARTRATE AND ACETAMINOPHEN 5; 325 MG/1; MG/1
1-2 TABLET ORAL EVERY 4 HOURS PRN
Qty: 20 TABLET | Refills: 0 | Status: SHIPPED | OUTPATIENT
Start: 2017-12-12 | End: 2017-12-22

## 2017-12-12 RX ORDER — ONDANSETRON 2 MG/ML
4 INJECTION INTRAMUSCULAR; INTRAVENOUS EVERY 30 MIN PRN
Status: DISCONTINUED | OUTPATIENT
Start: 2017-12-12 | End: 2017-12-12 | Stop reason: HOSPADM

## 2017-12-12 RX ORDER — KETOROLAC TROMETHAMINE 30 MG/ML
30 INJECTION, SOLUTION INTRAMUSCULAR; INTRAVENOUS ONCE
Status: COMPLETED | OUTPATIENT
Start: 2017-12-12 | End: 2017-12-12

## 2017-12-12 RX ADMIN — KETOROLAC TROMETHAMINE 30 MG: 30 INJECTION, SOLUTION INTRAMUSCULAR at 10:40

## 2017-12-12 RX ADMIN — HYDROMORPHONE HYDROCHLORIDE 0.5 MG: 1 INJECTION, SOLUTION INTRAMUSCULAR; INTRAVENOUS; SUBCUTANEOUS at 10:41

## 2017-12-12 RX ADMIN — HYDROMORPHONE HYDROCHLORIDE 0.5 MG: 1 INJECTION, SOLUTION INTRAMUSCULAR; INTRAVENOUS; SUBCUTANEOUS at 12:12

## 2017-12-12 NOTE — ED AVS SNAPSHOT
[facility] Emergency Department    [address] [place] [address]    Phone:  [phone]    Fax:  [phone]                                       Virginia Valenzuela   MRN: [id number]    Department:  [facility] Emergency Department   Date of Visit:  12/12/2017           After Visit Summary Signature Page     I have received my discharge instructions, and my questions have been answered. I have discussed any challenges I see with this plan with the nurse or doctor.    ..........................................................................................................................................  Patient/Patient Representative Signature      ..........................................................................................................................................  Patient Representative Print Name and Relationship to Patient    ..................................................               ................................................  Date                                            Time    ..........................................................................................................................................  Reviewed by Signature/Title    ...................................................              ..............................................  Date                                                            Time

## 2017-12-12 NOTE — DISCHARGE INSTRUCTIONS
Bacterial Vaginosis    You have a vaginal infection called bacterial vaginosis (BV). Both good and bad bacteria are present in a healthy vagina. BV occurs when these bacteria get out of balance. The number of bad bacteria increase. And the number of good bacteria decrease.  BV may or may not cause symptoms. If symptoms do occur, they can include:    Thin, gray, milky-white, or sometimes green discharge    Unpleasant odor or  fishy  smell    Itching, burning, or pain in or around the vagina  It is not known what causes BV, but certain factors can make the problem more likely. This can include:    Douching    Having sex with a new partner    Having sex with more than one partner  BV will sometimes go away on its own. But treatment is usually recommended. This is because untreated BV can increase the risk of more serious health problems such as:    Pelvic inflammatory disease (PID)     delivery (giving birth to a baby early if you re pregnant)    HIV and certain other sexually transmitted diseases (STDs)    Infection after surgery on the reproductive organs  Home care  General care    BV is most often treated with medicines called antibiotics. These may be given as pills or as a vaginal cream. If antibiotics are prescribed, be sure to use them exactly as directed. Also, be sure to complete all of the medicine, even if your symptoms go away.    Avoid douching or having sex during treatment.    If you have sex with a female partner, ask your healthcare provider if she should also be treated.  Prevention    Limit or avoid douching.    Avoid having sex. If you do have sex, then take steps to lower your risk:    Use condoms when having sex.    Limit the number of partners you have sex with.  Follow-up care  Follow up with your healthcare provider, or as advised.  When to seek medical advice  Call your healthcare provider right away if:    You have a fever of 100.4 F (38 C) or higher, or as directed by your  provider.    Your symptoms worsen, or they don t go away within a few days of starting treatment.    You have new pain in the lower belly or pelvic region.    You have side effects that bother you or a reaction to the pills or cream you re prescribed.    You or any partners you have sex with have new symptoms, such as a rash, joint pain, or sores.  Date Last Reviewed: 7/30/2015 2000-2017 [employer]. [address] [place] [address], [place] [address]. All rights reserved. This information is not intended as a substitute for professional medical care. Always follow your healthcare professional's instructions.          Ovarian Cysts    Ovarian cysts are sacs filled with fluid or tissue that form on or inside the ovaries. The ovaries are two small organs located on each side of a woman s uterus (womb). They are part of the female reproductive system.  Ovarian cysts are common in women, especially during childbearing years. There are different types of cysts. Most are harmless (benign) and go away on their own. They often cause no symptoms. If symptoms do occur, they can include mild pain or pressure in the lower belly (abdomen).  Cysts that are large or break (rupture) may cause more severe pain and symptoms. In these cases, hospital care or treatment such as surgery may be needed. More extensive treatment may also be needed if a cyst causes an ovary to twist (called torsion) or if a cyst is suspected to be cancerous. Keep in mind that most cysts are not cancerous, however.  General care    To help relieve pain, your healthcare provider may recommend using over-the-counter pain medicine. If needed, stronger pain medicine may be prescribed.    Depending on the type of cyst you have, your healthcare provider may advise taking birth control pills. These help shrink cysts in certain cases. They may also help prevent new cysts from forming. Be sure to take these medicines as directed if they are prescribed.    Your  healthcare provider may advise you to watch your symptoms over time to see if they go away or worsen. Regular ultrasound tests may also be advised. These can help check if a cyst goes away or grows in size.  Follow-up care  Follow up with your healthcare provider, or as advised.  When to seek medical advice  Call your healthcare provider right away if any of these occur:    Pain worsens or fails to get better with home treatment    Fever of 100.4 F (38 C) or higher (or other fever amount directed by your healthcare provider)    Nausea and vomiting    Weakness, dizziness, or fainting    Abnormal vaginal bleeding  Date Last Reviewed: 6/11/2015 2000-2017 [employer]. [address] [place] [address]. All rights reserved. This information is not intended as a substitute for professional medical care. Always follow your healthcare professional's instructions.          Discharge Instructions for Hypokalemia  You have been diagnosed with hypokalemia. This means you have a low level of potassium in your blood. Potassium helps your nerve and muscle cells work as they should. These cells include the cells in your heart. A low level of potassium in the blood can cause serious problems, such as abnormal heart rhythms and even heart attack.  Diet changes  Eat more potassium-rich foods:    Bananas    Oranges and orange juice    Tomatoes, tomato sauce, and tomato juice    Leafy green vegetables, such as spinach, kale, salad greens, collards, and chard    Melons (all kinds)    Pomegranates    Peas    Beans    Potatoes    Sweet potatoes    Avocados, including guacamole    Vegetable juices, such as V8    Fruit juices    All nuts and seeds    Fish, including tuna, halibut, salmon, cod, snapper, macey, swordfish, and perch    Milk, including fat-free, low-fat, whole, chocolate, and buttermilk    Soy milk  Other home care    Take a potassium supplement as directed by your healthcare provider.    After  strenuous exercise or any activity that causes you to sweat a lot, grab a beverage high in potassium. This includes chocolate milk, coconut water, orange juice, or low-sodium vegetable juices.    Be sure to eat foods or drink fluids that contain potassium if you are having diarrhea or vomiting.    Have your potassium levels checked regularly.    Take all medicines exactly as directed.    Tell your healthcare provider about all prescription and over-the-counter medicines you are taking. This includes herbal products.    Avoid foods that are high in salt. Avoid canned and prepared foods that are high in salt.  Follow-up    Make a follow-up appointment as directed by our staff.    Keep all follow-up appointments. Your healthcare provider needs to monitor your condition closely.     When to call your healthcare provider  Call your provider right away or go to the emergency room if you have any of the following:    Vomiting    Fatigue    Diarrhea    Rapid, irregular heartbeat    Shortness of breath    Chest pain    Muscle cramps, spasms, or twitching    Weakness    Paralysis   Date Last Reviewed: 6/1/2017 2000-2017 [employer]. [address] [place] [address]. All rights reserved. This information is not intended as a substitute for professional medical care. Always follow your healthcare professional's instructions.

## 2017-12-12 NOTE — ED NOTES
"Presents with concerns for LLQ abdominal pain into pelvis and groin since yesterday morning about 0300. She reports waking feeling like \"labor pains\" throughout lower abdomen. Denies N/V. Low grade fever. Brigitte Alvarez RN  "

## 2017-12-12 NOTE — ED AVS SNAPSHOT
[facility] Emergency Department    [address] [place] [address]    Phone:  [phone]    Fax:  [phone]                                       Virginia Valenzuela   MRN: [id number]    Department:  [facility] Emergency Department   Date of Visit:  2017           Patient Information     Date Of Birth          1975        Your diagnoses for this visit were:     Left ovarian cyst     Hypokalemia     Bacterial vaginosis        You were seen by Piyush Ding MD.      Follow-up Information     Follow up with Nate Mishra MD.    Specialties:  Family Practice, OB/Gyn    Why:  If not improving in 3 days    Contact information:    [address]   [place] [address] [phone]          Discharge Instructions           Bacterial Vaginosis    You have a vaginal infection called bacterial vaginosis (BV). Both good and bad bacteria are present in a healthy vagina. BV occurs when these bacteria get out of balance. The number of bad bacteria increase. And the number of good bacteria decrease.  BV may or may not cause symptoms. If symptoms do occur, they can include:    Thin, gray, milky-white, or sometimes green discharge    Unpleasant odor or  fishy  smell    Itching, burning, or pain in or around the vagina  It is not known what causes BV, but certain factors can make the problem more likely. This can include:    Douching    Having sex with a new partner    Having sex with more than one partner  BV will sometimes go away on its own. But treatment is usually recommended. This is because untreated BV can increase the risk of more serious health problems such as:    Pelvic inflammatory disease (PID)     delivery (giving birth to a baby early if you re pregnant)    HIV and certain other sexually transmitted diseases (STDs)    Infection after surgery on the reproductive organs  Home care  General care    BV is most often treated with medicines called  antibiotics. These may be given as pills or as a vaginal cream. If antibiotics are prescribed, be sure to use them exactly as directed. Also, be sure to complete all of the medicine, even if your symptoms go away.    Avoid douching or having sex during treatment.    If you have sex with a female partner, ask your healthcare provider if she should also be treated.  Prevention    Limit or avoid douching.    Avoid having sex. If you do have sex, then take steps to lower your risk:    Use condoms when having sex.    Limit the number of partners you have sex with.  Follow-up care  Follow up with your healthcare provider, or as advised.  When to seek medical advice  Call your healthcare provider right away if:    You have a fever of 100.4 F (38 C) or higher, or as directed by your provider.    Your symptoms worsen, or they don t go away within a few days of starting treatment.    You have new pain in the lower belly or pelvic region.    You have side effects that bother you or a reaction to the pills or cream you re prescribed.    You or any partners you have sex with have new symptoms, such as a rash, joint pain, or sores.  Date Last Reviewed: 7/30/2015 2000-2017 [employer]. [address] [place] [address]. All rights reserved. This information is not intended as a substitute for professional medical care. Always follow your healthcare professional's instructions.          Ovarian Cysts    Ovarian cysts are sacs filled with fluid or tissue that form on or inside the ovaries. The ovaries are two small organs located on each side of a woman s uterus (womb). They are part of the female reproductive system.  Ovarian cysts are common in women, especially during childbearing years. There are different types of cysts. Most are harmless (benign) and go away on their own. They often cause no symptoms. If symptoms do occur, they can include mild pain or pressure in the lower belly (abdomen).  Cysts that  are large or break (rupture) may cause more severe pain and symptoms. In these cases, hospital care or treatment such as surgery may be needed. More extensive treatment may also be needed if a cyst causes an ovary to twist (called torsion) or if a cyst is suspected to be cancerous. Keep in mind that most cysts are not cancerous, however.  General care    To help relieve pain, your healthcare provider may recommend using over-the-counter pain medicine. If needed, stronger pain medicine may be prescribed.    Depending on the type of cyst you have, your healthcare provider may advise taking birth control pills. These help shrink cysts in certain cases. They may also help prevent new cysts from forming. Be sure to take these medicines as directed if they are prescribed.    Your healthcare provider may advise you to watch your symptoms over time to see if they go away or worsen. Regular ultrasound tests may also be advised. These can help check if a cyst goes away or grows in size.  Follow-up care  Follow up with your healthcare provider, or as advised.  When to seek medical advice  Call your healthcare provider right away if any of these occur:    Pain worsens or fails to get better with home treatment    Fever of 100.4 F (38 C) or higher (or other fever amount directed by your healthcare provider)    Nausea and vomiting    Weakness, dizziness, or fainting    Abnormal vaginal bleeding  Date Last Reviewed: 6/11/2015 2000-2017 [employer]. [address] [place] [address], [place] [address]. All rights reserved. This information is not intended as a substitute for professional medical care. Always follow your healthcare professional's instructions.          Discharge Instructions for Hypokalemia  You have been diagnosed with hypokalemia. This means you have a low level of potassium in your blood. Potassium helps your nerve and muscle cells work as they should. These cells include the cells in your heart. A low  level of potassium in the blood can cause serious problems, such as abnormal heart rhythms and even heart attack.  Diet changes  Eat more potassium-rich foods:    Bananas    Oranges and orange juice    Tomatoes, tomato sauce, and tomato juice    Leafy green vegetables, such as spinach, kale, salad greens, collards, and chard    Melons (all kinds)    Pomegranates    Peas    Beans    Potatoes    Sweet potatoes    Avocados, including guacamole    Vegetable juices, such as V8    Fruit juices    All nuts and seeds    Fish, including tuna, halibut, salmon, cod, snapper, macye, swordfish, and perch    Milk, including fat-free, low-fat, whole, chocolate, and buttermilk    Soy milk  Other home care    Take a potassium supplement as directed by your healthcare provider.    After strenuous exercise or any activity that causes you to sweat a lot, grab a beverage high in potassium. This includes chocolate milk, coconut water, orange juice, or low-sodium vegetable juices.    Be sure to eat foods or drink fluids that contain potassium if you are having diarrhea or vomiting.    Have your potassium levels checked regularly.    Take all medicines exactly as directed.    Tell your healthcare provider about all prescription and over-the-counter medicines you are taking. This includes herbal products.    Avoid foods that are high in salt. Avoid canned and prepared foods that are high in salt.  Follow-up    Make a follow-up appointment as directed by our staff.    Keep all follow-up appointments. Your healthcare provider needs to monitor your condition closely.     When to call your healthcare provider  Call your provider right away or go to the emergency room if you have any of the following:    Vomiting    Fatigue    Diarrhea    Rapid, irregular heartbeat    Shortness of breath    Chest pain    Muscle cramps, spasms, or twitching    Weakness    Paralysis   Date Last Reviewed: 6/1/2017 2000-2017 [employer] [address]  [place] [address]. All rights reserved. This information is not intended as a substitute for professional medical care. Always follow your healthcare professional's instructions.            Future Appointments        Provider Department Dept Phone Center    12/29/2017 4:15 PM Yana Yoder,  [facility] [phone] [facility]      24 Hour Appointment Hotline       To make an appointment at any [facility], call [phone] ([phone]). If you don't have a family doctor or clinic, we will help you find one. [facility] are conveniently located to serve the needs of you and your family.             Review of your medicines      START taking        Dose / Directions Last dose taken    HYDROcodone-acetaminophen 5-325 MG per tablet   Commonly known as:  NORCO   Dose:  1-2 tablet   Quantity:  20 tablet        Take 1-2 tablets by mouth every 4 hours as needed for moderate to severe pain   Refills:  0        metroNIDAZOLE 500 MG tablet   Commonly known as:  FLAGYL   Dose:  500 mg   Quantity:  14 tablet        Take 1 tablet (500 mg) by mouth 2 times daily for 7 days   Refills:  0          Our records show that you are taking the medicines listed below. If these are incorrect, please call your family doctor or clinic.        Dose / Directions Last dose taken    cetirizine 10 MG tablet   Commonly known as:  zyrTEC   Quantity:  90 tablet        TAKE ONE TABLET BY MOUTH EVERY EVENING   Refills:  1        EPINEPHrine 0.3 MG/0.3ML injection 2-pack   Commonly known as:  EPIPEN/ADRENACLICK/or ANY BX GENERIC EQUIV   Dose:  0.3 mg   Quantity:  1 each        Inject 0.3 mLs (0.3 mg) into the muscle once as needed   Refills:  0        ibuprofen 600 MG tablet   Commonly known as:  ADVIL/MOTRIN   Dose:  600 mg   Quantity:  60 tablet        Take 1 tablet (600 mg) by mouth every 6 hours as needed for moderate pain   Refills:  1        losartan-hydrochlorothiazide 100-25 MG per  tablet   Commonly known as:  HYZAAR   Dose:  1 tablet        Take 1 tablet by mouth daily   Refills:  10        NORVASC PO   Dose:  10 mg        Take 10 mg by mouth   Refills:  0        oxybutynin 5 MG 24 hr tablet   Commonly known as:  DITROPAN XL   Dose:  5 mg   Quantity:  30 tablet        Take 1 tablet (5 mg) by mouth daily   Refills:  1        potassium chloride 10 MEQ tablet   Commonly known as:  K-TAB,KLOR-CON   Dose:  10 mEq   Quantity:  90 tablet        Take 1 tablet (10 mEq) by mouth daily   Refills:  3        PROBIOTIC DAILY PO        Take by mouth daily   Refills:  0        vitamin D3 2000 UNITS Caps   Quantity:  100 capsule        TAKE ONE CAPSULE BY MOUTH EVERY DAY   Refills:  10                Prescriptions were sent or printed at these locations (2 Prescriptions)                   [facility] [place] [id number] [facility]    [address] , [facility] [address]    Telephone:  [phone]   Fax:  [phone]   Hours:                  E-Prescribed (1 of 2)         metroNIDAZOLE (FLAGYL) 500 MG tablet                 Printed at Department/Unit printer (1 of 2)         HYDROcodone-acetaminophen (NORCO) 5-325 MG per tablet                Procedures and tests performed during your visit     CBC with platelets differential    Chlamydia trachomatis PCR    Comprehensive metabolic panel    HCG qualitative urine    Neisseria gonorrhoea PCR    Peripheral IV: Standard    UA with Microscopic    US Pelvic Complete w Transvaginal & Abd/Pel Duplex Limited    Wet prep      Orders Needing Specimen Collection     None      Pending Results     Date and Time Order Name Status Description    12/12/2017 1134 Chlamydia trachomatis PCR In process     12/12/2017 1134 Neisseria gonorrhoea PCR In process     12/12/2017 1023 US Pelvic Complete w Transvaginal & Abd/Pel Duplex Limited Preliminary             Pending Culture Results     Date and Time Order Name Status Description    12/12/2017 1134 Chlamydia  trachomatis PCR In process     12/12/2017 1134 Neisseria gonorrhoea PCR In process             Pending Results Instructions     If you had any lab results that were not finalized at the time of your Discharge, you can call the ED Lab Result RN at [phone]. You will be contacted by this team for any positive Lab results or changes in treatment. The nurses are available 7 days a week from 10A to 6:30P.  You can leave a message 24 hours per day and they will return your call.        Thank you for choosing [facility]       Thank you for choosing [facility] for your care. Our goal is always to provide you with excellent care. Hearing back from our patients is one way we can continue to improve our services. Please take a few minutes to complete the written survey that you may receive in the mail after you visit with us. Thank you!        "[employer] Information     [employer] gives you secure access to your electronic health record. If you see a primary care provider, you can also send messages to your care team and make appointments. If you have questions, please call your primary care clinic.  If you do not have a primary care provider, please call [phone] and they will assist you.        Care EveryWhere ID     This is your Care EveryWhere ID. This could be used by other organizations to access your [facility] medical records  [id number]        Equal Access to Services     CYNTHIA BROWN : Dwayne Ramos, waaxda raymon, qaybta kaalmada dahiana, martinez herman. So [facility] [phone].    ATENCIÓN: Si habla español, tiene a lewis disposición servicios gratuitos de asistencia lingüística. Llame al [phone].    We comply with applicable federal civil rights laws and [place] laws. We do not discriminate on the basis of race, color, national origin, age, disability, sex, sexual orientation, or gender identity.            After Visit Summary       This is your record. Keep this with  you and show to your community pharmacist(s) and doctor(s) at your next visit.

## 2017-12-13 NOTE — ED PROVIDER NOTES
History     Chief Complaint   Patient presents with     Abdominal Pain     HPI  Virginia Valenzuela is a 42 year old female who presents with lower abdominal pain.  This began at 3 AM yesterday.  It is a constant pain that felt like labor.  She has had no fever, no nausea, no vomiting or diarrhea.  She does endorse urinary frequency.  Her last bowel movement was normal.  She has had no vaginal bleeding or discharge.  She states the pain is 6 out of 10 except when it stabbing and becomes more of an 8 out of 10.  She has normal kidney stones.  She is status post appendectomy in approximately 1992.  She is also status post right oophorectomy for ovarian cyst.    Problem List:    Patient Active Problem List    Diagnosis Date Noted     Chronic pelvic pain in female 09/15/2017     Priority: Medium     Trigger finger, left thumb 04/28/2017     Priority: Medium     Sesamoiditis 04/18/2017     Priority: Medium     Bilateral carpal tunnel syndrome 08/11/2016     Priority: Medium     Tobacco use disorder 08/17/2015     Priority: Medium     Ovarian cyst 01/17/2014     Priority: Medium     Problem list name updated by automated process. Provider to review       Female infertility 01/17/2014     Priority: Medium     Hypertension goal BP (blood pressure) < 140/90 11/07/2013     Priority: Medium     Papanicolaou smear of cervix with atypical squamous cells of undetermined significance (ASC-US) 06/27/2013     Priority: Medium     3/12/13 pap ASCUS HR HPV  8/5/13 colposcopy. ASCUS. Plan repeat pap in 6 months  7/6/15 pap NIL/neg HPV. Plan cotest in 3 yrs       GERD (gastroesophageal reflux disease) 06/03/2013     Priority: Medium     Vitamin D deficiency 06/03/2013     Priority: Medium     Problem list name updated by automated process. Provider to review and confirm  [employer] Update utility       HAMIDA (obstructive sleep apnea) 05/29/2013     Priority: Medium     Generalized anxiety disorder 05/29/2013     Priority: Medium     Seasonal  allergic rhinitis 2013     Priority: Medium        Past Medical History:    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     History of appendectomy      Hypertension 2000     Idiopathic urticaria      Obstructive sleep apnea (adult) (pediatric)      Other chronic allergic conjunctivitis      Other malaise and fatigue      Pain in joint, shoulder region      S/P laparoscopic cholecystectomy      Special screening examination for human papillomavirus (HPV)      Sprain of tibiofibular (ligament), distal of ankle      Threatened , unspecified as to episode of care      Tobacco use disorder      Unspecified essential hypertension      Unspecified vitamin D deficiency        Past Surgical History:    Past Surgical History:   Procedure Laterality Date     ABDOMEN SURGERY  2013    ovaian cyst and right ovary removal     APPENDECTOMY       CHOLECYSTECTOMY       COLONOSCOPY N/A 10/26/2017    Procedure: COMBINED COLONOSCOPY, SINGLE OR MULTIPLE BIOPSY/POLYPECTOMY BY BIOPSY;  Colonoscopy with biopsies by biopsy;  Surgeon: Fred Faust DO;  Location:  GI     EYE SURGERY  1979     GENITOURINARY SURGERY  2013    Ovary, fallopian tube     GYN SURGERY  2013    Ovary, Fallopian tube     LAPAROSCOPIC LYSIS ADHESIONS Left 3/1/2016    Procedure: LAPAROSCOPIC LYSIS ADHESIONS;  Surgeon: Nate Mishra MD;  Location: [facility]     LAPAROSCOPIC LYSIS ADHESIONS N/A 2017    Procedure: LAPAROSCOPIC LYSIS ADHESIONS;   laparoscopic lysis of adhesions;  Surgeon: Nate Mishra MD;  Location: [facility]     LAPAROSCOPIC LYSIS ADHESIONS N/A 2017    Procedure: LAPAROSCOPIC LYSIS ADHESIONS;    "laparoscopic lysis of adhesions;  Surgeon: Nate Milligan MD;  Location[facility]     RELEASE CARPAL TUNNEL Left 8/31/2016    Procedure: RELEASE CARPAL TUNNEL;  Surgeon: Gucci Hairston MD;  Location: [facility]     RELEASE CARPAL TUNNEL Right 12/7/2016    Procedure: RELEASE CARPAL TUNNEL;  Surgeon: Gucci Hairston MD;  Location: [facility]       Family History:    Family History   Problem Relation Age of Onset     DIABETES Father      Hypertension Father      Cardiovascular Mother 29     myocarditis      Other Cancer Brother      unknown origin       Social History:  Marital Status:   [2]  Social History   Substance Use Topics     Smoking status: Current Every Day Smoker     Packs/day: 0.75     Years: 10.00     Types: Cigarettes     Smokeless tobacco: Never Used     Alcohol use No        Medications:      HYDROcodone-acetaminophen (NORCO) 5-325 MG per tablet   metroNIDAZOLE (FLAGYL) 500 MG tablet   potassium chloride (K-TAB,KLOR-CON) 10 MEQ tablet   oxybutynin (DITROPAN XL) 5 MG 24 hr tablet   losartan-hydrochlorothiazide (HYZAAR) 100-25 MG per tablet   cetirizine (ZYRTEC) 10 MG tablet   ibuprofen (ADVIL/MOTRIN) 600 MG tablet   Cholecalciferol (VITAMIN D3) 2000 UNITS CAPS   AmLODIPine Besylate (NORVASC PO)   Probiotic Product (PROBIOTIC DAILY PO)   EPINEPHrine (EPIPEN) 0.3 MG/0.3ML injection   [DISCONTINUED] losartan-hydrochlorothiazide (HYZAAR) 50-12.5 MG per tablet         Review of Systems  All other systems are reviewed and are negative    Physical Exam   BP: (!) 144/91  Pulse: 84  Temp: 98  F (36.7  C)  Resp: 20  Height: 162.6 cm (5' 4\")  Weight: 93 kg (205 lb)  SpO2: 97 %      Physical Exam   Constitutional: She appears well-developed and well-nourished. No distress.   HENT:   Head: Normocephalic and atraumatic.   Mouth/Throat: Oropharynx is clear and moist.   Eyes: Pupils are equal, round, and reactive to light. No scleral icterus.   Neck: Normal range of motion. Neck supple.   Cardiovascular: Normal " rate, regular rhythm, normal heart sounds and intact distal pulses.    No murmur heard.  Pulmonary/Chest: No stridor. No respiratory distress. She has no wheezes. She has no rales.   Abdominal: Soft. There is tenderness in the left lower quadrant. There is no rigidity and no guarding.   Genitourinary:   Genitourinary Comments: Moderate amount of white vaginal discharge.  There is left adnexal tenderness.  No cervical motion tenderness.  No right adnexal tenderness   Musculoskeletal: She exhibits no edema or tenderness.   Neurological: She is alert.   Skin: Skin is warm and dry. No rash noted. She is not diaphoretic. No erythema. No pallor.   Psychiatric: She has a normal mood and affect.   Nursing note and vitals reviewed.      ED Course     ED Course     Procedures             Recent Results (from the past 48 hour(s))   US Pelvic Complete w Transvaginal & Abd/Pel Duplex Limited    Narrative    PELVIC ULTRASOUND  WITH ENDOVAGINAL TRANSDUCER IMAGING  12/12/2017  11:20 AM     HISTORY: Pelvic pain.    TECHNIQUE:  Endovaginal sonography was added to the transabdominal  exam to better evaluate the uterus and ovaries.      COMPARISON: MRI pelvis dated 12/8/2017 and pelvic ultrasound dated  11/27/2017.    FINDINGS:  The uterus is normal in size, shape and echotexture  measuring  9.6 x 5.1 x 5.6 cm. Endometrium is 0.9 cm thick and appears  normal for a premenopausal female. Nabothian cysts are again seen in  the cervix.    The right ovary is surgically absent. The left ovary is mildly  enlarged, measuring 5.8 x 4.5 x 2.7 cm. This has increased in size  from 3.6 x 2.8 x 2.4 cm on the prior ultrasound dated 11/27/2017.  Complex cystic structure in the left ovary measures 1.7 x 2.4 x 1.7 cm  and likely represents hemorrhagic cyst. Color and Doppler spectral  analysis demonstrate arterial and venous waveforms in the left ovary.  No abnormal fluid collections are seen in the cul-de-sac.      Impression    IMPRESSION:    1. Right  ovary is surgically absent.  2. There has been further enlargement of the left ovary. Complex  cystic structure in the left ovary likely represents a hemorrhagic  cyst and was not as well-seen on the prior study. Color and Doppler  spectral analysis demonstrate arterial and venous waveforms within the  left ovary. Definite torsion is not seen.    I discussed the enlargement of the left ovary with a complex probable  hemorrhagic cyst and with both arterial and venous waveforms  documented with Dr. Ding on 12/12/2017 at 11:32 AM.    JAMEY OBRIEN MD         Critical Care time:  none               Labs Ordered and Resulted from Time of ED Arrival Up to the Time of Departure from the ED   ROUTINE UA WITH MICROSCOPIC - Abnormal; Notable for the following:        Result Value    Leukocyte Esterase Urine Trace (*)     Bacteria Urine Few (*)     Squamous Epithelial /HPF Urine 19 (*)     Mucous Urine Present (*)     All other components within normal limits   CBC WITH PLATELETS DIFFERENTIAL - Abnormal; Notable for the following:     WBC 12.3 (*)     All other components within normal limits   COMPREHENSIVE METABOLIC PANEL - Abnormal; Notable for the following:     Potassium 3.1 (*)     Urea Nitrogen 6 (*)     Creatinine 0.51 (*)     All other components within normal limits   HCG QUALITATIVE URINE   PERIPHERAL IV CATHETER   NEISSERIA GONORRHOEAE PCR   CHLAMYDIA TRACHOMATIS PCR   WET PREP       Assessments & Plan (with Medical Decision Making)  42-year-old female with left ovarian cyst that is symptomatic.  No evidence for torsion based on ultrasound at this point.  Also with some chronic hypokalemia already being treated.  Clue cells on wet prep consistent with possible bacterial vaginosis.  Treatment as below.  Discharge to home.  It is recommended she follow-up with Dr. Mishra if things are not improving the next 3 days.  We also did discuss torsion that if pain significantly worsens acutely she should come in for  reevaluation in the meantime.     I have reviewed the nursing notes.    I have reviewed the findings, diagnosis, plan and need for follow up with the patient.       Discharge Medication List as of 12/12/2017 12:42 PM      START taking these medications    Details   HYDROcodone-acetaminophen (NORCO) 5-325 MG per tablet Take 1-2 tablets by mouth every 4 hours as needed for moderate to severe pain, Disp-20 tablet, R-0, Local Print      metroNIDAZOLE (FLAGYL) 500 MG tablet Take 1 tablet (500 mg) by mouth 2 times daily for 7 days, Disp-14 tablet, R-0, E-PrescribeEat yogurt or cottage cheese daily to prevent diarrhea that can be caused by taking this antibiotic.             Final diagnoses:   Left ovarian cyst   Hypokalemia   Bacterial vaginosis       12/12/2017   [facility] EMERGENCY DEPARTMENT     Piyush Ding MD  12/12/17 7825

## 2017-12-14 LAB
C TRACH DNA SPEC QL NAA+PROBE: NEGATIVE
N GONORRHOEA DNA SPEC QL NAA+PROBE: NEGATIVE
SPECIMEN SOURCE: NORMAL
SPECIMEN SOURCE: NORMAL

## 2018-01-05 ENCOUNTER — APPOINTMENT (OUTPATIENT)
Dept: ULTRASOUND IMAGING | Facility: CLINIC | Age: 43
End: 2018-01-05
Attending: FAMILY MEDICINE
Payer: COMMERCIAL

## 2018-01-05 ENCOUNTER — HOSPITAL ENCOUNTER (EMERGENCY)
Facility: CLINIC | Age: 43
Discharge: HOME | End: 2018-01-05
Attending: FAMILY MEDICINE | Admitting: FAMILY MEDICINE
Payer: COMMERCIAL

## 2018-01-05 VITALS
RESPIRATION RATE: 12 BRPM | HEART RATE: 71 BPM | SYSTOLIC BLOOD PRESSURE: 136 MMHG | DIASTOLIC BLOOD PRESSURE: 81 MMHG | TEMPERATURE: 97 F | OXYGEN SATURATION: 100 %

## 2018-01-05 DIAGNOSIS — R10.32 ABDOMINAL PAIN, LEFT LOWER QUADRANT: ICD-10-CM

## 2018-01-05 LAB
ANION GAP SERPL CALCULATED.3IONS-SCNC: 10 MMOL/L (ref 3–14)
BASOPHILS # BLD AUTO: 0.1 10E9/L (ref 0–0.2)
BASOPHILS NFR BLD AUTO: 0.7 %
BUN SERPL-MCNC: 4 MG/DL (ref 7–30)
CALCIUM SERPL-MCNC: 8.5 MG/DL (ref 8.5–10.1)
CHLORIDE SERPL-SCNC: 105 MMOL/L (ref 94–109)
CO2 SERPL-SCNC: 25 MMOL/L (ref 20–32)
CREAT SERPL-MCNC: 0.5 MG/DL (ref 0.52–1.04)
DIFFERENTIAL METHOD BLD: NORMAL
EOSINOPHIL # BLD AUTO: 0.3 10E9/L (ref 0–0.7)
EOSINOPHIL NFR BLD AUTO: 2.9 %
ERYTHROCYTE [DISTWIDTH] IN BLOOD BY AUTOMATED COUNT: 13.3 % (ref 10–15)
GFR SERPL CREATININE-BSD FRML MDRD: >90 ML/MIN/1.7M2
GLUCOSE SERPL-MCNC: 141 MG/DL (ref 70–99)
HCT VFR BLD AUTO: 40.1 % (ref 35–47)
HGB BLD-MCNC: 13.3 G/DL (ref 11.7–15.7)
IMM GRANULOCYTES # BLD: 0 10E9/L (ref 0–0.4)
IMM GRANULOCYTES NFR BLD: 0.3 %
LYMPHOCYTES # BLD AUTO: 3.2 10E9/L (ref 0.8–5.3)
LYMPHOCYTES NFR BLD AUTO: 30.2 %
MCH RBC QN AUTO: 29.8 PG (ref 26.5–33)
MCHC RBC AUTO-ENTMCNC: 33.2 G/DL (ref 31.5–36.5)
MCV RBC AUTO: 90 FL (ref 78–100)
MONOCYTES # BLD AUTO: 0.5 10E9/L (ref 0–1.3)
MONOCYTES NFR BLD AUTO: 4.4 %
NEUTROPHILS # BLD AUTO: 6.5 10E9/L (ref 1.6–8.3)
NEUTROPHILS NFR BLD AUTO: 61.5 %
PLATELET # BLD AUTO: 309 10E9/L (ref 150–450)
POTASSIUM SERPL-SCNC: 3.6 MMOL/L (ref 3.4–5.3)
RBC # BLD AUTO: 4.47 10E12/L (ref 3.8–5.2)
SODIUM SERPL-SCNC: 140 MMOL/L (ref 133–144)
WBC # BLD AUTO: 10.6 10E9/L (ref 4–11)

## 2018-01-05 PROCEDURE — 85025 COMPLETE CBC W/AUTO DIFF WBC: CPT | Performed by: FAMILY MEDICINE

## 2018-01-05 PROCEDURE — 80048 BASIC METABOLIC PNL TOTAL CA: CPT | Performed by: FAMILY MEDICINE

## 2018-01-05 PROCEDURE — 99285 EMERGENCY DEPT VISIT HI MDM: CPT | Mod: Z6 | Performed by: FAMILY MEDICINE

## 2018-01-05 PROCEDURE — 76830 TRANSVAGINAL US NON-OB: CPT

## 2018-01-05 PROCEDURE — 96374 THER/PROPH/DIAG INJ IV PUSH: CPT | Performed by: FAMILY MEDICINE

## 2018-01-05 PROCEDURE — 25000128 H RX IP 250 OP 636: Performed by: FAMILY MEDICINE

## 2018-01-05 PROCEDURE — 99285 EMERGENCY DEPT VISIT HI MDM: CPT | Mod: 25 | Performed by: FAMILY MEDICINE

## 2018-01-05 PROCEDURE — 96375 TX/PRO/DX INJ NEW DRUG ADDON: CPT | Performed by: FAMILY MEDICINE

## 2018-01-05 RX ORDER — KETOROLAC TROMETHAMINE 15 MG/ML
15 INJECTION, SOLUTION INTRAMUSCULAR; INTRAVENOUS ONCE
Status: COMPLETED | OUTPATIENT
Start: 2018-01-05 | End: 2018-01-05

## 2018-01-05 RX ORDER — OXYCODONE AND ACETAMINOPHEN 5; 325 MG/1; MG/1
1-2 TABLET ORAL EVERY 8 HOURS PRN
Qty: 20 TABLET | Refills: 0 | Status: SHIPPED | OUTPATIENT
Start: 2018-01-05 | End: 2018-01-08

## 2018-01-05 RX ORDER — HYDROMORPHONE HYDROCHLORIDE 1 MG/ML
0.5 INJECTION, SOLUTION INTRAMUSCULAR; INTRAVENOUS; SUBCUTANEOUS
Status: DISCONTINUED | OUTPATIENT
Start: 2018-01-05 | End: 2018-01-05 | Stop reason: HOSPADM

## 2018-01-05 RX ADMIN — HYDROMORPHONE HYDROCHLORIDE 0.5 MG: 1 INJECTION, SOLUTION INTRAMUSCULAR; INTRAVENOUS; SUBCUTANEOUS at 11:13

## 2018-01-05 RX ADMIN — KETOROLAC TROMETHAMINE 15 MG: 15 INJECTION, SOLUTION INTRAMUSCULAR; INTRAVENOUS at 10:19

## 2018-01-05 NOTE — ED AVS SNAPSHOT
[facility] Emergency Department    [address] [place] [address]    Phone:  [phone]    Fax:  [phone]                                       Virginia Valenzuela   MRN: [id number]    Department:  [facility] Emergency Department   Date of Visit:  1/5/2018           After Visit Summary Signature Page     I have received my discharge instructions, and my questions have been answered. I have discussed any challenges I see with this plan with the nurse or doctor.    ..........................................................................................................................................  Patient/Patient Representative Signature      ..........................................................................................................................................  Patient Representative Print Name and Relationship to Patient    ..................................................               ................................................  Date                                            Time    ..........................................................................................................................................  Reviewed by Signature/Title    ...................................................              ..............................................  Date                                                            Time

## 2018-01-05 NOTE — DISCHARGE INSTRUCTIONS
We are sorry about your left sided pelvic pain.    We did not find a serious or obvious cause for this pain.  Your blood work and ultrasound were reassuring.    It is possible that she may have had a small ovarian cyst that ruptured, or you may be experiencing some other cause for your pain.    Rest today, and reserve Percocet for moderate to severe pain.    Monitor for any new or worsening signs or symptoms.  Return to the ED at any time if symptoms worsen.    Follow-up with Dr. Mishra in 3-4 days if not improving.

## 2018-01-05 NOTE — ED AVS SNAPSHOT
[facility] Emergency Department    [address]     [place] [address]    Phone:  [phone]    Fax:  [phone]                                       Virginia Valenzuela   MRN: [id number]    Department:  [facility] Emergency Department   Date of Visit:  1/5/2018           Patient Information     Date Of Birth          1975        Your diagnoses for this visit were:     Abdominal pain, left lower quadrant        You were seen by Abran Hartley MD.      Follow-up Information     Follow up with Nate Mishra MD In 3 days.    Specialties:  Family Practice, OB/Gyn    Why:  if not improving    Contact information:    [address]   [place] [address] [phone]          Follow up with [facility] Emergency Department.    Specialty:  EMERGENCY MEDICINE    Why:  If symptoms worsen    Contact information:    [address]   [place] [id number] [phone]    Additional information:    From [address]: Exit at [address] on [place] side of [place]. Turn right on [address]. Turn left at stoplight on [address]. [facility] will be in view two blocks ahead        Discharge Instructions       We are sorry about your left sided pelvic pain.    We did not find a serious or obvious cause for this pain.  Your blood work and ultrasound were reassuring.    It is possible that she may have had a small ovarian cyst that ruptured, or you may be experiencing some other cause for your pain.    Rest today, and reserve Percocet for moderate to severe pain.    Monitor for any new or worsening signs or symptoms.  Return to the ED at any time if symptoms worsen.    Follow-up with Dr. Mishra in 3-4 days if not improving.    Future Appointments        Provider Department Dept Phone Center    1/8/2018 10:45 AM Yana Yoder, DO [facility] [phone] [facility]      24 Hour Appointment Hotline       To make an  appointment at any [facility] clinic, call [phone] ([phone]). If you don't have a family doctor or clinic, we will help you find one. [facility] clinics are conveniently located to serve the needs of you and your family.             Review of your medicines      START taking        Dose / Directions Last dose taken    oxyCODONE-acetaminophen 5-325 MG per tablet   Commonly known as:  PERCOCET   Dose:  1-2 tablet   Quantity:  20 tablet        Take 1-2 tablets by mouth every 8 hours as needed for moderate to severe pain   Refills:  0          Our records show that you are taking the medicines listed below. If these are incorrect, please call your family doctor or clinic.        Dose / Directions Last dose taken    cetirizine 10 MG tablet   Commonly known as:  zyrTEC   Quantity:  90 tablet        TAKE ONE TABLET BY MOUTH EVERY EVENING   Refills:  1        EPINEPHrine 0.3 MG/0.3ML injection 2-pack   Commonly known as:  EPIPEN/ADRENACLICK/or ANY BX GENERIC EQUIV   Dose:  0.3 mg   Quantity:  1 each        Inject 0.3 mLs (0.3 mg) into the muscle once as needed   Refills:  0        ibuprofen 600 MG tablet   Commonly known as:  ADVIL/MOTRIN   Dose:  600 mg   Quantity:  60 tablet        Take 1 tablet (600 mg) by mouth every 6 hours as needed for moderate pain   Refills:  1        losartan-hydrochlorothiazide 100-25 MG per tablet   Commonly known as:  HYZAAR   Dose:  1 tablet        Take 1 tablet by mouth daily   Refills:  10        NORVASC PO   Dose:  10 mg        Take 10 mg by mouth   Refills:  0        oxybutynin 5 MG 24 hr tablet   Commonly known as:  DITROPAN XL   Dose:  5 mg   Quantity:  30 tablet        Take 1 tablet (5 mg) by mouth daily   Refills:  1        potassium chloride 10 MEQ tablet   Commonly known as:  K-TAB,KLOR-CON   Dose:  10 mEq   Quantity:  90 tablet        Take 1 tablet (10 mEq) by mouth daily   Refills:  3        PROBIOTIC DAILY PO        Take by mouth daily   Refills:  0        vitamin D3  2000 UNITS Caps   Quantity:  100 capsule        TAKE ONE CAPSULE BY MOUTH EVERY DAY   Refills:  10                Prescriptions were sent or printed at these locations (1 Prescription)                   [facility] [place] - [address]   [address] , [place] [address]    Telephone:  [phone]   Fax:  [phone]   Hours:                  Printed at Department/Unit printer (1 of 1)         oxyCODONE-acetaminophen (PERCOCET) 5-325 MG per tablet                Procedures and tests performed during your visit     Basic metabolic panel    CBC with platelets differential    Peripheral IV catheter    US Pelvic Complete w Transvaginal & Abd/Pel Duplex Limited      Orders Needing Specimen Collection     None      Pending Results     Date and Time Order Name Status Description    1/5/2018 0957 US Pelvic Complete w Transvaginal & Abd/Pel Duplex Limited Preliminary             Pending Culture Results     No orders found from 1/3/2018 to 1/6/2018.            Pending Results Instructions     If you had any lab results that were not finalized at the time of your Discharge, you can call the ED Lab Result RN at [phone]. You will be contacted by this team for any positive Lab results or changes in treatment. The nurses are available 7 days a week from 10A to 6:30P.  You can leave a message 24 hours per day and they will return your call.        Thank you for choosing [facility]       Thank you for choosing [facility] for your care. Our goal is always to provide you with excellent care. Hearing back from our patients is one way we can continue to improve our services. Please take a few minutes to complete the written survey that you may receive in the mail after you visit with us. Thank you!        [employer]     [employer] gives you secure access to your electronic health record. If you see a primary care provider, you can also send messages to your care team and make appointments. If  you have questions, please call your primary care clinic.  If you do not have a primary care provider, please call [phone] and they will assist you.        Care EveryWhere ID     This is your Care EveryWhere ID. This could be used by other organizations to access your [place] medical records  [id number]        Equal Access to Services     CYNTHIA BROWN : Dwayne Ramos, jae ennis, martinez caro. So [facility] [phone].    ATENCIÓN: Si habla español, tiene a lewis disposición servicios gratuitos de asistencia lingüística. Llame al [phone].    We comply with applicable federal civil rights laws and [place] laws. We do not discriminate on the basis of race, color, national origin, age, disability, sex, sexual orientation, or gender identity.            After Visit Summary       This is your record. Keep this with you and show to your community pharmacist(s) and doctor(s) at your next visit.

## 2018-01-05 NOTE — ED PROVIDER NOTES
[facility] ED Provider Note   CC:     Chief Complaint   Patient presents with     Abdominal Pain     HPI:  Virginia Valenzuela is a 42 year old female who presented to the emergency department with 3 day history of left lower quadrant abdominal/pelvic pain.  This pain is similar to her prior episodes of ovarian cysts.  Patient reports pain that also involve the left flank and back area, but for the most part that has improved.  Over the past 3 months, she has had recurrent ovarian cysts, with resolution between episodes of the cyst.  She was seen in November, and again in December.  In between she had a ultrasound showing resolution of her previous cyst.  Patient states that the pain typically lasts for 1-2 days, but it is now 3-4 days and it was more intense this morning.  Pain levels rated 8/10, has a sharp quality, and is worse with sitting and laying, and slightly better with standing.  She does not have any fever, chills, nausea, vomiting, changes in bowel habit, urinary symptoms, vaginal discharge or bleeding, etc.  She is a smoker, and has had previous right oophorectomy due to endometriosis.  She has been diagnosed with polycystic ovarian syndrome.  Patient is on blood pressure medication for her hypertension.  She has an intolerance to Tylenol with codeine.    Problem List:  Patient Active Problem List    Diagnosis     Chronic p is a smoker ofelvic pain in female     Trigger finger, left thumb     Sesamoiditis     Bilateral carpal tunnel syndrome     Tobacco use disorder     Ovarian cyst     Female infertility     Hypertension goal BP (blood pressure) < 140/90     Papanicolaou smear of cervix with atypical squamous cells of undetermined significance (ASC-US)     GERD (gastroesophageal reflux disease)     Vitamin D deficiency     HAMIDA (obstructive sleep apnea)     Generalized anxiety disorder     Seasonal allergic rhinitis       MEDS:    Discharge Medication List as of 1/5/2018 11:45 AM      CONTINUE these medications which have NOT CHANGED    Details   potassium chloride (K-TAB,KLOR-CON) 10 MEQ tablet Take 1 tablet (10 mEq) by mouth daily, Disp-90 tablet, R-3, E-Prescribe      oxybutynin (DITROPAN XL) 5 MG 24 hr tablet Take 1 tablet (5 mg) by mouth daily, Disp-30 tablet, R-1, E-Prescribe      losartan-hydrochlorothiazide (HYZAAR) 100-25 MG per tablet Take 1 tablet by mouth daily, R-10, Historical      cetirizine (ZYRTEC) 10 MG tablet TAKE ONE TABLET BY MOUTH EVERY EVENING, Disp-90 tablet, R-1, E-Prescribe      ibuprofen (ADVIL/MOTRIN) 600 MG tablet Take 1 tablet (600 mg) by mouth every 6 hours as needed for moderate pain, Disp-60 tablet, R-1, E-Prescribe      Cholecalciferol (VITAMIN D3) 2000 UNITS CAPS TAKE ONE CAPSULE BY MOUTH EVERY DAY, Disp-100 capsule, R-10, E-Prescribe      AmLODIPine Besylate (NORVASC PO) Take 10 mg by mouth, Historical      Probiotic Product (PROBIOTIC DAILY PO) Take by mouth daily, Historical      EPINEPHrine (EPIPEN) 0.3 MG/0.3ML injection Inject 0.3 mLs (0.3 mg) into the muscle once as needed, Disp-1 each, R-0, E-Prescribe             ALLERGIES:    Allergies   Allergen Reactions     Bees Swelling     FINGER TIPS TO ELBOWS     Tylenol W/Codeine [Acetaminophen-Codeine]        Past Surgical History:   Procedure Laterality Date     ABDOMEN SURGERY  July 2013    ovaian cyst and right ovary removal     APPENDECTOMY       CHOLECYSTECTOMY       COLONOSCOPY N/A 10/26/2017    Procedure: COMBINED COLONOSCOPY, SINGLE OR MULTIPLE BIOPSY/POLYPECTOMY BY BIOPSY;  Colonoscopy with biopsies by biopsy;  Surgeon: Fred Faust DO;  Location:  GI     EYE SURGERY  1979     GENITOURINARY SURGERY  July 2013    Ovary, fallopian tube     GYN SURGERY  July 2013    Ovary, Fallopian tube     LAPAROSCOPIC LYSIS ADHESIONS Left 3/1/2016    Procedure: LAPAROSCOPIC LYSIS ADHESIONS;  Surgeon: Nate Mishra MD;  Location:  OR      LAPAROSCOPIC LYSIS ADHESIONS N/A 7/13/2017    Procedure: LAPAROSCOPIC LYSIS ADHESIONS;   laparoscopic lysis of adhesions;  Surgeon: Nate Mishra MD;  Location: [facility] OR     LAPAROSCOPIC LYSIS ADHESIONS N/A 7/13/2017    Procedure: LAPAROSCOPIC LYSIS ADHESIONS;   laparoscopic lysis of adhesions;  Surgeon: Nate Milligan MD;  Location: [facility] OR     RELEASE CARPAL TUNNEL Left 8/31/2016    Procedure: RELEASE CARPAL TUNNEL;  Surgeon: Gucci Hairston MD;  Location: [facility] OR     RELEASE CARPAL TUNNEL Right 12/7/2016    Procedure: RELEASE CARPAL TUNNEL;  Surgeon: Gucci Hairston MD;  Location: [facility] OR       Social History   Substance Use Topics     Smoking status: Current Every Day Smoker     Packs/day: 0.75     Years: 10.00     Types: Cigarettes     Smokeless tobacco: Never Used     Alcohol use No         Review of Systems   Except as noted in HPI, all other systems were reviewed and are negative    Physical Exam     Vitals were reviewed  Patient Vitals for the past 8 hrs:   BP Temp Pulse Resp SpO2   01/05/18 1154 136/81 - 71 - -   01/05/18 1115 138/75 - 69 - -   01/05/18 0944 166/81 97  F (36.1  C) 87 12 100 %     GENERAL APPEARANCE: Alert, moderate distress due to pain  FACE: normal facies  EYES: Pupils are equal; patient has disconjugate gaze  HENT: normal external exam  NECK: no adenopathy or asymmetry  RESP: normal respiratory effort; clear breath sounds bilaterally  CV: regular rate and rhythm; no significant murmurs, gallops or rubs  ABD: soft, moderately obese, left pelvic tenderness; no rebound or guarding; bowel sounds are present  MS: no gross deformities noted; normal muscle tone.  EXT: No calf tenderness or pitting edema  SKIN: no worrisome rash  NEURO: no facial droop; no focal deficits, speech is normal  PSYCH: normal mood and affect      Available Lab/Imaging Results     Results for orders placed or performed during the hospital encounter of 01/05/18 (from the past 24 hour(s))   CBC with  platelets differential   Result Value Ref Range    WBC 10.6 4.0 - 11.0 10e9/L    RBC Count 4.47 3.8 - 5.2 10e12/L    Hemoglobin 13.3 11.7 - 15.7 g/dL    Hematocrit 40.1 35.0 - 47.0 %    MCV 90 78 - 100 fl    MCH 29.8 26.5 - 33.0 pg    MCHC 33.2 31.5 - 36.5 g/dL    RDW 13.3 10.0 - 15.0 %    Platelet Count 309 150 - 450 10e9/L    Diff Method Automated Method     % Neutrophils 61.5 %    % Lymphocytes 30.2 %    % Monocytes 4.4 %    % Eosinophils 2.9 %    % Basophils 0.7 %    % Immature Granulocytes 0.3 %    Absolute Neutrophil 6.5 1.6 - 8.3 10e9/L    Absolute Lymphocytes 3.2 0.8 - 5.3 10e9/L    Absolute Monocytes 0.5 0.0 - 1.3 10e9/L    Absolute Eosinophils 0.3 0.0 - 0.7 10e9/L    Absolute Basophils 0.1 0.0 - 0.2 10e9/L    Abs Immature Granulocytes 0.0 0 - 0.4 10e9/L   Basic metabolic panel   Result Value Ref Range    Sodium 140 133 - 144 mmol/L    Potassium 3.6 3.4 - 5.3 mmol/L    Chloride 105 94 - 109 mmol/L    Carbon Dioxide 25 20 - 32 mmol/L    Anion Gap 10 3 - 14 mmol/L    Glucose 141 (H) 70 - 99 mg/dL    Urea Nitrogen 4 (L) 7 - 30 mg/dL    Creatinine 0.50 (L) 0.52 - 1.04 mg/dL    GFR Estimate >90 >60 mL/min/1.7m2    GFR Estimate If Black >90 >60 mL/min/1.7m2    Calcium 8.5 8.5 - 10.1 mg/dL   US Pelvic Complete w Transvaginal & Abd/Pel Duplex Limited    Narrative    ULTRASOUND PELVIS DOPPLER   WITH TRANSVAGINAL IMAGING  1/5/2018 11:11  AM     HISTORY: Left pelvic pain; prone to hemorrhagic cysts; increase in  pain today.     COMPARISON: 12/12/2017.    TECHNIQUE: Transvaginal images were performed to better evaluate the  patient's uterus, ovaries and endometrial stripe. Grayscale, color  Doppler, and Doppler spectral waveform analysis performed.    FINDINGS:  No fibroids are evident. The uterus is 9.3 x 4.9 x 5.8 cm.  Nabothian cysts noted. Endometrial stripe measures 10 mm and is normal  for patient's age and menstrual status. The right ovary is absent. The  left ovary is normal. Doppler spectral waveform analysis  demonstrates  blood flow to the left ovary. No left adnexal masses are present. No  free pelvic fluid is present.      Impression    IMPRESSION: Unremarkable pelvic ultrasound status post right  oophorectomy.    STUART MANN MD         Impression     Final diagnoses:   Abdominal pain, left lower quadrant       ED Course & Medical Decision Making   Virginia Valenzuela is a 42 year old female who presented to the emergency department with 3-4 day history of left pelvic pain similar to her previous episodes of ovarian cyst.  She has had previous right oophorectomy, and recently had a hemorrhagic ovarian cyst in December, and before that another ovarian cyst in November.  Patient was seen shortly after arrival.  Temperature is 97 , blood pressure 166/81, heart rate of 87, respiratory rate of 12, 100% oxygen saturation.  Patient's exam revealed tenderness in the left pelvic area without any rebound or guarding.  Patient was treated with Toradol 15 mg IV, and Dilaudid.  Patient underwent a pelvic ultrasound study for comparison.  The results reveal no evidence for fibroids, with normal endometrial stripe measuring 10 mm.  Right ovary is absent, the left ovary is normal.  There is no left adnexal masses and no free pelvic fluid.  Patient's pain is better after receiving Toradol and Dilaudid.  Patient was feeling improved, and understands that she will need a follow-up in 3-4 days if not improving with Dr. Otis palomo.  Patient was advised to monitor for any new or worsening symptoms.  We do not have a clear etiology for her pain.  2 over the last 4 ultrasounds have demonstrated an ovarian cyst.  Her pain may be unrelated but we will have her monitor for further symptoms, reserve Percocet for moderate to severe breakthrough pain.  Follow-up with Dr. Mishra next week if symptoms persist.  Return to the ED at any time if symptoms worsen.      Written after-visit summary and instructions were given at the time of  discharge.      Discharge Medication List as of 1/5/2018 11:45 AM      START taking these medications    Details   oxyCODONE-acetaminophen (PERCOCET) 5-325 MG per tablet Take 1-2 tablets by mouth every 8 hours as needed for moderate to severe pain, Disp-20 tablet, R-0, Local Print               This note was completed in part using Dragon voice recognition, and may contain word and grammatical errors.        Abran Hartley MD  01/05/18 [id number]

## 2018-01-08 ENCOUNTER — OFFICE VISIT (OUTPATIENT)
Dept: OBGYN | Facility: OTHER | Age: 43
End: 2018-01-08
Payer: COMMERCIAL

## 2018-01-08 ENCOUNTER — MYC MEDICAL ADVICE (OUTPATIENT)
Dept: FAMILY MEDICINE | Facility: CLINIC | Age: 43
End: 2018-01-08

## 2018-01-08 VITALS
SYSTOLIC BLOOD PRESSURE: 127 MMHG | WEIGHT: 207 LBS | BODY MASS INDEX: 35.34 KG/M2 | HEIGHT: 64 IN | HEART RATE: 92 BPM | DIASTOLIC BLOOD PRESSURE: 80 MMHG

## 2018-01-08 DIAGNOSIS — R10.9 INTERMITTENT ABDOMINAL PAIN: Primary | ICD-10-CM

## 2018-01-08 LAB — BETA HCG QUAL IFA URINE: NEGATIVE

## 2018-01-08 PROCEDURE — 84703 CHORIONIC GONADOTROPIN ASSAY: CPT | Performed by: OBSTETRICS & GYNECOLOGY

## 2018-01-08 PROCEDURE — 99213 OFFICE O/P EST LOW 20 MIN: CPT | Performed by: OBSTETRICS & GYNECOLOGY

## 2018-01-08 RX ORDER — ACETAMINOPHEN AND CODEINE PHOSPHATE 120; 12 MG/5ML; MG/5ML
1 SOLUTION ORAL DAILY
Qty: 84 TABLET | Refills: 0 | Status: SHIPPED | OUTPATIENT
Start: 2018-01-08 | End: 2018-03-06

## 2018-01-08 ASSESSMENT — PAIN SCALES - GENERAL: PAINLEVEL: SEVERE PAIN (6)

## 2018-01-08 NOTE — MR AVS SNAPSHOT
After Visit Summary   1/8/2018    Virginia Valenzuela    MRN: [id number]           Patient Information     Date Of Birth          1975        Visit Information        Provider Department      1/8/2018 10:45 AM Yana Yoder,  [facility]        Today's Diagnoses     Intermittent abdominal pain    -  1      Care Instructions    Please call if you any questions.    [facility]  [address] [place]   [address]  [phone]        Yana Yoder            Follow-ups after your visit        Additional Services     GASTROENTEROLOGY ADULT REF CONSULT ONLY       Preferred Location: [facility], [facility]: [phone]      Please be aware that coverage of these services is subject to the terms and limitations of your health insurance plan.  Call member services at your health plan with any benefit or coverage questions.  Any procedures must be performed at a [facility] facility OR coordinated by your clinic's referral office.    Please bring the following with you to your appointment:    (1) Any X-Rays, CTs or MRIs which have been performed.  Contact the facility where they were done to arrange for  prior to your scheduled appointment.    (2) List of current medications   (3) This referral request   (4) Any documents/labs given to you for this referral                  Follow-up notes from your care team     Return in about 3 months (around 4/8/2018).      Who to contact     If you have questions or need follow up information about today's clinic visit or your schedule please contact [facility] directly at [phone].  Normal or non-critical lab and imaging results will be communicated to you by MyChart, letter or phone within 4 business days after the clinic has received the results. If you do not hear from us within 7 days, please contact the clinic through MyChart or phone. If you have a critical or abnormal lab result, we will  "notify you by phone as soon as possible.  Submit refill requests through [employer] or call your pharmacy and they will forward the refill request to us. Please allow 3 business days for your refill to be completed.          Additional Information About Your Visit        [employer] Information     [employer] gives you secure access to your electronic health record. If you see a primary care provider, you can also send messages to your care team and make appointments. If you have questions, please call your primary care clinic.  If you do not have a primary care provider, please call [phone] and they will assist you.        Care EveryWhere ID     This is your Care EveryWhere ID. This could be used by other organizations to access your [facility] medical records  [id number]        Your Vitals Were     Pulse Height Last Period BMI (Body Mass Index)          92 5' 3.5\" (1.613 m) 12/22/2017 36.09 kg/m2         Blood Pressure from Last 3 Encounters:   01/08/18 127/80   01/05/18 136/81   12/12/17 129/75    Weight from Last 3 Encounters:   01/08/18 207 lb (93.9 kg)   12/12/17 205 lb (93 kg)   12/07/17 205 lb (93 kg)              We Performed the Following     Beta HCG qual IFA urine - [facility] and [place]     GASTROENTEROLOGY ADULT REF CONSULT ONLY          Today's Medication Changes          These changes are accurate as of: 1/8/18  1:04 PM.  If you have any questions, ask your nurse or doctor.               Start taking these medicines.        Dose/Directions    norethindrone 0.35 MG per tablet   Commonly known as:  MICRONOR   Used for:  Intermittent abdominal pain   Started by:  Yana Yoder DO        Dose:  1 tablet   Take 1 tablet (0.35 mg) by mouth daily   Quantity:  84 tablet   Refills:  0            Where to get your medicines      These medications were sent to [facility] [place], [place] - [address]  [address] [address]     Phone:  [phone]     norethindrone 0.35 MG " per tablet                Primary Care Provider Office Phone # Fax #    [facility] [phone] [address] [facility] [address]        Equal Access to Services     CYNTHIA BROWN : Hadii aad ku hadstanislavsulema Richard, wathereseda luqadaha, qaybta kaalmada dahiana, martinez corbett vidabassam sepulveda jan herman. So [facility] [phone].    ATENCIÓN: Si habla español, tiene a lewis disposición servicios gratuitos de asistencia lingüística. Luiz al [phone].    We comply with applicable federal civil rights laws and [place] laws. We do not discriminate on the basis of race, color, national origin, age, disability, sex, sexual orientation, or gender identity.            Thank you!     Thank you for choosing [facility]  for your care. Our goal is always to provide you with excellent care. Hearing back from our patients is one way we can continue to improve our services. Please take a few minutes to complete the written survey that you may receive in the mail after your visit with us. Thank you!             Your Updated Medication List - Protect others around you: Learn how to safely use, store and throw away your medicines at www.disposemymeds.org.          This list is accurate as of: 1/8/18  1:04 PM.  Always use your most recent med list.                   Brand Name Dispense Instructions for use Diagnosis    cetirizine 10 MG tablet    zyrTEC    90 tablet    TAKE ONE TABLET BY MOUTH EVERY EVENING    Chronic seasonal allergic rhinitis, unspecified trigger       EPINEPHrine 0.3 MG/0.3ML injection 2-pack    EPIPEN/ADRENACLICK/or ANY BX GENERIC EQUIV    1 each    Inject 0.3 mLs (0.3 mg) into the muscle once as needed    History of bee sting allergy       ibuprofen 600 MG tablet    ADVIL/MOTRIN    60 tablet    Take 1 tablet (600 mg) by mouth every 6 hours as needed for moderate pain    S/P laparoscopic surgery       losartan-hydrochlorothiazide 100-25 MG per tablet    HYZAAR      Take 1 tablet by mouth daily        norethindrone 0.35 MG per tablet    MICRONOR    84 tablet    Take 1 tablet (0.35 mg) by mouth daily    Intermittent abdominal pain       NORVASC PO      Take 10 mg by mouth        oxyCODONE-acetaminophen 5-325 MG per tablet    PERCOCET    20 tablet    Take 1-2 tablets by mouth every 8 hours as needed for moderate to severe pain        potassium chloride 10 MEQ tablet    K-TAB,KLOR-CON    90 tablet    Take 1 tablet (10 mEq) by mouth daily    Hypokalemia       PROBIOTIC DAILY PO      Take by mouth daily        vitamin D3 2000 UNITS Caps     100 capsule    TAKE ONE CAPSULE BY MOUTH EVERY DAY    Vitamin D deficiency

## 2018-01-08 NOTE — PROGRESS NOTES
Subjective  42 year old non-pregnant female presents today for a second opinion due to pelvic pain.  Patient states she has had left sided pelvic pain for a few years.  She has had many ultrasounds, seen Urology, had a colonoscopy, and two dx surgeries to remove scar tissue.  Patient states the pain is intermittent.  It will come every few days. When she has it it is constant.  She had an XL, RSO with removal of right ovarian cyst, and drainage of left ovarian cyst by me in 2013. Patient is not on any hormones.  She had the Mirena 5 years ago.  She is no longer interested in trying to get pregnant.  She gets a regular, monthly cycle.  Normal bowel movements.  No problems urinating.  She is sexually active and has pain with intercourse.   We discussed possible causes for this pain.  I recommended patient try hormones to suppress ovulation as that may be a cause of her pain.  I also recommended patient see GI.  Patient is having a hard time with this discomfort and feels something has to be done because she cannot be in pain everyday.  We discussed the removal of her remaining ovary would likely require HRT and she young for this as well as smokes.  I did not recommend a hysterectomy or LSO at this time.          ROS: 10 point ROS neg other than the symptoms noted above in the HPI.  Past Medical History:   Diagnosis Date     Allergic rhinitis, cause unspecified      ASCUS with positive high risk HPV 3/12/13     Depressive disorder, not elsewhere classified      Dysfunction of eustachian tube      Encounter for therapeutic drug monitoring      Generalized anxiety disorder      High risk HPV infection 5/12/09     History of appendectomy      Hypertension 2000     Idiopathic urticaria      Obstructive sleep apnea (adult) (pediatric)      Other chronic allergic conjunctivitis      Other malaise and fatigue      Pain in joint, shoulder region      S/P laparoscopic cholecystectomy      Special screening examination for  human papillomavirus (HPV)      Sprain of tibiofibular (ligament), distal of ankle      Threatened , unspecified as to episode of care      Tobacco use disorder      Unspecified essential hypertension      Unspecified vitamin D deficiency      Past Surgical History:   Procedure Laterality Date     ABDOMEN SURGERY  2013    ovaian cyst and right ovary removal     APPENDECTOMY       CHOLECYSTECTOMY       COLONOSCOPY N/A 10/26/2017    Procedure: COMBINED COLONOSCOPY, SINGLE OR MULTIPLE BIOPSY/POLYPECTOMY BY BIOPSY;  Colonoscopy with biopsies by biopsy;  Surgeon: Fred Faust DO;  Location: [facility]     EYE SURGERY  1979     GENITOURINARY SURGERY  2013    Ovary, fallopian tube     GYN SURGERY  2013    Ovary, Fallopian tube     LAPAROSCOPIC LYSIS ADHESIONS Left 3/1/2016    Procedure: LAPAROSCOPIC LYSIS ADHESIONS;  Surgeon: Nate Mishra MD;  Location: [facility]     LAPAROSCOPIC LYSIS ADHESIONS N/A 2017    Procedure: LAPAROSCOPIC LYSIS ADHESIONS;   laparoscopic lysis of adhesions;  Surgeon: Nate Mishra MD;  Location: [facility]     LAPAROSCOPIC LYSIS ADHESIONS N/A 2017    Procedure: LAPAROSCOPIC LYSIS ADHESIONS;   laparoscopic lysis of adhesions;  Surgeon: Nate Milligan MD;  Location: [facility]     RELEASE CARPAL TUNNEL Left 2016    Procedure: RELEASE CARPAL TUNNEL;  Surgeon: Gucci Hairston MD;  Location: [facility] OR     RELEASE CARPAL TUNNEL Right 2016    Procedure: RELEASE CARPAL TUNNEL;  Surgeon: Gucci Hairston MD;  Location: [facility] OR     Family History   Problem Relation Age of Onset     DIABETES Father      Hypertension Father      Cardiovascular Mother 29     myocarditis      Other Cancer Brother      unknown origin     Social History   Substance Use Topics     Smoking status: Current Every Day Smoker     Packs/day: 0.75     Years: 10.00     Types: Cigarettes     Smokeless tobacco: Never Used     Alcohol use No         Objective  Vitals: BP  "127/80  Pulse 92  Ht 5' 3.5\" (1.613 m)  Wt 207 lb (93.9 kg)  LMP 12/22/2017  BMI 36.09 kg/m2  BMI= Body mass index is 36.09 kg/(m^2).    General appearance=no deformities noted  Psych=mood is stable  Abd=soft, Nontender/nondistended, no masses, no signs of hernias, no evidence of hepatosplenomegaly  PELVIC:    External genitalia: normal without lesions or masses  Urethral meatus: no lesions or prolapse noted, normal size  Urethra: no masses, non tender  Bladder: non tender, no fullness  Vagina: normal mucosa and rugae, no discharge.  Cervix: normal without lesion, no cervical motion tenderness, healthy, multiparous  Uterus: small, mobile, nontender.  Adnexa: non tender, without masses  Rectal: deffered  Ext=no clubbing or cyanosis, no swelling    Pelvic ultrasound=1/5/18:  FINDINGS:  No fibroids are evident. The uterus is 9.3 x 4.9 x 5.8 cm.  Nabothian cysts noted. Endometrial stripe measures 10 mm and is normal  for patient's age and menstrual status. The right ovary is absent. The  left ovary is normal. Doppler spectral waveform analysis demonstrates  blood flow to the left ovary. No left adnexal masses are present. No  free pelvic fluid is present.         IMPRESSION: Unremarkable pelvic ultrasound status post right  Oophorectomy.    Pelvic MRI=12/8/17:  FINDINGS: Uterus measures 6 x 5.1 x 8.9 cm. No focal myometrial  lesions identified. Nabothian cysts are noted. Right ovary is absent.  Left ovary appears within normal limits. There is a dominant follicle  at the left ovary that is 1.5 cm. No free fluid identified. No  adenopathy or convincing acute abnormalities.         IMPRESSION: No acute abnormality is seen. Right ovary is absent.      Assessment  1.)  Left sided pelvic pain  2.)  History of ovarian cysts      Plan  1.)  Micronor ordered  2.)  UPT  3.)  GI referral placed      25 minutes was spent face to face with the patient today discussing her history, diagnosis, and follow-up plan as noted above.  " Over 50% of the visit was spent in counseling and coordination of care.        Nursing notes read and reviewed    Yana Yoder

## 2018-01-08 NOTE — NURSING NOTE
"Chief Complaint   Patient presents with     Pelvic Pain     2nd opinion       Initial /80  Pulse 92  Ht 5' 3.5\" (1.613 m)  Wt 207 lb (93.9 kg)  LMP 12/22/2017  BMI 36.09 kg/m2 Estimated body mass index is 36.09 kg/(m^2) as calculated from the following:    Height as of this encounter: 5' 3.5\" (1.613 m).    Weight as of this encounter: 207 lb (93.9 kg).  Medication Reconciliation: complete  "

## 2018-03-02 DIAGNOSIS — Z98.890 S/P LAPAROSCOPIC SURGERY: ICD-10-CM

## 2018-03-02 NOTE — TELEPHONE ENCOUNTER
"Requested Prescriptions   Pending Prescriptions Disp Refills     ibuprofen (ADVIL/MOTRIN) 600 MG tablet [Pharmacy Med Name: IBUPROFEN 600MG TABS] 60 tablet 1     Sig: TAKE ONE TABLET BY MOUTH EVERY 6 HOURS AS NEEDED FOR MODERATE PAIN    NSAID Medications Failed    3/2/2018  7:47 AM       Failed - Normal serum creatinine on file in past 12 months    Recent Labs   Lab Test  01/05/18   1017   CR  0.50*            Passed - Blood pressure under 140/90 in past 12 months    BP Readings from Last 3 Encounters:   01/08/18 127/80   01/05/18 136/81   12/12/17 129/75                Passed - Normal ALT on file in past 12 months    Recent Labs   Lab Test  12/12/17   1035   ALT  42            Passed - Normal AST on file in past 12 months    Recent Labs   Lab Test  12/12/17   1035   AST  31            Passed - Recent or future visit with authorizing provider's specialty    Patient had office visit in the last year or has a visit in the next 30 days with authorizing provider.  See \"Patient Info\" tab in inbasket, or \"Choose Columns\" in Meds & Orders section of the refill encounter.            Passed - Patient is age 6-64 years       Passed - Normal CBC on file in past 12 months    Recent Labs   Lab Test  01/05/18   1017   WBC  10.6   RBC  4.47   HGB  13.3   HCT  40.1   PLT  309            Passed - No active pregnancy on record       Passed - No positive pregnancy test in past 12 months          Last Written Prescription Date:  7/13/17  Last Fill Quantity: 60,  # refills: 1   Last Office Visit with [facility], [facility] or [facility] prescribing provider:  12/7/17   Future Office Visit:       "

## 2018-03-05 NOTE — TELEPHONE ENCOUNTER
Routing refill request to provider for review/approval because:  Labs out of range:  PHILIP MclaughlinN, RN

## 2018-03-06 ENCOUNTER — APPOINTMENT (OUTPATIENT)
Dept: ULTRASOUND IMAGING | Facility: CLINIC | Age: 43
End: 2018-03-06
Attending: PHYSICIAN ASSISTANT
Payer: COMMERCIAL

## 2018-03-06 ENCOUNTER — HOSPITAL ENCOUNTER (EMERGENCY)
Facility: CLINIC | Age: 43
Discharge: HOME | End: 2018-03-06
Attending: PHYSICIAN ASSISTANT | Admitting: PHYSICIAN ASSISTANT
Payer: COMMERCIAL

## 2018-03-06 VITALS
DIASTOLIC BLOOD PRESSURE: 70 MMHG | HEART RATE: 90 BPM | BODY MASS INDEX: 33.13 KG/M2 | WEIGHT: 190 LBS | RESPIRATION RATE: 16 BRPM | SYSTOLIC BLOOD PRESSURE: 119 MMHG | OXYGEN SATURATION: 98 % | TEMPERATURE: 97.4 F

## 2018-03-06 DIAGNOSIS — N83.202 HEMORRHAGIC CYST OF LEFT OVARY: ICD-10-CM

## 2018-03-06 LAB
ALBUMIN UR-MCNC: NEGATIVE MG/DL
APPEARANCE UR: CLEAR
BILIRUB UR QL STRIP: NEGATIVE
COLOR UR AUTO: ABNORMAL
GLUCOSE UR STRIP-MCNC: NEGATIVE MG/DL
HCG UR QL: NEGATIVE
HGB UR QL STRIP: NEGATIVE
KETONES UR STRIP-MCNC: NEGATIVE MG/DL
LEUKOCYTE ESTERASE UR QL STRIP: NEGATIVE
MUCOUS THREADS #/AREA URNS LPF: PRESENT /LPF
NITRATE UR QL: NEGATIVE
PH UR STRIP: 6 PH (ref 5–7)
RBC #/AREA URNS AUTO: <1 /HPF (ref 0–2)
SOURCE: ABNORMAL
SP GR UR STRIP: 1.01 (ref 1–1.03)
SQUAMOUS #/AREA URNS AUTO: 5 /HPF (ref 0–1)
UROBILINOGEN UR STRIP-MCNC: 0 MG/DL (ref 0–2)
WBC #/AREA URNS AUTO: <1 /HPF (ref 0–5)

## 2018-03-06 PROCEDURE — 93976 VASCULAR STUDY: CPT

## 2018-03-06 PROCEDURE — 99285 EMERGENCY DEPT VISIT HI MDM: CPT | Mod: Z6 | Performed by: PHYSICIAN ASSISTANT

## 2018-03-06 PROCEDURE — 99285 EMERGENCY DEPT VISIT HI MDM: CPT | Mod: 25 | Performed by: PHYSICIAN ASSISTANT

## 2018-03-06 PROCEDURE — 81001 URINALYSIS AUTO W/SCOPE: CPT | Performed by: PHYSICIAN ASSISTANT

## 2018-03-06 PROCEDURE — 81025 URINE PREGNANCY TEST: CPT | Performed by: PHYSICIAN ASSISTANT

## 2018-03-06 PROCEDURE — 25000128 H RX IP 250 OP 636: Performed by: PHYSICIAN ASSISTANT

## 2018-03-06 PROCEDURE — 25000132 ZZH RX MED GY IP 250 OP 250 PS 637: Performed by: PHYSICIAN ASSISTANT

## 2018-03-06 PROCEDURE — 96372 THER/PROPH/DIAG INJ SC/IM: CPT | Performed by: PHYSICIAN ASSISTANT

## 2018-03-06 RX ORDER — HYDROCODONE BITARTRATE AND ACETAMINOPHEN 5; 325 MG/1; MG/1
1 TABLET ORAL ONCE
Status: COMPLETED | OUTPATIENT
Start: 2018-03-06 | End: 2018-03-06

## 2018-03-06 RX ORDER — IBUPROFEN 600 MG/1
TABLET, FILM COATED ORAL
Qty: 60 TABLET | Refills: 1 | Status: ON HOLD | OUTPATIENT
Start: 2018-03-06 | End: 2018-05-14

## 2018-03-06 RX ORDER — OXYCODONE AND ACETAMINOPHEN 5; 325 MG/1; MG/1
1-2 TABLET ORAL EVERY 6 HOURS PRN
Qty: 10 TABLET | Refills: 0 | Status: ON HOLD | OUTPATIENT
Start: 2018-03-06 | End: 2018-05-14

## 2018-03-06 RX ADMIN — HYDROMORPHONE HYDROCHLORIDE 1 MG: 1 INJECTION, SOLUTION INTRAMUSCULAR; INTRAVENOUS; SUBCUTANEOUS at 16:16

## 2018-03-06 RX ADMIN — HYDROCODONE BITARTRATE AND ACETAMINOPHEN 1 TABLET: 5; 325 TABLET ORAL at 17:12

## 2018-03-06 ASSESSMENT — ENCOUNTER SYMPTOMS
FEVER: 0
LIGHT-HEADEDNESS: 0
VOMITING: 0
DYSURIA: 0
NAUSEA: 0
SHORTNESS OF BREATH: 0

## 2018-03-06 NOTE — ED NOTES
Patient has history of developing cysts on her ovaries. Has her right ovary removed approx 4 years ago. Today patient started having pain on her left lower pelvic area. Denies bleeding. Tried Ibuprofen with no relief. Feels like it's getting worse.

## 2018-03-06 NOTE — ED NOTES
Pt w/hx of chronic pelvic pain x 5am.  Normally it will mellow out as the day goes on, but this time seems to be getting worse.  Had a BM this was looser, but had gone out to breakfast.

## 2018-03-06 NOTE — ED PROVIDER NOTES
History     Chief Complaint   Patient presents with     Pelvic Pain     HPI  Virginia Valenzuela is a 42 year old female who presents to the emergency department complaining of pelvic pain.  She has had ongoing issues with chronic left sided pelvic pain for years.  She has had surgeries to remove the right ovary, she had left major ovarian cysts removed, lysis of adhesions, colonoscopies, ultrasounds, and MRIs of the pelvis.  She reports today around 5 AM she woke up with left-sided pelvic pain that is sharp and constant.  It has progressively worsened over the course of the day despite use of ibuprofen.  The pain worsens with movement and urination, she states as her bladder contracts the left sided pelvic pain and pressure intensifies. She denies any new vaginal discharge or bleeding.  Last menstrual period was February 16.  She is sexually active with one male, denies chance of STDs.  Denies nausea, vomiting.  Did have a looser stool today but no blood in stool.  Denies fevers, lightheadedness.    Problem List:    Patient Active Problem List    Diagnosis Date Noted     Chronic pelvic pain in female 09/15/2017     Priority: Medium     Trigger finger, left thumb 04/28/2017     Priority: Medium     Sesamoiditis 04/18/2017     Priority: Medium     Bilateral carpal tunnel syndrome 08/11/2016     Priority: Medium     Tobacco use disorder 08/17/2015     Priority: Medium     Ovarian cyst 01/17/2014     Priority: Medium     Problem list name updated by automated process. Provider to review       Female infertility 01/17/2014     Priority: Medium     Hypertension goal BP (blood pressure) < 140/90 11/07/2013     Priority: Medium     Papanicolaou smear of cervix with atypical squamous cells of undetermined significance (ASC-US) 06/27/2013     Priority: Medium     3/12/13 pap ASCUS HR HPV  8/5/13 colposcopy. ASCUS. Plan repeat pap in 6 months  7/6/15 pap NIL/neg HPV. Plan cotest in 3 yrs       GERD (gastroesophageal reflux  disease) 2013     Priority: Medium     Vitamin D deficiency 2013     Priority: Medium     Problem list name updated by automated process. Provider to review and confirm  [employer] Update utility       HAMIDA (obstructive sleep apnea) 2013     Priority: Medium     Generalized anxiety disorder 2013     Priority: Medium     Seasonal allergic rhinitis 2013     Priority: Medium        Past Medical History:    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     History of appendectomy      Hypertension 2000     Idiopathic urticaria      Obstructive sleep apnea (adult) (pediatric)      Other chronic allergic conjunctivitis      Other malaise and fatigue      Pain in joint, shoulder region      S/P laparoscopic cholecystectomy      Special screening examination for human papillomavirus (HPV)      Sprain of tibiofibular (ligament), distal of ankle      Threatened , unspecified as to episode of care      Tobacco use disorder      Unspecified essential hypertension      Unspecified vitamin D deficiency        Past Surgical History:    Past Surgical History:   Procedure Laterality Date     ABDOMEN SURGERY  2013    ovaian cyst and right ovary removal     APPENDECTOMY       CHOLECYSTECTOMY       COLONOSCOPY N/A 10/26/2017    Procedure: COMBINED COLONOSCOPY, SINGLE OR MULTIPLE BIOPSY/POLYPECTOMY BY BIOPSY;  Colonoscopy with biopsies by biopsy;  Surgeon: Fred Faust DO;  Location:  GI     EYE SURGERY       GENITOURINARY SURGERY  2013    Ovary, fallopian tube     GYN SURGERY  2013    Ovary, Fallopian tube     LAPAROSCOPIC LYSIS ADHESIONS Left 3/1/2016    Procedure: LAPAROSCOPIC LYSIS  ADHESIONS;  Surgeon: Nate Mishra MD;  Location: [facility]     LAPAROSCOPIC LYSIS ADHESIONS N/A 7/13/2017    Procedure: LAPAROSCOPIC LYSIS ADHESIONS;   laparoscopic lysis of adhesions;  Surgeon: Nate Mishra MD;  Location: [facility]     LAPAROSCOPIC LYSIS ADHESIONS N/A 7/13/2017    Procedure: LAPAROSCOPIC LYSIS ADHESIONS;   laparoscopic lysis of adhesions;  Surgeon: Nate Milligan MD;  Location: [facility] OR     RELEASE CARPAL TUNNEL Left 8/31/2016    Procedure: RELEASE CARPAL TUNNEL;  Surgeon: Gucci Hairston MD;  Location: [facility]     RELEASE CARPAL TUNNEL Right 12/7/2016    Procedure: RELEASE CARPAL TUNNEL;  Surgeon: Gucci Hairston MD;  Location: [facility] OR       Family History:    Family History   Problem Relation Age of Onset     DIABETES Father      Hypertension Father      Cardiovascular Mother 29     myocarditis      Other Cancer Brother      unknown origin       Social History:  Marital Status:   [2]  Social History   Substance Use Topics     Smoking status: Current Every Day Smoker     Packs/day: 0.75     Years: 10.00     Types: Cigarettes     Smokeless tobacco: Never Used     Alcohol use No        Medications:      ibuprofen (ADVIL/MOTRIN) 600 MG tablet   oxyCODONE-acetaminophen (PERCOCET) 5-325 MG per tablet   potassium chloride (K-TAB,KLOR-CON) 10 MEQ tablet   losartan-hydrochlorothiazide (HYZAAR) 100-25 MG per tablet   cetirizine (ZYRTEC) 10 MG tablet   Cholecalciferol (VITAMIN D3) 2000 UNITS CAPS   AmLODIPine Besylate (NORVASC PO)   Probiotic Product (PROBIOTIC DAILY PO)   EPINEPHrine (EPIPEN) 0.3 MG/0.3ML injection         Review of Systems   Constitutional: Negative for fever.   Respiratory: Negative for shortness of breath.    Cardiovascular: Negative for chest pain.   Gastrointestinal: Positive for diarrhea (one softer stool today). Negative for nausea and vomiting.   Genitourinary: Positive for pelvic pain. Negative for dysuria, vaginal bleeding, vaginal discharge and  vaginal pain.   Neurological: Negative for light-headedness.   All other systems reviewed and are negative.      Physical Exam   BP: 142/82  Pulse: 90  Heart Rate: 84  Temp: 97.4  F (36.3  C)  Resp: 18  Weight: 86.2 kg (190 lb)  SpO2: 98 %      Physical Exam   Constitutional: She is oriented to person, place, and time. She appears well-developed and well-nourished. She appears distressed (appears uncomfortable).   HENT:   Head: Normocephalic and atraumatic.   Mouth/Throat: Oropharynx is clear and moist. No oropharyngeal exudate.   Eyes: Conjunctivae are normal. Pupils are equal, round, and reactive to light. No scleral icterus.   Neck: Normal range of motion.   Cardiovascular: Normal rate, regular rhythm, normal heart sounds and intact distal pulses.    Pulmonary/Chest: Effort normal and breath sounds normal. No respiratory distress.   Abdominal: Soft. Bowel sounds are normal. She exhibits no distension. There is tenderness in the suprapubic area. There is no rebound.   Genitourinary: Vagina normal and uterus normal. Cervix exhibits no motion tenderness. Right adnexum displays no mass and no tenderness. Left adnexum displays tenderness. Left adnexum displays no mass.   Musculoskeletal: She exhibits no edema or tenderness.   Neurological: She is alert and oriented to person, place, and time.   Skin: Skin is warm and dry. No rash noted. She is not diaphoretic.   Psychiatric: She has a normal mood and affect.   Nursing note and vitals reviewed.      ED Course     ED Course     Procedures      Results for orders placed or performed during the hospital encounter of 03/06/18 (from the past 24 hour(s))   UA with Microscopic reflex to Culture   Result Value Ref Range    Color Urine Straw     Appearance Urine Clear     Glucose Urine Negative NEG^Negative mg/dL    Bilirubin Urine Negative NEG^Negative    Ketones Urine Negative NEG^Negative mg/dL    Specific Gravity Urine 1.006 1.003 - 1.035    Blood Urine Negative  NEG^Negative    pH Urine 6.0 5.0 - 7.0 pH    Protein Albumin Urine Negative NEG^Negative mg/dL    Urobilinogen mg/dL 0.0 0.0 - 2.0 mg/dL    Nitrite Urine Negative NEG^Negative    Leukocyte Esterase Urine Negative NEG^Negative    Source Unspecified Urine     WBC Urine <1 0 - 5 /HPF    RBC Urine <1 0 - 2 /HPF    Squamous Epithelial /HPF Urine 5 (H) 0 - 1 /HPF    Mucous Urine Present (A) NEG^Negative /LPF   HCG qualitative urine (UPT)   Result Value Ref Range    HCG Qual Urine Negative NEG^Negative   US Pelvic Complete w Transvaginal & Abd/Pel Duplex Limited    Narrative    ULTRASOUND PELVIS DOPPLER   WITH TRANSVAGINAL IMAGING  3/6/2018 5:42  PM     HISTORY: Pelvic pain;     COMPARISON: None.    TECHNIQUE: Transvaginal images were performed to better evaluate the  patient's uterus, ovaries and endometrial stripe. Grayscale, color  Doppler, and Doppler spectral waveform analysis performed.    FINDINGS:  The endometrium measures 0.9 cm in thickness. The uterus  measures 9.1 x 4.3 x 5.8 cm in size. The right ovary has been removed.  The left ovary measures 5.6 x 4.3 x 4.6 cm in size. There is a complex  3.6 x 3.6 x 3.9 cm lesion in the left ovary. This could represent a  hemorrhagic follicle. It may also be due to an endometrioma. There is  normal blood flow in the left ovary with normal arterial and venous  signal. No free fluid. No adnexal mass.         Impression    IMPRESSION: Complex 3.6 x 3.9 cm lesion in the left ovary. This could  represent a hemorrhagic follicle. An endometrioma could also have this  appearance.    MARITA REAL MD       Medications   HYDROmorphone (DILAUDID) injection 1 mg (1 mg Intramuscular Given 3/6/18 1616)   HYDROcodone-acetaminophen (NORCO) 5-325 MG per tablet 1 tablet (1 tablet Oral Given 3/6/18 1712)        Assessments & Plan (with Medical Decision Making)  Virginia Valenzuela is a 42 year old female who presented to the ED with acute on chronic left sided pelvic pain. Developed this morning,  but seems worse than usual pelvic pain. Vitals on arrival were unremarkable. She had left adnexal tenderness on exam today, but otherwise reassuring exam. She had her right ovary removed, but has had issues with cysts on left in the past. There was concern for this again today vs ovarian torsion or ectopic pregnancy. No right sided abdominal tenderness to suggest appendix, no LLQ tenderness to suggest colonic issue. Patient given IM dilaudid for pain initially, subsequently given norco for longer acting relief. Urine negative for signs of infection, UPT negative. Ultrasound of her pelvis did show a complex lesion in the left ovary suggestive of hemorrhagic cyst vs endometrioma. I think this is likely the source of her pain today. She did express great frustration about ongoing pelvic pain issues that we did talk through today. Patient was agreeable to using ibuprofen or naproxen for pain control. I did give her a small script of percocet for acute pain relief, since patient reported norco not as effective, but she was advised to follow up with PCP for further pain management if needed. She plans to call OB-GYN tomorrow to schedule close follow up. I advised returning to ED if symptoms worsen. Patient in agreement with plan and with discharge to home.      I have reviewed the nursing notes.    I have reviewed the findings, diagnosis, plan and need for follow up with the patient.    Discharge Medication List as of 3/6/2018  6:45 PM      START taking these medications    Details   oxyCODONE-acetaminophen (PERCOCET) 5-325 MG per tablet Take 1-2 tablets by mouth every 6 hours as needed for pain, Disp-10 tablet, R-0, Local Print             Final diagnoses:   Hemorrhagic cyst of left ovary     Note: Chart documentation done in part with Dragon Voice Recognition software. Although reviewed after completion, some word and grammatical errors may remain.      3/6/2018   [facility] EMERGENCY DEPARTMENT     [place]  Adelina Purvis PA-C  03/07/18 0142

## 2018-03-06 NOTE — ED AVS SNAPSHOT
[facility] Emergency Department    [address] [place] [address]    Phone:  [phone]    Fax:  [phone]                                       Virginia Valenzuela   MRN: [id number]    Department:  [facility] Emergency Department   Date of Visit:  3/6/2018           Patient Information     Date Of Birth          1975        Your diagnoses for this visit were:     Hemorrhagic cyst of left ovary        You were seen by Adelina Huber PA-C.      Follow-up Information     Follow up with [facility] Emergency Department.    Specialty:  EMERGENCY MEDICINE    Why:  If symptoms worsen    Contact information:    [address] [place] [address] [phone]    Additional information:    From [address]: Exit at [address] on [place] side of [place]. Turn right on [address]. Turn left at stoplight on [address]. [facility] will be in view two blocks ahead        Call Nate Mishra MD.    Specialties:  Family Practice, OB/Gyn    Why:  For ER follow up    Contact information:    [address] [place] [address] [phone]          Discharge Instructions       Please use ibuprofen or naproxen for pain control. Reserve use of percocet for severe breakthrough pain. Call Dr. Mishra in the morning and let him know you were here to schedule follow up. If symptoms worsen, please return to the emergency department.    Thank you for choosing [facility]'s Emergency Department. It was a pleasure taking care of you today. If you have any questions, please call [phone].    Adelina Huber PA-C    Ovarian Cysts    Ovarian cysts are sacs filled with fluid or tissue that form on or inside the ovaries. The ovaries are two small organs located on each side of a woman s uterus (womb). They are part of the female reproductive system.  Ovarian cysts are common in women, especially during childbearing years. There are  different types of cysts. Most are harmless (benign) and go away on their own. They often cause no symptoms. If symptoms do occur, they can include mild pain or pressure in the lower belly (abdomen).  Cysts that are large or break (rupture) may cause more severe pain and symptoms. In these cases, hospital care or treatment such as surgery may be needed. More extensive treatment may also be needed if a cyst causes an ovary to twist (called torsion) or if a cyst is suspected to be cancerous. Keep in mind that most cysts are not cancerous, however.  General care    To help relieve pain, your healthcare provider may recommend using over-the-counter pain medicine. If needed, stronger pain medicine may be prescribed.    Depending on the type of cyst you have, your healthcare provider may advise taking birth control pills. These help shrink cysts in certain cases. They may also help prevent new cysts from forming. Be sure to take these medicines as directed if they are prescribed.    Your healthcare provider may advise you to watch your symptoms over time to see if they go away or worsen. Regular ultrasound tests may also be advised. These can help check if a cyst goes away or grows in size.  Follow-up care  Follow up with your healthcare provider, or as advised.  When to seek medical advice  Call your healthcare provider right away if any of these occur:    Pain worsens or fails to get better with home treatment    Fever of 100.4 F (38 C) or higher (or other fever amount directed by your healthcare provider)    Nausea and vomiting    Weakness, dizziness, or fainting    Abnormal vaginal bleeding          Future Appointments        Provider Department Dept Phone Center    3/8/2018 2:45 PM Leora Amaral PT [facility] [phone] [facility]      24 Hour Appointment Hotline       To make an appointment at any [facility], call [phone] ([phone]). If you don't have a family doctor or  clinic, we will help you find one. [facility] are conveniently located to serve the needs of you and your family.             Review of your medicines      START taking        Dose / Directions Last dose taken    oxyCODONE-acetaminophen 5-325 MG per tablet   Commonly known as:  PERCOCET   Dose:  1-2 tablet   Quantity:  10 tablet        Take 1-2 tablets by mouth every 6 hours as needed for pain   Refills:  0          Our records show that you are taking the medicines listed below. If these are incorrect, please call your family doctor or clinic.        Dose / Directions Last dose taken    cetirizine 10 MG tablet   Commonly known as:  zyrTEC   Quantity:  90 tablet        TAKE ONE TABLET BY MOUTH EVERY EVENING   Refills:  1        EPINEPHrine 0.3 MG/0.3ML injection 2-pack   Commonly known as:  EPIPEN/ADRENACLICK/or ANY BX GENERIC EQUIV   Dose:  0.3 mg   Quantity:  1 each        Inject 0.3 mLs (0.3 mg) into the muscle once as needed   Refills:  0        ibuprofen 600 MG tablet   Commonly known as:  ADVIL/MOTRIN   Quantity:  60 tablet        TAKE ONE TABLET BY MOUTH EVERY 6 HOURS AS NEEDED FOR MODERATE PAIN   Refills:  1        losartan-hydrochlorothiazide 100-25 MG per tablet   Commonly known as:  HYZAAR   Dose:  1 tablet        Take 1 tablet by mouth daily   Refills:  10        NORVASC PO   Dose:  10 mg        Take 10 mg by mouth   Refills:  0        potassium chloride 10 MEQ tablet   Commonly known as:  K-TAB,KLOR-CON   Dose:  10 mEq   Quantity:  90 tablet        Take 1 tablet (10 mEq) by mouth daily   Refills:  3        PROBIOTIC DAILY PO        Take by mouth daily   Refills:  0        vitamin D3 2000 UNITS Caps   Quantity:  100 capsule        TAKE ONE CAPSULE BY MOUTH EVERY DAY   Refills:  10                Prescriptions were sent or printed at these locations (1 Prescription)                   [facility], [place] - [address]   [address], [place] [address]    Telephone:   [phone]   Fax:  [phone]   Hours:                  Printed at Department/Unit printer (1 of 1)         oxyCODONE-acetaminophen (PERCOCET) 5-325 MG per tablet                Procedures and tests performed during your visit     HCG qualitative urine (UPT)    UA with Microscopic reflex to Culture    US Pelvic Complete w Transvaginal & Abd/Pel Duplex Limited      Orders Needing Specimen Collection     None      Pending Results     Date and Time Order Name Status Description    3/6/2018 1650 US Pelvic Complete w Transvaginal & Abd/Pel Duplex Limited Preliminary             Pending Culture Results     No orders found from 3/4/2018 to 3/7/2018.            Pending Results Instructions     If you had any lab results that were not finalized at the time of your Discharge, you can call the ED Lab Result RN at [phone]. You will be contacted by this team for any positive Lab results or changes in treatment. The nurses are available 7 days a week from 10A to 6:30P.  You can leave a message 24 hours per day and they will return your call.        Thank you for choosing [facility]       Thank you for choosing [facility] for your care. Our goal is always to provide you with excellent care. Hearing back from our patients is one way we can continue to improve our services. Please take a few minutes to complete the written survey that you may receive in the mail after you visit with us. Thank you!        [email or web address] Information     [facility] gives you secure access to your electronic health record. If you see a primary care provider, you can also send messages to your care team and make appointments. If you have questions, please call your primary care clinic.  If you do not have a primary care provider, please call [phone] and they will assist you.        Care EveryWhere ID     This is your Care EveryWhere ID. This could be used by other organizations to access your [facility] medical records  [id number]        Equal Access to  Services     [facility]: Dwayne Ramos, wathereseda luqadaha, qaybta kamartinez zapata. So [facility] [phone].    ATENCIÓN: Si habla español, tiene a lewis disposición servicios gratuitos de asistencia lingüística. Llame al [phone].    We comply with applicable federal civil rights laws and [place] laws. We do not discriminate on the basis of race, color, national origin, age, disability, sex, sexual orientation, or gender identity.            After Visit Summary       This is your record. Keep this with you and show to your community pharmacist(s) and doctor(s) at your next visit.

## 2018-03-07 ASSESSMENT — ENCOUNTER SYMPTOMS: DIARRHEA: 1

## 2018-03-07 NOTE — DISCHARGE INSTRUCTIONS
Please use ibuprofen or naproxen for pain control. Reserve use of percocet for severe breakthrough pain. Call Dr. Mishra in the morning and let him know you were here to schedule follow up. If symptoms worsen, please return to the emergency department.    Thank you for choosing [facility]'s Emergency Department. It was a pleasure taking care of you today. If you have any questions, please call [phone].    Adelina Huber PA-C    Ovarian Cysts    Ovarian cysts are sacs filled with fluid or tissue that form on or inside the ovaries. The ovaries are two small organs located on each side of a woman s uterus (womb). They are part of the female reproductive system.  Ovarian cysts are common in women, especially during childbearing years. There are different types of cysts. Most are harmless (benign) and go away on their own. They often cause no symptoms. If symptoms do occur, they can include mild pain or pressure in the lower belly (abdomen).  Cysts that are large or break (rupture) may cause more severe pain and symptoms. In these cases, hospital care or treatment such as surgery may be needed. More extensive treatment may also be needed if a cyst causes an ovary to twist (called torsion) or if a cyst is suspected to be cancerous. Keep in mind that most cysts are not cancerous, however.  General care    To help relieve pain, your healthcare provider may recommend using over-the-counter pain medicine. If needed, stronger pain medicine may be prescribed.    Depending on the type of cyst you have, your healthcare provider may advise taking birth control pills. These help shrink cysts in certain cases. They may also help prevent new cysts from forming. Be sure to take these medicines as directed if they are prescribed.    Your healthcare provider may advise you to watch your symptoms over time to see if they go away or worsen. Regular ultrasound tests may also be advised. These can help check if a cyst goes  away or grows in size.  Follow-up care  Follow up with your healthcare provider, or as advised.  When to seek medical advice  Call your healthcare provider right away if any of these occur:    Pain worsens or fails to get better with home treatment    Fever of 100.4 F (38 C) or higher (or other fever amount directed by your healthcare provider)    Nausea and vomiting    Weakness, dizziness, or fainting    Abnormal vaginal bleeding

## 2018-03-07 NOTE — ED NOTES
Patient was frustrated and visited longer with ULISSES Fernandez. Patient was more reassured and had options to go home and try for the ovary pain. Also knows to call or return if the pain increases. Patient will be following up with dr Mishra and the Women's health specialist at the [facility]

## 2018-03-08 ENCOUNTER — HOSPITAL ENCOUNTER (OUTPATIENT)
Dept: PHYSICAL THERAPY | Facility: CLINIC | Age: 43
Setting detail: THERAPIES SERIES
End: 2018-03-08
Attending: PHYSICIAN ASSISTANT
Payer: COMMERCIAL

## 2018-03-08 ENCOUNTER — MEDICAL CORRESPONDENCE (OUTPATIENT)
Dept: PHYSICAL THERAPY | Facility: CLINIC | Age: 43
End: 2018-03-08

## 2018-03-08 PROCEDURE — 40000718 ZZHC STATISTIC PT DEPARTMENT ORTHO VISIT: Performed by: PHYSICAL THERAPIST

## 2018-03-08 PROCEDURE — 97161 PT EVAL LOW COMPLEX 20 MIN: CPT | Mod: GP | Performed by: PHYSICAL THERAPIST

## 2018-03-08 PROCEDURE — 97530 THERAPEUTIC ACTIVITIES: CPT | Mod: GP | Performed by: PHYSICAL THERAPIST

## 2018-03-08 PROCEDURE — 97110 THERAPEUTIC EXERCISES: CPT | Mod: GP | Performed by: PHYSICAL THERAPIST

## 2018-03-12 NOTE — PROGRESS NOTES
Outpatient Physical Therapy Discharge Note     Patient: Virginia Valenzuela  : 1975    Beginning/End Dates of Reporting Period:  3/8/18 to 3/12/2018  1 visit    Referring Provider: Joshua Burns PA    Therapy Diagnosis: Episodic left shoulder pain due to left rotator cuff and biceps tendons weakness     Client Self Report: Please see eval. Pt not having pain but is hoping for HEP to prevent future issues with her episodic left shoulder pain.     Objective Measurements:  Objective Measure: Please see eval     Goals:  Goal Identifier HEP   Goal Description Pt will have comprehensive shoudler HEP to prevent future shoulder issues.   Target Date 18   Date Met   18   Progress: Pt provided with HEP on 3/8/18 and did not call back with questions. Has PT's number to call back at any time if needed       Progress Toward Goals:   Progress this reporting period: All goals met. Pt provided with comprehensive HEP. Has PT's number if questions arise.       Plan:  Discharge from therapy.    Discharge:    Reason for Discharge: Patient has met all goals.    Equipment Issued: Provided with red and yellow therabands.     Discharge Plan: Patient to continue home program.    Leora Amaral, PT, DPT  [phone]  [facility]

## 2018-03-12 NOTE — PROGRESS NOTES
Outpatient Physical Therapy Evaluation     03/08/18 1449   General Information   Type of Visit Initial OP Ortho PT Evaluation   Start of Care Date 03/08/18   Referring Physician Joshua Burns PA   Patient/Family Goals Statement stretches or something to manage her symptoms if it flares up again   Orders Evaluate and Treat   Date of Order 03/01/18   Insurance Type [employer]   Insurance Comments/Visits Authorized 40 visits before prior auth   Medical Diagnosis Degenerative tear of glenoid labrum of left shoulder   Surgical/Medical history reviewed Yes       Present No   Body Part(s)   Body Part(s) Shoulder   Presentation and Etiology   Pertinent history of current problem (include personal factors and/or comorbidities that impact the POC) For 7-8 years it hurts 1-3 times a  year it ramps up. She has had cortizone injections. Had imaging done. No initial shoulder injury she is aware of. She is right handed. She is not using her left arm repetitively. She does sleep with her arm up under her pillow but is usually sleeping on ther right side. Had bilateral carpal tunnel surgery. When it flares up she can't hook her own bra or wash her own hair because everything. Did not have a shot this time. Took naproxen daily for a couple weeks (since end of Feb). No mostly feels good. This last time around started a good three weeks ago. Tried lidocine patches. Has significant pelvic pain issues with picos, and ovarian cysts.    Impairments A. Pain   Symptom Location Pain with palpation; no pain with movement or use. Pain previously, superior aspect of shoulder near GHJ, very intense   How/Where did it occur From insidious onset   Chronicity Recurrent   Pain quality H. Other   Pain quality comment When had pain, was throbbing into collar bone, shoulder blade, and up side of neck   Frequency of pain/symptoms (Was constant, worse at evening and better in mornings)   Pain/symptoms eased by (tried ice, eat, OTC  medications)   Progression of symptoms since onset: Improved   Current / Previous Interventions   Diagnostic Tests: MRI   MRI Results Results   MRI results Per pt report, degenerative shoulder changes and per order degenerative tear of glenoid labrum of left shoulder   Current Level of Function   Current Community Support Family/friend caregiver   Patient role/employment history C. Homemaker   Fall Risk Screen   Fall screen completed by PT   Have you fallen 2 or more times in the past year? No   Have you fallen and had an injury in the past year? No   Is patient a fall risk? No   Functional Scales   Functional Scales Other   Other Scales  Shoulder Pain & Disability Index (SPADI): Patient Score: 0% total SPADI score (0 points out of 130); 0% total pain score (0 points out of 50); 0% total disability score (0 points out of 80). Higher percentages demonstrates higher levels of pain or higher level of disability.  Minimal Detectable Change = 13 scale points according to Westfall et al 1991. SPADI is validated for patients 18 years and older, and if completed by a younger patient, is done to help determine functional deficits and activity limitations for goal use.   Shoulder Objective Findings   Side (if bilateral, select both right and left) Left   Observation No antalgic movements noted   Integumentary  No concerns   Posture Rounded shoulders, forward head, thoracic kyphosis   Cervical Screen (ROM, quadrant) WFLs   Thoracic Mobility Screen WFLs   Shoulder ROM Comment Full painfree AROM bilaterally   Scapulothoracic Rhythm Mild asymmetry however no significant change in function or discomfort for pt when PT assist mobility   Pec Minor (supine) Flexibility Tightness bilaterally as pt is at least 2 inches from surface at best   Shoulder Flexibility Comments Left rhomboid strength: 4/5   Neer's Test Positive   Hall-Carlos Test Positive on left   Cliff's Test Positive on left, weakness present   Load and Shift Test  Positive superiorly on left   Shoulder Special Tests Comments Empty Can: Positive, 3+/5 supraspinatus strength   Accessory Motion/Joint Mobility Good GHJ mobility in all directions   Left Shoulder Flexion Strength 4+/5   Left Shoulder Abduction Strength 4+/5   Left Shoulder ER Strength 4/5   Left Shoulder IR Strength 4/5   Left Shoulder Extension Strength 4+/5   Left Mid Trapezius Strength 3+/5   Left Lower Trapezius Strength 3+/5   Planned Therapy Interventions   Planned Therapy Interventions ROM;strengthening;stretching   Clinical Impression   Criteria for Skilled Therapeutic Interventions Met yes, treatment indicated   PT Diagnosis Episodic left shoulder pain due to left rotator cuff and biceps tendons weakness   Influenced by the following impairments pain, weakness, +special tests   Functional limitations due to impairments When having issues, lifting, ADLs, carrying, etc   Clinical Presentation Stable/Uncomplicated   Clinical Presentation Rationale Intermittent left shoulder pain over years (isolated to one joint, currently not bothersome)   Clinical Decision Making (Complexity) Low complexity   Therapy Frequency other (see comments)  (1 visit)   Predicted Duration of Therapy Intervention (days/wks) 1 visit   Risk & Benefits of therapy have been explained Yes   Patient, Family & other staff in agreement with plan of care Yes   Education Assessment   Preferred Learning Style Listening;Reading;Demonstration;Pictures/video   Barriers to Learning No barriers   ORTHO GOALS   PT Ortho Eval Goals 1   Ortho Goal 1   Goal Identifier HEP   Goal Description Pt will have comprehensive shoudler HEP to prevent future shoulder issues.   Target Date 03/08/18   Total Evaluation Time   Total Evaluation Time 25       Leora Amaral, PT, DPT  [phone]  [facility]

## 2018-03-12 NOTE — ADDENDUM NOTE
Encounter addended by: Leora Amaral, PT on: 3/12/2018  1:11 PM<BR>     Actions taken: Flowsheet data copied forward, Flowsheet accepted, Sign clinical note, Episode resolved

## 2018-04-04 ENCOUNTER — MYC MEDICAL ADVICE (OUTPATIENT)
Dept: FAMILY MEDICINE | Facility: CLINIC | Age: 43
End: 2018-04-04

## 2018-04-04 NOTE — TELEPHONE ENCOUNTER
Coravin message sent to patient to get records sent to us. Will wait for response.  Brook Peter, CMA

## 2018-04-30 ENCOUNTER — OFFICE VISIT (OUTPATIENT)
Dept: FAMILY MEDICINE | Facility: CLINIC | Age: 43
End: 2018-04-30
Payer: COMMERCIAL

## 2018-04-30 VITALS
SYSTOLIC BLOOD PRESSURE: 114 MMHG | DIASTOLIC BLOOD PRESSURE: 70 MMHG | OXYGEN SATURATION: 96 % | BODY MASS INDEX: 33.8 KG/M2 | HEIGHT: 64 IN | WEIGHT: 198 LBS | HEART RATE: 72 BPM | TEMPERATURE: 97.9 F | RESPIRATION RATE: 16 BRPM

## 2018-04-30 DIAGNOSIS — R10.2 PELVIC PAIN IN FEMALE: Primary | ICD-10-CM

## 2018-04-30 DIAGNOSIS — Z01.818 PREOP GENERAL PHYSICAL EXAM: ICD-10-CM

## 2018-04-30 DIAGNOSIS — Z01.818 PRE-OP EXAM: ICD-10-CM

## 2018-04-30 DIAGNOSIS — I10 BENIGN ESSENTIAL HYPERTENSION: ICD-10-CM

## 2018-04-30 DIAGNOSIS — N83.202 CYST OF LEFT OVARY: ICD-10-CM

## 2018-04-30 LAB
ANION GAP SERPL CALCULATED.3IONS-SCNC: 7 MMOL/L (ref 3–14)
BUN SERPL-MCNC: 10 MG/DL (ref 7–30)
CALCIUM SERPL-MCNC: 8.8 MG/DL (ref 8.5–10.1)
CHLORIDE SERPL-SCNC: 104 MMOL/L (ref 94–109)
CO2 SERPL-SCNC: 28 MMOL/L (ref 20–32)
CREAT SERPL-MCNC: 0.58 MG/DL (ref 0.52–1.04)
GFR SERPL CREATININE-BSD FRML MDRD: >90 ML/MIN/1.7M2
GLUCOSE SERPL-MCNC: 99 MG/DL (ref 70–99)
POTASSIUM SERPL-SCNC: 3.3 MMOL/L (ref 3.4–5.3)
SODIUM SERPL-SCNC: 139 MMOL/L (ref 133–144)

## 2018-04-30 PROCEDURE — 36415 COLL VENOUS BLD VENIPUNCTURE: CPT | Performed by: OBSTETRICS & GYNECOLOGY

## 2018-04-30 PROCEDURE — 80048 BASIC METABOLIC PNL TOTAL CA: CPT | Performed by: OBSTETRICS & GYNECOLOGY

## 2018-04-30 PROCEDURE — 99214 OFFICE O/P EST MOD 30 MIN: CPT | Performed by: OBSTETRICS & GYNECOLOGY

## 2018-04-30 ASSESSMENT — PAIN SCALES - GENERAL: PAINLEVEL: MODERATE PAIN (4)

## 2018-04-30 NOTE — PROGRESS NOTES
Patient has a Health Care Directive or Living Will:  NO    1. NO - Do you have a history of heart attack, stroke, stent, bypass or surgery on an artery in the head, neck, heart or legs?  2. NO - Do you ever have any pain or discomfort in your chest?  3. NO - Do you have a history of  Heart Failure?  4. NO - Are you troubled by shortness of breath when: walking on the level, up a slight hill or at night?  5. NO - Do you currently have a cold, bronchitis or other respiratory infection?  6. NO - Do you have a cough, shortness of breath or wheezing?  7. NO - Do you sometimes get pains in the calves of your legs when you walk?  8. YES - DO YOU OR ANYONE IN YOUR FAMILY HAVE PREVIOUS HISTORY OF BLOOD CLOTS? Sister in 2014 during pregnancy  9. NO - Do you or does anyone in your family have a serious bleeding problem such as prolonged bleeding following surgeries or cuts?  10. NO - Have you ever had problems with anemia or been told to take iron pills?  11. NO - Have you had any abnormal blood loss such as black, tarry or bloody stools, or abnormal vaginal bleeding?  12. NO - Have you ever had a blood transfusion?  13. NO - Have you or any of your relatives ever had problems with anesthesia?  14. YES - DO YOU HAVE SLEEP APNEA, EXCESSIVE SNORING OR DAYTIME DROWSINESS? Snoring at night  15. NO - Do you have any prosthetic heart valves?  16. NO - Do you have prosthetic joints?  17. NO - Is there any chance that you may be pregnant?

## 2018-04-30 NOTE — NURSING NOTE
"Chief Complaint   Patient presents with     RECHECK       Initial /70  Pulse 72  Temp 97.9  F (36.6  C) (Temporal)  Resp 16  Ht 5' 3.5\" (1.613 m)  Wt 198 lb (89.8 kg)  LMP 04/14/2018  SpO2 96%  Breastfeeding? No  BMI 34.52 kg/m2 Estimated body mass index is 34.52 kg/(m^2) as calculated from the following:    Height as of this encounter: 5' 3.5\" (1.613 m).    Weight as of this encounter: 198 lb (89.8 kg)..   BP completed using cuff size: regular  Medication Rec Completed    Brook Peter CMA    "

## 2018-04-30 NOTE — PROGRESS NOTES
Subjective:  She is to discuss her pain again. It is left-sided. She went to see Dr. Cottrell for a second opinion and she discussed using progesterone or potentially performing surgery which might include laparoscopic left salpingectomy versus salpingo-oophorectomy versus hysterectomy with Left-sided salpingo-oopherectomy     She is telling me that she would like to do the most minimal surgery possible and she is asking me to remove the left fallopian tube in case that is the source of her pain. That was one of the options discussed with her second opinion and she wants to go with that option if possible.      The past medical history, social history, past surgical history and family history as shown below have been reviewed by me today.  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     History of appendectomy      Hypertension 2000     Idiopathic urticaria      Obstructive sleep apnea (adult) (pediatric)      Other chronic allergic conjunctivitis      Other malaise and fatigue      Pain in joint, shoulder region      S/P laparoscopic cholecystectomy      Special screening examination for human papillomavirus (HPV)      Sprain of tibiofibular (ligament), distal of ankle      Threatened , unspecified as to episode of care      Tobacco use disorder      Unspecified essential hypertension      Unspecified vitamin D deficiency         Allergies   Allergen Reactions     Bees Swelling     FINGER TIPS TO ELBOWS     Tylenol W/Codeine [Acetaminophen-Codeine]      Current Outpatient Prescriptions   Medication Sig Dispense Refill     AmLODIPine Besylate (NORVASC PO) Take 10 mg by mouth       cetirizine (ZYRTEC) 10 MG tablet TAKE ONE TABLET  BY MOUTH EVERY EVENING 90 tablet 1     Cholecalciferol (VITAMIN D3) 2000 UNITS CAPS TAKE ONE CAPSULE BY MOUTH EVERY  capsule 10     EPINEPHrine (EPIPEN) 0.3 MG/0.3ML injection Inject 0.3 mLs (0.3 mg) into the muscle once as needed 1 each 0     ibuprofen (ADVIL/MOTRIN) 600 MG tablet TAKE ONE TABLET BY MOUTH EVERY 6 HOURS AS NEEDED FOR MODERATE PAIN 60 tablet 1     losartan-hydrochlorothiazide (HYZAAR) 100-25 MG per tablet Take 1 tablet by mouth daily  10     oxyCODONE-acetaminophen (PERCOCET) 5-325 MG per tablet Take 1-2 tablets by mouth every 6 hours as needed for pain (Patient not taking: Reported on 4/30/2018) 10 tablet 0     potassium chloride (K-TAB,KLOR-CON) 10 MEQ tablet Take 1 tablet (10 mEq) by mouth daily 90 tablet 3     Probiotic Product (PROBIOTIC DAILY PO) Take by mouth daily       Past Surgical History:   Procedure Laterality Date     ABDOMEN SURGERY  July 2013    ovaian cyst and right ovary removal     APPENDECTOMY       CHOLECYSTECTOMY       COLONOSCOPY N/A 10/26/2017    Procedure: COMBINED COLONOSCOPY, SINGLE OR MULTIPLE BIOPSY/POLYPECTOMY BY BIOPSY;  Colonoscopy with biopsies by biopsy;  Surgeon: Fred Faust DO;  Location:  GI     EYE SURGERY  1979     GENITOURINARY SURGERY  July 2013    Ovary, fallopian tube     GYN SURGERY  July 2013    Ovary, Fallopian tube     LAPAROSCOPIC LYSIS ADHESIONS Left 3/1/2016    Procedure: LAPAROSCOPIC LYSIS ADHESIONS;  Surgeon: Nate Mishra MD;  Location: [facility] OR     LAPAROSCOPIC LYSIS ADHESIONS N/A 7/13/2017    Procedure: LAPAROSCOPIC LYSIS ADHESIONS;   laparoscopic lysis of adhesions;  Surgeon: Nate Mishra MD;  Location: [facility] OR     LAPAROSCOPIC LYSIS ADHESIONS N/A 7/13/2017    Procedure: LAPAROSCOPIC LYSIS ADHESIONS;   laparoscopic lysis of adhesions;  Surgeon: Nate Milligan MD;  Location: [facility] OR     RELEASE CARPAL TUNNEL Left 8/31/2016    Procedure: RELEASE CARPAL TUNNEL;  Surgeon: Gucci Hairston MD;   Location: [facility] OR     RELEASE CARPAL TUNNEL Right 12/7/2016    Procedure: RELEASE CARPAL TUNNEL;  Surgeon: Gucci Hairston MD;  Location: [facility] OR     Social History     Social History     Marital status:      Spouse name: N/A     Number of children: N/A     Years of education: N/A     Social History Main Topics     Smoking status: Current Every Day Smoker     Packs/day: 0.75     Years: 10.00     Types: Cigarettes     Smokeless tobacco: Never Used     Alcohol use No     Drug use: No     Sexual activity: Yes     Partners: Male     Birth control/ protection: None      Comment: Trying to conceive     Other Topics Concern     Parent/Sibling W/ Cabg, Mi Or Angioplasty Before 65f 55m? No     Social History Narrative     Family History   Problem Relation Age of Onset     DIABETES Father      Hypertension Father      Cardiovascular Mother 29     myocarditis      Other Cancer Brother      unknown origin       ROS: A 12 point review of systems was done. Except for what is listed above in the HPI, the systems review is negative .     General: No fevers, significant weight change, or other feelings of illness    Skin: No new rashes or acute concerns.    Pulmonary:: no new or active cough, chest pain, dyspnea, or hemoptysis    Cardiac: no chest pain, PND or MORAN, or palpitations    Neuro: no numbness or tingling in extremities, no dizziness, syncope, no headaches.    Females: no change in menses or unusual vaginal bleeding,    Urinary: no dysuria or hematuria or change in urinary habits. No SILVANA    GI: No change in bowel habits. No rectal bleeding or melena.    Musculoskeletal: No swollen joints or acute painful areas    Heme: no acute bleeding areas on skin, gums, nosebleeds or other areas   Endocrine: no weight change, no night sweats or fevers   Psychiatric: no changes in mental health or current concerns      Objective: Vital signs: Blood pressure 114/70, pulse 72, temperature 97.9  F (36.6  C), temperature source  "Temporal, resp. rate 16, height 5' 3.5\" (1.613 m), weight 198 lb (89.8 kg), last menstrual period 04/14/2018, SpO2 96 %, not currently breastfeeding.  HEENT:    Sclerae and conjunctiva are normal.   Ear canals and TMs look normal.  Nasal mucosa is pink  - no polyps or masses seen.  sinuses are non tender to palpation.  Throat is unremarkable . Mucous membranes are moist.   Neck is supple, mobile, no adenopathy or masses palpable. The thyroid feels normal.   Normal range of motion noted.  Chest is clear to auscultation.  No wheezes, rales or rhonchi heard.  Cardiac exam is normal with s1, s2, no murmurs or adventitious sounds.Normal rate and rhythm is heard.   Abdomen is soft,  nondistended, No masses felt.No HSM. No guarding or rigidity or rebound   noted. Palpation reveals  no    tenderness  xcept in the left lower quadrant and over the left adnexal area. Normal bowel sounds heard. sshe declined a pelvic exam today.  Extremities have normal sensation and color.Normal pulses noted. Normal capillary refill in fingers.  No cyanosis or ulcers or trophic skin changes. No edema noted.    neurologic exam is grossly intact          Assessment/Plan:    1. 43-year-old female with chronic left sided pelvic pain who has had a very exhaustive workup and had a recent second opinion which did agree that options would include laparoscopy with possible salpingectomy or even hysterectomy. In this case she is requesting salpingectomy. The right tube and ovary have already been removed. She wants to spare her left ovary if possible.    2. She has benign essential hypertension which is well-controlled. I will check electrolytes today.    3. Preop exam:  Based upon today's history and exam findings I believe that  she   is a good candidate for general anesthesia and is therefore CLEARED for general anesthesia if needed.      4.  Left ovarian cyst: She has been in and out of the emergency room over the last year with this left-sided " pelvic pain and imaging has mostly been unremarkable but her recent ultrasound showed:    FINDINGS:  The endometrium measures 0.9 cm in thickness. The uterus  measures 9.1 x 4.3 x 5.8 cm in size. The right ovary has been removed.  The left ovary measures 5.6 x 4.3 x 4.6 cm in size. There is a complex  3.6 x 3.6 x 3.9 cm lesion in the left ovary. This could represent a  hemorrhagic follicle. It may also be due to an endometrioma. There is  normal blood flow in the left ovary with normal arterial and venous  signal. No free fluid. No adnexal mass.             IMPRESSION: Complex 3.6 x 3.9 cm lesion in the left ovary. This could  represent a hemorrhagic follicle. An endometrioma could also have this  appearance.     MARITA REAL MD      In light of these findings of the left ovarian cyst she wants me to evaluate that ovary carefully and if I still see the cyst there and she wants me to remove the cyst if possible. She understands that there is a chance that I will need to take the ovary out if the cyst removal causes some bleeding and I can't control it or if the ovary with the cyst looks obviously abnormal. She is okay with the possibility that she may lose the ovary.    MARIETTA Mishra MD

## 2018-04-30 NOTE — MR AVS SNAPSHOT
After Visit Summary   4/30/2018    Virginia Valenzuela    MRN: [id number]           Patient Information     Date Of Birth          1975        Visit Information        Provider Department      4/30/2018 12:00 PM Nate Mishra MD [facility]        Today's Diagnoses     Pelvic pain in female    -  1    Benign essential hypertension        Pre-op exam        Preop general physical exam        Cyst of left ovary           Follow-ups after your visit        Your next 10 appointments already scheduled     May 13, 2018  9:30 AM CDT   LAB with NL LAB [facility] ([facility])    [address] [place] [id number]   [phone]           Please do not eat 10-12 hours before your appointment if you are coming in fasting for labs on lipids, cholesterol, or glucose (sugar). This does not apply to pregnant women. Water, hot tea and black coffee (with nothing added) are okay. Do not drink other fluids, diet soda or chew gum.            May 18, 2018  9:40 AM CDT   SHORT with Nate Mishra MD   [facility]    [address] [place] [id number]   [phone]              Future tests that were ordered for you today     Open Future Orders        Priority Expected Expires Ordered    HCG quantitative pregnancy Routine  4/30/2019 4/30/2018            Who to contact     If you have questions or need follow up information about today's clinic visit or your schedule please contact [facility] directly at [phone].  Normal or non-critical lab and imaging results will be communicated to you by MyChart, letter or phone within 4 business days after the clinic has received the results. If you do not hear from us within 7 days, please contact the clinic through MyChart or phone. If you have a critical or abnormal lab result, we will notify you by phone as soon as  "possible.  Submit refill requests through [employer] or call your pharmacy and they will forward the refill request to us. Please allow 3 business days for your refill to be completed.          Additional Information About Your Visit        [employer] Information     [employer] gives you secure access to your electronic health record. If you see a primary care provider, you can also send messages to your care team and make appointments. If you have questions, please call your primary care clinic.  If you do not have a primary care provider, please call [phone] and they will assist you.        Care EveryWhere ID     This is your Care EveryWhere ID. This could be used by other organizations to access your [facility] medical records  [id number]        Your Vitals Were     Pulse Temperature Respirations Height Last Period Pulse Oximetry    72 97.9  F (36.6  C) (Temporal) 16 5' 3.5\" (1.613 m) 04/14/2018 96%    Breastfeeding? BMI (Body Mass Index)                No 34.52 kg/m2           Blood Pressure from Last 3 Encounters:   04/30/18 114/70   03/06/18 119/70   01/08/18 127/80    Weight from Last 3 Encounters:   04/30/18 198 lb (89.8 kg)   03/06/18 190 lb (86.2 kg)   01/08/18 207 lb (93.9 kg)              We Performed the Following     **Basic metabolic panel FUTURE anytime        Primary Care Provider Office Phone # Fax #    [facility] [phone] [address] [facility] [address]        Equal Access to Services     CYNTHIA BROWN : Hadii aad ku hadasho Soomaali, waaxda luqadaha, qaybta kaalmada adeegyada, wax"[employer] hiro montoya . So [facility] [phone].    ATENCIÓN: Si habla español, tiene a lewis disposición servicios gratuitos de asistencia lingüística. Llame al [phone].    We comply with applicable federal civil rights laws and [place] laws. We do not discriminate on the basis of race, color, national origin, age, disability, sex, sexual orientation, or gender " identity.            Thank you!     Thank you for choosing [facility]  for your care. Our goal is always to provide you with excellent care. Hearing back from our patients is one way we can continue to improve our services. Please take a few minutes to complete the written survey that you may receive in the mail after your visit with us. Thank you!             Your Updated Medication List - Protect others around you: Learn how to safely use, store and throw away your medicines at www.disposemymeds.org.          This list is accurate as of 4/30/18 12:48 PM.  Always use your most recent med list.                   Brand Name Dispense Instructions for use Diagnosis    cetirizine 10 MG tablet    zyrTEC    90 tablet    TAKE ONE TABLET BY MOUTH EVERY EVENING    Chronic seasonal allergic rhinitis, unspecified trigger       EPINEPHrine 0.3 MG/0.3ML injection 2-pack    EPIPEN/ADRENACLICK/or ANY BX GENERIC EQUIV    1 each    Inject 0.3 mLs (0.3 mg) into the muscle once as needed    History of bee sting allergy       ibuprofen 600 MG tablet    ADVIL/MOTRIN    60 tablet    TAKE ONE TABLET BY MOUTH EVERY 6 HOURS AS NEEDED FOR MODERATE PAIN    S/P laparoscopic surgery       losartan-hydrochlorothiazide 100-25 MG per tablet    HYZAAR     Take 1 tablet by mouth daily        NORVASC PO      Take 10 mg by mouth        oxyCODONE-acetaminophen 5-325 MG per tablet    PERCOCET    10 tablet    Take 1-2 tablets by mouth every 6 hours as needed for pain        potassium chloride 10 MEQ tablet    K-TAB,KLOR-CON    90 tablet    Take 1 tablet (10 mEq) by mouth daily    Hypokalemia       PROBIOTIC DAILY PO      Take by mouth daily        vitamin D3 2000 units Caps     100 capsule    TAKE ONE CAPSULE BY MOUTH EVERY DAY    Vitamin D deficiency

## 2018-05-01 ENCOUNTER — TELEPHONE (OUTPATIENT)
Dept: OBGYN | Facility: CLINIC | Age: 43
End: 2018-05-01

## 2018-05-01 ENCOUNTER — TELEPHONE (OUTPATIENT)
Dept: FAMILY MEDICINE | Facility: CLINIC | Age: 43
End: 2018-05-01

## 2018-05-01 NOTE — TELEPHONE ENCOUNTER
Dr. Mishra had called this patient regarding her potassium levels from yesterdays lab draw.  Patient called back and stated that she takes a potassium pill daily.  Patient can be reached at [phone] for any follow-up.  Alexis Heard MA

## 2018-05-01 NOTE — TELEPHONE ENCOUNTER
Per RM patient to take 1 tablet of her potassium 2x daily for 1 week. Patient informed of this and agreeable to plan. We will recheck this the day before surgery with her other lab. Order placed  Brook Peter CMA

## 2018-05-01 NOTE — TELEPHONE ENCOUNTER
Type of surgery: Laparoscopic, Left Salpingectomy  Location of surgery: [facility]  Date and time of surgery: 5/14/18  Surgeon: Dr. iMshra, with Dr. Castillo Assisting  Pre-Op Appt Date: 4/30/18  Post-Op Appt Date: 5/18/18   Packet sent out: Yes  Pre-cert/Authorization completed:  Not Applicable  Date: 5/1/18

## 2018-05-13 DIAGNOSIS — Z01.818 PREOP GENERAL PHYSICAL EXAM: ICD-10-CM

## 2018-05-13 DIAGNOSIS — I10 BENIGN ESSENTIAL HYPERTENSION: ICD-10-CM

## 2018-05-13 LAB
ANION GAP SERPL CALCULATED.3IONS-SCNC: 6 MMOL/L (ref 3–14)
B-HCG SERPL-ACNC: <1 IU/L (ref 0–5)
BUN SERPL-MCNC: 7 MG/DL (ref 7–30)
CALCIUM SERPL-MCNC: 8.7 MG/DL (ref 8.5–10.1)
CHLORIDE SERPL-SCNC: 105 MMOL/L (ref 94–109)
CO2 SERPL-SCNC: 31 MMOL/L (ref 20–32)
CREAT SERPL-MCNC: 0.64 MG/DL (ref 0.52–1.04)
GFR SERPL CREATININE-BSD FRML MDRD: >90 ML/MIN/1.7M2
GLUCOSE SERPL-MCNC: 95 MG/DL (ref 70–99)
POTASSIUM SERPL-SCNC: 3.5 MMOL/L (ref 3.4–5.3)
SODIUM SERPL-SCNC: 142 MMOL/L (ref 133–144)

## 2018-05-13 PROCEDURE — 84702 CHORIONIC GONADOTROPIN TEST: CPT | Performed by: OBSTETRICS & GYNECOLOGY

## 2018-05-13 PROCEDURE — 80048 BASIC METABOLIC PNL TOTAL CA: CPT | Performed by: OBSTETRICS & GYNECOLOGY

## 2018-05-13 PROCEDURE — 36415 COLL VENOUS BLD VENIPUNCTURE: CPT | Performed by: OBSTETRICS & GYNECOLOGY

## 2018-05-14 ENCOUNTER — HOSPITAL ENCOUNTER (OUTPATIENT)
Facility: CLINIC | Age: 43
Discharge: HOME | End: 2018-05-14
Attending: OBSTETRICS & GYNECOLOGY | Admitting: OBSTETRICS & GYNECOLOGY
Payer: COMMERCIAL

## 2018-05-14 ENCOUNTER — ANESTHESIA EVENT (OUTPATIENT)
Dept: SURGERY | Facility: CLINIC | Age: 43
End: 2018-05-14
Payer: COMMERCIAL

## 2018-05-14 ENCOUNTER — SURGERY (OUTPATIENT)
Age: 43
End: 2018-05-14
Payer: COMMERCIAL

## 2018-05-14 ENCOUNTER — ANESTHESIA (OUTPATIENT)
Dept: SURGERY | Facility: CLINIC | Age: 43
End: 2018-05-14
Payer: COMMERCIAL

## 2018-05-14 VITALS
TEMPERATURE: 98 F | DIASTOLIC BLOOD PRESSURE: 71 MMHG | OXYGEN SATURATION: 95 % | HEART RATE: 78 BPM | SYSTOLIC BLOOD PRESSURE: 109 MMHG | RESPIRATION RATE: 16 BRPM

## 2018-05-14 DIAGNOSIS — Z98.890 S/P LAPAROSCOPIC PROCEDURE: Primary | ICD-10-CM

## 2018-05-14 PROCEDURE — 71000027 ZZH RECOVERY PHASE 2 EACH 15 MINS: Performed by: OBSTETRICS & GYNECOLOGY

## 2018-05-14 PROCEDURE — 58662 LAPAROSCOPY EXCISE LESIONS: CPT | Mod: 80 | Performed by: FAMILY MEDICINE

## 2018-05-14 PROCEDURE — 25000132 ZZH RX MED GY IP 250 OP 250 PS 637: Performed by: OBSTETRICS & GYNECOLOGY

## 2018-05-14 PROCEDURE — 88305 TISSUE EXAM BY PATHOLOGIST: CPT | Performed by: OBSTETRICS & GYNECOLOGY

## 2018-05-14 PROCEDURE — 27110028 ZZH OR GENERAL SUPPLY NON-STERILE: Performed by: OBSTETRICS & GYNECOLOGY

## 2018-05-14 PROCEDURE — 27210794 ZZH OR GENERAL SUPPLY STERILE: Performed by: OBSTETRICS & GYNECOLOGY

## 2018-05-14 PROCEDURE — 71000015 ZZH RECOVERY PHASE 1 LEVEL 2 EA ADDTL HR: Performed by: OBSTETRICS & GYNECOLOGY

## 2018-05-14 PROCEDURE — 36000056 ZZH SURGERY LEVEL 3 1ST 30 MIN: Performed by: OBSTETRICS & GYNECOLOGY

## 2018-05-14 PROCEDURE — 58661 LAPAROSCOPY REMOVE ADNEXA: CPT | Mod: 80 | Performed by: FAMILY MEDICINE

## 2018-05-14 PROCEDURE — 25000128 H RX IP 250 OP 636: Performed by: NURSE ANESTHETIST, CERTIFIED REGISTERED

## 2018-05-14 PROCEDURE — 25000125 ZZHC RX 250: Performed by: NURSE ANESTHETIST, CERTIFIED REGISTERED

## 2018-05-14 PROCEDURE — 58662 LAPAROSCOPY EXCISE LESIONS: CPT | Mod: 59 | Performed by: OBSTETRICS & GYNECOLOGY

## 2018-05-14 PROCEDURE — 37000009 ZZH ANESTHESIA TECHNICAL FEE, EACH ADDTL 15 MIN: Performed by: OBSTETRICS & GYNECOLOGY

## 2018-05-14 PROCEDURE — 25000125 ZZHC RX 250: Performed by: OBSTETRICS & GYNECOLOGY

## 2018-05-14 PROCEDURE — 58661 LAPAROSCOPY REMOVE ADNEXA: CPT | Performed by: OBSTETRICS & GYNECOLOGY

## 2018-05-14 PROCEDURE — 37000008 ZZH ANESTHESIA TECHNICAL FEE, 1ST 30 MIN: Performed by: OBSTETRICS & GYNECOLOGY

## 2018-05-14 PROCEDURE — 25000128 H RX IP 250 OP 636: Performed by: OBSTETRICS & GYNECOLOGY

## 2018-05-14 PROCEDURE — 40000306 ZZH STATISTIC PRE PROC ASSESS II: Performed by: OBSTETRICS & GYNECOLOGY

## 2018-05-14 PROCEDURE — 36000058 ZZH SURGERY LEVEL 3 EA 15 ADDTL MIN: Performed by: OBSTETRICS & GYNECOLOGY

## 2018-05-14 PROCEDURE — 71000014 ZZH RECOVERY PHASE 1 LEVEL 2 FIRST HR: Performed by: OBSTETRICS & GYNECOLOGY

## 2018-05-14 PROCEDURE — 88305 TISSUE EXAM BY PATHOLOGIST: CPT | Mod: 26 | Performed by: OBSTETRICS & GYNECOLOGY

## 2018-05-14 PROCEDURE — 25000564 ZZH DESFLURANE, EA 15 MIN: Performed by: OBSTETRICS & GYNECOLOGY

## 2018-05-14 RX ORDER — NALOXONE HYDROCHLORIDE 0.4 MG/ML
.1-.4 INJECTION, SOLUTION INTRAMUSCULAR; INTRAVENOUS; SUBCUTANEOUS
Status: DISCONTINUED | OUTPATIENT
Start: 2018-05-14 | End: 2018-05-14 | Stop reason: HOSPADM

## 2018-05-14 RX ORDER — OXYCODONE AND ACETAMINOPHEN 5; 325 MG/1; MG/1
1-2 TABLET ORAL EVERY 6 HOURS PRN
Qty: 40 TABLET | Refills: 0 | Status: SHIPPED | OUTPATIENT
Start: 2018-05-14 | End: 2020-12-10

## 2018-05-14 RX ORDER — SODIUM CHLORIDE, SODIUM LACTATE, POTASSIUM CHLORIDE, CALCIUM CHLORIDE 600; 310; 30; 20 MG/100ML; MG/100ML; MG/100ML; MG/100ML
INJECTION, SOLUTION INTRAVENOUS CONTINUOUS
Status: DISCONTINUED | OUTPATIENT
Start: 2018-05-14 | End: 2018-05-14 | Stop reason: HOSPADM

## 2018-05-14 RX ORDER — ONDANSETRON 4 MG/1
4 TABLET, ORALLY DISINTEGRATING ORAL EVERY 30 MIN PRN
Status: DISCONTINUED | OUTPATIENT
Start: 2018-05-14 | End: 2018-05-14 | Stop reason: HOSPADM

## 2018-05-14 RX ORDER — FENTANYL CITRATE 50 UG/ML
INJECTION, SOLUTION INTRAMUSCULAR; INTRAVENOUS PRN
Status: DISCONTINUED | OUTPATIENT
Start: 2018-05-14 | End: 2018-05-14

## 2018-05-14 RX ORDER — KETOROLAC TROMETHAMINE 30 MG/ML
30 INJECTION, SOLUTION INTRAMUSCULAR; INTRAVENOUS ONCE
Status: COMPLETED | OUTPATIENT
Start: 2018-05-14 | End: 2018-05-14

## 2018-05-14 RX ORDER — MEPERIDINE HYDROCHLORIDE 25 MG/ML
12.5 INJECTION INTRAMUSCULAR; INTRAVENOUS; SUBCUTANEOUS
Status: DISCONTINUED | OUTPATIENT
Start: 2018-05-14 | End: 2018-05-14 | Stop reason: HOSPADM

## 2018-05-14 RX ORDER — HYDROMORPHONE HYDROCHLORIDE 1 MG/ML
.3-.5 INJECTION, SOLUTION INTRAMUSCULAR; INTRAVENOUS; SUBCUTANEOUS EVERY 10 MIN PRN
Status: DISCONTINUED | OUTPATIENT
Start: 2018-05-14 | End: 2018-05-14 | Stop reason: HOSPADM

## 2018-05-14 RX ORDER — ONDANSETRON 2 MG/ML
4 INJECTION INTRAMUSCULAR; INTRAVENOUS EVERY 30 MIN PRN
Status: DISCONTINUED | OUTPATIENT
Start: 2018-05-14 | End: 2018-05-14 | Stop reason: HOSPADM

## 2018-05-14 RX ORDER — GLYCOPYRROLATE 0.2 MG/ML
INJECTION, SOLUTION INTRAMUSCULAR; INTRAVENOUS PRN
Status: DISCONTINUED | OUTPATIENT
Start: 2018-05-14 | End: 2018-05-14

## 2018-05-14 RX ORDER — ONDANSETRON 2 MG/ML
INJECTION INTRAMUSCULAR; INTRAVENOUS PRN
Status: DISCONTINUED | OUTPATIENT
Start: 2018-05-14 | End: 2018-05-14

## 2018-05-14 RX ORDER — OXYCODONE AND ACETAMINOPHEN 5; 325 MG/1; MG/1
1-2 TABLET ORAL EVERY 6 HOURS PRN
Status: DISCONTINUED | OUTPATIENT
Start: 2018-05-14 | End: 2018-05-14 | Stop reason: HOSPADM

## 2018-05-14 RX ORDER — PROPOFOL 10 MG/ML
INJECTION, EMULSION INTRAVENOUS PRN
Status: DISCONTINUED | OUTPATIENT
Start: 2018-05-14 | End: 2018-05-14

## 2018-05-14 RX ORDER — FENTANYL CITRATE 50 UG/ML
25-50 INJECTION, SOLUTION INTRAMUSCULAR; INTRAVENOUS
Status: DISCONTINUED | OUTPATIENT
Start: 2018-05-14 | End: 2018-05-14 | Stop reason: HOSPADM

## 2018-05-14 RX ORDER — NEOSTIGMINE METHYLSULFATE 1 MG/ML
VIAL (ML) INJECTION PRN
Status: DISCONTINUED | OUTPATIENT
Start: 2018-05-14 | End: 2018-05-14

## 2018-05-14 RX ORDER — IBUPROFEN 600 MG/1
600 TABLET, FILM COATED ORAL EVERY 6 HOURS PRN
Qty: 60 TABLET | Refills: 1 | Status: SHIPPED | OUTPATIENT
Start: 2018-05-14 | End: 2018-10-18

## 2018-05-14 RX ORDER — BUPIVACAINE HYDROCHLORIDE AND EPINEPHRINE 2.5; 5 MG/ML; UG/ML
INJECTION, SOLUTION INFILTRATION; PERINEURAL PRN
Status: DISCONTINUED | OUTPATIENT
Start: 2018-05-14 | End: 2018-05-14 | Stop reason: HOSPADM

## 2018-05-14 RX ORDER — LIDOCAINE HYDROCHLORIDE 20 MG/ML
INJECTION, SOLUTION INFILTRATION; PERINEURAL PRN
Status: DISCONTINUED | OUTPATIENT
Start: 2018-05-14 | End: 2018-05-14

## 2018-05-14 RX ORDER — CEFAZOLIN SODIUM 2 G/100ML
2 INJECTION, SOLUTION INTRAVENOUS
Status: COMPLETED | OUTPATIENT
Start: 2018-05-14 | End: 2018-05-14

## 2018-05-14 RX ORDER — AMOXICILLIN 250 MG
1-2 CAPSULE ORAL DAILY PRN
Qty: 30 TABLET | Refills: 3 | Status: SHIPPED | OUTPATIENT
Start: 2018-05-14 | End: 2018-10-18

## 2018-05-14 RX ORDER — CEFAZOLIN SODIUM 1 G/3ML
1 INJECTION, POWDER, FOR SOLUTION INTRAMUSCULAR; INTRAVENOUS SEE ADMIN INSTRUCTIONS
Status: DISCONTINUED | OUTPATIENT
Start: 2018-05-14 | End: 2018-05-14 | Stop reason: HOSPADM

## 2018-05-14 RX ADMIN — KETOROLAC TROMETHAMINE 30 MG: 30 INJECTION, SOLUTION INTRAMUSCULAR at 11:31

## 2018-05-14 RX ADMIN — GLYCOPYRROLATE 0.4 MG: 0.2 INJECTION, SOLUTION INTRAMUSCULAR; INTRAVENOUS at 10:58

## 2018-05-14 RX ADMIN — ONDANSETRON 4 MG: 2 INJECTION INTRAMUSCULAR; INTRAVENOUS at 10:56

## 2018-05-14 RX ADMIN — CEFAZOLIN SODIUM 2 G: 2 INJECTION, SOLUTION INTRAVENOUS at 10:11

## 2018-05-14 RX ADMIN — PROPOFOL 200 MG: 10 INJECTION, EMULSION INTRAVENOUS at 10:09

## 2018-05-14 RX ADMIN — FENTANYL CITRATE 50 MCG: 50 INJECTION, SOLUTION INTRAMUSCULAR; INTRAVENOUS at 11:22

## 2018-05-14 RX ADMIN — SODIUM CHLORIDE, POTASSIUM CHLORIDE, SODIUM LACTATE AND CALCIUM CHLORIDE: 600; 310; 30; 20 INJECTION, SOLUTION INTRAVENOUS at 09:58

## 2018-05-14 RX ADMIN — FENTANYL CITRATE 50 MCG: 50 INJECTION, SOLUTION INTRAMUSCULAR; INTRAVENOUS at 10:30

## 2018-05-14 RX ADMIN — LIDOCAINE HYDROCHLORIDE 100 MG: 20 INJECTION, SOLUTION INFILTRATION; PERINEURAL at 10:09

## 2018-05-14 RX ADMIN — HYDROMORPHONE HYDROCHLORIDE 0.5 MG: 1 INJECTION, SOLUTION INTRAMUSCULAR; INTRAVENOUS; SUBCUTANEOUS at 11:41

## 2018-05-14 RX ADMIN — FENTANYL CITRATE 50 MCG: 50 INJECTION, SOLUTION INTRAMUSCULAR; INTRAVENOUS at 11:42

## 2018-05-14 RX ADMIN — SODIUM CHLORIDE, POTASSIUM CHLORIDE, SODIUM LACTATE AND CALCIUM CHLORIDE: 600; 310; 30; 20 INJECTION, SOLUTION INTRAVENOUS at 10:53

## 2018-05-14 RX ADMIN — Medication 5 MG: at 10:58

## 2018-05-14 RX ADMIN — BUPIVACAINE HYDROCHLORIDE AND EPINEPHRINE BITARTRATE 6 ML: 2.5; .005 INJECTION, SOLUTION INFILTRATION; PERINEURAL at 10:28

## 2018-05-14 RX ADMIN — FENTANYL CITRATE 50 MCG: 50 INJECTION, SOLUTION INTRAMUSCULAR; INTRAVENOUS at 10:03

## 2018-05-14 RX ADMIN — MIDAZOLAM 2 MG: 1 INJECTION INTRAMUSCULAR; INTRAVENOUS at 09:58

## 2018-05-14 RX ADMIN — FENTANYL CITRATE 50 MCG: 50 INJECTION, SOLUTION INTRAMUSCULAR; INTRAVENOUS at 10:07

## 2018-05-14 RX ADMIN — ROCURONIUM BROMIDE 50 MG: 10 INJECTION INTRAVENOUS at 10:09

## 2018-05-14 RX ADMIN — FENTANYL CITRATE 50 MCG: 50 INJECTION, SOLUTION INTRAMUSCULAR; INTRAVENOUS at 11:29

## 2018-05-14 RX ADMIN — OXYCODONE HYDROCHLORIDE AND ACETAMINOPHEN 2 TABLET: 5; 325 TABLET ORAL at 11:58

## 2018-05-14 ASSESSMENT — LIFESTYLE VARIABLES: TOBACCO_USE: 1

## 2018-05-14 ASSESSMENT — PAIN DESCRIPTION - DESCRIPTORS
DESCRIPTORS: SORE

## 2018-05-14 NOTE — DISCHARGE INSTRUCTIONS
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 Please call [phone] or [phone]  to speak to Dr Mishra's nurse  or else call the main appointments line at [phone] if she is not available.         If you have unusually heavy vaginal bleeding, severe nausea with vomiting, or increased pain, or fever, call us at that same number to be seen earlier, or go to the emergency room if after hours or we are unavailable.          you should avoid intercourse for 1   week   after surgery.           you should not drive for 1 day   after surgery  .         you should limit lifting to 30 pounds for 4 weeks   after surgery.           you should not shower today but may resume showering tomorrow after the effects of anesthesia wear off.  Do not take a bath for 1  week   after surgery.           You may remove the bandaids tomorrow. Leave steri strips in place, they will come off on their own. Do not pick at them or touch incisions.           Apply ice for 15 minutes an hour for the first 24-48 hours.      Same-Day Surgery   Adult Discharge Orders & Instructions     For 24 hours after surgery    1. Get plenty of rest.  A responsible adult must stay with you for at least 24 hours after you leave the hospital.   2. Do not drive or use heavy equipment.  If you have weakness or tingling, don't drive or use heavy equipment until this feeling goes away.  3. Do not drink alcohol.  4. Avoid strenuous or risky activities.  Ask for help when climbing stairs.   5. You may feel lightheaded.  IF so, sit for a few minutes before standing.  Have someone help you get up.   6. You may have a slight fever. Call the doctor if your fever is over 100 F (37.7 C) (taken under the tongue) or lasts longer than 24 hours.  7. You may have a dry mouth, a sore throat, muscle aches or trouble sleeping.  These should go away after 24 hours.  8. Do not make important  or legal decisions.  Based on the surgery/procedure that you had today, we do not anticipate that you will have any problems.  However, given the various responses that patients can have to the surgical experience, we want to ensure that you have information available to manage pain or nausea and what to do if you observe bleeding or you develop any signs and symptoms of infection:  Methods to control pain include:  Prescription pain medication or over the counter medications as prescribed or suggested by your physician.  In addition, ice packs and periods of rest are often helpful.  If your pain is not managed with the above methods, contact your physician.  Methods to control nausea include:  Anti-nausea medication approved by your physician.  Drink clear liquids such as apple juice, ginger ale, broth or 7-Up. Be sure to drink enough fluids.  Move to a regular diet as you feel able.  Rest may also help.  Bleeding:  It's not uncommon to see a little blood staining on the dressing, about the size of a quarter in the first 24 hours; if you see this, there is no reason to be alarmed.  However, should this continue to increase in size, apply pressure if able, ant notify your physician.  Infection:  We do not anticipate that you will acquire an infection, but if you should experience any of the following symptoms:  redness, swelling, heat, increasing pain or abnormal drainage at your surgery site, fever or chills, please notify your physician.    Call your doctor for any of the followin.  Signs of infection (fever, growing tenderness at the surgery site, a large amount of drainage or bleeding, severe pain, foul-smelling drainage, redness, swelling).    2. It has been over 8 to 10 hours since surgery and you are still not able to urinate (pass water).    3.  Headache for over 24 hours.    Nurse advice line: [phone]            If you have questions do not hesitate to call the office at above number. Thank  you.         Nate Mishra MD, FACOG, FAAFP              , OB/GYN  Department.

## 2018-05-14 NOTE — OP NOTE
Procedure Date: 05/14/2018      PREOPERATIVE DIAGNOSIS:  Left-sided pelvic pain.      POSTOPERATIVE DIAGNOSES:   1.  Left ovarian nodule.   2.  Pelvic adhesions.      PROCEDURE:   1.  Laparoscopic removal of left ovarian nodule.   2.  Laparoscopic left salpingectomy.   3.  Laparoscopic lysis of adhesions.      ANESTHESIA:  General endotracheal anesthesia.      SURGEON:  Nate Mishra MD      FIRST ASSISTANT:  Carlos Castillo MD  (The assistance of this surgeon was required due to the need for additional skilled surgical hands.)      SECOND ASSISTANT:  Fred Mansfield CST      PATIENT PROFILE:  The patient is a 43-year-old female with chronic left-sided pelvic pain.  She has had previous right salpingo-oophorectomy.  There was ultrasound evidence of a left ovarian cyst and she had requested left salpingectomy and evaluate the ovary thoroughly and remove it if we had concerns.      OPERATIVE FINDINGS:  She had adhesions of the left adnexa to the cornua of the uterus on the left side.  These were freed up with the LigaSure.  She also had a nodule on the left ovary which appeared to be solid.  I did remove it with the ConMed device and sent it separately for pathology.  I also removed the left fallopian tube as requested.  There was only a very small fragment of tube distally and it was adherent to the left ovary and so I carefully dissected it off.  It was removed separately and sent for pathology.  Otherwise, her pelvis looked normal.  The right tube and ovary were surgically absent.  The posterior cul-de-sac and anterior cul-de-sac were both normal in appearance.  The left broad ligament had some adhesions to the left side of the uterus and I left these alone because they were close to the round ligament, but I did free up what I could on that side.  No endometriosis was seen.  No other obvious explanations for her pain.  No hernias were seen.  See operative description for other details.      OPERATIVE  DESCRIPTION:  After the establishment of satisfactory general endotracheal anesthesia, the patient was prepped and draped in the usual fashion for laparoscopy and the bladder was drained of urine.        A 5-mm incision was made just above the umbilicus and the Veress needle was inserted and then I insufflated the abdomen with CO2 gas.  I then inserted a 5 mm bladed trocar and introduced the laparoscope to verify correct placement.  The patient was then placed in slight Trendelenburg and Dr. Castillo placed a 5-mm bladed trocar in the right lateral abdomen and I placed a 5-mm bladed trocar in the left lateral abdomen.  I then had Dr. Castillo carefully hold the ovary in place while I removed a left ovarian nodule with the ConMed device L hook.  Once I had dissected it free, then I sent it for pathology.  The ovary was hemostatic on the base of where I removed the nodule.  It looked benign.  The left fallopian tube was adherent to the ovary and I used the ConMed device and the LigaSure to go across this area and free up the distal fallopian tube and sent it separately for pathology.  Dr. Castillo's assistance was critical for this because he needed to hold the ovary and tube area quite steady and away from the vital structures along the pelvic sidewall.  We then directed our attention to the area of the left adnexa where the uterus was adherent to the adnexa with fibrous adhesions which we freed up with the LigaSure device.  We stayed away from the broad ligament.  Once these were freed up, we inspected the pelvis with findings as listed above.  We then looked at the ovary again and it was quite hemostatic.  We then expressed all the CO2 gas from the abdomen and removed all the trocars and the trocar skin incisions were repaired with 4-0 Vicryl subcuticular stitch and each incision was injected with 0.25% Marcaine with dilute epinephrine, a total of 10 mL was used.  The estimated blood loss was 10 mL.  The final sponge,  needle and instrument counts were reported to me as correct and she was taken to the recovery room in good condition without complications.         NAVIN GRESHAM MD             D: 2018   T: 2018   MT: DADA      Name:     DIXON MARQUES   MRN:      [id number]        Account:        [id number]   :      1975           Procedure Date: 2018      Document: [id number]

## 2018-05-14 NOTE — IP AVS SNAPSHOT
MRN:[id number]                      After Visit Summary   5/14/2018    Virginia Valenzuela    MRN: [id number]           Thank you!     Thank you for choosing [facility] for your care. Our goal is always to provide you with excellent care. Hearing back from our patients is one way we can continue to improve our services. Please take a few minutes to complete the written survey that you may receive in the mail after you visit with us. Thank you!        Patient Information     Date Of Birth          1975        About your hospital stay     You were admitted on:  May 14, 2018 You last received care in the:  [facility] Phase II    You were discharged on:  May 14, 2018       Who to Call     For medical emergencies, please call 911.  For non-urgent questions about your medical care, please call your primary care provider or clinic, [phone]  For questions related to your surgery, please call your surgery clinic        Attending Provider     Provider Specialty    Nate Mishra MD [facility]       Primary Care Provider Office Phone # Fax #    [facility] [phone]      Your next 10 appointments already scheduled     May 18, 2018  9:40 AM CDT   SHORT with Nate Mishra MD   [facility]    [address] [place] [id number] [phone]              Further instructions from your care team       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 Please call [phone] or [phone]  to speak to Dr Mishra's nurse  or else call the main appointments line at [phone] if she is not available.         If you have unusually heavy vaginal bleeding, severe nausea with vomiting, or increased pain, or fever, call us at that same number to be seen earlier, or go to the emergency room if after hours or we are  unavailable.          you should avoid intercourse for 1   week   after surgery.           you should not drive for 1 day   after surgery  .         you should limit lifting to 30 pounds for 4 weeks   after surgery.           you should not shower today but may resume showering tomorrow after the effects of anesthesia wear off.  Do not take a bath for 1  week   after surgery.           You may remove the bandaids tomorrow. Leave steri strips in place, they will come off on their own. Do not pick at them or touch incisions.           Apply ice for 15 minutes an hour for the first 24-48 hours.      Same-Day Surgery   Adult Discharge Orders & Instructions     For 24 hours after surgery    1. Get plenty of rest.  A responsible adult must stay with you for at least 24 hours after you leave the hospital.   2. Do not drive or use heavy equipment.  If you have weakness or tingling, don't drive or use heavy equipment until this feeling goes away.  3. Do not drink alcohol.  4. Avoid strenuous or risky activities.  Ask for help when climbing stairs.   5. You may feel lightheaded.  IF so, sit for a few minutes before standing.  Have someone help you get up.   6. You may have a slight fever. Call the doctor if your fever is over 100 F (37.7 C) (taken under the tongue) or lasts longer than 24 hours.  7. You may have a dry mouth, a sore throat, muscle aches or trouble sleeping.  These should go away after 24 hours.  8. Do not make important or legal decisions.  Based on the surgery/procedure that you had today, we do not anticipate that you will have any problems.  However, given the various responses that patients can have to the surgical experience, we want to ensure that you have information available to manage pain or nausea and what to do if you observe bleeding or you develop any signs and symptoms of infection:  Methods to control pain include:  Prescription pain medication or over the counter medications as prescribed or  suggested by your physician.  In addition, ice packs and periods of rest are often helpful.  If your pain is not managed with the above methods, contact your physician.  Methods to control nausea include:  Anti-nausea medication approved by your physician.  Drink clear liquids such as apple juice, ginger ale, broth or 7-Up. Be sure to drink enough fluids.  Move to a regular diet as you feel able.  Rest may also help.  Bleeding:  It's not uncommon to see a little blood staining on the dressing, about the size of a quarter in the first 24 hours; if you see this, there is no reason to be alarmed.  However, should this continue to increase in size, apply pressure if able, ant notify your physician.  Infection:  We do not anticipate that you will acquire an infection, but if you should experience any of the following symptoms:  redness, swelling, heat, increasing pain or abnormal drainage at your surgery site, fever or chills, please notify your physician.    Call your doctor for any of the followin.  Signs of infection (fever, growing tenderness at the surgery site, a large amount of drainage or bleeding, severe pain, foul-smelling drainage, redness, swelling).    2. It has been over 8 to 10 hours since surgery and you are still not able to urinate (pass water).    3.  Headache for over 24 hours.    Nurse advice line: [phone]            If you have questions do not hesitate to call the office at above number. Thank you.         Nate Mishra MD, FACOG, FAAFP              , OB/GYN  Department.               Pending Results     Date and Time Order Name Status Description    2018 1040 Surgical pathology exam In process             Admission Information     Date & Time Provider Department Dept. Phone    2018 Nate Mishra MD [facility] Phase [phone]      Your Vitals Were     Blood Pressure Pulse Temperature Respirations Last Period Pulse Oximetry    110/58 78  98  F (36.7  C) (Oral) 5 05/14/2018 100%      [employer] Information     [employer] gives you secure access to your electronic health record. If you see a primary care provider, you can also send messages to your care team and make appointments. If you have questions, please call your primary care clinic.  If you do not have a primary care provider, please call [phone] and they will assist you.        Care EveryWhere ID     This is your Care EveryWhere ID. This could be used by other organizations to access your [facility] medical records  [id number]        Equal Access to Services     [facility]: Hadsonia Ramos, jae ennis, med talbot, martinez montoya . So [facility] [phone].    ATENCIÓN: Si habla español, tiene a lewis disposición servicios gratuitos de asistencia lingüística. Llame al [phone].    We comply with applicable federal civil rights laws and [place] laws. We do not discriminate on the basis of race, color, national origin, age, disability, sex, sexual orientation, or gender identity.               Review of your medicines      START taking        Dose / Directions    oxyCODONE-acetaminophen 5-325 MG per tablet   Commonly known as:  PERCOCET   Used for:  S/P laparoscopic procedure        Dose:  1-2 tablet   Take 1-2 tablets by mouth every 6 hours as needed for pain (moderate to severe)   Quantity:  40 tablet   Refills:  0       senna-docusate 8.6-50 MG per tablet   Commonly known as:  SENOKOT-S;PERICOLACE   Used for:  S/P laparoscopic procedure        Dose:  1-2 tablet   Take 1-2 tablets by mouth daily as needed for constipation   Quantity:  30 tablet   Refills:  3         CONTINUE these medicines which may have CHANGED, or have new prescriptions. If we are uncertain of the size of tablets/capsules you have at home, strength may be listed as something that might have changed.        Dose / Directions    ibuprofen 600 MG tablet   Commonly  known as:  ADVIL/MOTRIN   This may have changed:  See the new instructions.   Used for:  S/P laparoscopic procedure        Dose:  600 mg   Take 1 tablet (600 mg) by mouth every 6 hours as needed for moderate pain   Quantity:  60 tablet   Refills:  1         CONTINUE these medicines which have NOT CHANGED        Dose / Directions    cetirizine 10 MG tablet   Commonly known as:  zyrTEC   Used for:  Chronic seasonal allergic rhinitis, unspecified trigger        TAKE ONE TABLET BY MOUTH EVERY EVENING   Quantity:  90 tablet   Refills:  1       EPINEPHrine 0.3 MG/0.3ML injection 2-pack   Commonly known as:  EPIPEN/ADRENACLICK/or ANY BX GENERIC EQUIV   Used for:  History of bee sting allergy        Dose:  0.3 mg   Inject 0.3 mLs (0.3 mg) into the muscle once as needed   Quantity:  1 each   Refills:  0       losartan-hydrochlorothiazide 100-25 MG per tablet   Commonly known as:  HYZAAR        Dose:  1 tablet   Take 1 tablet by mouth daily   Refills:  10       NORVASC PO        Dose:  10 mg   Take 10 mg by mouth   Refills:  0       potassium chloride 10 MEQ tablet   Commonly known as:  K-TAB,KLOR-CON   Used for:  Hypokalemia        Dose:  10 mEq   Take 1 tablet (10 mEq) by mouth daily   Quantity:  90 tablet   Refills:  3       PROBIOTIC DAILY PO        Take by mouth daily   Refills:  0       vitamin D3 2000 units Caps   Used for:  Vitamin D deficiency        TAKE ONE CAPSULE BY MOUTH EVERY DAY   Quantity:  100 capsule   Refills:  10            Where to get your medicines      These medications were sent to [facility]  [address]  [address]  [address], [place] [address]     Phone:  [phone]     ibuprofen 600 MG tablet    senna-docusate 8.6-50 MG per tablet         Some of these will need a paper prescription and others can be bought over the counter. Ask your nurse if you have questions.     Bring a paper prescription for each of these medications     oxyCODONE-acetaminophen 5-325 MG  per tablet                Protect others around you: Learn how to safely use, store and throw away your medicines at www.disposemymeds.org.        Information about OPIOIDS     PRESCRIPTION OPIOIDS: WHAT YOU NEED TO KNOW   You have a prescription for an opioid (narcotic) pain medicine. Opioids can cause addiction. If you have a history of chemical dependency of any type, you are at a higher risk of becoming addicted to opioids. Only take this medicine after all other options have been tried. Take it for as short a time and as few doses as possible.     Do not:    Drive. If you drive while taking these medicines, you could be arrested for driving under the influence (DUI).    Operate heavy machinery    Do any other dangerous activities while taking these medicines.     Drink any alcohol while taking these medicines.      Take with any other medicines that contain acetaminophen. Read all labels carefully. Look for the word  acetaminophen  or  Tylenol.  Ask your pharmacist if you have questions or are unsure.    Store your pills in a secure place, locked if possible. We will not replace any lost or stolen medicine. If you don t finish your medicine, please throw away (dispose) as directed by your pharmacist. The [place] [employer]ution [employer] has more information about safe disposal: https://www.pca.[facility].[place].us/living-green/managing-unwanted-medications    All opioids tend to cause constipation. Drink plenty of water and eat foods that have a lot of fiber, such as fruits, vegetables, prune juice, apple juice and high-fiber cereal. Take a laxative (Miralax, milk of magnesia, Colace, Senna) if you don t move your bowels at least every other day.              Medication List: This is a list of all your medications and when to take them. Check marks below indicate your daily home schedule. Keep this list as a reference.      Medications           Morning Afternoon Evening Bedtime As Needed    cetirizine 10 MG tablet    Commonly known as:  zyrTEC   TAKE ONE TABLET BY MOUTH EVERY EVENING                                EPINEPHrine 0.3 MG/0.3ML injection 2-pack   Commonly known as:  EPIPEN/ADRENACLICK/or ANY BX GENERIC EQUIV   Inject 0.3 mLs (0.3 mg) into the muscle once as needed                                ibuprofen 600 MG tablet   Commonly known as:  ADVIL/MOTRIN   Take 1 tablet (600 mg) by mouth every 6 hours as needed for moderate pain                                losartan-hydrochlorothiazide 100-25 MG per tablet   Commonly known as:  HYZAAR   Take 1 tablet by mouth daily                                NORVASC PO   Take 10 mg by mouth                                oxyCODONE-acetaminophen 5-325 MG per tablet   Commonly known as:  PERCOCET   Take 1-2 tablets by mouth every 6 hours as needed for pain (moderate to severe)   Last time this was given:  2 tablets on 5/14/2018 11:58 AM                                potassium chloride 10 MEQ tablet   Commonly known as:  K-TAB,KLOR-CON   Take 1 tablet (10 mEq) by mouth daily                                PROBIOTIC DAILY PO   Take by mouth daily                                senna-docusate 8.6-50 MG per tablet   Commonly known as:  SENOKOT-S;PERICOLACE   Take 1-2 tablets by mouth daily as needed for constipation                                vitamin D3 2000 units Caps   TAKE ONE CAPSULE BY MOUTH EVERY DAY

## 2018-05-14 NOTE — ANESTHESIA PREPROCEDURE EVALUATION
Anesthesia Evaluation     . Pt has had prior anesthetic. Type: MAC and General    No history of anesthetic complications          ROS/MED HX    ENT/Pulmonary:     (+)sleep apnea, HAMIDA risk factors snores loudly, hypertension, obese, daytime somnolence, tobacco use, Current use 7.5 pack yeat hx packs/day  , . .    Neurologic:  - neg neurologic ROS     Cardiovascular:     (+) hypertension-range: < 140 / 90, ---. : . . . :. .       METS/Exercise Tolerance:     Hematologic:  - neg hematologic  ROS       Musculoskeletal:  - neg musculoskeletal ROS       GI/Hepatic:     (+) GERD Asymptomatic on medication,       Renal/Genitourinary:  - ROS Renal section negative       Endo:  - neg endo ROS       Psychiatric:     (+) psychiatric history anxiety      Infectious Disease:  - neg infectious disease ROS       Malignancy:      - no malignancy   Other:    (+) No chance of pregnancy                    Physical Exam  Normal systems: cardiovascular, pulmonary and dental    Airway   Mallampati: II  TM distance: >3 FB  Neck ROM: full    Dental     Cardiovascular   Rhythm and rate: regular and normal  (-) no murmur    Pulmonary    breath sounds clear to auscultation                    Anesthesia Plan      History & Physical Review  History and physical reviewed and following examination; no interval change.    ASA Status:  2 .    NPO Status:  > 6 hours    Plan for General and ETT with Intravenous and Propofol induction. Maintenance will be Balanced.    PONV prophylaxis:  Ondansetron (or other 5HT-3) and Dexamethasone or Solumedrol       Postoperative Care  Postoperative pain management:  IV analgesics and Oral pain medications.      Consents  Anesthetic plan, risks, benefits and alternatives discussed with:  Patient.  Use of blood products discussed: No .   .                          .

## 2018-05-14 NOTE — ANESTHESIA CARE TRANSFER NOTE
Patient: Virginia Valenzuela    Procedure(s):  laparoscopy, left salpingectomy, left ovarian nodule removal and lysis of adhesions - Wound Class: II-Clean Contaminated   - Wound Class: II-Clean Contaminated   - Wound Class: I-Clean    Diagnosis: left sided pelvic pain  Diagnosis Additional Information: No value filed.    Anesthesia Type:   General, ETT     Note:  Airway :Nasal Cannula  Patient transferred to:PACU  Handoff Report: Identifed the Patient, Identified the Reponsible Provider, Reviewed the pertinent medical history, Discussed the surgical course, Reviewed Intra-OP anesthesia mangement and issues during anesthesia, Set expectations for post-procedure period and Allowed opportunity for questions and acknowledgement of understanding      Vitals: (Last set prior to Anesthesia Care Transfer)    CRNA VITALS  5/14/2018 1033 - 5/14/2018 1133      5/14/2018             SpO2: 98 %    Resp Rate (observed): 17                Electronically Signed By: LISE Gonzalez CRNA  May 14, 2018  11:41 AM

## 2018-05-14 NOTE — BRIEF OP NOTE
Brief Operative Note:  Preoperative Diagnosis:left pelvic pain    Postoperative Diagnosis: left ovarian nodule and pelvic adhesions    Procedure: 1. laparoscopicRemoval of left ovarian nodule  2. laparoscopic left salpingectomy  3. Laparoscopic lysis of adhesions      Anesthesia: General endotracheal anesthesia (GETA)        Surgeon: Nate Mishra MD    Assistant: Carlos Castillo MD    Assistant: Fred Mansfield CST    Findings: see dictation      Estimated blood loss: 10 cc    Fluids: 1000 cc crystalloid      Complications: none      See complete operative note-dictated separately.  MARIETTA Mishra MD

## 2018-05-14 NOTE — ANESTHESIA POSTPROCEDURE EVALUATION
Patient: Virginia Valenzuela    Procedure(s):  laparoscopy, left salpingectomy, left ovarian nodule removal and lysis of adhesions - Wound Class: II-Clean Contaminated   - Wound Class: II-Clean Contaminated   - Wound Class: I-Clean    Diagnosis:left sided pelvic pain  Diagnosis Additional Information: No value filed.    Anesthesia Type:  General, ETT    Note:  Anesthesia Post Evaluation    Patient location during evaluation: Phase 2 and Bedside  Patient participation: Able to fully participate in evaluation  Level of consciousness: awake and alert  Pain management: satisfactory to patient  Airway patency: patent  Cardiovascular status: stable  Respiratory status: room air and spontaneous ventilation  Hydration status: stable  PONV: none     Anesthetic complications: None    Comments: Appear to tolerate Gen well without anesthesia related problems / complications noted.  Pain level adequate per patient. No N  /  V .  No complaints per patient.  Will follow as needed.        Last vitals:  Vitals:    05/14/18 1150 05/14/18 1200 05/14/18 1215   BP: 123/64 110/58 117/78   Pulse:      Resp: 17 16    Temp:      SpO2: 100% 100% 97%         Electronically Signed By: LISE Gonzalez CRNA  May 14, 2018  12:47 PM

## 2018-05-14 NOTE — IP AVS SNAPSHOT
[facility]    [address]     [place] [address]    Phone:  [phone]                                       After Visit Summary   5/14/2018    Virginia Valenzuela    MRN: [id number]           After Visit Summary Signature Page     I have received my discharge instructions, and my questions have been answered. I have discussed any challenges I see with this plan with the nurse or doctor.    ..........................................................................................................................................  Patient/Patient Representative Signature      ..........................................................................................................................................  Patient Representative Print Name and Relationship to Patient    ..................................................               ................................................  Date                                            Time    ..........................................................................................................................................  Reviewed by Signature/Title    ...................................................              ..............................................  Date                                                            Time

## 2018-05-17 LAB — COPATH REPORT: NORMAL

## 2018-05-18 ENCOUNTER — OFFICE VISIT (OUTPATIENT)
Dept: FAMILY MEDICINE | Facility: CLINIC | Age: 43
End: 2018-05-18
Payer: COMMERCIAL

## 2018-05-18 VITALS
HEART RATE: 112 BPM | WEIGHT: 195.4 LBS | TEMPERATURE: 98.4 F | BODY MASS INDEX: 34.07 KG/M2 | DIASTOLIC BLOOD PRESSURE: 70 MMHG | OXYGEN SATURATION: 95 % | SYSTOLIC BLOOD PRESSURE: 116 MMHG

## 2018-05-18 DIAGNOSIS — Z09 POSTOPERATIVE EXAMINATION: Primary | ICD-10-CM

## 2018-05-18 PROCEDURE — 99024 POSTOP FOLLOW-UP VISIT: CPT | Performed by: OBSTETRICS & GYNECOLOGY

## 2018-05-18 ASSESSMENT — PAIN SCALES - GENERAL: PAINLEVEL: SEVERE PAIN (6)

## 2018-05-18 NOTE — MR AVS SNAPSHOT
After Visit Summary   5/18/2018    Virginia Valenzuela    MRN: [id number]           Patient Information     Date Of Birth          1975        Visit Information        Provider Department      5/18/2018 9:40 AM Nate Mishra MD [facility]        Today's Diagnoses     Postoperative examination    -  1       Follow-ups after your visit        Who to contact     If you have questions or need follow up information about today's clinic visit or your schedule please contact [facility] directly at [phone].  Normal or non-critical lab and imaging results will be communicated to you by [employer], letter or phone within 4 business days after the clinic has received the results. If you do not hear from us within 7 days, please contact the clinic through [employer] or phone. If you have a critical or abnormal lab result, we will notify you by phone as soon as possible.  Submit refill requests through [employer] or call your pharmacy and they will forward the refill request to us. Please allow 3 business days for your refill to be completed.          Additional Information About Your Visit        MyChart Information     [employer] gives you secure access to your electronic health record. If you see a primary care provider, you can also send messages to your care team and make appointments. If you have questions, please call your primary care clinic.  If you do not have a primary care provider, please call [phone] and they will assist you.        Care EveryWhere ID     This is your Care EveryWhere ID. This could be used by other organizations to access your [place] medical records  [id number]        Your Vitals Were     Pulse Temperature Last Period Pulse Oximetry BMI (Body Mass Index)       112 98.4  F (36.9  C) (Temporal) 05/14/2018 95% 34.07 kg/m2        Blood Pressure from Last 3 Encounters:   05/18/18 116/70   05/14/18 109/71   04/30/18 114/70    Weight from Last  3 Encounters:   05/18/18 195 lb 6.4 oz (88.6 kg)   04/30/18 198 lb (89.8 kg)   03/06/18 190 lb (86.2 kg)              Today, you had the following     No orders found for display       Primary Care Provider Office Phone # Fax #    [place] [facility] [phone] [address] [address]        Equal Access to Services     CYNTHIA BROWN : Hadii aad ku hadasho Soomaali, waaxda luqadaha, qaybta kaalmada adeegyada, waxay gabbiein hayaan adeeg laylahusambiju montoya . So [facility] [phone].    ATENCIÓN: Si samyla espjerzy, tiene a lewis disposición servicios gratuitos de asistencia lingüística. Luiz al [phone].    We comply with applicable federal civil rights laws and [place] laws. We do not discriminate on the basis of race, color, national origin, age, disability, sex, sexual orientation, or gender identity.            Thank you!     Thank you for choosing [facility]  for your care. Our goal is always to provide you with excellent care. Hearing back from our patients is one way we can continue to improve our services. Please take a few minutes to complete the written survey that you may receive in the mail after your visit with us. Thank you!             Your Updated Medication List - Protect others around you: Learn how to safely use, store and throw away your medicines at www.disposemymeds.org.          This list is accurate as of 5/18/18 10:27 AM.  Always use your most recent med list.                   Brand Name Dispense Instructions for use Diagnosis    cetirizine 10 MG tablet    zyrTEC    90 tablet    TAKE ONE TABLET BY MOUTH EVERY EVENING    Chronic seasonal allergic rhinitis, unspecified trigger       EPINEPHrine 0.3 MG/0.3ML injection 2-pack    EPIPEN/ADRENACLICK/or ANY BX GENERIC EQUIV    1 each    Inject 0.3 mLs (0.3 mg) into the muscle once as needed    History of bee sting allergy       ibuprofen 600 MG tablet    ADVIL/MOTRIN    60 tablet    Take 1 tablet  (600 mg) by mouth every 6 hours as needed for moderate pain    S/P laparoscopic procedure       losartan-hydrochlorothiazide 100-25 MG per tablet    HYZAAR     Take 1 tablet by mouth daily        NORVASC PO      Take 10 mg by mouth        oxyCODONE-acetaminophen 5-325 MG per tablet    PERCOCET    40 tablet    Take 1-2 tablets by mouth every 6 hours as needed for pain (moderate to severe)    S/P laparoscopic procedure       potassium chloride 10 MEQ tablet    K-TAB,KLOR-CON    90 tablet    Take 1 tablet (10 mEq) by mouth daily    Hypokalemia       PROBIOTIC DAILY PO      Take by mouth daily        senna-docusate 8.6-50 MG per tablet    SENOKOT-S;PERICOLACE    30 tablet    Take 1-2 tablets by mouth daily as needed for constipation    S/P laparoscopic procedure       vitamin D3 2000 units Caps     100 capsule    TAKE ONE CAPSULE BY MOUTH EVERY DAY    Vitamin D deficiency

## 2018-05-18 NOTE — PROGRESS NOTES
S: The patient is here for postop check from laparoscopy. She reports normal bowel and bladder function and pain control is adequate. No fevers or other complaints.     O: VSS as shown in chart /70 (Cuff Size: Adult Large)  Pulse 112  Temp 98.4  F (36.9  C) (Temporal)  Wt 195 lb 6.4 oz (88.6 kg)  LMP 05/14/2018  SpO2 95%  BMI 34.07 kg/m2       Chest is clear to auscultation and cardiac exam is normal. Abdomen is soft, nondistended, and the incisions are clean, dry, and intact, and healing well. Normal bowel sounds are heard.    A: stable post op check      P: laparoscopy findings discussed with patient.  She'll follow up  and she is advised to recheck acutely if the incisions become red or discarge noted or any sx of infection or  nausea, vomiting, or pain.       MARIETTA Mishra MD

## 2018-05-22 NOTE — PROGRESS NOTES
[place] Please inform Virginia/ or caretaker  that this result(s) is/are normal.  The pathology report on the nodule removed from the left ovary was normal- also the tube pathology was benign-Thanks. MARIETTA Mishra MD

## 2018-06-14 DIAGNOSIS — E55.9 VITAMIN D DEFICIENCY: Primary | ICD-10-CM

## 2018-06-14 RX ORDER — CHOLECALCIFEROL (VITAMIN D3) 50 MCG
1 TABLET ORAL DAILY
Qty: 100 TABLET | Refills: 3 | Status: SHIPPED | OUTPATIENT
Start: 2018-06-14 | End: 2020-12-10

## 2018-06-14 NOTE — TELEPHONE ENCOUNTER
"Requested Prescriptions   Pending Prescriptions Disp Refills     Cholecalciferol (VITAMIN D) 2000 units tablet [Pharmacy Med Name: VITAMIN D3 TAB 2000UNIT] 100 tablet 8    Last Written Prescription Date:  5/31/17  Last Fill Quantity: 100,  # refills: 10   Last office visit: 5/18/2018 with prescribing provider:  5/18/18   Future Office Visit:     Sig: TAKE ONE TABLET BY MOUTH EVERY DAY    Vitamin Supplements (Adult) Protocol Passed    6/14/2018 12:09 PM       Passed - High dose Vitamin D not ordered       Passed - Recent (12 mo) or future (30 days) visit within the authorizing provider's specialty    Patient had office visit in the last 12 months or has a visit in the next 30 days with authorizing provider or within the authorizing provider's specialty.  See \"Patient Info\" tab in inbasket, or \"Choose Columns\" in Meds & Orders section of the refill encounter.              "

## 2018-06-14 NOTE — TELEPHONE ENCOUNTER
Prescription approved per [facility] Refill Protocol.    Ary Caldwell RN

## 2018-07-20 ENCOUNTER — TELEPHONE (OUTPATIENT)
Dept: FAMILY MEDICINE | Facility: CLINIC | Age: 43
End: 2018-07-20

## 2018-07-20 NOTE — TELEPHONE ENCOUNTER
Prior Authorization Retail Medication Request    Medication/Dose: omeprazole  ICD code (if different than what is on RX):     Previously Tried and Failed:     Rationale:       Insurance Name:  [facility]  Insurance ID:  [id number]      Pharmacy Information (if different than what is on RX)  Name:     Phone:

## 2018-09-04 ENCOUNTER — TRANSFERRED RECORDS (OUTPATIENT)
Dept: HEALTH INFORMATION MANAGEMENT | Facility: CLINIC | Age: 43
End: 2018-09-04

## 2018-09-08 ENCOUNTER — HEALTH MAINTENANCE LETTER (OUTPATIENT)
Age: 43
End: 2018-09-08

## 2018-09-14 ENCOUNTER — TRANSFERRED RECORDS (OUTPATIENT)
Dept: HEALTH INFORMATION MANAGEMENT | Facility: CLINIC | Age: 43
End: 2018-09-14

## 2018-09-25 ENCOUNTER — OFFICE VISIT (OUTPATIENT)
Dept: ORTHOPEDICS | Facility: CLINIC | Age: 43
End: 2018-09-25
Payer: COMMERCIAL

## 2018-09-25 VITALS
WEIGHT: 189.5 LBS | DIASTOLIC BLOOD PRESSURE: 70 MMHG | HEIGHT: 64 IN | SYSTOLIC BLOOD PRESSURE: 116 MMHG | BODY MASS INDEX: 32.35 KG/M2

## 2018-09-25 DIAGNOSIS — M16.11 PRIMARY OSTEOARTHRITIS OF RIGHT HIP: ICD-10-CM

## 2018-09-25 DIAGNOSIS — M25.551 ACUTE RIGHT HIP PAIN: Primary | ICD-10-CM

## 2018-09-25 PROCEDURE — 99203 OFFICE O/P NEW LOW 30 MIN: CPT | Performed by: PHYSICAL MEDICINE & REHABILITATION
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
## 2018-09-25 NOTE — PATIENT INSTRUCTIONS
-Physical therapy ordered in [place].  Please do 5-6 days of exercises per week between formal sessions and the home exercises they provide.  -Right hip injection ordered.  [facility] [phone].   -Ice or heat 15-20 minutes as needed (Avoid sleeping on a heating pad or ice)    -Follow up in 4-6 weeks or sooner if symptoms fail to improve or worsen.  Please call with questions or concerns.

## 2018-09-25 NOTE — MR AVS SNAPSHOT
After Visit Summary   2018    Virginia Valenzuela    MRN: [id number]           Patient Information     Date Of Birth          1975        Visit Information        Provider Department      2018 10:40 AM Claire Duron MD [facility]        Today's Diagnoses     Acute right hip pain    -  1    Primary osteoarthritis of right hip          Care Instructions    -Physical therapy ordered in [place].  Please do 5-6 days of exercises per week between formal sessions and the home exercises they provide.  -Right hip injection ordered.  [facility] [phone].   -Ice or heat 15-20 minutes as needed (Avoid sleeping on a heating pad or ice)    -Follow up in 4-6 weeks or sooner if symptoms fail to improve or worsen.  Please call with questions or concerns.              Follow-ups after your visit        Additional Services     PHYSICAL THERAPY REFERRAL       If you have not heard from the scheduling office within 2 business days, please call [phone] for all locations, with the exception of [place], please call [phone] and [place], please call [phone].    Please be aware that coverage of these services is subject to the terms and limitations of your health insurance plan.  Call member services at your health plan with any benefit or coverage questions.                  Future tests that were ordered for you today     Open Future Orders        Priority Expected Expires Ordered    XR Joint Injection Major Right Routine 2018    PHYSICAL THERAPY REFERRAL Routine  2019            Who to contact     If you have questions or need follow up information about today's clinic visit or your schedule please contact [facility] directly at [phone].  Normal or non-critical lab and imaging results will be communicated to you by MyChart, letter or phone within 4 business days after the clinic has  "received the results. If you do not hear from us within 7 days, please contact the clinic through [employer] or phone. If you have a critical or abnormal lab result, we will notify you by phone as soon as possible.  Submit refill requests through [employer] or call your pharmacy and they will forward the refill request to us. Please allow 3 business days for your refill to be completed.          Additional Information About Your Visit        [employer] Information     [employer] gives you secure access to your electronic health record. If you see a primary care provider, you can also send messages to your care team and make appointments. If you have questions, please call your primary care clinic.  If you do not have a primary care provider, please call [phone] and they will assist you.        Care EveryWhere ID     This is your Care EveryWhere ID. This could be used by other organizations to access your [place] medical records  [id number]        Your Vitals Were     Height BMI (Body Mass Index)                5' 3.5\" (1.613 m) 33.04 kg/m2           Blood Pressure from Last 3 Encounters:   09/25/18 116/70   05/18/18 116/70   05/14/18 109/71    Weight from Last 3 Encounters:   09/25/18 189 lb 8 oz (86 kg)   05/18/18 195 lb 6.4 oz (88.6 kg)   04/30/18 198 lb (89.8 kg)               Primary Care Provider Office Phone # Fax #    [facility] [phone]       [address] [address]        Equal Access to Services     CYNTHIA BROWN AH: Hadii aad ku hadasho Soomaali, waaxda luqadaha, qaybta kaalmada adeegyada, martinez bosch hayjen montoya . So [facility] [phone].    ATENCIÓN: Si habla español, tiene a lewis disposición servicios gratuitos de asistencia lingüística. Llame al [phone].    We comply with applicable federal civil rights laws and [place] laws. We do not discriminate on the basis of race, color, national origin, age, disability, sex, sexual orientation, or " gender identity.            Thank you!     Thank you for choosing [facility]  for your care. Our goal is always to provide you with excellent care. Hearing back from our patients is one way we can continue to improve our services. Please take a few minutes to complete the written survey that you may receive in the mail after your visit with us. Thank you!             Your Updated Medication List - Protect others around you: Learn how to safely use, store and throw away your medicines at www.disposemymeds.org.          This list is accurate as of 9/25/18 11:10 AM.  Always use your most recent med list.                   Brand Name Dispense Instructions for use Diagnosis    cetirizine 10 MG tablet    zyrTEC    90 tablet    TAKE ONE TABLET BY MOUTH EVERY EVENING    Chronic seasonal allergic rhinitis, unspecified trigger       EPINEPHrine 0.3 MG/0.3ML injection 2-pack    EPIPEN/ADRENACLICK/or ANY BX GENERIC EQUIV    1 each    Inject 0.3 mLs (0.3 mg) into the muscle once as needed    History of bee sting allergy       ibuprofen 600 MG tablet    ADVIL/MOTRIN    60 tablet    Take 1 tablet (600 mg) by mouth every 6 hours as needed for moderate pain    S/P laparoscopic procedure       losartan-hydrochlorothiazide 100-25 MG per tablet    HYZAAR     Take 1 tablet by mouth daily        NORVASC PO      Take 10 mg by mouth        oxyCODONE-acetaminophen 5-325 MG per tablet    PERCOCET    40 tablet    Take 1-2 tablets by mouth every 6 hours as needed for pain (moderate to severe)    S/P laparoscopic procedure       potassium chloride 10 MEQ tablet    K-TAB,KLOR-CON    90 tablet    Take 1 tablet (10 mEq) by mouth daily    Hypokalemia       PROBIOTIC DAILY PO      Take by mouth daily        senna-docusate 8.6-50 MG per tablet    SENOKOT-S;PERICOLACE    30 tablet    Take 1-2 tablets by mouth daily as needed for constipation    S/P laparoscopic procedure       * vitamin D3 2000 units Caps     100 capsule    TAKE ONE  CAPSULE BY MOUTH EVERY DAY    Vitamin D deficiency       * vitamin D 2000 units tablet     100 tablet    Take 1 tablet by mouth daily    Vitamin D deficiency       * Notice:  This list has 2 medication(s) that are the same as other medications prescribed for you. Read the directions carefully, and ask your doctor or other care provider to review them with you.

## 2018-09-26 ENCOUNTER — HOSPITAL ENCOUNTER (OUTPATIENT)
Dept: GENERAL RADIOLOGY | Facility: CLINIC | Age: 43
Discharge: HOME | End: 2018-09-26
Attending: PHYSICAL MEDICINE & REHABILITATION | Admitting: PHYSICAL MEDICINE & REHABILITATION
Payer: COMMERCIAL

## 2018-09-26 DIAGNOSIS — M25.551 ACUTE RIGHT HIP PAIN: ICD-10-CM

## 2018-09-26 DIAGNOSIS — M16.11 PRIMARY OSTEOARTHRITIS OF RIGHT HIP: ICD-10-CM

## 2018-09-26 PROCEDURE — 25000125 ZZHC RX 250: Performed by: RADIOLOGY

## 2018-09-26 PROCEDURE — 25500064 ZZH RX 255 OP 636: Performed by: RADIOLOGY

## 2018-09-26 PROCEDURE — 25000128 H RX IP 250 OP 636: Performed by: RADIOLOGY

## 2018-09-26 PROCEDURE — 20610 DRAIN/INJ JOINT/BURSA W/O US: CPT | Mod: RT

## 2018-09-26 RX ORDER — TRIAMCINOLONE ACETONIDE 40 MG/ML
1 INJECTION, SUSPENSION INTRA-ARTICULAR; INTRAMUSCULAR ONCE
Status: COMPLETED | OUTPATIENT
Start: 2018-09-26 | End: 2018-09-26

## 2018-09-26 RX ORDER — LIDOCAINE HYDROCHLORIDE 10 MG/ML
20 INJECTION, SOLUTION INFILTRATION; PERINEURAL ONCE
Status: COMPLETED | OUTPATIENT
Start: 2018-09-26 | End: 2018-09-26

## 2018-09-26 RX ORDER — IOPAMIDOL 408 MG/ML
50 INJECTION, SOLUTION INTRAVASCULAR ONCE
Status: COMPLETED | OUTPATIENT
Start: 2018-09-26 | End: 2018-09-26

## 2018-09-26 RX ADMIN — TRIAMCINOLONE ACETONIDE 40 MG: 40 INJECTION, SUSPENSION INTRA-ARTICULAR; INTRAMUSCULAR at 11:07

## 2018-09-26 RX ADMIN — LIDOCAINE HYDROCHLORIDE 3.5 ML: 10 INJECTION, SOLUTION EPIDURAL; INFILTRATION; INTRACAUDAL; PERINEURAL at 11:06

## 2018-09-26 RX ADMIN — IOPAMIDOL 1 ML: 408 INJECTION, SOLUTION INTRAVASCULAR at 11:07

## 2018-09-28 ENCOUNTER — HOSPITAL ENCOUNTER (OUTPATIENT)
Dept: MAMMOGRAPHY | Facility: CLINIC | Age: 43
Discharge: HOME | End: 2018-09-28
Attending: PHYSICIAN ASSISTANT | Admitting: PHYSICIAN ASSISTANT
Payer: COMMERCIAL

## 2018-09-28 DIAGNOSIS — Z12.31 VISIT FOR SCREENING MAMMOGRAM: ICD-10-CM

## 2018-09-28 PROCEDURE — 77067 SCR MAMMO BI INCL CAD: CPT

## 2018-10-02 ENCOUNTER — TRANSFERRED RECORDS (OUTPATIENT)
Dept: HEALTH INFORMATION MANAGEMENT | Facility: CLINIC | Age: 43
End: 2018-10-02

## 2018-10-04 ENCOUNTER — TRANSFERRED RECORDS (OUTPATIENT)
Dept: HEALTH INFORMATION MANAGEMENT | Facility: CLINIC | Age: 43
End: 2018-10-04

## 2018-10-09 ENCOUNTER — MEDICAL CORRESPONDENCE (OUTPATIENT)
Dept: GENERAL RADIOLOGY | Facility: CLINIC | Age: 43
End: 2018-10-09

## 2018-10-11 ENCOUNTER — TRANSFERRED RECORDS (OUTPATIENT)
Dept: HEALTH INFORMATION MANAGEMENT | Facility: CLINIC | Age: 43
End: 2018-10-11

## 2018-10-25 ENCOUNTER — ANESTHESIA EVENT (OUTPATIENT)
Dept: SURGERY | Facility: CLINIC | Age: 43
End: 2018-10-25
Payer: COMMERCIAL

## 2018-10-25 ENCOUNTER — SURGERY (OUTPATIENT)
Age: 43
End: 2018-10-25

## 2018-10-25 ENCOUNTER — HOSPITAL ENCOUNTER (OUTPATIENT)
Facility: CLINIC | Age: 43
Discharge: HOME | End: 2018-10-25
Attending: ANESTHESIOLOGY | Admitting: ANESTHESIOLOGY
Payer: COMMERCIAL

## 2018-10-25 ENCOUNTER — HOSPITAL ENCOUNTER (OUTPATIENT)
Dept: GENERAL RADIOLOGY | Facility: CLINIC | Age: 43
End: 2018-10-25
Attending: ANESTHESIOLOGY | Admitting: ANESTHESIOLOGY
Payer: COMMERCIAL

## 2018-10-25 ENCOUNTER — ANESTHESIA (OUTPATIENT)
Dept: SURGERY | Facility: CLINIC | Age: 43
End: 2018-10-25
Payer: COMMERCIAL

## 2018-10-25 VITALS
OXYGEN SATURATION: 99 % | TEMPERATURE: 98.2 F | RESPIRATION RATE: 16 BRPM | SYSTOLIC BLOOD PRESSURE: 122 MMHG | DIASTOLIC BLOOD PRESSURE: 78 MMHG

## 2018-10-25 DIAGNOSIS — M47.819 FACET ARTHROPATHY: ICD-10-CM

## 2018-10-25 LAB — HCG UR QL: NEGATIVE

## 2018-10-25 PROCEDURE — 64493 INJ PARAVERT F JNT L/S 1 LEV: CPT | Mod: 50 | Performed by: ANESTHESIOLOGY

## 2018-10-25 PROCEDURE — 37000008 ZZH ANESTHESIA TECHNICAL FEE, 1ST 30 MIN: Performed by: ANESTHESIOLOGY

## 2018-10-25 PROCEDURE — 81025 URINE PREGNANCY TEST: CPT | Performed by: NURSE ANESTHETIST, CERTIFIED REGISTERED

## 2018-10-25 PROCEDURE — 25000128 H RX IP 250 OP 636: Performed by: NURSE ANESTHETIST, CERTIFIED REGISTERED

## 2018-10-25 PROCEDURE — 25000128 H RX IP 250 OP 636: Performed by: ANESTHESIOLOGY

## 2018-10-25 PROCEDURE — 40000277 XR SURGERY CARM FLUORO LESS THAN 5 MIN W STILLS: Mod: TC

## 2018-10-25 PROCEDURE — 25000125 ZZHC RX 250: Performed by: NURSE ANESTHETIST, CERTIFIED REGISTERED

## 2018-10-25 PROCEDURE — 25000125 ZZHC RX 250: Performed by: ANESTHESIOLOGY

## 2018-10-25 RX ORDER — LIDOCAINE 40 MG/G
CREAM TOPICAL
Status: DISCONTINUED | OUTPATIENT
Start: 2018-10-25 | End: 2018-10-25 | Stop reason: HOSPADM

## 2018-10-25 RX ORDER — LIDOCAINE HYDROCHLORIDE 20 MG/ML
INJECTION, SOLUTION INFILTRATION; PERINEURAL PRN
Status: DISCONTINUED | OUTPATIENT
Start: 2018-10-25 | End: 2018-10-25

## 2018-10-25 RX ORDER — IOPAMIDOL 612 MG/ML
INJECTION, SOLUTION INTRATHECAL PRN
Status: DISCONTINUED | OUTPATIENT
Start: 2018-10-25 | End: 2018-10-25 | Stop reason: HOSPADM

## 2018-10-25 RX ORDER — TRIAMCINOLONE ACETONIDE 40 MG/ML
INJECTION, SUSPENSION INTRA-ARTICULAR; INTRAMUSCULAR PRN
Status: DISCONTINUED | OUTPATIENT
Start: 2018-10-25 | End: 2018-10-25 | Stop reason: HOSPADM

## 2018-10-25 RX ORDER — PROPOFOL 10 MG/ML
INJECTION, EMULSION INTRAVENOUS PRN
Status: DISCONTINUED | OUTPATIENT
Start: 2018-10-25 | End: 2018-10-25

## 2018-10-25 RX ORDER — BUPIVACAINE HYDROCHLORIDE 7.5 MG/ML
INJECTION, SOLUTION EPIDURAL; RETROBULBAR PRN
Status: DISCONTINUED | OUTPATIENT
Start: 2018-10-25 | End: 2018-10-25 | Stop reason: HOSPADM

## 2018-10-25 RX ADMIN — PROPOFOL 50 MG: 10 INJECTION, EMULSION INTRAVENOUS at 16:38

## 2018-10-25 RX ADMIN — PROPOFOL 50 MG: 10 INJECTION, EMULSION INTRAVENOUS at 16:41

## 2018-10-25 RX ADMIN — BUPIVACAINE HYDROCHLORIDE 5 ML: 7.5 INJECTION, SOLUTION EPIDURAL; RETROBULBAR at 16:39

## 2018-10-25 RX ADMIN — PROPOFOL 50 MG: 10 INJECTION, EMULSION INTRAVENOUS at 16:39

## 2018-10-25 RX ADMIN — IOPAMIDOL 5 ML: 612 INJECTION, SOLUTION INTRATHECAL at 16:40

## 2018-10-25 RX ADMIN — TRIAMCINOLONE ACETONIDE 40 MG: 40 INJECTION, SUSPENSION INTRA-ARTICULAR; INTRAMUSCULAR at 16:41

## 2018-10-25 RX ADMIN — PROPOFOL 50 MG: 10 INJECTION, EMULSION INTRAVENOUS at 16:35

## 2018-10-25 RX ADMIN — LIDOCAINE HYDROCHLORIDE 50 MG: 20 INJECTION, SOLUTION INFILTRATION; PERINEURAL at 16:35

## 2018-10-25 RX ADMIN — SODIUM BICARBONATE 1 MEQ: 84 INJECTION, SOLUTION INTRAVENOUS at 16:40

## 2018-10-25 ASSESSMENT — LIFESTYLE VARIABLES: TOBACCO_USE: 1

## 2018-10-25 NOTE — DISCHARGE INSTRUCTIONS
Home Care Instructions                Procedure:  Epidural Steroid Injection or Joint injection    Activity:    Rest today    Do not work today    Resume normal activity tomorrow    Pain:    You may experience soreness at the injection site for one or two days    You may use an ice pack for 20 minutes every 2 hours for the first 24 hours    You may use a heating pad after the first 24 hours    You may use Tylenol  (acetaminophen) every 4 hours or other pain medicines as directed by your physician    Safety  Sedation medicine, if given may remain active for many hours.    It is important for the next 24 hours that you do not:    Drive a car    Operate machines or power tools    Consume alcohol, including beer    Sign any important papers or legal documents    You may experience numbness radiating into your legs or arms, (depending on the procedure location)  This numbness may last several hours.  Until the numb sensation returns to normal please use caution in walking, climbing stairs, stepping out of your vehicle, etc.    Common side effects of steroids:  Not everyone will experience corticosteroid side effects. If side effects are experienced they will gradually subside in the 7-10 day period following an injection.    Most common side effects include:    Flushed face and/or chest    Feeling of warmth, particularly in face but could be overall feeling of warmth    Increased blood sugar in diabetic patients    Menstrual irregularities may occur.  If taking hormone based birth control an alternate method of birth control is recommended    Sleep disturbances and/or mood swings are possible    Leg cramps    Please contact us if you have:  Severe pain   Fever more than 101.5 degrees Fahrenheit  Signs of infection (redness, swelling or drainage)      If you have questions during normal business hours (8am-5pm Monday-Friday) contact the [facility] at [phone]. If you need help after hours, we recommend that  you go to a hospital emergency room or dial 911.

## 2018-10-25 NOTE — IP AVS SNAPSHOT
[facility] Phase II    [address]     [place] [address]    Phone:  [phone]                                       After Visit Summary   10/25/2018    Virginia Valenzuela    MRN: [id number]           After Visit Summary Signature Page     I have received my discharge instructions, and my questions have been answered. I have discussed any challenges I see with this plan with the nurse or doctor.    ..........................................................................................................................................  Patient/Patient Representative Signature      ..........................................................................................................................................  Patient Representative Print Name and Relationship to Patient    ..................................................               ................................................  Date                                   Time    ..........................................................................................................................................  Reviewed by Signature/Title    ...................................................              ..............................................  Date                                               Time          22EPIC Rev 08/18

## 2018-10-25 NOTE — ANESTHESIA POSTPROCEDURE EVALUATION
Patient: Virginia Valenzuela    Procedure(s):  Injest Facet Joint Lumbar 4-5 Bilateral    Diagnosis:facet arthropathy, mild intervertebral disc desisccation at L-4/L-5 without disc herniation. patent cancal throught the lumbar spine. moderate L4-5 and mild L5-S1 facet arthrosis bilaterally. associated L4 and L5 pedicular marrow edema with periartucular   facet soft tissue edema left, greater than right, likely related to a sympomatic inflammatory/degenerative facet arthopathy  Diagnosis Additional Information: No value filed.    Anesthesia Type:  General, ETT    Note:  Anesthesia Post Evaluation    Patient location during evaluation: Phase 2  Patient participation: Able to fully participate in evaluation  Level of consciousness: awake and alert  Pain management: adequate  Airway patency: patent  Cardiovascular status: acceptable  Respiratory status: acceptable  Hydration status: acceptable  PONV: none     Anesthetic complications: None          Last vitals:  Vitals:    10/25/18 1557   BP: 147/84   Resp: 16   Temp: 98.2  F (36.8  C)         Electronically Signed By: LISE Romero CRNA  October 25, 2018  4:55 PM

## 2018-10-25 NOTE — OP NOTE
PRIMARY PROBLEM: Low back pain secondary to lumbar spondylosis  INTERVAL HISTORY:   Discussed the above note with the patient. Agree with above.     PROCEDURE:Bilateral L4-5  facet joint injections with steroid using fluoroscopic guidance and contrast control.     PROCEDURE DETAILS: After discussing the risks, benefits, and alternatives to the procedure, the patient expressed understanding and wished to proceed. Written consent was obtained.  The patient was escorted to the fluoroscopy suite and placed in the prone position on the procedure table.  A procedural pause was conducted to verify the correct: patient identity, procedure to be performed, side, site, patient position, and any special requirements. The skin was aseptically prepped and draped in the usual fashion. The skin and subcutaneous tissue were anesthetized with 1% lidocaine.  Using intermittent fluoroscopic guidance, a 22 gauge spinal needle was advanced into each facet joint at an oblique angle.  After negative aspiration, 0.25cc of contrast was injected, showing intra-articular spread of contrast without any evidence of intravascular or intrathecal spread.  A 1.0 ml solution consisting of 20mg of methylprednisolone and of 0.75 ml 2% lidocaine was then injected slowly and incrementally into the joint.  Following each injection, the needle was withdrawn slightly and was flushed with 1% lidocaine as it was withdrawn.    The patient was monitored with blood pressure and pulse oximetry machines with the assistance of an RN throughout the procedure.  The patient was alert and responsive to questions throughout the procedure.   The patient tolerated the procedure well and was observed in the post-procedural area.  The patient was dismissed without apparent complications.     DIAGNOSIS:  1. Low back pain secondary to lumbar spondylosis and a symptomatic facet arthropathy   PLAN:  1. Performed L4-5 facet injections.  If it provides benefit but the duration  of benefit is short, will discuss medial branch blocks and radiofrequency rhizotomy with the patient for longer term relief.  The patient was instructed to fill out a pain diary and follow up per Dr. Nolasco's instructions.    Colin Nolasco MD  Diplomate of the American Board of Anesthesiology, Pain Medicine

## 2018-10-25 NOTE — ANESTHESIA PREPROCEDURE EVALUATION
Anesthesia Evaluation     . Pt has had prior anesthetic. Type: MAC and General    No history of anesthetic complications          ROS/MED HX    ENT/Pulmonary:     (+)sleep apnea, HAMIDA risk factors snores loudly, hypertension, obese, daytime somnolence, tobacco use, Current use 7.5 pack yeat hx packs/day  , . .    Neurologic:  - neg neurologic ROS     Cardiovascular:     (+) hypertension-range: < 140 / 90, ---. : . . . :. .       METS/Exercise Tolerance:  >4 METS   Hematologic:  - neg hematologic  ROS       Musculoskeletal:  - neg musculoskeletal ROS       GI/Hepatic:     (+) GERD Asymptomatic on medication,       Renal/Genitourinary:  - ROS Renal section negative       Endo:  - neg endo ROS       Psychiatric:     (+) psychiatric history anxiety      Infectious Disease:  - neg infectious disease ROS       Malignancy:      - no malignancy   Other:    (+) No chance of pregnancy                    Physical Exam  Normal systems: cardiovascular, pulmonary and dental    Airway   Mallampati: III  TM distance: >3 FB  Neck ROM: full    Dental     Cardiovascular   Rhythm and rate: regular and normal  (-) no murmur    Pulmonary    breath sounds clear to auscultation                        Anesthesia Plan      History & Physical Review  History and physical reviewed and following examination; no interval change.    ASA Status:  2 .    NPO Status:  > 4 hours    Plan for General and ETT with Intravenous and Propofol induction. Maintenance will be TIVA.           Postoperative Care  Postoperative pain management:  IV analgesics.      Consents  Anesthetic plan, risks, benefits and alternatives discussed with:  Patient.  Use of blood products discussed: No .   .                          .

## 2018-10-25 NOTE — ANESTHESIA CARE TRANSFER NOTE
Patient: Virginia Valenzuela    Procedure(s):  Injest Facet Joint Lumbar 4-5 Bilateral    Diagnosis: facet arthropathy, mild intervertebral disc desisccation at L-4/L-5 without disc herniation. patent cancal throught the lumbar spine. moderate L4-5 and mild L5-S1 facet arthrosis bilaterally. associated L4 and L5 pedicular marrow edema with periartucular   facet soft tissue edema left, greater than right, likely related to a sympomatic inflammatory/degenerative facet arthopathy  Diagnosis Additional Information: No value filed.    Anesthesia Type:   General, ETT     Note:  Airway :Room Air  Patient transferred to:Phase II  Handoff Report: Identifed the Patient, Identified the Reponsible Provider, Reviewed the pertinent medical history, Discussed the surgical course, Reviewed Intra-OP anesthesia mangement and issues during anesthesia, Set expectations for post-procedure period and Allowed opportunity for questions and acknowledgement of understanding      Vitals: (Last set prior to Anesthesia Care Transfer)    CRNA VITALS  10/25/2018 1617 - 10/25/2018 1652      10/25/2018             Resp Rate (observed): 23                Electronically Signed By: LISE Romero CRNA  October 25, 2018  4:52 PM

## 2018-10-25 NOTE — IP AVS SNAPSHOT
MRN:[id number]                      After Visit Summary   10/25/2018    Virginia Valenzuela    MRN: [id number]           Thank you!     Thank you for choosing [facility] for your care. Our goal is always to provide you with excellent care. Hearing back from our patients is one way we can continue to improve our services. Please take a few minutes to complete the written survey that you may receive in the mail after you visit with us. Thank you!        Patient Information     Date Of Birth          1975        About your hospital stay     You were admitted on:  October 25, 2018 You last received care in the:  [facility] Phase II    You were discharged on:  October 25, 2018       Who to Call     For medical emergencies, please call 911.  For non-urgent questions about your medical care, please call your primary care provider or clinic, [phone]  For questions related to your surgery, please call your surgery clinic        Attending Provider     Provider Specialty    Colin [facility]co [facility]       Primary Care Provider Office Phone # Fax #    [facility] [phone]      After Care Instructions     Discharge Instructions       Review outpatient procedure discharge instructions as directed by Provider.  Fill out your pain relief form.  If you do not have pain relief within 7 days of the injection schedule a follow-up appointment at the [facility] clinic and bring your pain relief form with you to this follow-up.                  Further instructions from your care team       Home Care Instructions                Procedure:  Epidural Steroid Injection or Joint injection    Activity:    Rest today    Do not work today    Resume normal activity tomorrow    Pain:    You may experience soreness at the injection site for one or two days    You may use an ice pack for 20 minutes every 2 hours for the first 24 hours    You may use a heating pad after the first  24 hours    You may use Tylenol  (acetaminophen) every 4 hours or other pain medicines as directed by your physician    Safety  Sedation medicine, if given may remain active for many hours.    It is important for the next 24 hours that you do not:    Drive a car    Operate machines or power tools    Consume alcohol, including beer    Sign any important papers or legal documents    You may experience numbness radiating into your legs or arms, (depending on the procedure location)  This numbness may last several hours.  Until the numb sensation returns to normal please use caution in walking, climbing stairs, stepping out of your vehicle, etc.    Common side effects of steroids:  Not everyone will experience corticosteroid side effects. If side effects are experienced they will gradually subside in the 7-10 day period following an injection.    Most common side effects include:    Flushed face and/or chest    Feeling of warmth, particularly in face but could be overall feeling of warmth    Increased blood sugar in diabetic patients    Menstrual irregularities may occur.  If taking hormone based birth control an alternate method of birth control is recommended    Sleep disturbances and/or mood swings are possible    Leg cramps    Please contact us if you have:  Severe pain   Fever more than 101.5 degrees Fahrenheit  Signs of infection (redness, swelling or drainage)      If you have questions during normal business hours (8am-5pm Monday-Friday) contact the [facility] at [phone]. If you need help after hours, we recommend that you go to a hospital emergency room or dial 911.                 Pending Results     No orders found from 10/23/2018 to 10/26/2018.            Admission Information     Date & Time Provider Department Dept. Phone    10/25/2018 Colin Nolasco MD [facility] Phase [phone]      Your Vitals Were     Blood Pressure Temperature Respirations             147/84 98.2  F  (36.8  C) (Oral) 16         [employer] Information     [employer] gives you secure access to your electronic health record. If you see a primary care provider, you can also send messages to your care team and make appointments. If you have questions, please call your primary care clinic.  If you do not have a primary care provider, please call [phone] and they will assist you.        Care EveryWhere ID     This is your Care EveryWhere ID. This could be used by other organizations to access your [facility] medical records  [id number]        Equal Access to Services     CYNTHIA BROWN : Hadsonia he Somichelle, waaxda luqadaha, qaybta kaalmanash talbot, martinez montoya . So [facility] [phone].    ATENCIÓN: Si habla español, tiene a lewis disposición servicios gratuitos de asistencia lingüística. Luiz al [phone].    We comply with applicable federal civil rights laws and [place] laws. We do not discriminate on the basis of race, color, national origin, age, disability, sex, sexual orientation, or gender identity.               Review of your medicines      CONTINUE these medicines which have NOT CHANGED        Dose / Directions    cetirizine 10 MG tablet   Commonly known as:  zyrTEC   Used for:  Chronic seasonal allergic rhinitis, unspecified trigger        TAKE ONE TABLET BY MOUTH EVERY EVENING   Quantity:  90 tablet   Refills:  1       EPINEPHrine 0.3 MG/0.3ML injection 2-pack   Commonly known as:  EPIPEN/ADRENACLICK/or ANY BX GENERIC EQUIV   Used for:  History of bee sting allergy        Dose:  0.3 mg   Inject 0.3 mLs (0.3 mg) into the muscle once as needed   Quantity:  1 each   Refills:  0       losartan-hydrochlorothiazide 100-25 MG per tablet   Commonly known as:  HYZAAR        Dose:  1 tablet   Take 1 tablet by mouth daily   Refills:  10       MULTIVITAMIN ADULT PO        Dose:  1 tablet   Take 1 tablet by mouth daily   Refills:  0       NEXIUM PO        Dose:  20 mg   Take 20 mg  by mouth daily   Refills:  0       NORVASC PO        Dose:  10 mg   Take 10 mg by mouth   Refills:  0       oxyCODONE-acetaminophen 5-325 MG per tablet   Commonly known as:  PERCOCET   Used for:  S/P laparoscopic procedure        Dose:  1-2 tablet   Take 1-2 tablets by mouth every 6 hours as needed for pain (moderate to severe)   Quantity:  40 tablet   Refills:  0       potassium chloride 10 MEQ tablet   Commonly known as:  K-TAB,KLOR-CON   Used for:  Hypokalemia        Dose:  10 mEq   Take 1 tablet (10 mEq) by mouth daily   Quantity:  90 tablet   Refills:  3       PROBIOTIC DAILY PO        Take by mouth daily   Refills:  0       TYLENOL PO        Dose:  650 mg   Take 650 mg by mouth as needed for mild pain or fever   Refills:  0       * vitamin D3 2000 units Caps   Used for:  Vitamin D deficiency        TAKE ONE CAPSULE BY MOUTH EVERY DAY   Quantity:  100 capsule   Refills:  10       * vitamin D 2000 units tablet   Used for:  Vitamin D deficiency        Dose:  1 tablet   Take 1 tablet by mouth daily   Quantity:  100 tablet   Refills:  3       * Notice:  This list has 2 medication(s) that are the same as other medications prescribed for you. Read the directions carefully, and ask your doctor or other care provider to review them with you.             Protect others around you: Learn how to safely use, store and throw away your medicines at www.disposemymeds.org.             Medication List: This is a list of all your medications and when to take them. Check marks below indicate your daily home schedule. Keep this list as a reference.      Medications           Morning Afternoon Evening Bedtime As Needed    cetirizine 10 MG tablet   Commonly known as:  zyrTEC   TAKE ONE TABLET BY MOUTH EVERY EVENING                                EPINEPHrine 0.3 MG/0.3ML injection 2-pack   Commonly known as:  EPIPEN/ADRENACLICK/or ANY BX GENERIC EQUIV   Inject 0.3 mLs (0.3 mg) into the muscle once as needed                                 losartan-hydrochlorothiazide 100-25 MG per tablet   Commonly known as:  HYZAAR   Take 1 tablet by mouth daily                                MULTIVITAMIN ADULT PO   Take 1 tablet by mouth daily                                NEXIUM PO   Take 20 mg by mouth daily                                NORVASC PO   Take 10 mg by mouth                                oxyCODONE-acetaminophen 5-325 MG per tablet   Commonly known as:  PERCOCET   Take 1-2 tablets by mouth every 6 hours as needed for pain (moderate to severe)                                potassium chloride 10 MEQ tablet   Commonly known as:  K-TAB,KLOR-CON   Take 1 tablet (10 mEq) by mouth daily                                PROBIOTIC DAILY PO   Take by mouth daily                                TYLENOL PO   Take 650 mg by mouth as needed for mild pain or fever                                * vitamin D3 2000 units Caps   TAKE ONE CAPSULE BY MOUTH EVERY DAY                                * vitamin D 2000 units tablet   Take 1 tablet by mouth daily                                * Notice:  This list has 2 medication(s) that are the same as other medications prescribed for you. Read the directions carefully, and ask your doctor or other care provider to review them with you.

## 2018-11-03 ENCOUNTER — HOSPITAL ENCOUNTER (EMERGENCY)
Facility: CLINIC | Age: 43
Discharge: HOME | End: 2018-11-03
Attending: FAMILY MEDICINE | Admitting: FAMILY MEDICINE
Payer: COMMERCIAL

## 2018-11-03 ENCOUNTER — APPOINTMENT (OUTPATIENT)
Dept: CT IMAGING | Facility: CLINIC | Age: 43
End: 2018-11-03
Attending: FAMILY MEDICINE
Payer: COMMERCIAL

## 2018-11-03 VITALS
SYSTOLIC BLOOD PRESSURE: 133 MMHG | BODY MASS INDEX: 30.16 KG/M2 | OXYGEN SATURATION: 95 % | RESPIRATION RATE: 16 BRPM | TEMPERATURE: 98 F | WEIGHT: 173 LBS | DIASTOLIC BLOOD PRESSURE: 75 MMHG

## 2018-11-03 DIAGNOSIS — S46.811A TRAPEZIUS STRAIN, RIGHT, INITIAL ENCOUNTER: ICD-10-CM

## 2018-11-03 DIAGNOSIS — N83.202 LEFT OVARIAN CYST: ICD-10-CM

## 2018-11-03 DIAGNOSIS — J84.10 GRANULOMATOUS LUNG DISEASE (H): ICD-10-CM

## 2018-11-03 DIAGNOSIS — F17.200 TOBACCO USE DISORDER: ICD-10-CM

## 2018-11-03 DIAGNOSIS — S20.211A CHEST WALL CONTUSION, RIGHT, INITIAL ENCOUNTER: ICD-10-CM

## 2018-11-03 DIAGNOSIS — K76.0 FATTY INFILTRATION OF LIVER: ICD-10-CM
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
## 2018-11-03 LAB
ALBUMIN UR-MCNC: NEGATIVE MG/DL
APPEARANCE UR: CLEAR
BILIRUB UR QL STRIP: NEGATIVE
COLOR UR AUTO: NORMAL
GLUCOSE UR STRIP-MCNC: NEGATIVE MG/DL
HCG UR QL: NEGATIVE
HGB UR QL STRIP: NEGATIVE
KETONES UR STRIP-MCNC: NEGATIVE MG/DL
LEUKOCYTE ESTERASE UR QL STRIP: NEGATIVE
NITRATE UR QL: NEGATIVE
PH UR STRIP: 5 PH (ref 5–7)
SOURCE: NORMAL
SP GR UR STRIP: 1.01 (ref 1–1.03)
UROBILINOGEN UR STRIP-MCNC: 0 MG/DL (ref 0–2)

## 2018-11-03 PROCEDURE — 99285 EMERGENCY DEPT VISIT HI MDM: CPT | Mod: 25 | Performed by: FAMILY MEDICINE

## 2018-11-03 PROCEDURE — 25000132 ZZH RX MED GY IP 250 OP 250 PS 637: Performed by: FAMILY MEDICINE

## 2018-11-03 PROCEDURE — 25000128 H RX IP 250 OP 636: Performed by: FAMILY MEDICINE

## 2018-11-03 PROCEDURE — 99285 EMERGENCY DEPT VISIT HI MDM: CPT | Mod: Z6 | Performed by: FAMILY MEDICINE

## 2018-11-03 PROCEDURE — 71260 CT THORAX DX C+: CPT

## 2018-11-03 PROCEDURE — 25000125 ZZHC RX 250: Performed by: FAMILY MEDICINE

## 2018-11-03 PROCEDURE — 81025 URINE PREGNANCY TEST: CPT | Performed by: FAMILY MEDICINE

## 2018-11-03 PROCEDURE — 96374 THER/PROPH/DIAG INJ IV PUSH: CPT | Performed by: FAMILY MEDICINE

## 2018-11-03 PROCEDURE — 81003 URINALYSIS AUTO W/O SCOPE: CPT | Performed by: FAMILY MEDICINE

## 2018-11-03 RX ORDER — IOPAMIDOL 755 MG/ML
100 INJECTION, SOLUTION INTRAVASCULAR ONCE
Status: COMPLETED | OUTPATIENT
Start: 2018-11-03 | End: 2018-11-03

## 2018-11-03 RX ORDER — KETOROLAC TROMETHAMINE 15 MG/ML
15 INJECTION, SOLUTION INTRAMUSCULAR; INTRAVENOUS ONCE
Status: COMPLETED | OUTPATIENT
Start: 2018-11-03 | End: 2018-11-03

## 2018-11-03 RX ORDER — OXYCODONE HYDROCHLORIDE 5 MG/1
5 TABLET ORAL ONCE
Status: COMPLETED | OUTPATIENT
Start: 2018-11-03 | End: 2018-11-03

## 2018-11-03 RX ORDER — ACETAMINOPHEN 325 MG/1
975 TABLET ORAL ONCE
Status: COMPLETED | OUTPATIENT
Start: 2018-11-03 | End: 2018-11-03

## 2018-11-03 RX ORDER — LIDOCAINE 4 G/G
1 PATCH TOPICAL EVERY 8 HOURS PRN
COMMUNITY
Start: 2018-11-03 | End: 2023-08-24

## 2018-11-03 RX ADMIN — SODIUM CHLORIDE 60 ML: 9 INJECTION, SOLUTION INTRAVENOUS at 21:57

## 2018-11-03 RX ADMIN — ACETAMINOPHEN 975 MG: 325 TABLET ORAL at 21:35

## 2018-11-03 RX ADMIN — OXYCODONE HYDROCHLORIDE 5 MG: 5 TABLET ORAL at 21:35

## 2018-11-03 RX ADMIN — KETOROLAC TROMETHAMINE 15 MG: 15 INJECTION, SOLUTION INTRAMUSCULAR; INTRAVENOUS at 22:29

## 2018-11-03 RX ADMIN — IOPAMIDOL 85 ML: 755 INJECTION, SOLUTION INTRAVENOUS at 21:58

## 2018-11-03 ASSESSMENT — ENCOUNTER SYMPTOMS
HEADACHES: 0
LIGHT-HEADEDNESS: 0
DIZZINESS: 0
CHILLS: 0
VOMITING: 0
APPETITE CHANGE: 0
JOINT SWELLING: 0
COUGH: 0
ABDOMINAL PAIN: 1
CHOKING: 0
SHORTNESS OF BREATH: 0
ENDOCRINE NEGATIVE: 1
WHEEZING: 0
ARTHRALGIAS: 1
CHEST TIGHTNESS: 1
FATIGUE: 0
DIAPHORESIS: 0
STRIDOR: 0
WEAKNESS: 0
FEVER: 0
NUMBNESS: 0
WOUND: 1
EYES NEGATIVE: 1
NEUROLOGICAL NEGATIVE: 1
CARDIOVASCULAR NEGATIVE: 1
NECK STIFFNESS: 1
ANAL BLEEDING: 0
PSYCHIATRIC NEGATIVE: 1
BLOOD IN STOOL: 0
DIARRHEA: 0
MYALGIAS: 1
ACTIVITY CHANGE: 1
NAUSEA: 0
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
## 2018-11-04 NOTE — DISCHARGE INSTRUCTIONS
Please read and follow the handout(s) instructions. Return, if needed, for increased or worsening symptoms and as directed by the handout(s).    I recommend you have the ovarian cysts rechecked on the left ovary in about 3-4 weeks in your clinic.    It is important for you to ice the area of pain/spasm. Use the ice for 10-15 minutes every 1-2 hours as needed for the pain. Gently stretch the area after the ice while the area is still cold (see the stretching handout). Swim or walk daily. This will help prevent the muscle from weakening which will eventually cause more risk of spasm/pain. Take your medications as directed only (see the med list). Return, if needed, for increased symptoms as directed your handout(s).     Sergio Pizano DO
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
## 2018-11-04 NOTE — ED NOTES
"Pt refused UPT, states she \"can't get pregnant because I don't have any fallopian tubes\". Provider notified.  "

## 2018-11-26 ENCOUNTER — TRANSFERRED RECORDS (OUTPATIENT)
Dept: HEALTH INFORMATION MANAGEMENT | Facility: CLINIC | Age: 43
End: 2018-11-26

## 2018-11-27 ENCOUNTER — MYC MEDICAL ADVICE (OUTPATIENT)
Dept: FAMILY MEDICINE | Facility: CLINIC | Age: 43
End: 2018-11-27

## 2018-11-30 ENCOUNTER — MYC MEDICAL ADVICE (OUTPATIENT)
Dept: FAMILY MEDICINE | Facility: CLINIC | Age: 43
End: 2018-11-30

## 2018-11-30 DIAGNOSIS — Z87.42 HISTORY OF OVARIAN CYST: Primary | ICD-10-CM

## 2018-12-03 NOTE — TELEPHONE ENCOUNTER
Per RM US was reviewed, could represent new ovarian cyst or ovarian in nature. Recommend appt with patient is having pelvic pain, if not repeat US in 2 months. Spoke with patient and informed her of this. She is not having pain, only very occasional and wants to monitor with repeat US in 2 months. Order is placed and patient scheduled  Brook Peter CMA

## 2018-12-20 DIAGNOSIS — E87.6 HYPOKALEMIA: ICD-10-CM

## 2018-12-24 RX ORDER — POTASSIUM CHLORIDE 750 MG/1
TABLET, EXTENDED RELEASE ORAL
Qty: 90 TABLET | Refills: 1 | Status: SHIPPED | OUTPATIENT
Start: 2018-12-24 | End: 2022-11-21

## 2018-12-24 NOTE — TELEPHONE ENCOUNTER
"Requested Prescriptions   Pending Prescriptions Disp Refills     potassium chloride ER (K-TAB/KLOR-CON) 10 MEQ CR tablet [Pharmacy Med Name: POTASSIUM CHLORIDE ER 10MEQ TBCR] 90 tablet 3    Last Written Prescription Date:  11/28/17  Last Fill Quantity: 90,  # refills: 3   Last office visit: 5/18/2018 with prescribing provider:     Future Office Visit:     Sig: TAKE ONE TABLET BY MOUTH EVERY DAY    Potassium Supplements Protocol Passed - 12/20/2018  7:38 PM       Passed - Recent (12 mo) or future (30 days) visit within the authorizing provider's specialty    Patient had office visit in the last 12 months or has a visit in the next 30 days with authorizing provider or within the authorizing provider's specialty.  See \"Patient Info\" tab in inbasket, or \"Choose Columns\" in Meds & Orders section of the refill encounter.             Passed - Patient is age 18 or older       Passed - Normal serum potassium in past 12 months    Recent Labs   Lab Test 05/13/18  0925   POTASSIUM 3.5                    Rx refilled per RN protocol.  PHILIP RobledoN, RN    "

## 2019-02-04 ENCOUNTER — HOSPITAL ENCOUNTER (OUTPATIENT)
Dept: ULTRASOUND IMAGING | Facility: CLINIC | Age: 44
Discharge: HOME | End: 2019-02-04
Attending: OBSTETRICS & GYNECOLOGY | Admitting: OBSTETRICS & GYNECOLOGY
Payer: COMMERCIAL

## 2019-02-04 DIAGNOSIS — Z87.42 HISTORY OF OVARIAN CYST: ICD-10-CM

## 2019-02-04 PROCEDURE — 76830 TRANSVAGINAL US NON-OB: CPT

## 2019-02-05 ENCOUNTER — TELEPHONE (OUTPATIENT)
Dept: FAMILY MEDICINE | Facility: CLINIC | Age: 44
End: 2019-02-05

## 2019-02-05 DIAGNOSIS — N83.209 CYST OF OVARY, UNSPECIFIED LATERALITY: Primary | ICD-10-CM

## 2019-02-05 NOTE — TELEPHONE ENCOUNTER
Order placed per Dr. Mishra. Patient notified of MD message.  She states she will call radiology to schedule.  No questions or concerns.  Alexis Heard MA

## 2019-02-05 NOTE — TELEPHONE ENCOUNTER
----- Message from Nate Mishra MD sent at 2/4/2019  5:11 PM CST -----  Brook Please inform Virginia/ or caretaker  that this result(s) is/are showing 2 cyst on the left ovary.  Please advise her to repeat an ultrasound in 6 weeks to make sure the cysts resolved.  Please help her set up that exam.  Thanks. MARIETTA Mishra MD

## 2019-03-11 ENCOUNTER — TELEPHONE (OUTPATIENT)
Dept: SURGERY | Facility: CLINIC | Age: 44
End: 2019-03-11

## 2019-03-11 NOTE — TELEPHONE ENCOUNTER
Patient called to schedule EVIE with Dr. Nolasco on 3/15/19 at 130pm. Received orders from [facility].  Instructed pt to make appt for H&P and to have a  the day of the procedure.

## 2019-03-13 ENCOUNTER — TRANSFERRED RECORDS (OUTPATIENT)
Dept: HEALTH INFORMATION MANAGEMENT | Facility: CLINIC | Age: 44
End: 2019-03-13

## 2019-03-15 ENCOUNTER — HOSPITAL ENCOUNTER (OUTPATIENT)
Facility: CLINIC | Age: 44
Discharge: HOME | End: 2019-03-15
Attending: ANESTHESIOLOGY | Admitting: ANESTHESIOLOGY
Payer: COMMERCIAL

## 2019-03-15 ENCOUNTER — TRANSFERRED RECORDS (OUTPATIENT)
Dept: HEALTH INFORMATION MANAGEMENT | Facility: CLINIC | Age: 44
End: 2019-03-15

## 2019-03-15 ENCOUNTER — HOSPITAL ENCOUNTER (OUTPATIENT)
Dept: GENERAL RADIOLOGY | Facility: CLINIC | Age: 44
End: 2019-03-15
Attending: ANESTHESIOLOGY | Admitting: ANESTHESIOLOGY
Payer: COMMERCIAL

## 2019-03-15 ENCOUNTER — ANESTHESIA EVENT (OUTPATIENT)
Dept: SURGERY | Facility: CLINIC | Age: 44
End: 2019-03-15
Payer: COMMERCIAL

## 2019-03-15 ENCOUNTER — ANESTHESIA (OUTPATIENT)
Dept: SURGERY | Facility: CLINIC | Age: 44
End: 2019-03-15
Payer: COMMERCIAL

## 2019-03-15 VITALS
SYSTOLIC BLOOD PRESSURE: 142 MMHG | RESPIRATION RATE: 16 BRPM | DIASTOLIC BLOOD PRESSURE: 83 MMHG | OXYGEN SATURATION: 95 % | TEMPERATURE: 98.1 F | HEART RATE: 65 BPM

## 2019-03-15 DIAGNOSIS — M54.16 LUMBAR RADICULOPATHY: ICD-10-CM

## 2019-03-15 PROCEDURE — 25000128 H RX IP 250 OP 636: Performed by: ANESTHESIOLOGY

## 2019-03-15 PROCEDURE — 37000008 ZZH ANESTHESIA TECHNICAL FEE, 1ST 30 MIN: Performed by: ANESTHESIOLOGY

## 2019-03-15 PROCEDURE — 25000128 H RX IP 250 OP 636: Performed by: NURSE ANESTHETIST, CERTIFIED REGISTERED

## 2019-03-15 PROCEDURE — 64493 INJ PARAVERT F JNT L/S 1 LEV: CPT | Performed by: ANESTHESIOLOGY

## 2019-03-15 PROCEDURE — 64493 INJ PARAVERT F JNT L/S 1 LEV: CPT | Mod: 50 | Performed by: ANESTHESIOLOGY

## 2019-03-15 PROCEDURE — 40000277 XR SURGERY CARM FLUORO LESS THAN 5 MIN W STILLS: Mod: TC

## 2019-03-15 PROCEDURE — 25000125 ZZHC RX 250: Performed by: NURSE ANESTHETIST, CERTIFIED REGISTERED

## 2019-03-15 RX ORDER — PROPOFOL 10 MG/ML
INJECTION, EMULSION INTRAVENOUS PRN
Status: DISCONTINUED | OUTPATIENT
Start: 2019-03-15 | End: 2019-03-15

## 2019-03-15 RX ORDER — LIDOCAINE HYDROCHLORIDE 20 MG/ML
INJECTION, SOLUTION INFILTRATION; PERINEURAL PRN
Status: DISCONTINUED | OUTPATIENT
Start: 2019-03-15 | End: 2019-03-15

## 2019-03-15 RX ORDER — TRIAMCINOLONE ACETONIDE 40 MG/ML
INJECTION, SUSPENSION INTRA-ARTICULAR; INTRAMUSCULAR PRN
Status: DISCONTINUED | OUTPATIENT
Start: 2019-03-15 | End: 2019-03-15 | Stop reason: HOSPADM

## 2019-03-15 RX ADMIN — LIDOCAINE HYDROCHLORIDE 50 MG: 20 INJECTION, SOLUTION INFILTRATION; PERINEURAL at 13:48

## 2019-03-15 RX ADMIN — PROPOFOL 40 MG: 10 INJECTION, EMULSION INTRAVENOUS at 13:58

## 2019-03-15 RX ADMIN — PROPOFOL 100 MG: 10 INJECTION, EMULSION INTRAVENOUS at 13:51

## 2019-03-15 ASSESSMENT — LIFESTYLE VARIABLES: TOBACCO_USE: 1

## 2019-03-15 NOTE — ANESTHESIA PREPROCEDURE EVALUATION
Anesthesia Pre-Procedure Evaluation    Patient: Virginia Valenzuela   MRN: [id number] : 1975          Preoperative Diagnosis: lumbar radiculopathy    Procedure(s):  INJECT EPIDURAL LUMBAR 4-5    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     History of appendectomy      Hypertension      Idiopathic urticaria      Obstructive sleep apnea (adult) (pediatric)      Other chronic allergic conjunctivitis      Other malaise and fatigue      Pain in joint, shoulder region      S/P laparoscopic cholecystectomy      Special screening examination for human papillomavirus (HPV)      Sprain of tibiofibular (ligament), distal of ankle      Threatened , unspecified as to episode of care      Tobacco use disorder      Unspecified essential hypertension      Unspecified vitamin D deficiency      Past Surgical History:   Procedure Laterality Date     ABDOMEN SURGERY  2013    ovaian cyst and right ovary removal     APPENDECTOMY       CHOLECYSTECTOMY       COLONOSCOPY N/A 10/26/2017    Procedure: COMBINED COLONOSCOPY, SINGLE OR MULTIPLE BIOPSY/POLYPECTOMY BY BIOPSY;  Colonoscopy with biopsies by biopsy;  Surgeon: Fred Faust DO;  Location:  GI     EYE SURGERY       GENITOURINARY SURGERY  2013    Ovary, fallopian tube     GYN SURGERY  2013    Ovary, Fallopian tube     INJECT FACET JOINT N/A 10/25/2018    Procedure: Injest Facet Joint Lumbar 4-5 Bilateral;  Surgeon: Colin Nolasco MD;  [facility]     LAPAROSCOPIC LYSIS ADHESIONS Left 3/1/2016    Procedure: LAPAROSCOPIC LYSIS ADHESIONS;  Surgeon: Nate Mishra MD;  Location[facility]     LAPAROSCOPIC LYSIS ADHESIONS N/A 2017     Procedure: LAPAROSCOPIC LYSIS ADHESIONS;   laparoscopic lysis of adhesions;  Surgeon: Nate Mishra MD;  [facility]     LAPAROSCOPIC LYSIS ADHESIONS N/A 7/13/2017    Procedure: LAPAROSCOPIC LYSIS ADHESIONS;   laparoscopic lysis of adhesions;  Surgeon: Nate Milligan MD;  [facility]     LAPAROSCOPIC LYSIS ADHESIONS N/A 5/14/2018    Procedure: LAPAROSCOPIC LYSIS ADHESIONS;;  Surgeon: Nate Mishra MD;  [facility]     LAPAROSCOPIC SALPINGECTOMY Left 5/14/2018    Procedure: LAPAROSCOPIC SALPINGECTOMY;;  Surgeon: Nate Mishra MD;  [facility]     LAPAROSCOPY DIAGNOSTIC (GYN) N/A 5/14/2018    Procedure: LAPAROSCOPY DIAGNOSTIC (GYN);  laparoscopy, left salpingectomy, left ovarian nodule removal and lysis of adhesions;  Surgeon: Nate Mishra MD;  [facility]     RELEASE CARPAL TUNNEL Left 8/31/2016    Procedure: RELEASE CARPAL TUNNEL;  Surgeon: Gucci Hairston MD;  Location[facility]     RELEASE CARPAL TUNNEL Right 12/7/2016    Procedure: RELEASE CARPAL TUNNEL;  Surgeon: Gucci Hairston MD;  Location[facility]       Anesthesia Evaluation     . Pt has had prior anesthetic. Type: MAC and General    No history of anesthetic complications          ROS/MED HX    ENT/Pulmonary:     (+)sleep apnea, HAMIDA risk factors snores loudly, hypertension, obese, daytime somnolence, tobacco use, Current use 0.5-1 ppd x 20 years packs/day  , . .    Neurologic:  - neg neurologic ROS     Cardiovascular:     (+) hypertension-range: < 140 / 90, ---. : . . . :. .       METS/Exercise Tolerance:  >4 METS   Hematologic:  - neg hematologic  ROS       Musculoskeletal:  - neg musculoskeletal ROS       GI/Hepatic:     (+) GERD Asymptomatic on medication,       Renal/Genitourinary:  - ROS Renal section negative       Endo:  - neg endo ROS       Psychiatric:     (+) psychiatric history anxiety      Infectious Disease:  - neg infectious disease ROS       Malignancy:      - no  "malignancy   Other:    (+) No chance of pregnancy H/O Chronic Pain,H/O chronic opiod use ,                         Physical Exam  Normal systems: cardiovascular, pulmonary and dental    Airway   Mallampati: III  TM distance: >3 FB  Neck ROM: full    Dental     Cardiovascular   Rhythm and rate: regular and normal  (-) no murmur    Pulmonary    breath sounds clear to auscultation    Other findings: Took hydrochlorothiazide yesterday and took Amlodipine this am        Lab Results   Component Value Date    WBC 10.6 01/05/2018    HGB 13.3 01/05/2018    HCT 40.1 01/05/2018     01/05/2018    CRP 11.6 (H) 11/28/2017    SED 17 04/18/2017     05/13/2018    POTASSIUM 3.5 05/13/2018    CHLORIDE 105 05/13/2018    CO2 31 05/13/2018    BUN 7 05/13/2018    CR 0.64 05/13/2018    GLC 95 05/13/2018    YUVAL 8.7 05/13/2018    ALBUMIN 3.5 12/12/2017    PROTTOTAL 7.6 12/12/2017    ALT 42 12/12/2017    AST 31 12/12/2017    ALKPHOS 105 12/12/2017    BILITOTAL 0.2 12/12/2017    LIPASE 131 02/07/2017    TSH 1.94 10/11/2013    HCG Negative 11/03/2018       Preop Vitals  BP Readings from Last 3 Encounters:   11/03/18 133/75   10/25/18 122/78   09/25/18 116/70    Pulse Readings from Last 3 Encounters:   05/18/18 112   05/14/18 78   04/30/18 72      Resp Readings from Last 3 Encounters:   11/03/18 16   10/25/18 16   05/14/18 16    SpO2 Readings from Last 3 Encounters:   11/03/18 95%   10/25/18 99%   05/18/18 95%      Temp Readings from Last 1 Encounters:   11/03/18 98  F (36.7  C) (Oral)    Ht Readings from Last 1 Encounters:   09/25/18 1.613 m (5' 3.5\")      Wt Readings from Last 1 Encounters:   11/03/18 78.5 kg (173 lb)    Estimated body mass index is 30.16 kg/m  as calculated from the following:    Height as of 9/25/18: 1.613 m (5' 3.5\").    Weight as of 11/3/18: 78.5 kg (173 lb).       Anesthesia Plan      History & Physical Review  History and physical reviewed and following examination; no interval change.    ASA Status:  2 .  "   NPO Status:  > 4 hours    Plan for MAC with Intravenous and Propofol induction. Maintenance will be TIVA.  Reason for MAC:  Deep or markedly invasive procedure (G8)         Postoperative Care  Postoperative pain management:  Oral pain medications.      Consents  Anesthetic plan, risks, benefits and alternatives discussed with:  Patient.  Use of blood products discussed: No .   .                 LISE Gonzalez CRNA

## 2019-03-15 NOTE — ANESTHESIA CARE TRANSFER NOTE
Patient: Virginia Valenzuela    Procedure(s):  Inject Facets Lumbar 4-5, Bilateral    Diagnosis: lumbar radiculopathy  Diagnosis Additional Information: No value filed.    Anesthesia Type:   MAC     Note:  Airway :Room Air  Patient transferred to:Phase II  Handoff Report: Identifed the Patient, Identified the Reponsible Provider, Reviewed the pertinent medical history, Discussed the surgical course, Reviewed Intra-OP anesthesia mangement and issues during anesthesia, Set expectations for post-procedure period and Allowed opportunity for questions and acknowledgement of understanding      Vitals: (Last set prior to Anesthesia Care Transfer)    CRNA VITALS  3/15/2019 1333 - 3/15/2019 1409      3/15/2019             EKG:  NSR                Electronically Signed By: LISE Gonzalez CRNA  March 15, 2019  2:09 PM

## 2019-03-15 NOTE — ANESTHESIA POSTPROCEDURE EVALUATION
Patient: Virginia Valenzuela    Procedure(s):  Inject Facets Lumbar 4-5, Bilateral    Diagnosis:lumbar radiculopathy  Diagnosis Additional Information: No value filed.    Anesthesia Type:  MAC    Note:  Anesthesia Post Evaluation    Patient location during evaluation: Phase 2 and Bedside  Patient participation: Able to fully participate in evaluation  Level of consciousness: awake and alert  Pain management: satisfactory to patient  Airway patency: patent  Cardiovascular status: stable  Respiratory status: spontaneous ventilation and room air  Hydration status: stable  PONV: none     Anesthetic complications: None    Comments: Appear to tolerate MAC with IV sedation well without anesthesia related problems / complications noted.  Pain level satisfactory per patient. No N  /  V.  No complaints per patient.  Will follow as needed.        Last vitals:  Vitals:    03/15/19 1228   BP: 143/75   Resp: 16   Temp: 98.1  F (36.7  C)         Electronically Signed By: LISE Gonzalez CRNA  March 15, 2019  2:10 PM

## 2019-03-15 NOTE — DISCHARGE INSTRUCTIONS
Home Care Instructions                Procedure:  Epidural Steroid Injection or Joint injection    Activity:    Rest today    Do not work today    Resume normal activity tomorrow    Pain:    You may experience soreness at the injection site for one or two days    You may use an ice pack for 20 minutes every 2 hours for the first 24 hours    You may use a heating pad after the first 24 hours    You may use Tylenol  (acetaminophen) every 4 hours or other pain medicines as directed by your physician    Safety  Sedation medicine, if given may remain active for many hours.    It is important for the next 24 hours that you do not:    Drive a car    Operate machines or power tools    Consume alcohol, including beer    Sign any important papers or legal documents    You may experience numbness radiating into your legs or arms, (depending on the procedure location)  This numbness may last several hours.  Until the numb sensation returns to normal please use caution in walking, climbing stairs, stepping out of your vehicle, etc.    Common side effects of steroids:  Not everyone will experience corticosteroid side effects. If side effects are experienced they will gradually subside in the 7-10 day period following an injection.    Most common side effects include:    Flushed face and/or chest    Feeling of warmth, particularly in face but could be overall feeling of warmth    Increased blood sugar in diabetic patients    Menstrual irregularities may occur.  If taking hormone based birth control an alternate method of birth control is recommended    Sleep disturbances and/or mood swings are possible    Leg cramps    Please contact us if you have:  Severe pain   Fever more than 101.5 degrees Fahrenheit  Signs of infection (redness, swelling or drainage)      If you have questions during normal business hours (8am-5pm Monday-Friday) contact the [facility] at [phone]. If you need help after hours, we recommend that  you go to a hospital emergency room or dial 911.

## 2019-03-18 ENCOUNTER — HOSPITAL ENCOUNTER (OUTPATIENT)
Dept: ULTRASOUND IMAGING | Facility: CLINIC | Age: 44
Discharge: HOME | End: 2019-03-18
Attending: OBSTETRICS & GYNECOLOGY | Admitting: OBSTETRICS & GYNECOLOGY
Payer: COMMERCIAL

## 2019-03-18 DIAGNOSIS — N83.209 CYST OF OVARY, UNSPECIFIED LATERALITY: ICD-10-CM

## 2019-03-18 PROCEDURE — 76830 TRANSVAGINAL US NON-OB: CPT

## 2019-03-19 ENCOUNTER — TELEPHONE (OUTPATIENT)
Dept: OBGYN | Facility: CLINIC | Age: 44
End: 2019-03-19

## 2019-03-19 DIAGNOSIS — N83.202 LEFT OVARIAN CYST: Primary | ICD-10-CM

## 2019-03-19 NOTE — TELEPHONE ENCOUNTER
----- Message from Nate Mishra MD sent at 3/19/2019  1:19 PM CDT -----  Rbook Please inform Virginia/ or caretaker  that this result(s) is/are showing 2 small cysts on the left ovary.  Neither one looks very concerning at all.  However I would recommend that she repeat an ultrasound in 4 months to make sure that they go away.  Please help her set this up thanks. MARIETTA Mishra MD     Universal Safety Interventions

## 2019-03-19 NOTE — RESULT ENCOUNTER NOTE
[place] Please inform [place]/ or caretaker  that this result(s) is/are showing 2 small cysts on the left ovary.  Neither one looks very concerning at all.  However I would recommend that she repeat an ultrasound in 4 months to make sure that they go away.  Please help her set this up thanks. MARIETTA Mishra MD

## 2019-03-19 NOTE — TELEPHONE ENCOUNTER
Patient called and informed. US order placed and patient transferred to scheduling to schedule US.  Patient is wondering if Dr. Mishra has an explanation as to why she is having low pelvic pain that starts on the left side and radiates to the right.  Per patient wondering if this could be scar tissue?  Provider please advise.  Alexis Heard MA

## 2019-03-22 NOTE — ED AVS SNAPSHOT
[facility] Emergency Department    [address] [place] [address]    Phone:  [phone]    Fax:  [phone]                                       Virginia Valenzuela   MRN: [id number]    Department:  [facility] Emergency Department   Date of Visit:  3/6/2018           After Visit Summary Signature Page     I have received my discharge instructions, and my questions have been answered. I have discussed any challenges I see with this plan with the nurse or doctor.    ..........................................................................................................................................  Patient/Patient Representative Signature      ..........................................................................................................................................  Patient Representative Print Name and Relationship to Patient    ..................................................               ................................................  Date                                            Time    ..........................................................................................................................................  Reviewed by Signature/Title    ...................................................              ..............................................  Date                                                            Time           Pt in MPU 1 for Paracentesis.

## 2019-05-29 ENCOUNTER — HOSPITAL ENCOUNTER (OUTPATIENT)
Dept: PHYSICAL THERAPY | Facility: CLINIC | Age: 44
Setting detail: THERAPIES SERIES
End: 2019-05-29
Payer: COMMERCIAL

## 2019-05-29 PROCEDURE — 97161 PT EVAL LOW COMPLEX 20 MIN: CPT | Mod: GP | Performed by: PHYSICAL THERAPIST

## 2019-05-29 PROCEDURE — 97112 NEUROMUSCULAR REEDUCATION: CPT | Mod: GP | Performed by: PHYSICAL THERAPIST

## 2019-05-29 PROCEDURE — 97110 THERAPEUTIC EXERCISES: CPT | Mod: GP | Performed by: PHYSICAL THERAPIST
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
## 2019-06-10 ENCOUNTER — HOSPITAL ENCOUNTER (OUTPATIENT)
Dept: PHYSICAL THERAPY | Facility: CLINIC | Age: 44
Setting detail: THERAPIES SERIES
End: 2019-06-10
Payer: COMMERCIAL

## 2019-06-10 PROCEDURE — 97110 THERAPEUTIC EXERCISES: CPT | Mod: GP | Performed by: PHYSICAL THERAPIST

## 2019-06-10 PROCEDURE — 97035 APP MDLTY 1+ULTRASOUND EA 15: CPT | Mod: GP | Performed by: PHYSICAL THERAPIST

## 2019-06-18 ENCOUNTER — HOSPITAL ENCOUNTER (OUTPATIENT)
Dept: PHYSICAL THERAPY | Facility: CLINIC | Age: 44
Setting detail: THERAPIES SERIES
End: 2019-06-18
Payer: COMMERCIAL

## 2019-06-18 PROCEDURE — 97035 APP MDLTY 1+ULTRASOUND EA 15: CPT | Mod: GP | Performed by: PHYSICAL THERAPIST

## 2019-06-18 PROCEDURE — 97110 THERAPEUTIC EXERCISES: CPT | Mod: GP | Performed by: PHYSICAL THERAPIST

## 2019-06-28 ENCOUNTER — HOSPITAL ENCOUNTER (OUTPATIENT)
Dept: PHYSICAL THERAPY | Facility: CLINIC | Age: 44
Setting detail: THERAPIES SERIES
End: 2019-06-28
Payer: COMMERCIAL

## 2019-06-28 PROCEDURE — 97110 THERAPEUTIC EXERCISES: CPT | Mod: GP | Performed by: PHYSICAL THERAPIST

## 2019-07-09 ENCOUNTER — HOSPITAL ENCOUNTER (OUTPATIENT)
Dept: PHYSICAL THERAPY | Facility: CLINIC | Age: 44
Setting detail: THERAPIES SERIES
End: 2019-07-09
Payer: COMMERCIAL

## 2019-07-09 PROCEDURE — 97110 THERAPEUTIC EXERCISES: CPT | Mod: GP | Performed by: PHYSICAL THERAPIST

## 2019-07-15 ENCOUNTER — HOSPITAL ENCOUNTER (OUTPATIENT)
Dept: ULTRASOUND IMAGING | Facility: CLINIC | Age: 44
Discharge: HOME | End: 2019-07-15
Attending: OBSTETRICS & GYNECOLOGY | Admitting: OBSTETRICS & GYNECOLOGY
Payer: COMMERCIAL

## 2019-07-15 DIAGNOSIS — N83.202 LEFT OVARIAN CYST: ICD-10-CM

## 2019-07-15 PROCEDURE — 76830 TRANSVAGINAL US NON-OB: CPT

## 2019-07-16 ENCOUNTER — HOSPITAL ENCOUNTER (OUTPATIENT)
Dept: PHYSICAL THERAPY | Facility: CLINIC | Age: 44
Setting detail: THERAPIES SERIES
End: 2019-07-16
Payer: COMMERCIAL

## 2019-07-16 PROCEDURE — 97110 THERAPEUTIC EXERCISES: CPT | Mod: GP | Performed by: PHYSICAL THERAPIST

## 2019-07-16 PROCEDURE — 97140 MANUAL THERAPY 1/> REGIONS: CPT | Mod: GP | Performed by: PHYSICAL THERAPIST

## 2019-07-22 NOTE — RESULT ENCOUNTER NOTE
Elisabeth/Alexis Ding,Please inform Virginia/ or caretaker  that this result(s) is/are still showing the cyst on the left ovary.  She did there are 2 small cysts.  The largest is 2.2 cm which is not very big.  I still think we need to follow this along.  I would suggest repeating the ultrasound in 3 months.  Since the cyst is not getting any bigger that is reassuring.  We Could also consider removing the ovary because the cyst is still there.  If she would prefer that approach please set up an appointment to see me.  Otherwise set up an ultrasound for 3 months from now.  Thanks. MARIETTA Mishra MD

## 2019-08-05 NOTE — ADDENDUM NOTE
Encounter addended by: Rossana Morales, PT on: 8/5/2019 11:57 AM   Actions taken: Charge Capture section accepted

## 2019-08-06 ENCOUNTER — HOSPITAL ENCOUNTER (OUTPATIENT)
Dept: PHYSICAL THERAPY | Facility: CLINIC | Age: 44
Setting detail: THERAPIES SERIES
End: 2019-08-06
Payer: MEDICAID

## 2019-08-06 PROCEDURE — 97140 MANUAL THERAPY 1/> REGIONS: CPT | Mod: GP | Performed by: PHYSICAL THERAPIST

## 2019-08-08 ENCOUNTER — HOSPITAL ENCOUNTER (OUTPATIENT)
Dept: PHYSICAL THERAPY | Facility: CLINIC | Age: 44
Setting detail: THERAPIES SERIES
End: 2019-08-08
Payer: MEDICAID

## 2019-08-08 PROCEDURE — 97110 THERAPEUTIC EXERCISES: CPT | Mod: GP | Performed by: PHYSICAL THERAPIST

## 2019-08-08 PROCEDURE — 97140 MANUAL THERAPY 1/> REGIONS: CPT | Mod: GP | Performed by: PHYSICAL THERAPIST

## 2019-08-08 NOTE — ADDENDUM NOTE
Encounter addended by: Rossana Morales, PT on: 8/8/2019 9:14 AM   Actions taken: Pend clinical note, Sign clinical note

## 2019-08-08 NOTE — PROGRESS NOTES
Outpatient Physical Therapy Progress Note     Patient: Virginia Valenzuela  : 1975    Beginning/End Dates of Reporting Period:  2019 - 2019     Referring Provider: Jackie Jimenez CNP    Therapy Diagnosis: low back pain with B radiculopathy into buttocks with signs of posterior derangement, impaired posture, weakness    Client Self Report: No new concerns noted. MD visit . Some relief  with extension based exercises, so stiff when getting out of bed.Extension,  2 times per day. Does have R ovary and fallopian tube removed, L sided fallopian tube but still has the ovary L.  Cholestectomy, appendectomy and 2 adhesion removals in past surgical history. Overall symptoms not worse with PT but not much better either in these few visits. Does offer diffuse pelvic/lower abdominal pain which is chronic, normal bowel movements without constipation, pelvic pain wtih sexual intercourse (penetration as well as with sustained positions), on episode of incontinence without urge, and stress incontinence reported. Pain today 4-5/10 broad based LB, no leg pain.     Objective Measurements:  Objective Measure: Palpation  Details: L pubis posterior and tender compared to R. L ilium higher than R with only slight rotation noted today. General pull of fascia in abdomen into L shear focused around L abdomen anteriorly. Transverse plane pelvic rotation L.  Poor reversal of lordosis wth lumbar flexion, with extension tends to hinge from higher lumbar and thoracic, similar observed in SB       Goals:  Goal Identifier Biking   Goal Description Patient will report able to bike 6 miles pain free in order to return to prior level of function.    Target Date 19   Date Met  19   Progress:     Goal Identifier Pain   Goal Description Patient will report able to manage pain to acceptable levels with comphrensive HEP reducing use of medication by 50% in order to progress towards her goal of decrease use of medications  for pain management.   Target Date 06/26/19   Date Met  06/28/19   Progress:     Goal Identifier Function   Goal Description Patient will report a decrease in MATTIE score by 12.88% in order to meet the MCID for significant improvement in pain/disability demonstrating overall improvement in function.   Target Date 07/24/19   Date Met      Progress:     Goal Identifier HEP   Goal Description Patient will be independent with comprehensive HEP addressing pain, posture, weakness, and activity modification in order to facilitate discharge from PT services.   Target Date 07/24/19   Date Met      Progress:     Progress Toward Goals: 2  goals met so far. Progress this reporting period: Continue to address ROM, mm flexibility, fascial restrictions/scar tissue, and core /LE strength.       Patient has attended 5 visits in this reporting period. Treatment has included therapeutic exercise for positioning, stretching, Manual therapy to address mobility deficits affecting ROM and tissue flexibility.  Plan:  Continue therapy per current plan of care.  Changes include increase frequency to 1-2 times per week for 90 days,   Certification and note completed with updated insurance as of 8/1/19.    Discharge:  No

## 2019-08-08 NOTE — PROGRESS NOTES
[facility]          OUTPATIENT PHYSICAL THERAPY ORTHOPEDIC EVALUATION  PLAN OF TREATMENT FOR OUTPATIENT REHABILITATION  (COMPLETE FOR INITIAL CLAIMS ONLY)  Patient's Last Name, First Name, M.I.  YOB: 1975  BiancaVirginia  D    Provider s Name:  [facility]   Medical Record No.  [id number]   Start of Care Date:   5/16/2019   Onset Date:   5/29/2014   Type:     _X__PT   ___OT   ___SLP Medical Diagnosis:   LBP   PT Diagnosis:   low back pain with B radiculopathy into buttocks with signs of posterior derangement, impaired posture, weakness   Visits from SOC:  1      _________________________________________________________________________________  Plan of Treatment/Functional Goals:          Goals  Goal Identifier: Biking  Goal Description: Patient will report able to bike 6 miles pain free in order to return to prior level of function.   Target Date: 07/24/19    Goal Identifier: Pain  Goal Description: Patient will report able to manage pain to acceptable levels with comphrensive HEP reducing use of medication by 50% in order to progress towards her goal of decrease use of medications for pain management.  Target Date: 06/26/19    Goal Identifier: Function  Goal Description: Patient will report a decrease in MATTIE score by 12.88% in order to meet the MCID for significant improvement in pain/disability demonstrating overall improvement in function.  Target Date: 07/24/19    Goal Identifier: HEP  Goal Description: Patient will be independent with comprehensive HEP addressing pain, posture, weakness, and activity modification in order to facilitate discharge from PT services.  Target Date: 07/24/19            Therapy Frequency:  2 times per week  Predicted Duration of Therapy Intervention:  90 days    Rossana Morales PT                 I CERTIFY THE NEED FOR THESE SERVICES FURNISHED UNDER        THIS PLAN OF TREATMENT AND WHILE UNDER MY CARE .             Physician  Signature               Date    X_____________________________________________________                             Certification Date From:    7/6/2019   Certification Date To:   9/30/2019  Referring Provider:   Jackie Jimenez CNP    Initial Assessment        See Epic Evaluation

## 2019-08-13 ENCOUNTER — HOSPITAL ENCOUNTER (OUTPATIENT)
Dept: PHYSICAL THERAPY | Facility: CLINIC | Age: 44
Setting detail: THERAPIES SERIES
End: 2019-08-13
Payer: MEDICAID

## 2019-08-13 PROCEDURE — 97140 MANUAL THERAPY 1/> REGIONS: CPT | Mod: GP | Performed by: PHYSICAL THERAPIST

## 2019-08-15 ENCOUNTER — HOSPITAL ENCOUNTER (OUTPATIENT)
Dept: PHYSICAL THERAPY | Facility: CLINIC | Age: 44
Setting detail: THERAPIES SERIES
End: 2019-08-15
Payer: MEDICAID

## 2019-08-15 PROCEDURE — 97140 MANUAL THERAPY 1/> REGIONS: CPT | Mod: GP | Performed by: PHYSICAL THERAPIST

## 2019-08-20 ENCOUNTER — HOSPITAL ENCOUNTER (OUTPATIENT)
Dept: PHYSICAL THERAPY | Facility: CLINIC | Age: 44
Setting detail: THERAPIES SERIES
End: 2019-08-20
Payer: MEDICAID

## 2019-08-20 PROCEDURE — 97110 THERAPEUTIC EXERCISES: CPT | Mod: GP | Performed by: PHYSICAL THERAPIST

## 2019-08-20 PROCEDURE — 97140 MANUAL THERAPY 1/> REGIONS: CPT | Mod: GP | Performed by: PHYSICAL THERAPIST

## 2019-08-22 ENCOUNTER — HOSPITAL ENCOUNTER (OUTPATIENT)
Dept: PHYSICAL THERAPY | Facility: CLINIC | Age: 44
Setting detail: THERAPIES SERIES
End: 2019-08-22
Payer: MEDICAID

## 2019-08-22 PROCEDURE — 97140 MANUAL THERAPY 1/> REGIONS: CPT | Mod: GP | Performed by: PHYSICAL THERAPIST

## 2019-09-03 ENCOUNTER — HOSPITAL ENCOUNTER (OUTPATIENT)
Dept: PHYSICAL THERAPY | Facility: CLINIC | Age: 44
Setting detail: THERAPIES SERIES
End: 2019-09-03
Payer: COMMERCIAL

## 2019-09-03 PROCEDURE — 97140 MANUAL THERAPY 1/> REGIONS: CPT | Mod: GP | Performed by: PHYSICAL THERAPIST

## 2019-09-12 ENCOUNTER — HOSPITAL ENCOUNTER (EMERGENCY)
Facility: CLINIC | Age: 44
Discharge: HOME | End: 2019-09-12
Attending: FAMILY MEDICINE | Admitting: FAMILY MEDICINE
Payer: COMMERCIAL

## 2019-09-12 ENCOUNTER — HOSPITAL ENCOUNTER (OUTPATIENT)
Dept: PHYSICAL THERAPY | Facility: CLINIC | Age: 44
Setting detail: THERAPIES SERIES
End: 2019-09-12
Payer: COMMERCIAL

## 2019-09-12 VITALS
HEART RATE: 82 BPM | BODY MASS INDEX: 29.64 KG/M2 | TEMPERATURE: 97.7 F | SYSTOLIC BLOOD PRESSURE: 121 MMHG | RESPIRATION RATE: 16 BRPM | WEIGHT: 170 LBS | OXYGEN SATURATION: 94 % | DIASTOLIC BLOOD PRESSURE: 70 MMHG

## 2019-09-12 DIAGNOSIS — M62.830 SPASM OF LUMBAR PARASPINOUS MUSCLE: ICD-10-CM

## 2019-09-12 DIAGNOSIS — G89.29 ACUTE EXACERBATION OF CHRONIC LOW BACK PAIN: ICD-10-CM

## 2019-09-12 DIAGNOSIS — M54.50 ACUTE EXACERBATION OF CHRONIC LOW BACK PAIN: ICD-10-CM

## 2019-09-12 DIAGNOSIS — M54.5 LOW BACK PAIN, UNSPECIFIED BACK PAIN LATERALITY, UNSPECIFIED CHRONICITY, WITH SCIATICA PRESENCE UNSPECIFIED: Primary | ICD-10-CM

## 2019-09-12 LAB
ALBUMIN UR-MCNC: NEGATIVE MG/DL
APPEARANCE UR: ABNORMAL
BACTERIA #/AREA URNS HPF: ABNORMAL /HPF
BILIRUB UR QL STRIP: NEGATIVE
COLOR UR AUTO: YELLOW
GLUCOSE UR STRIP-MCNC: NEGATIVE MG/DL
HGB UR QL STRIP: NEGATIVE
KETONES UR STRIP-MCNC: NEGATIVE MG/DL
LEUKOCYTE ESTERASE UR QL STRIP: ABNORMAL
MUCOUS THREADS #/AREA URNS LPF: PRESENT /LPF
NITRATE UR QL: NEGATIVE
PH UR STRIP: 5 PH (ref 5–7)
RBC #/AREA URNS AUTO: 1 /HPF (ref 0–2)
SOURCE: ABNORMAL
SP GR UR STRIP: 1.02 (ref 1–1.03)
SQUAMOUS #/AREA URNS AUTO: 14 /HPF (ref 0–1)
UROBILINOGEN UR STRIP-MCNC: 0 MG/DL (ref 0–2)
WBC #/AREA URNS AUTO: 3 /HPF (ref 0–5)

## 2019-09-12 PROCEDURE — 81001 URINALYSIS AUTO W/SCOPE: CPT | Performed by: FAMILY MEDICINE

## 2019-09-12 PROCEDURE — 25000132 ZZH RX MED GY IP 250 OP 250 PS 637: Performed by: FAMILY MEDICINE

## 2019-09-12 PROCEDURE — 96374 THER/PROPH/DIAG INJ IV PUSH: CPT | Performed by: FAMILY MEDICINE

## 2019-09-12 PROCEDURE — 97032 APPL MODALITY 1+ESTIM EA 15: CPT | Mod: GP | Performed by: PHYSICAL THERAPIST

## 2019-09-12 PROCEDURE — 97110 THERAPEUTIC EXERCISES: CPT | Mod: GP | Performed by: PHYSICAL THERAPIST

## 2019-09-12 PROCEDURE — 96375 TX/PRO/DX INJ NEW DRUG ADDON: CPT | Performed by: FAMILY MEDICINE

## 2019-09-12 PROCEDURE — 99285 EMERGENCY DEPT VISIT HI MDM: CPT | Mod: 25 | Performed by: FAMILY MEDICINE

## 2019-09-12 PROCEDURE — 25000128 H RX IP 250 OP 636: Performed by: FAMILY MEDICINE

## 2019-09-12 PROCEDURE — 99285 EMERGENCY DEPT VISIT HI MDM: CPT | Mod: Z6 | Performed by: FAMILY MEDICINE

## 2019-09-12 RX ORDER — HYDROMORPHONE HYDROCHLORIDE 1 MG/ML
0.5 INJECTION, SOLUTION INTRAMUSCULAR; INTRAVENOUS; SUBCUTANEOUS
Status: DISCONTINUED | OUTPATIENT
Start: 2019-09-12 | End: 2019-09-13 | Stop reason: HOSPADM

## 2019-09-12 RX ORDER — LIDOCAINE 50 MG/G
1 PATCH TOPICAL ONCE
Status: DISCONTINUED | OUTPATIENT
Start: 2019-09-12 | End: 2019-09-12

## 2019-09-12 RX ORDER — LORAZEPAM 2 MG/ML
1 INJECTION INTRAMUSCULAR ONCE
Status: COMPLETED | OUTPATIENT
Start: 2019-09-12 | End: 2019-09-12

## 2019-09-12 RX ORDER — OXYCODONE HYDROCHLORIDE 5 MG/1
5-10 TABLET ORAL EVERY 6 HOURS PRN
Qty: 12 TABLET | Refills: 0 | Status: SHIPPED | OUTPATIENT
Start: 2019-09-12 | End: 2022-01-28

## 2019-09-12 RX ORDER — OXYCODONE HYDROCHLORIDE 5 MG/1
5 TABLET ORAL ONCE
Status: DISCONTINUED | OUTPATIENT
Start: 2019-09-12 | End: 2019-09-13 | Stop reason: HOSPADM

## 2019-09-12 RX ORDER — PREDNISONE 20 MG/1
TABLET ORAL
Qty: 7 TABLET | Refills: 0 | Status: SHIPPED | OUTPATIENT
Start: 2019-09-12 | End: 2021-05-16

## 2019-09-12 RX ORDER — LIDOCAINE 4 G/G
1 PATCH TOPICAL
Status: DISCONTINUED | OUTPATIENT
Start: 2019-09-12 | End: 2019-09-13 | Stop reason: HOSPADM

## 2019-09-12 RX ADMIN — HYDROMORPHONE HYDROCHLORIDE 0.5 MG: 1 INJECTION, SOLUTION INTRAMUSCULAR; INTRAVENOUS; SUBCUTANEOUS at 20:56

## 2019-09-12 RX ADMIN — LORAZEPAM 1 MG: 2 INJECTION INTRAMUSCULAR; INTRAVENOUS at 20:59

## 2019-09-12 RX ADMIN — LIDOCAINE 1 PATCH: 560 PATCH PERCUTANEOUS; TOPICAL; TRANSDERMAL at 21:16

## 2019-09-12 NOTE — ED AVS SNAPSHOT
[facility] Emergency Department  [address] [place] [address]  Phone:  [phone]  Fax:  [phone]                                    Virginia Valenzuela   MRN: [id number]    Department:  [facility] Emergency Department   Date of Visit:  9/12/2019           After Visit Summary Signature Page    I have received my discharge instructions, and my questions have been answered. I have discussed any challenges I see with this plan with the nurse or doctor.    ..........................................................................................................................................  Patient/Patient Representative Signature      ..........................................................................................................................................  Patient Representative Print Name and Relationship to Patient    ..................................................               ................................................  Date                                   Time    ..........................................................................................................................................  Reviewed by Signature/Title    ...................................................              ..............................................  Date                                               Time          22EPIC Rev 08/18

## 2019-09-13 ASSESSMENT — ENCOUNTER SYMPTOMS
NUMBNESS: 0
CARDIOVASCULAR NEGATIVE: 1
FREQUENCY: 0
FLANK PAIN: 0
APPETITE CHANGE: 0
WEAKNESS: 0
GASTROINTESTINAL NEGATIVE: 1
JOINT SWELLING: 0
COLOR CHANGE: 0
SHORTNESS OF BREATH: 0
CHILLS: 0
BACK PAIN: 1
PSYCHIATRIC NEGATIVE: 1
EYES NEGATIVE: 1

## 2019-09-13 NOTE — ED PROVIDER NOTES
History     Chief Complaint   Patient presents with     Headache     Back Pain     HPI  Virginia Valenzuela is a 44 year old female who presents to the ER with concerns about low back pain symptoms with extension of the pain up into her legs and up her spine to her neck.  She states that the back has been an issue for several years.  She stated she underwent a radioablation therapy procedure yesterday per her chronic pain specialist.  This was done on the right lumbar region.  She had a similar procedure done 2 weeks ago on the left side to the lumbar region.  She stated that the increased pain to the lumbar region started the day before the most recent radioablation therapy procedure.  Fever she states that she does not feel feverish but checked her temperature at home with a temporal thermometer and it read 100.1.  She is not sure if it is accurate or not.  She admits to some mild weakness into her legs but this is not worsened over the last several years.  She denies significant numbness or tingling into her legs or upper extremities.  She admits to mild headache as well which has been uncontrolled with her chronic pain medications.  She denies any skin rash or recent fall or injury.  She stated that the pain specialist did not report to her any complications with the procedure yesterday.  He denies any bowel or bladder incontinence.  She has had no change in her urination such as hematuria or dysuria.  She denies any dark or bloody stools.  She describes the pain is being most intense across the low back laterally and extends down into her posterior legs bilaterally.      I reviewed a PT appointment documentation for earlier today and copied that note below:  OP PT Daily Documentation     Row Name  09/12/19  0900           Signing Clinician's Name / Credentials   Signing clinician's name / credentials  Rossana Morales, PT,  DPT           Session Number   Session Number  14 (10/10)           Progress  Note/Recertification   Progress Note Due Date  09/30/19           Progress Note Completed Date  07/06/19           Recertification Due Date  09/30/19           Ortho Goal 2   Goal Identifier  Pain           Target Date  06/26/19           Date Met  06/28/19           Ortho Goal 3   Goal Identifier  Function           Target Date  07/24/19           Ortho Goal 4   Goal Identifier  HEP           Target Date  09/30/19           Subjective Report   Subjective Report  Worst pain tod-  ay since start-  ed, unsure of  onset,  3 days  ago.  Pain was  present  first  before 1st abl-  ation but befo-  re second on L.  Better with  laying down,  worse with sit-  ting, standing,  walking. Pain  9/10,B low back  down the back  of hips and  back of legs.  Nothing is hel-  ping vicodin 2  times per day,  1 toradal  up  to 4 times per  day.  Has had  ok sleep this  week despite  the increased  pain.  Also  noted onset of  forehead heada-  nalini,  Will see  OB for ovarian  cyst in a few  weeks.            Objective Measure 1   Objective Measure  ROM           Details  FF 20% , finge-  rtips to prox  thighs, extens-  ion 20% and  pain increased.  25 % L SB and  15% L SB . Very  limited and  guarded all  directions.            Objective Measure 2   Objective Measure  MATTIE           Details  Was improving  overall, but  noting signifi-  cant increase  pain           Objective Measure 3   Objective Measure  PREP           Details  Standing pain  9/10, guarded  in all motion.  Can reduce to  6/10 prone lyi-  ng over 1 pill-  ow, 7/10 over 2  pillows, and  5/10 laying  flat.            Modalities   Modalities  Electrical Sti-  mulation           Electrical Stimulation   Electrical Stimulation (Attended) Minutes (52537)  15           Skilled Intervention  Selection,set  up, monitoring           Patient Response  States feels  comfortable in  treatment, red-  uces pain some           Treatment Detail  IFC low back  sweep 4 wires  connected  to  patient comfort  (30).           Therapeutic Procedure/exercise   Therapeutic Procedures: strength, endurance, ROM, flexibillity minutes (18034)  23           Skilled Intervention  Home management  of increased  pain           Patient Response  Therapeutic  positioning  encouraged,            Treatment Detail  Standing pain  9/10, guarded  in all motion.  Can reduce to  6/10 prone lyi-  ng over 1 pill-  ow, 7/10 over 2  pillows, and  5/10 laying  flat.            Progress  Revisited conc-  epts of centra-  lization and  focus on calmi-  ng down acute  inflammation.  Unsure of orig-  in for increas-  ed pain, also  has an ovarian  cyst which is  growing and  will see ObGyb  Sept 30th for  second opinion.  Pain seems  worse than wri-  ter recalls  with patient  since beginning  with her with  unknown source  of flare up           Plan       I reviewed her most recent pain clinic physician notes and copied excerpts from the notes below:     #1  IMAGING   MRI (NM 10/2018) L4-L5 and L5-S1 spondylosis,      ASSESSMENT  Virginia Valenzuela is a 44 y.o. female who presents for evaluation for  Consultation; Hip pain (Right); and Back pain (Lower Bilateral) since 2009.   The patient reported having tried: Acupuncture: yes   Physical therapy: Mar 2018     Chiropractic: feb/march 2019   Injection therapy: mach 2019   Behavioral Therapy: No and TENS Unit: Yes.  The patient reported having tried the following medications:  NSAIDS: Ibuprofen, Toradol; Opiates: Hydrocodone, Tramadol; Muscle relaxants: Tizandine; Topical: Bio-Freeze, Voltaren Gel.     DIAGNOSIS  1. L4-L5 and L5-S1 spondylosis  2. Axial back pain  3. Anxiety    PLAN  1. Performed a radiofrequency ablation of the Right L3, Right L4 and Right L5 (dorsal ramus) medial branches for treatment of the Right L4-L5 and L5-S1 facet joints. I let the patient know there can be irritation in the low back for a few weeks after the procedure. I expect benefit to occur 3-4 weeks  after the procedure is performed. Before proceeding with the radiofrequency ablation, the patient was confirmed to have 80% or greater pain relief with 2 different blocks (Lidocaine and bupivacaine) for the appropriate time duration following SIS (Spine Intervention Society) guidelines with appropriate conservative management beforehand.   2. The patient will be scheduled to follow up in clinic in four to six weeks.    Colin Paul MD  Diplomate of the American Board of Anesthesiology, Pain Medicine      #2  [facility]  LUMBAR RADIOFREQUENCY ABLATION     Patient Name: Virginia Valenzuela  Address: [address] [place] [address]    Primary Care Provider: Joshua Burns PA-C    CHIEF COMPLAINT  Low back pain    INTERVAL HISTORY  Virginia Valenzuela is a 44 y.o. female with a history of low back pain.    PROCEDURE  Left L3, Left L4, and Left L5 (dorsal ramus) percutaneous radiofrequency medial branch neurotomy using fluoroscopic guidance    PROCEDURE DETAILS  After written informed consent was obtained from the patient, the patient was escorted to the procedure room. The patient was placed in the prone position. A time out was conducted to verify patient identity, procedure to be performed, side, site, allergies and any special requirements. The skin over the lumbar region was prepped and draped in normal sterile fashion. The skin was anesthetized with 2% lidocaine. Using intermittent fluoroscopic guidance an 18-gauge 100 mm RF cannula with a 10 mm active tip was advanced using an oblique posterior approach to the target at the junction of the superior articular process and transverse process of the Left L4 and Left L5 vertebra where the Left L3 and Left L4 medial branches are located. An RF needle was placed at the left sacral ala to target the Left L5 dorsal ramus branch. Needle depth was assessed as seen in the lateral view. Needle placement was confirmed using AP and lateral views. Motor  testing was performed at 2 Hz up to 2.5 volts. With final needle positioning, there was no evidence of radicular stimulation. Prior to lesioning, 1 cc of 2% lidocaine was injected at each site. Lesioning was performed at 80 degrees Celsius for 80 seconds. Then the needle was rotated 180 degrees and a second lesion was performed in the same manner. All needles were removed.     The patient received intravenous sedation during the procedure. The patient was monitored with blood pressure and pulse oximetry machines with the assistance of an CNP throughout the procedure. The patient was alert and responsive to questions throughout the procedure. The patient tolerated the procedure well and was observed in the post-procedural area. The patient was dismissed without apparent complications.     IMAGING   MRI (NM 10/2018) L4-L5 and L5-S1 spondylosis,      ASSESSMENT  Virginia Valenzuela is a 44 y.o. female who presents for evaluation for  Consultation; Hip pain (Right); and Back pain (Lower Bilateral) since 2009.   The patient reported having tried: Acupuncture: yes   Physical therapy: Mar 2018     Chiropractic: feb/march 2019   Injection therapy: mach 2019   Behavioral Therapy: No and TENS Unit: Yes.  The patient reported having tried the following medications:  NSAIDS: Ibuprofen, Toradol; Opiates: Hydrocodone, Tramadol; Muscle relaxants: Tizandine; Topical: Bio-Freeze, Voltaren Gel.     DIAGNOSIS  1. L4-L5 and L5-S1 spondylosis  2. Axial back pain  3. Anxiety    PLAN  1. Performed a radiofrequency ablation of the Left L3, Left L4 and Left L5 (dorsal ramus) medial branches for treatment of the Left L4-L5 and L5-S1 facet joints. I let the patient know there can be irritation in the low back for a few weeks after the procedure. I expect benefit to occur 3-4 weeks after the procedure is performed. Before proceeding with the radiofrequency ablation, the patient was confirmed to have 80% or greater pain relief with 2 different  blocks (Lidocaine and bupivacaine) for the appropriate time duration following SIS (Spine Intervention Society) guidelines with appropriate conservative management beforehand.   2. The patient will be scheduled to follow up in clinic in four to six weeks.    Colin Paul MD  Diplomate of the American Board of Anesthesiology, Pain Medicine    Electronically signed by Colin Paul MD at 08/28/2019 2:25 PM CDT      Allergies:  Allergies   Allergen Reactions     Bees Swelling     FINGER TIPS TO ELBOWS     Tylenol W/Codeine [Acetaminophen-Codeine] Other (See Comments)     Squeezing pain around abdomen       Problem List:    Patient Active Problem List    Diagnosis Date Noted     Chronic pelvic pain in female 09/15/2017     Priority: Medium     Trigger finger, left thumb 04/28/2017     Priority: Medium     Sesamoiditis 04/18/2017     Priority: Medium     Bilateral carpal tunnel syndrome 08/11/2016     Priority: Medium     Tobacco use disorder 08/17/2015     Priority: Medium     Ovarian cyst 01/17/2014     Priority: Medium     Problem list name updated by automated process. Provider to review       Female infertility 01/17/2014     Priority: Medium     Hypertension goal BP (blood pressure) < 140/90 11/07/2013     Priority: Medium     Papanicolaou smear of cervix with atypical squamous cells of undetermined significance (ASC-US) 06/27/2013     Priority: Medium     3/12/13 pap ASCUS HR HPV  8/5/13 colposcopy. ASCUS. Plan repeat pap in 6 months  7/6/15 pap NIL/neg HPV. Plan cotest in 3 yrs       GERD (gastroesophageal reflux disease) 06/03/2013     Priority: Medium     Vitamin D deficiency 06/03/2013     Priority: Medium     Problem list name updated by automated process. Provider to review and confirm  [employer] Update utility       HAMIDA (obstructive sleep apnea) 05/29/2013     Priority: Medium     Generalized anxiety disorder 05/29/2013     Priority: Medium     Seasonal allergic rhinitis 05/29/2013     Priority:  Medium        Past Medical History:    Past Medical History:   Diagnosis Date     Allergic rhinitis, cause unspecified      Arthritis      ASCUS with positive high risk HPV 3/12/13     Depressive disorder, not elsewhere classified      Dysfunction of eustachian tube      Encounter for therapeutic drug monitoring      Generalized anxiety disorder      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     History of appendectomy      Hypertension 2000     Idiopathic urticaria      Obstructive sleep apnea (adult) (pediatric)      Other chronic allergic conjunctivitis      Other malaise and fatigue      Pain in joint, shoulder region      S/P laparoscopic cholecystectomy      Special screening examination for human papillomavirus (HPV)      Sprain of tibiofibular (ligament), distal of ankle      Threatened , unspecified as to episode of care      Tobacco use disorder      Unspecified essential hypertension      Unspecified vitamin D deficiency        Past Surgical History:    Past Surgical History:   Procedure Laterality Date     ABDOMEN SURGERY  2013    ovaian cyst and right ovary removal     APPENDECTOMY       CHOLECYSTECTOMY       COLONOSCOPY N/A 10/26/2017    Procedure: COMBINED COLONOSCOPY, SINGLE OR MULTIPLE BIOPSY/POLYPECTOMY BY BIOPSY;  Colonoscopy with biopsies by biopsy;  Surgeon: Fred Faust DO;  Location:  GI     EYE SURGERY  1979     GENITOURINARY SURGERY  2013    Ovary, fallopian tube     GYN SURGERY  2013    Ovary, Fallopian tube     INJECT EPIDURAL LUMBAR Bilateral 3/15/2019    Procedure: Inject Facets Lumbar 4-5, Bilateral;  Surgeon: Colin Nolasco MD;  Location[facility]     INJECT FACET JOINT N/A 10/25/2018    Procedure: Injest Facet Joint Lumbar 4-5 Bilateral;  Surgeon: Colin Nolasco MD;  Location[facility]     LAPAROSCOPIC LYSIS ADHESIONS Left 3/1/2016    Procedure: LAPAROSCOPIC LYSIS ADHESIONS;  Surgeon: Nate Mishra MD;  [facility]     LAPAROSCOPIC LYSIS  ADHESIONS N/A 7/13/2017    Procedure: LAPAROSCOPIC LYSIS ADHESIONS;   laparoscopic lysis of adhesions;  Surgeon: Nate Mishra MD;  [facility]     LAPAROSCOPIC LYSIS ADHESIONS N/A 7/13/2017    Procedure: LAPAROSCOPIC LYSIS ADHESIONS;   laparoscopic lysis of adhesions;  Surgeon: Nate Milligan MD;  [facility]     LAPAROSCOPIC LYSIS ADHESIONS N/A 5/14/2018    Procedure: LAPAROSCOPIC LYSIS ADHESIONS;;  Surgeon: Nate Mishra MD;  [facility]     LAPAROSCOPIC SALPINGECTOMY Left 5/14/2018    Procedure: LAPAROSCOPIC SALPINGECTOMY;;  Surgeon: Nate Mishra MD;  [facility]     LAPAROSCOPY DIAGNOSTIC (GYN) N/A 5/14/2018    Procedure: LAPAROSCOPY DIAGNOSTIC (GYN);  laparoscopy, left salpingectomy, left ovarian nodule removal and lysis of adhesions;  Surgeon: Nate Mishra MD;  [facility]     RELEASE CARPAL TUNNEL Left 8/31/2016    Procedure: RELEASE CARPAL TUNNEL;  Surgeon: Gucci Hairston MD;  [facility]     RELEASE CARPAL TUNNEL Right 12/7/2016    Procedure: RELEASE CARPAL TUNNEL;  Surgeon: Gucci Hairston MD;  [facility]       Family History:    Family History   Problem Relation Age of Onset     Diabetes Father      Hypertension Father      Cardiovascular Mother 29        myocarditis      Other Cancer Brother         unknown origin       Social History:  Marital Status:   [2]  Social History     Tobacco Use     Smoking status: Current Every Day Smoker     Packs/day: 1.00     Years: 10.00     Pack years: 10.00     Types: Cigarettes     Smokeless tobacco: Never Used   Substance Use Topics     Alcohol use: No     Alcohol/week: 0.0 oz     Drug use: No        Medications:      oxyCODONE (ROXICODONE) 5 MG tablet   predniSONE (DELTASONE) 20 MG tablet   tiZANidine (ZANAFLEX) 4 MG tablet   Acetaminophen (TYLENOL PO)   AmLODIPine Besylate (NORVASC PO)   cetirizine (ZYRTEC) 10 MG tablet   Cholecalciferol (VITAMIN D) 2000 units  tablet   Cholecalciferol (VITAMIN D3) 2000 UNITS CAPS   EPINEPHrine (EPIPEN) 0.3 MG/0.3ML injection   Esomeprazole Magnesium (NEXIUM PO)   Lidocaine (LIDOCARE) 4 % Patch   losartan-hydrochlorothiazide (HYZAAR) 100-25 MG per tablet   Multiple Vitamins-Minerals (MULTIVITAMIN ADULT PO)   oxyCODONE-acetaminophen (PERCOCET) 5-325 MG per tablet   potassium chloride ER (K-TAB/KLOR-CON) 10 MEQ CR tablet   Probiotic Product (PROBIOTIC DAILY PO)         Review of Systems   Constitutional: Negative for appetite change and chills.   HENT: Negative.    Eyes: Negative.    Respiratory: Negative for shortness of breath.    Cardiovascular: Negative.    Gastrointestinal: Negative.    Genitourinary: Negative for flank pain, frequency, vaginal bleeding and vaginal discharge.        She denies incontinence of bowel or bladder.   Musculoskeletal: Positive for back pain (Bilateral lumbar area pain.) and gait problem (Painful to walk due to low back pain). Negative for joint swelling.   Skin: Negative for color change and rash.   Neurological: Negative for weakness and numbness.   Psychiatric/Behavioral: Negative.    All other systems reviewed and are negative.      Physical Exam   BP: 134/74  Pulse: 96  Heart Rate: 96  Temp: 97.7  F (36.5  C)  Resp: 18  Weight: 77.1 kg (170 lb)  SpO2: 98 %      Physical Exam   Constitutional: She is oriented to person, place, and time. She appears well-developed and well-nourished. She appears distressed.   HENT:   Head: Normocephalic and atraumatic.   Eyes: Pupils are equal, round, and reactive to light. Conjunctivae and EOM are normal.   Neck: Trachea normal and normal range of motion. Neck supple. Muscular tenderness present. No neck rigidity. No Brudzinski's sign and no Kernig's sign noted.   Cardiovascular: Normal rate, normal heart sounds and intact distal pulses.   No murmur heard.  Pulmonary/Chest: Effort normal and breath sounds normal. No respiratory distress.   Abdominal: Soft. Bowel sounds are  normal. She exhibits no distension. There is no tenderness.   Musculoskeletal:        Lumbar back: She exhibits tenderness, pain and spasm. She exhibits normal range of motion, no swelling and no deformity.        Back:    Neurological: She is alert and oriented to person, place, and time.   Skin: Skin is warm.   Examination of the low back reveals 2 puncture sites to the right lumbar region which appear to be healthy without signs of any drainage, discharge or erythema.   Nursing note and vitals reviewed.      ED Course        Procedures               Critical Care time:  none               Results for orders placed or performed during the hospital encounter of 09/12/19 (from the past 24 hour(s))   UA reflex to Microscopic and Culture   Result Value Ref Range    Color Urine Yellow     Appearance Urine Slightly Cloudy     Glucose Urine Negative NEG^Negative mg/dL    Bilirubin Urine Negative NEG^Negative    Ketones Urine Negative NEG^Negative mg/dL    Specific Gravity Urine 1.018 1.003 - 1.035    Blood Urine Negative NEG^Negative    pH Urine 5.0 5.0 - 7.0 pH    Protein Albumin Urine Negative NEG^Negative mg/dL    Urobilinogen mg/dL 0.0 0.0 - 2.0 mg/dL    Nitrite Urine Negative NEG^Negative    Leukocyte Esterase Urine Trace (A) NEG^Negative    Source Midstream Urine     RBC Urine 1 0 - 2 /HPF    WBC Urine 3 0 - 5 /HPF    Bacteria Urine Few (A) NEG^Negative /HPF    Squamous Epithelial /HPF Urine 14 (H) 0 - 1 /HPF    Mucous Urine Present (A) NEG^Negative /LPF       Medications   LORazepam (ATIVAN) injection 1 mg (1 mg Intravenous Given 9/12/19 2059)       Assessments & Plan (with Medical Decision Making)  44-year-old female to the ER secondary concerns of increasing low back pain after undergoing 2 recent radioablation therapy procedures to her lumbar spine.  First procedure occurred about 2 weeks ago to the left lumbar region and the second yesterday to the right.  She has had increasing low back pain for last several  days.  She has had no suggestion of incontinence of bowel or bladder.  There is question about possible fever but she was found to be afebrile in the ER.  Patient without significant weakness into her lower extremities.  Patient does have evidence for spasm to the lumbar region on exam.  Possibly her increased symptoms associated with the recent procedures.  Hopefully, we will see gradual resolution of her symptoms over the next several weeks.  Elected to initiate medication as listed below in hopes of calming down her pain symptoms.  She agrees to return to the ER should she have increase or worsening pain, fever, incontinence of bowel or bladder, or weakness into her lower extremities.  She plans follow-up with her chronic pain clinic.     I have reviewed the nursing notes.    I have reviewed the findings, diagnosis, plan and need for follow up with the patient.       Discharge Medication List as of 9/12/2019 10:09 PM      START taking these medications    Details   oxyCODONE (ROXICODONE) 5 MG tablet Take 1-2 tablets (5-10 mg) by mouth every 6 hours as needed for pain, Disp-12 tablet, R-0, Local Print      predniSONE (DELTASONE) 20 MG tablet 2 tabs daily for 2 days, then 1 tab daily for 3 days, Disp-7 tablet, R-0, E-Prescribe      tiZANidine (ZANAFLEX) 4 MG tablet Take 1 tablet (4 mg) by mouth 3 times daily as needed for muscle spasms (MUSCLE SPASM), Disp-20 tablet, R-0, E-Prescribe                  I verbally discussed the findings of the evaluation today in the ER. I have verbally discussed with Virginia the suggested treatment(s) as described in the discharge instructions and handouts. I have prescribed the above listed medications and instructed her on appropriate use of these medications.      I have verbally suggested she follow-up in her clinic or return to the ER for increased symptoms. See the follow-up recommendations documented  in the after visit summary in this visit's EPIC chart.      Final diagnoses:    Acute exacerbation of chronic low back pain   Spasm of lumbar paraspinous muscle       9/12/2019   [facility] EMERGENCY DEPARTMENT     Sergio Pizano DO  09/13/19 [id number]

## 2019-09-13 NOTE — ED NOTES
Patient reports chronic back pain and had a procedure done yesterday. Has had headache since then. Took Toradol and Vicodin at 1800 without relief.

## 2019-09-13 NOTE — ED NOTES
Pain decreased to a 4/10 from an 8 after Ativan and Dilaudid given. Patient resting comfortably at this time, Lidoderm patch placed and warm blanket given.

## 2019-09-17 ENCOUNTER — HOSPITAL ENCOUNTER (OUTPATIENT)
Dept: PHYSICAL THERAPY | Facility: CLINIC | Age: 44
Setting detail: THERAPIES SERIES
End: 2019-09-17
Payer: COMMERCIAL

## 2019-09-17 PROCEDURE — 97110 THERAPEUTIC EXERCISES: CPT | Mod: GP | Performed by: PHYSICAL THERAPIST

## 2019-09-17 PROCEDURE — 97140 MANUAL THERAPY 1/> REGIONS: CPT | Mod: GP | Performed by: PHYSICAL THERAPIST

## 2019-09-17 NOTE — PROGRESS NOTES
[facility]      OUTPATIENT PHYSICAL THERAPY  PLAN OF TREATMENT FOR OUTPATIENT REHABILITATION    Patient's Last Name, First Name, M.I.                YOB: 1975  Virginia Valenzuela                        Provider's Name  [facility] Medical Record No.  [id number]                               Onset Date: 5/22/2019 Date of referral   Start of Care Date: 5/29/2019   Type:     _X_PT   ___OT   ___SLP Medical Diagnosis: LBP                       PT Diagnosis: Back pain with B radiculopathy, reduced ROM, tightness, reduced joint mobility, weakness and impaired posture      _________________________________________________________________________________  Plan of Treatment:    Frequency/Duration: 1-2x week for 12 weeks.          Goals:  Goal Identifier Biking   Goal Description Patient will report able to bike 6 miles pain free in order to return to prior level of function.    Target Date 07/24/19   Date Met  06/28/19   Progress:      Goal Identifier Pain   Goal Description Patient will report able to manage pain to acceptable levels with comphrensive HEP reducing use of medication by 50% in order to progress towards her goal of decrease use of medications for pain management.   Target Date 06/26/19   Date Met  06/28/19   Progress:      Goal Identifier Function   Goal Description Patient will report a decrease in MATTIE score by 12.88% in order to meet the MCID for significant improvement in pain/disability demonstrating overall improvement in function.   Target Date 011/15/2019   Date Met      Progress: Goal extended      Goal Identifier HEP   Goal Description Patient will be independent with comprehensive HEP addressing pain, posture, weakness, and activity modification in order to facilitate discharge from PT services.   Target Date 11/15/2019   Date Met      Progress:      Progress Toward  Goals: 2  goals met so far. Progress this reporting period: Continue to address ROM, mm flexibility, fascial restrictions/scar tissue, and core /LE strength. Was improving but is doing better in regard to the hip pain, but having a flare up in her back recently.       Patient has attended  10 visits in this reporting period. Treatment has included therapeutic exercise for positioning, stretching, Manual therapy to address mobility deficits affecting ROM and tissue flexibility. Today is having a flare of unknown source, has had recent nerve ablation procedures. She thinks the MFR is helping, she has reduced hip pain noted also. Continued deficits in lumbar ROM, tightness in spine (Cervical , thoracic, lumbar), B SI joint dysfunction and core weakness needing continued management to address. Still may consider pelvic floor evaluation but will wait on her OBgyn visit later this month to guide that some. Id like to keep working with her for pain management and ROM        Certification date from 9/12/2019 to 11/26/2019.    Rossana Morales, PT          I CERTIFY THE NEED FOR THESE SERVICES FURNISHED UNDER        THIS PLAN OF TREATMENT AND WHILE UNDER MY CARE .             Physician Signature               Date    X_____________________________________________________                      Referring Provider: Trever Jimenez CNP

## 2019-09-17 NOTE — PROGRESS NOTES
Outpatient Physical Therapy Progress Note     Patient: Virginia Valenzuela  : 1975    Beginning/End Dates of Reporting Period:  2019  to 2019    Referring Provider: Jackie Jimenez CNP    Therapy Diagnosis: Reduced ROM, myofascial tightness, hx of abdominal adhesions, R hip pain, SI joint dysfunction, core weakness    Client Self Report: Worst pain today since started, unsure of onset,  3 days ago.  Pain was present  first before 1st ablation but before second on L. Better with laying down, worse with sitting, standing,walking. Pain 9/10,B low back down the back of hips and back of legs.  Nothing is helping vicodin 2 times per day, 1 toradal  up to 4 times per day.  Has had ok sleep this week despite the increased pain.  Also noted onset of forehead headache,  Will see OB for ovarian cyst in a few weeks.     Objective Measurements:Objective Measurements:  Objective Measure: ROM  Details: FF 20% , fingertips to prox thighs, extension 20% and pain increased. 25 % L SB and 15% L SB . Very limited and guarded all directions.   Objective Measure: MATTIE  Details: Was improving overall, but noting significant increase pain  Objective Measure: PREP  Details: Standing pain 9/10, guarded in all motion. Can reduce to 6/10 prone lying over 1 pillow, 7/10 over 2 pillows, and 5/10 laying flat.         Goals:  Goal Identifier Biking   Goal Description Patient will report able to bike 6 miles pain free in order to return to prior level of function.    Target Date 19   Date Met  19   Progress:      Goal Identifier Pain   Goal Description Patient will report able to manage pain to acceptable levels with comphrensive HEP reducing use of medication by 50% in order to progress towards her goal of decrease use of medications for pain management.   Target Date 19   Date Met  19   Progress:      Goal Identifier Function   Goal Description Patient will report a decrease in MATTIE score by 12.88% in order to  meet the MCID for significant improvement in pain/disability demonstrating overall improvement in function.   Target Date 011/15/2019   Date Met      Progress: Goal extended      Goal Identifier HEP   Goal Description Patient will be independent with comprehensive HEP addressing pain, posture, weakness, and activity modification in order to facilitate discharge from PT services.   Target Date 11/15/2019   Date Met      Progress:      Progress Toward Goals: 2  goals met so far. Progress this reporting period: Continue to address ROM, mm flexibility, fascial restrictions/scar tissue, and core /LE strength. Was improving but is doing better in regard to the hip pain, but having a flare up in her back recently.       Patient has attended 10 visits in this reporting period. Treatment has included therapeutic exercise for positioning, stretching, Manual therapy to address mobility deficits affecting ROM and tissue flexibility. Today is having a flare of unknown source, has had recent nerve ablation procedures. She thinks the MFR is helping, she has reduced hip pain noted also. Continued deficits in lumbar ROM, tightness in spine (Cervical , thoracic, lumbar), B SI joint dysfunction and core weakness needing continued management to address. Still may consider pelvic floor evaluation but will wait on her OBgyn visit later this month to guide that some. Id like to keep working with her for pain management and ROM     Plan:  Continue therapy per current plan of care.  Changes include increase frequency to 1-2 times per week for 90 days.

## 2019-09-19 ENCOUNTER — HOSPITAL ENCOUNTER (OUTPATIENT)
Dept: PHYSICAL THERAPY | Facility: CLINIC | Age: 44
Setting detail: THERAPIES SERIES
End: 2019-09-19
Payer: COMMERCIAL

## 2019-09-19 PROCEDURE — 97140 MANUAL THERAPY 1/> REGIONS: CPT | Mod: GP | Performed by: PHYSICAL THERAPIST

## 2019-09-26 ENCOUNTER — HOSPITAL ENCOUNTER (OUTPATIENT)
Dept: PHYSICAL THERAPY | Facility: CLINIC | Age: 44
Setting detail: THERAPIES SERIES
End: 2019-09-26
Payer: COMMERCIAL

## 2019-09-26 PROCEDURE — 97110 THERAPEUTIC EXERCISES: CPT | Mod: GP | Performed by: PHYSICAL THERAPIST

## 2019-09-26 PROCEDURE — 97140 MANUAL THERAPY 1/> REGIONS: CPT | Mod: GP | Performed by: PHYSICAL THERAPIST

## 2019-09-28 ENCOUNTER — HEALTH MAINTENANCE LETTER (OUTPATIENT)
Age: 44
End: 2019-09-28

## 2019-09-30 ENCOUNTER — HOSPITAL ENCOUNTER (OUTPATIENT)
Dept: PHYSICAL THERAPY | Facility: CLINIC | Age: 44
Setting detail: THERAPIES SERIES
End: 2019-09-30
Payer: COMMERCIAL

## 2019-09-30 PROCEDURE — 97140 MANUAL THERAPY 1/> REGIONS: CPT | Mod: GP | Performed by: PHYSICAL THERAPIST

## 2019-10-02 ENCOUNTER — HOSPITAL ENCOUNTER (OUTPATIENT)
Dept: PHYSICAL THERAPY | Facility: CLINIC | Age: 44
Setting detail: THERAPIES SERIES
End: 2019-10-02
Payer: COMMERCIAL

## 2019-10-02 PROCEDURE — 97140 MANUAL THERAPY 1/> REGIONS: CPT | Mod: GP | Performed by: PHYSICAL THERAPIST

## 2019-10-09 ENCOUNTER — HOSPITAL ENCOUNTER (OUTPATIENT)
Dept: PHYSICAL THERAPY | Facility: CLINIC | Age: 44
Setting detail: THERAPIES SERIES
End: 2019-10-09
Payer: COMMERCIAL

## 2019-10-09 PROCEDURE — 97140 MANUAL THERAPY 1/> REGIONS: CPT | Mod: GP | Performed by: PHYSICAL THERAPIST

## 2019-10-11 ENCOUNTER — HOSPITAL ENCOUNTER (OUTPATIENT)
Dept: PHYSICAL THERAPY | Facility: CLINIC | Age: 44
Setting detail: THERAPIES SERIES
End: 2019-10-11
Payer: COMMERCIAL

## 2019-10-11 PROCEDURE — 97112 NEUROMUSCULAR REEDUCATION: CPT | Mod: GP | Performed by: PHYSICAL THERAPIST

## 2019-10-14 ENCOUNTER — HOSPITAL ENCOUNTER (OUTPATIENT)
Dept: PHYSICAL THERAPY | Facility: CLINIC | Age: 44
Setting detail: THERAPIES SERIES
End: 2019-10-14
Payer: COMMERCIAL

## 2019-10-14 PROCEDURE — 97140 MANUAL THERAPY 1/> REGIONS: CPT | Mod: GP | Performed by: PHYSICAL THERAPIST

## 2019-10-14 PROCEDURE — 97112 NEUROMUSCULAR REEDUCATION: CPT | Mod: GP | Performed by: PHYSICAL THERAPIST

## 2019-10-14 PROCEDURE — 97110 THERAPEUTIC EXERCISES: CPT | Mod: GP | Performed by: PHYSICAL THERAPIST

## 2019-10-16 ENCOUNTER — HOSPITAL ENCOUNTER (OUTPATIENT)
Dept: PHYSICAL THERAPY | Facility: CLINIC | Age: 44
Setting detail: THERAPIES SERIES
End: 2019-10-16
Payer: COMMERCIAL

## 2019-10-16 PROCEDURE — 97110 THERAPEUTIC EXERCISES: CPT | Mod: GP | Performed by: PHYSICAL THERAPIST

## 2019-10-16 PROCEDURE — 97140 MANUAL THERAPY 1/> REGIONS: CPT | Mod: GP | Performed by: PHYSICAL THERAPIST

## 2019-10-22 ENCOUNTER — HOSPITAL ENCOUNTER (OUTPATIENT)
Dept: PHYSICAL THERAPY | Facility: CLINIC | Age: 44
Setting detail: THERAPIES SERIES
End: 2019-10-22
Payer: COMMERCIAL

## 2019-10-22 PROCEDURE — 97140 MANUAL THERAPY 1/> REGIONS: CPT | Mod: GP | Performed by: PHYSICAL THERAPIST

## 2019-10-24 ENCOUNTER — HOSPITAL ENCOUNTER (OUTPATIENT)
Dept: PHYSICAL THERAPY | Facility: CLINIC | Age: 44
Setting detail: THERAPIES SERIES
End: 2019-10-24
Payer: COMMERCIAL

## 2019-10-24 PROCEDURE — 97140 MANUAL THERAPY 1/> REGIONS: CPT | Mod: GP | Performed by: PHYSICAL THERAPIST

## 2019-10-24 NOTE — PROGRESS NOTES
Outpatient Physical Therapy Progress Note     Patient: Virginia Valenzuela  : 1975    Beginning/End Dates of Reporting Period:  2019 to 10/24/2019    Referring Provider: Jackie Albert Diagnosis: Reduced ROM, myofascial tightness, hx of abdominal adhesions, R hip pain, SI joint dysfunction, core weakness    Client Self Report: (P) Some R inguinal hip pain, states the thoracic and rib pain was better after last session. eager to see how the pelvic floor rehabilitation items and continued MFR continue to unfold and help her symptoms. Noting she can stop urine flow midstream on occasion. Does note her bladder function to be difficult the week before her period with increased urgency/frequency.      Objective 10/11/2019 : resting tone 3mv. 10 sec work/rest 6 cycles peaks at 15 mv and with additional cycles peaks about 12 mv. 2sec work/rest 3mv to 12-15 mv, with adductor assist can get to 18mv. Good coordination and sequencing, minimal to no gluteal, abdominal or adductor interference. No increased pain anywhere. S  Continues to have restricted motion of sacrum and flexion stiffness from TL junction to sacrum/lumbar region.     Goals:  Goal Identifier Biking   Goal Description Patient will report able to bike 6 miles pain free in order to return to prior level of function.    Target Date 19   Date Met  19   Progress:      Goal Identifier Pain   Goal Description Patient will report able to manage pain to acceptable levels with comphrensive HEP reducing use of medication by 50% in order to progress towards her goal of decrease use of medications for pain management.   Target Date 19   Date Met  19   Progress:      Goal Identifier Function   Goal Description Patient will report a decrease in MATTIE score by 12.88% in order to meet the MCID for significant improvement in pain/disability demonstrating overall improvement in function.   Target Date 011/15/2019   Date Met      Progress:  Goal remains appropriate, she has reduced her back pain now down from flare in Sept.       Goal Identifier HEP   Goal Description Patient will be independent with comprehensive HEP addressing pain, posture, weakness, and activity modification in order to facilitate discharge from PT services.   Target Date 11/15/2019   Date Met      Progress:      Progress Toward Goals: 2  goals met so far. Progress this reporting period: Continue to address ROM, mm flexibility, fascial restrictions/scar tissue, and core /LE strength. Was improving but is doing better in regard to the hip pain, but having a flare up in her back recently.  Treatment has included therapeutic exercise for positioning, stretching, Manual therapy to address mobility deficits affecting ROM and tissue flexibility, biofeedback assessment of pelvic floor for strength training guidance. Reports back pain is slightly better overall, hip pain is improved and she noted starting to see some improvement in her ability to hold back urine stream , and we continue to work on her flexibility with hx of pelvic adhesions focused on attempting to restore normal pelvic and sacral mobility. She remains tight in flexion from TL junction to sacrum. Will treat for another 4-6 weeks and will discontinue with her pending hysterectomy.     Plan:  Continue therapy per current plan of care, continued training/advancement of pelvic floor strengthening, flexibliity    Discharge:  No

## 2019-10-29 ENCOUNTER — HOSPITAL ENCOUNTER (OUTPATIENT)
Dept: PHYSICAL THERAPY | Facility: CLINIC | Age: 44
Setting detail: THERAPIES SERIES
End: 2019-10-29
Payer: COMMERCIAL

## 2019-10-29 PROCEDURE — 97110 THERAPEUTIC EXERCISES: CPT | Mod: GP | Performed by: PHYSICAL THERAPIST

## 2019-10-29 PROCEDURE — 97140 MANUAL THERAPY 1/> REGIONS: CPT | Mod: GP | Performed by: PHYSICAL THERAPIST

## 2019-10-31 ENCOUNTER — HOSPITAL ENCOUNTER (OUTPATIENT)
Dept: PHYSICAL THERAPY | Facility: CLINIC | Age: 44
Setting detail: THERAPIES SERIES
End: 2019-10-31
Payer: COMMERCIAL

## 2019-10-31 PROCEDURE — 97140 MANUAL THERAPY 1/> REGIONS: CPT | Mod: GP | Performed by: PHYSICAL THERAPIST

## 2020-05-04 ENCOUNTER — TRANSFERRED RECORDS (OUTPATIENT)
Dept: HEALTH INFORMATION MANAGEMENT | Facility: CLINIC | Age: 45
End: 2020-05-04

## 2020-05-04 LAB — PHQ9 SCORE: 3

## 2020-05-07 ENCOUNTER — MEDICAL CORRESPONDENCE (OUTPATIENT)
Dept: HEALTH INFORMATION MANAGEMENT | Facility: CLINIC | Age: 45
End: 2020-05-07

## 2020-05-22 ENCOUNTER — HOSPITAL ENCOUNTER (OUTPATIENT)
Dept: PHYSICAL THERAPY | Facility: CLINIC | Age: 45
Setting detail: THERAPIES SERIES
End: 2020-05-22
Attending: ANESTHESIOLOGY
Payer: COMMERCIAL

## 2020-05-22 ENCOUNTER — TRANSFERRED RECORDS (OUTPATIENT)
Dept: HEALTH INFORMATION MANAGEMENT | Facility: CLINIC | Age: 45
End: 2020-05-22

## 2020-05-22 PROCEDURE — 97162 PT EVAL MOD COMPLEX 30 MIN: CPT | Mod: GP | Performed by: PHYSICAL THERAPIST

## 2020-05-22 NOTE — PROGRESS NOTES
05/22/20 0800   General Information   Type of Visit Initial OP Ortho PT Evaluation   Start of Care Date 05/22/20   Referring Physician Dr Dereck Cabrera   Patient/Family Goals Statement  Reduced back and hip pain with walk more and lose weight again   Orders Per Therapist Evaluation   Orders Comment Eval and tx for LBP, Incorporate pelvic and SIJ strengthening   Date of Order 05/04/20   Certification Required? Yes  (Blue + MA cert required)   Medical Diagnosis LBP, SIJ pain, L hip pain   Surgical/Medical history reviewed Yes   Precautions/Limitations no known precautions/limitations   Weight-Bearing Status - LUE weight-bearing as tolerated   Weight-Bearing Status - RUE weight-bearing as tolerated   Weight-Bearing Status - LLE weight-bearing as tolerated   Weight-Bearing Status - RLE weight-bearing as tolerated   Body Part(s)   Body Part(s) Lumbar Spine/SI   Presentation and Etiology   Pertinent history of current problem (include personal factors and/or comorbidities that impact the POC) Chief complaint:  5+ years of LBP, R> L hip pain; now L>R. No injury. She recently switched from [place] pain clinic to [facility] in [place] with Dr Cabrera and wanted to revisit PT here also. Medical hx: current smoker (desires to quit), hypertension controlled with medication, Suspected to have endometriosis but states not diagnosed formally, Previous tx x 3 to pelvis for adhesions, appendectomy, choleystectcomy.  ongoing LBP with several injections (1st eliminated pain for several months and the subsequent were little to no help), lumbar nerve ablations (not helpful), and just got rec. For SI joint injections (scheduled for later today). Surgical hx: Multiple procedures for scar tissue removal, Vaginal hysterectomy 11/2019 with Dr Beckett (post op did get a superficial clot in her arm). Did see rheumatology which didn't think that she had rheumatic disease and will see hematology in June related to clotting factors and her post surgical  clot. She is not on blood thinners as of now.  R ovary removed around 2012.  She has had PT last year which was somewhat helpful for LB manual therapy and HEP, as well as light PFM strengthening. She has had other bouts of exercise PT in past which was not helpful. She does see chiropractic care for neck and back which helps manage but pain is usually always there. Her back pain had almost totally resolved for 2 months after her hysterectomy last fall, but returned with a vengence at that point. She was Sent here for LBP and Pelvic and SI joint strengthening. She did report pelvic pain prior to surgery with intercourse but this has resolved. She did have improvements with kegal program with urine leakage pre surgery but has  Worsened again since surgery. She does report Occasional urine leakage (mostly post void leakages, and double voiding behavior). Wearing pads on occasion, changing her underwear/clothing almost every time she pees instead of pad use. She does get post void leakage with a few drops vs full pad/underwear. She is doing HRT following her hysterectomy and is attempting to quite smoking. She has gained some weight the last few months. She likes to bike outdoors and is trying to walk for weight loss, but limited by back pain with walking.    Impairments D. Decreased ROM;E. Decreased flexibility;F. Decreased strength and endurance;A. Pain   Functional Limitations perform activities of daily living;perform desired leisure / sports activities   Symptom Location Broad across low lumbar spine towards L lateral hip. Can radiate to R lateral hip at times also   How/Where did it occur From insidious onset;From Degenerative Joint Disease   Onset date of current episode/exacerbation 05/04/20  (Date of referral )   Chronicity Chronic   Progression of symptoms since onset: Other   Pain progression comment Better after hysterctomy then worse again but still better than last year   Current / Previous Interventions    Diagnostic Tests: MRI   MRI Results Results   MRI results 4/2019. No stenosis or significan formane narrowing, no nerve root impingement. Mild DDD L4/5, multi level Facet arthropathy most significant at L4/5, Residual edema at L L5 pedicla and mild at R pedicla, Mild periarticuilar soft tissue edema noted at L4/5, L5/S1 facets likely either inflammatory or degenerative.    Prior Level of Function   Functional Level Prior Comment Self limited with reports of pain, but does complete ADLs. wants to walk more and bike   Current Level of Function   Patient role/employment history [employer]   Fall Risk Screen   Fall screen completed by PT   Have you fallen 2 or more times in the past year? No   Have you fallen and had an injury in the past year? No   Is patient a fall risk? No   System Outcome Measures   Outcome Measures Low Back Pain (see Oswestry and Lele)   Lumbar Spine/SI Objective Findings   Observation No acute distress.    Posture [employer] extension, Forward head, rounded and forward shoulders, moderate lordosis with poor reversal upon flexion.   Gait/Locomotion Reduced pelvic rotation noted AP during swing phase of gait, slight reduced arm swing   Flexion ROM >50% motion loss   Extension ROM >75% motion loss and increased pain   Right Side Bending ROM 50% motion loss   Left Side Bending ROM 75% motion loss   Repeated Extension-Standing ROM Increased pain with attempts to bend further repeatedly   Lumbar ROM Comment Lumbar Range of motion: Standing Forward flexion with fingertips reaching knees and increased pulling and pain from TL junction to sacrum and moderate tightness palpated, Extension mod to max limitation and increased sharp low back pain, R sidebend mod limited and increased L truncal pull, L sidebend max limited and onset of sharp L sided lumbar /SI pain.  Supine flexion past 90-95 hip flexion increases low back pain and pulling with poor reversal of lumbar lordosis. Prone laying pain is 3/10.  Prone on elbows increases low back pain into sacral region and cannot tolerate prone press ups d/t  >6/10 low back pain.    Transversus Abdominus Strength (Home Leg Lowering-deg) TA isolation is of weak recruitment. Able to elicit and PFM contraction and relaxation/bulge with palpation externally. Defer internal pelvic strength to next session as well as biofeedback examination   Knee Extension (L3) Strength 5   Ankle Dorsiflexion (L4) Strength 5   Great Toe Extension (L5) Strength 5   Ankle Plantar Flexion (S1) Strength 5   Prone Instability Test -   Lumbar/SI Special Tests Comments operative report 11/19 showed 6cm uterus, enlarged overal 5vm in size with many small fluid fileld cysts, bowel adherent to L ovary. Completed Laparoscopic Total hysterectomy, L oophoectomy, Lysis of adhesions and cystoscopy under anesthesia 11/19 from review of operative report.    Segmental Mobility R pubis tender, B SIJ mobility loss bilaterally shear test. ASIS compression/SI gapping is negligible. Limited thoracic joint play from T8-T12. Segmental stiffness Thoracic, lumbar and poor sacral inferior glide and decompression from L5. L hip ROM Flexion to 95 PROM, IR 15, ER 45, R hip ROM 90, 50 ER, IR 12 degrees.    Neurological Testing Comments Intact sensation to light touch   Palpation Non specific tenderness throughout lumbar and gluteal musculature.    Pelvic Floor Dysfunction Questions   Regular exercise Yes   Fluid intake-glasses/day (one glass/cup = 8oz 80-100oz   Caffeinated beverages-glasses/day Diet soda - 6 cans   Alcoholic beverages - glasses/day 0   Recent diet change? No   How long can you delay the need to urinate?  minutes if moderate urge   How many times do you wake to urinate at night?   used to after hysterectomy but 0 as of now   How often do you urinate during the day?   6   Can you stop the flow of urine when on the toilet?  No  (Could , cant now)   Is the volume of urine passed usually  Large   Do you have  "the sensation that you need to go to the toilet?  Yes   Do you empty your bladder frequently, before you experience the urge to pass urine?  Yes  (when leaving the house)   Do you have \"triggers\" that make you feel you can't wait to go to the toilet?  No   Number of bladder infections last year?  0   Frequency of bowel movements:  Daily   Consistency of stool?  Soft   Do you ignore the urge to defecate?  No   Women's Health Questions   Number of vaginal deliveries  2   Number of  section deliveries  0   Weight of largest baby  7#1oz   Planned Therapy Interventions   Planned Therapy Interventions joint mobilization;manual therapy;ROM;strengthening;stretching;neuromuscular re-education;balance training   Planned Modality Interventions   Planned Modality Interventions Comments Modalities as needed for pain relief   Clinical Impression   Criteria for Skilled Therapeutic Interventions Met yes, treatment indicated   PT Diagnosis Reduced ROM, joint mobility, strength, flexibility   Influenced by the following impairments ROM, MMT, joint mobility testing, mm length, functional mobility   Functional limitations due to impairments Gait, stairs, standing, walking, functional activity   Clinical Presentation Evolving/Changing   Clinical Presentation Rationale PLOF vs current, chronicity, previous + to PT   Clinical Decision Making (Complexity) Moderate complexity   Therapy Frequency 2 times/Week   Predicted Duration of Therapy Intervention (days/wks) 12 weeks   Risk & Benefits of therapy have been explained Yes   Patient, Family & other staff in agreement with plan of care Yes   Clinical Impression Comments Reduced joint mobility, tightness of hips, back and pelvis and plan to further assess PFM strength, biofeedback to get the full picture of SI and lumbar regions.    Education Assessment   Preferred Learning Style Listening;Reading;Demonstration;Pictures/video   Barriers to Learning No barriers   ORTHO GOALS   PT " Ortho Eval Goals 3;2;1;4   Ortho Goal 1   Goal Identifier MATTIE   Goal Description Patient will report improved tolerance to all functional activities via MATTIE score 30% to <15% to better tolerate her daily activities   Target Date 08/14/20   Ortho Goal 2   Goal Identifier Pain levels   Goal Description Patient will report a 25% reduction in average daily pain levels to better tolerance ADLs and exercise program   Target Date 08/14/20   Ortho Goal 3   Goal Identifier ROM   Goal Description Patient will have improved lumbar ROM from 50% limited to < 25% limited in all directions in 10 weeks   Target Date 07/31/20   Ortho Goal 4   Goal Identifier Pelvic floor   Goal Description Patient will have normal resting levels, function strength of PFM 4 and HEP for pelvic floor strengthening to manage urinary post void leakage in 10 weeks   Target Date 07/31/20   Total Evaluation Time   PT Roma, Moderate Complexity Minutes (25384) 60

## 2020-05-22 NOTE — PROGRESS NOTES
[facility]      OUTPATIENT PHYSICAL THERAPY ORTHOPEDIC EVALUATION  PLAN OF TREATMENT FOR OUTPATIENT REHABILITATION  (COMPLETE FOR INITIAL CLAIMS ONLY)  Patient's Last Name, First Name, Virginia Solo    Provider s Name:  [facility]   Medical Record No.  [id number]   Start of Care Date:  05/22/20   Onset Date:  05/04/20(Date of referral )   Type:     _X__PT   ___OT   ___SLP Medical Diagnosis:   SIJ pain, LBP     PT Diagnosis:  (S) Reduced ROM, joint mobility, strength, flexibility with B LBP, SIJ pain, Urinary leakage   Visits from SOC:  1   Therapist assessment:   _________________________________________________________________________________  Plan of Treatment/Functional Goals:  joint mobilization, manual therapy, ROM, strengthening, stretching, neuromuscular re-education, balance training        Modalities as needed for pain relief  Goals  Goal Identifier: MATTIE  Goal Description: Patient will report improved tolerance to all functional activities via MATTIE score 30% to <15% to better tolerate her daily activities  Target Date: 08/14/20    Goal Identifier: Pain levels  Goal Description: Patient will report a 25% reduction in average daily pain levels to better tolerance ADLs and exercise program  Target Date: 08/14/20    Goal Identifier: ROM  Goal Description: Patient will have improved lumbar ROM from 50% limited to < 25% limited in all directions in 10 weeks  Target Date: 07/31/20    Goal Identifier: Pelvic floor  Goal Description: Patient will have normal resting levels, function strength of PFM 4 and HEP for pelvic floor strengthening to manage urinary post void leakage in 10 weeks  Target Date: 07/31/20              Therapy Frequency:  2 times/Week  Predicted Duration of Therapy Intervention:  12 weeks    Rossana Morales PT                 I CERTIFY THE NEED FOR THESE SERVICES FURNISHED UNDER        THIS PLAN OF TREATMENT AND WHILE UNDER MY CARE .              Physician Signature               Date    X_____________________________________________________                             Certification Date From:    5/22/2020   Certification Date To:   8/14/2020    Referring Provider:  Dr Dereck Cabrera    Initial Assessment        See Epic Evaluation Start of Care Date: 05/22/20             Rossana Kamara................... PT, DPT, CLT   5/22/2020, 1:16 PM  [phone]

## 2020-06-05 ENCOUNTER — HOSPITAL ENCOUNTER (OUTPATIENT)
Dept: PHYSICAL THERAPY | Facility: CLINIC | Age: 45
Setting detail: THERAPIES SERIES
End: 2020-06-05
Attending: ANESTHESIOLOGY
Payer: COMMERCIAL

## 2020-06-05 PROCEDURE — 90913 BFB TRAINING EA ADDL 15 MIN: CPT | Mod: GP | Performed by: PHYSICAL THERAPIST

## 2020-06-05 PROCEDURE — 97110 THERAPEUTIC EXERCISES: CPT | Mod: GP | Performed by: PHYSICAL THERAPIST

## 2020-06-05 PROCEDURE — 97530 THERAPEUTIC ACTIVITIES: CPT | Mod: GP | Performed by: PHYSICAL THERAPIST

## 2020-06-05 PROCEDURE — 90912 BFB TRAINING 1ST 15 MIN: CPT | Mod: GP | Performed by: PHYSICAL THERAPIST

## 2020-06-15 ENCOUNTER — HOSPITAL ENCOUNTER (OUTPATIENT)
Dept: PHYSICAL THERAPY | Facility: CLINIC | Age: 45
Setting detail: THERAPIES SERIES
End: 2020-06-15
Attending: ANESTHESIOLOGY
Payer: COMMERCIAL

## 2020-06-15 PROCEDURE — 97140 MANUAL THERAPY 1/> REGIONS: CPT | Mod: GP | Performed by: PHYSICAL THERAPIST

## 2020-06-18 ENCOUNTER — HOSPITAL ENCOUNTER (OUTPATIENT)
Dept: PHYSICAL THERAPY | Facility: CLINIC | Age: 45
Setting detail: THERAPIES SERIES
End: 2020-06-18
Attending: ANESTHESIOLOGY
Payer: COMMERCIAL

## 2020-06-18 PROCEDURE — 97140 MANUAL THERAPY 1/> REGIONS: CPT | Mod: GP | Performed by: PHYSICAL THERAPIST

## 2020-07-09 ENCOUNTER — HOSPITAL ENCOUNTER (OUTPATIENT)
Dept: PHYSICAL THERAPY | Facility: CLINIC | Age: 45
Setting detail: THERAPIES SERIES
End: 2020-07-09
Attending: ANESTHESIOLOGY
Payer: COMMERCIAL

## 2020-07-09 PROCEDURE — 97140 MANUAL THERAPY 1/> REGIONS: CPT | Mod: GP | Performed by: PHYSICAL THERAPIST

## 2020-07-15 ENCOUNTER — HOSPITAL ENCOUNTER (OUTPATIENT)
Dept: PHYSICAL THERAPY | Facility: CLINIC | Age: 45
Setting detail: THERAPIES SERIES
End: 2020-07-15
Attending: ANESTHESIOLOGY
Payer: COMMERCIAL

## 2020-07-15 PROCEDURE — 97530 THERAPEUTIC ACTIVITIES: CPT | Mod: GP | Performed by: PHYSICAL THERAPIST

## 2020-07-15 PROCEDURE — 97110 THERAPEUTIC EXERCISES: CPT | Mod: GP | Performed by: PHYSICAL THERAPIST

## 2020-07-16 ENCOUNTER — HOSPITAL ENCOUNTER (OUTPATIENT)
Dept: MAMMOGRAPHY | Facility: CLINIC | Age: 45
Discharge: HOME | End: 2020-07-16
Attending: PHYSICIAN ASSISTANT | Admitting: PHYSICIAN ASSISTANT
Payer: COMMERCIAL

## 2020-07-16 DIAGNOSIS — Z12.31 VISIT FOR SCREENING MAMMOGRAM: ICD-10-CM

## 2020-07-16 PROCEDURE — 77067 SCR MAMMO BI INCL CAD: CPT

## 2020-07-20 ENCOUNTER — HOSPITAL ENCOUNTER (OUTPATIENT)
Dept: PHYSICAL THERAPY | Facility: CLINIC | Age: 45
Setting detail: THERAPIES SERIES
End: 2020-07-20
Attending: ANESTHESIOLOGY
Payer: COMMERCIAL

## 2020-07-20 PROCEDURE — 97110 THERAPEUTIC EXERCISES: CPT | Mod: GP | Performed by: PHYSICAL THERAPIST

## 2020-07-20 PROCEDURE — 97140 MANUAL THERAPY 1/> REGIONS: CPT | Mod: GP | Performed by: PHYSICAL THERAPIST

## 2020-08-10 ENCOUNTER — HOSPITAL ENCOUNTER (OUTPATIENT)
Dept: PHYSICAL THERAPY | Facility: CLINIC | Age: 45
Setting detail: THERAPIES SERIES
End: 2020-08-10
Attending: ANESTHESIOLOGY
Payer: COMMERCIAL

## 2020-08-10 PROCEDURE — 97110 THERAPEUTIC EXERCISES: CPT | Mod: GP | Performed by: PHYSICAL THERAPIST

## 2020-08-10 PROCEDURE — 97140 MANUAL THERAPY 1/> REGIONS: CPT | Mod: GP | Performed by: PHYSICAL THERAPIST

## 2020-08-17 ENCOUNTER — HOSPITAL ENCOUNTER (EMERGENCY)
Facility: CLINIC | Age: 45
Discharge: HOME | End: 2020-08-17
Attending: EMERGENCY MEDICINE | Admitting: EMERGENCY MEDICINE
Payer: COMMERCIAL

## 2020-08-17 ENCOUNTER — APPOINTMENT (OUTPATIENT)
Dept: GENERAL RADIOLOGY | Facility: CLINIC | Age: 45
End: 2020-08-17
Attending: EMERGENCY MEDICINE
Payer: COMMERCIAL

## 2020-08-17 VITALS
TEMPERATURE: 98.7 F | HEART RATE: 97 BPM | OXYGEN SATURATION: 98 % | RESPIRATION RATE: 20 BRPM | BODY MASS INDEX: 34.04 KG/M2 | DIASTOLIC BLOOD PRESSURE: 85 MMHG | SYSTOLIC BLOOD PRESSURE: 152 MMHG | WEIGHT: 195.2 LBS

## 2020-08-17 DIAGNOSIS — M62.830 BACK MUSCLE SPASM: ICD-10-CM

## 2020-08-17 LAB
ALBUMIN UR-MCNC: NEGATIVE MG/DL
APPEARANCE UR: CLEAR
BILIRUB UR QL STRIP: NEGATIVE
COLOR UR AUTO: COLORLESS
GLUCOSE UR STRIP-MCNC: NEGATIVE MG/DL
HGB UR QL STRIP: NEGATIVE
KETONES UR STRIP-MCNC: NEGATIVE MG/DL
LEUKOCYTE ESTERASE UR QL STRIP: NEGATIVE
MUCOUS THREADS #/AREA URNS LPF: PRESENT /LPF
NITRATE UR QL: NEGATIVE
PH UR STRIP: 6 PH (ref 5–7)
RBC #/AREA URNS AUTO: <1 /HPF (ref 0–2)
SOURCE: ABNORMAL
SP GR UR STRIP: 1 (ref 1–1.03)
SQUAMOUS #/AREA URNS AUTO: 2 /HPF (ref 0–1)
UROBILINOGEN UR STRIP-MCNC: 0 MG/DL (ref 0–2)
WBC #/AREA URNS AUTO: 0 /HPF (ref 0–5)

## 2020-08-17 PROCEDURE — 81001 URINALYSIS AUTO W/SCOPE: CPT | Performed by: EMERGENCY MEDICINE

## 2020-08-17 PROCEDURE — 99284 EMERGENCY DEPT VISIT MOD MDM: CPT | Performed by: EMERGENCY MEDICINE

## 2020-08-17 PROCEDURE — 99284 EMERGENCY DEPT VISIT MOD MDM: CPT | Mod: Z6 | Performed by: EMERGENCY MEDICINE

## 2020-08-17 PROCEDURE — 71046 X-RAY EXAM CHEST 2 VIEWS: CPT | Mod: TC

## 2020-08-17 RX ORDER — DIAZEPAM 5 MG
5 TABLET ORAL EVERY 6 HOURS PRN
Qty: 15 TABLET | Refills: 0 | Status: SHIPPED | OUTPATIENT
Start: 2020-08-17 | End: 2022-01-28

## 2020-08-17 NOTE — ED TRIAGE NOTES
Having right side flank pain, states has a hx of back pain but usually lower and in the center and radiates out.   Patient's airway, breathing, circulation, and disability/mental status (ABCDs) intact/WDL during triage.

## 2020-08-17 NOTE — ED PROVIDER NOTES
History     Chief Complaint   Patient presents with     Flank Pain     The history is provided by the patient.     Virginia Valenzuela is a 45 year old female who presents to the emergency department for flank pain. Patient reports having moderate left flank pain with spasms all day yesterday. She states having moderate right flank pain today. Denies any cough, shortness of breath or fever. She denies any radiating pain. Patient states she has a history of low back pain.    Allergies:  Allergies   Allergen Reactions     Bees Swelling     FINGER TIPS TO ELBOWS     Tylenol W/Codeine [Acetaminophen-Codeine] Other (See Comments)     Squeezing pain around abdomen       Problem List:    Patient Active Problem List    Diagnosis Date Noted     Chronic pelvic pain in female 09/15/2017     Priority: Medium     Trigger finger, left thumb 04/28/2017     Priority: Medium     Sesamoiditis 04/18/2017     Priority: Medium     Bilateral carpal tunnel syndrome 08/11/2016     Priority: Medium     Tobacco use disorder 08/17/2015     Priority: Medium     Ovarian cyst 01/17/2014     Priority: Medium     Problem list name updated by automated process. Provider to review       Female infertility 01/17/2014     Priority: Medium     Hypertension goal BP (blood pressure) < 140/90 11/07/2013     Priority: Medium     Papanicolaou smear of cervix with atypical squamous cells of undetermined significance (ASC-US) 06/27/2013     Priority: Medium     3/12/13 pap ASCUS HR HPV  8/5/13 colposcopy. ASCUS. Plan repeat pap in 6 months  7/6/15 pap NIL/neg HPV. Plan cotest in 3 yrs       GERD (gastroesophageal reflux disease) 06/03/2013     Priority: Medium     Vitamin D deficiency 06/03/2013     Priority: Medium     Problem list name updated by automated process. Provider to review and confirm  [employer] Update utility       HAMIDA (obstructive sleep apnea) 05/29/2013     Priority: Medium     Generalized anxiety disorder 05/29/2013     Priority: Medium     Seasonal  allergic rhinitis 2013     Priority: Medium        Past Medical History:    Past Medical History:   Diagnosis Date     Allergic rhinitis, cause unspecified      Arthritis      ASCUS with positive high risk HPV 3/12/13     Depressive disorder, not elsewhere classified      Dysfunction of eustachian tube      Encounter for therapeutic drug monitoring      Generalized anxiety disorder      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     History of appendectomy      Hypertension 2000     Idiopathic urticaria      Obstructive sleep apnea (adult) (pediatric)      Other chronic allergic conjunctivitis      Other malaise and fatigue      Pain in joint, shoulder region      S/P laparoscopic cholecystectomy      Special screening examination for human papillomavirus (HPV)      Sprain of tibiofibular (ligament), distal of ankle      Threatened , unspecified as to episode of care      Tobacco use disorder      Unspecified essential hypertension      Unspecified vitamin D deficiency        Past Surgical History:    Past Surgical History:   Procedure Laterality Date     ABDOMEN SURGERY  2013    ovaian cyst and right ovary removal     APPENDECTOMY       CHOLECYSTECTOMY       COLONOSCOPY N/A 10/26/2017    Procedure: COMBINED COLONOSCOPY, SINGLE OR MULTIPLE BIOPSY/POLYPECTOMY BY BIOPSY;  Colonoscopy with biopsies by biopsy;  Surgeon: Fred Faust DO;  Location:  GI     EYE SURGERY  1979     GENITOURINARY SURGERY  2013    Ovary, fallopian tube     GYN SURGERY  2013    Ovary, Fallopian tube     INJECT EPIDURAL LUMBAR Bilateral 3/15/2019    Procedure: Inject Facets Lumbar 4-5, Bilateral;  Surgeon: Colin Nolasco MD;  Location: [facility]     INJECT FACET JOINT N/A 10/25/2018    Procedure: Injest Facet Joint Lumbar 4-5 Bilateral;  Surgeon: Colin Nolasco MD;  Location[facility]     LAPAROSCOPIC LYSIS ADHESIONS Left 3/1/2016    Procedure: LAPAROSCOPIC LYSIS ADHESIONS;  Surgeon: Nate Mishra MD;   [facility]     LAPAROSCOPIC LYSIS ADHESIONS N/A 7/13/2017    Procedure: LAPAROSCOPIC LYSIS ADHESIONS;   laparoscopic lysis of adhesions;  Surgeon: Nate Mishra MD;  [facility]     LAPAROSCOPIC LYSIS ADHESIONS N/A 7/13/2017    Procedure: LAPAROSCOPIC LYSIS ADHESIONS;   laparoscopic lysis of adhesions;  Surgeon: Nate Milligan MD;  [facility]     LAPAROSCOPIC LYSIS ADHESIONS N/A 5/14/2018    Procedure: LAPAROSCOPIC LYSIS ADHESIONS;;  Surgeon: Nate Mishra MD;  [facility]     LAPAROSCOPIC SALPINGECTOMY Left 5/14/2018    Procedure: LAPAROSCOPIC SALPINGECTOMY;;  Surgeon: Nate Mishra MD;  [facility]     LAPAROSCOPY DIAGNOSTIC (GYN) N/A 5/14/2018    Procedure: LAPAROSCOPY DIAGNOSTIC (GYN);  laparoscopy, left salpingectomy, left ovarian nodule removal and lysis of adhesions;  Surgeon: Nate Mishra MD;  [facility]     RELEASE CARPAL TUNNEL Left 8/31/2016    Procedure: RELEASE CARPAL TUNNEL;  Surgeon: Gucci Hairston MD;  [facility]     RELEASE CARPAL TUNNEL Right 12/7/2016    Procedure: RELEASE CARPAL TUNNEL;  Surgeon: Gucci Hairston MD;  [facility]       Family History:    Family History   Problem Relation Age of Onset     Diabetes Father      Hypertension Father      Cardiovascular Mother 29        myocarditis      Other Cancer Brother         unknown origin       Social History:  Marital Status:   [2]  Social History     Tobacco Use     Smoking status: Current Every Day Smoker     Packs/day: 1.00     Years: 10.00     Pack years: 10.00     Types: Cigarettes     Smokeless tobacco: Never Used   Substance Use Topics     Alcohol use: No     Alcohol/week: 0.0 standard drinks     Drug use: No        Medications:    diazepam (VALIUM) 5 MG tablet  Acetaminophen (TYLENOL PO)  AmLODIPine Besylate (NORVASC PO)  cetirizine (ZYRTEC) 10 MG tablet  Cholecalciferol (VITAMIN D) 2000 units tablet  Cholecalciferol (VITAMIN  D3) 2000 UNITS CAPS  EPINEPHrine (EPIPEN) 0.3 MG/0.3ML injection  Esomeprazole Magnesium (NEXIUM PO)  Lidocaine (LIDOCARE) 4 % Patch  losartan-hydrochlorothiazide (HYZAAR) 100-25 MG per tablet  Multiple Vitamins-Minerals (MULTIVITAMIN ADULT PO)  oxyCODONE (ROXICODONE) 5 MG tablet  oxyCODONE-acetaminophen (PERCOCET) 5-325 MG per tablet  potassium chloride ER (K-TAB/KLOR-CON) 10 MEQ CR tablet  predniSONE (DELTASONE) 20 MG tablet  Probiotic Product (PROBIOTIC DAILY PO)          Review of Systems   All other systems reviewed and are negative.      Physical Exam   BP: (!) 152/85  Pulse: 97  Temp: 98.7  F (37.1  C)  Resp: 20  Weight: 88.5 kg (195 lb 3.2 oz)  SpO2: 98 %      Physical Exam  Vitals signs and nursing note reviewed.     Alert cooperative female in mild to moderate stress.  Seems to have increased discomfort when she attempts to sit up or move.  CT is unremarkable.  The back shows no obvious swelling or asymmetry.  There is no skin rashes.  On palpation of the bony spine she is nontender to get down to the SI areas then she has bilateral tenderness.  With palpation over the musculature she is got diffuse muscle tightness and tenderness especially in the right paraspinous musculature in the lumbar region.  No rib tenderness.  No adventitious lung sounds.    ED Course        Procedures               Critical Care time:  none               Results for orders placed or performed during the hospital encounter of 08/17/20 (from the past 24 hour(s))   Routine UA with microscopic   Result Value Ref Range    Color Urine Colorless     Appearance Urine Clear     Glucose Urine Negative NEG^Negative mg/dL    Bilirubin Urine Negative NEG^Negative    Ketones Urine Negative NEG^Negative mg/dL    Specific Gravity Urine 1.003 1.003 - 1.035    Blood Urine Negative NEG^Negative    pH Urine 6.0 5.0 - 7.0 pH    Protein Albumin Urine Negative NEG^Negative mg/dL    Urobilinogen mg/dL 0.0 0.0 - 2.0 mg/dL    Nitrite Urine Negative  NEG^Negative    Leukocyte Esterase Urine Negative NEG^Negative    Source Midstream Urine     WBC Urine 0 0 - 5 /HPF    RBC Urine <1 0 - 2 /HPF    Squamous Epithelial /HPF Urine 2 (H) 0 - 1 /HPF    Mucous Urine Present (A) NEG^Negative /LPF   XR Chest 2 Views    Narrative    CHEST TWO VIEWS 8/17/2020 6:58 PM     HISTORY: Migrating back pain    COMPARISON: 11/3/2018    FINDINGS: Heart size and pulmonary vascularity are within normal  limits. The lungs are clear. No pneumothorax or pleural effusion.       Impression    IMPRESSION: No radiographic evidence of acute chest abnormality.        Medications - No data to display    Assessments & Plan (with Medical Decision Making)   Virginia Valenzuela is a 45 year old female who presents to the emergency department for flank pain. Patient reports having moderate left flank pain with spasms all day yesterday. She states having moderate right flank pain today. Denies any cough, shortness of breath or fever. She denies any radiating pain. Patient states she has a history of low back pain.  On presentation she was afebrile not hypoxic.  On exam she had no meningismus.  No midline bony tenderness of the spine except over the SI joints.  No CVA tenderness.  Diffuse muscular tenderness especially on the right.  No skin rashes.  Urine was unremarkable showing no evidence of hematuria or infection.  Record review shows CT done 2 years ago did not have any renal issues or renal stones evident.  Doubt kidney stone or pyelonephritis.  Chest x-ray was clear showing no acute abnormality.  Suspect her symptoms are muscular due to the migratory nature and worsening with movement.  We will give her Valium for spasm.  Ice or heat.  Follow-up with primary if persistent symptoms or new concerning symptoms.  I have reviewed the nursing notes.    I have reviewed the findings, diagnosis, plan and need for follow up with the patient.       New Prescriptions    DIAZEPAM (VALIUM) 5 MG TABLET    Take 1  tablet (5 mg) by mouth every 6 hours as needed for muscle spasms       Final diagnoses:   Back muscle spasm     This document serves as a record of services personally performed by Benjie Samayoa MD. It was created on their behalf by Emeli Solano, a trained medical scribe. The creation of this record is based on the provider's personal observations and the statements of the patient. This document has been checked and approved by the attending provider.    Note: Chart documentation done in part with Dragon Voice Recognition software. Although reviewed after completion, some word and grammatical errors may remain.    8/17/2020   [facility] EMERGENCY DEPARTMENT     Benjie Samayoa MD  08/17/20 [id number]

## 2020-08-17 NOTE — ED AVS SNAPSHOT
[facility] Emergency Department  [address] [place] [address]  Phone:  [phone]  Fax:  [phone]                                    Virginia Valenzuela   MRN: [id number]    Department:  [facility] Emergency Department   Date of Visit:  8/17/2020           After Visit Summary Signature Page    I have received my discharge instructions, and my questions have been answered. I have discussed any challenges I see with this plan with the nurse or doctor.    ..........................................................................................................................................  Patient/Patient Representative Signature      ..........................................................................................................................................  Patient Representative Print Name and Relationship to Patient    ..................................................               ................................................  Date                                   Time    ..........................................................................................................................................  Reviewed by Signature/Title    ...................................................              ..............................................  Date                                               Time          22EPIC Rev 08/18

## 2020-08-18 ENCOUNTER — HOSPITAL ENCOUNTER (OUTPATIENT)
Dept: PHYSICAL THERAPY | Facility: CLINIC | Age: 45
Setting detail: THERAPIES SERIES
End: 2020-08-18
Attending: ANESTHESIOLOGY
Payer: COMMERCIAL

## 2020-08-18 PROCEDURE — 97140 MANUAL THERAPY 1/> REGIONS: CPT | Mod: GP | Performed by: PHYSICAL THERAPIST

## 2020-08-18 NOTE — DISCHARGE INSTRUCTIONS
Ice or heat whichever is more beneficial.  Ibuprofen for pain.  Valium for muscle spasm.  If symptoms persist consider physical therapy.

## 2020-08-18 NOTE — PROGRESS NOTES
[facility]      OUTPATIENT PHYSICAL THERAPY  PLAN OF TREATMENT FOR OUTPATIENT REHABILITATION    Patient's Last Name, First Name, M.I.                YOB: 1975  BiancaVirginia  BEN                        Provider's Name  [facility] Medical Record No.  [id number]                               Onset Date: 5/4/20   Start of Care Date: 5/22/20   Type:     _X_PT   ___OT   ___SLP Medical Diagnosis: SIJ pain, LBP                       PT Diagnosis: Reduced ROM, joint mobility, strength, flexibility with LBP, SIJ pain, urinary leakage, thoracic pain.       _________________________________________________________________________________  Plan of Treatment:    Frequency/Duration: 1x wk     Goals:  Goal Identifier MATTIE   Goal Description Patient will report improved tolerance to all functional activities via MATTIE score 30% to <15% to better tolerate her daily activities   Target Date 08/14/20   Date Met  08/18/20   Progress:     Goal Identifier Pain levels   Goal Description Patient will report a 25% reduction in average daily pain levels to better tolerance ADLs and exercise program   Target Date 08/14/20   Date Met  08/18/20   Progress:     Goal Identifier ROM   Goal Description Patient will have improved lumbar ROM from 50% limited to < 25% limited in all directions in 10 weeks   Target Date 07/31/20   Date Met  08/18/20   Progress:     Goal Identifier Pelvic floor   Goal Description Patient will have normal resting levels, function strength of PFM 4 and HEP for pelvic floor strengthening to manage urinary post void leakage in 10 weeks   Target Date 07/31/20   Date Met  08/18/20   Progress:     Goal Identifier 5   Goal Description LBP of a #2-3, no hip pain, and able to tolerate her HEP with no pain in the thoracic/ribs.    Target Date 11/15/20   Date Met      Progress:                                               New goal       Progress Toward Goals:   Progress this reporting period: Patient is doing better with the LBP see the above goals. However as she is doing the ex's for the LB pt is getting more pain in the TL junction and lower rib area. However this is due to guarding for such a long time with the LBP, and also contributing factor is larger breast tissue that is pulling pt forward along with weakness in the back.           Certification date from 8/14/20 to 10/14/20.    Virginia Gayle, PT          I CERTIFY THE NEED FOR THESE SERVICES FURNISHED UNDER        THIS PLAN OF TREATMENT AND WHILE UNDER MY CARE .             Physician Signature               Date    X_____________________________________________________                      Referring Provider: Dr.Dustin Cabrera

## 2020-08-18 NOTE — PROGRESS NOTES
"Outpatient Physical Therapy Progress Note     Patient: Virginia Valenzuela  : 1975    Beginning/End Dates of Reporting Period:  20 to 2020    Referring Provider: Dr. Cabrera    Therapy Diagnosis: LBP, right hip pain and thoracic pain     Client Self Report: Patient is over all doing better with LBP, however has pain in the lower right ribs and more into the thoracic since been laying prone more. Been to the chiro 2x since last seen in PT.     Objective Measurements:  Objective Measure: MATTIE  Details: 22%  Objective Measure: biofeedback   Details: in supine resting .4mv. with the 10 sec holds: quick peak at 20mv but then fell off over the 10 sec, avg is 10mv. resting after the contractions is slow to relax \"walking down the steps\". with the 2 sec holds went up to 20mv and the resting was quicker and better.  Objective Measure: urine flow  Details: doing well with this  Objective Measure: BM  Details: daily [place] #4  Objective Measure: urine leaking  Details: leaking about 2x per wk.   Objective Measure: LBP (after the hysterectomy 2 months no pain, then came back all at once).   Details: #2, size plum, no referral into the hip      Goals:  Goal Identifier MATTIE   Goal Description Patient will report improved tolerance to all functional activities via MATTIE score 30% to <15% to better tolerate her daily activities   Target Date 20   Date Met  20   Progress:     Goal Identifier Pain levels   Goal Description Patient will report a 25% reduction in average daily pain levels to better tolerance ADLs and exercise program   Target Date 20   Date Met  20   Progress:     Goal Identifier ROM   Goal Description Patient will have improved lumbar ROM from 50% limited to < 25% limited in all directions in 10 weeks   Target Date 20   Date Met  20   Progress:     Goal Identifier Pelvic floor   Goal Description Patient will have normal resting levels, function strength of PFM 4 and HEP " for pelvic floor strengthening to manage urinary post void leakage in 10 weeks   Target Date 07/31/20   Date Met  08/18/20   Progress:     Goal Identifier 5   Goal Description LBP of a #2-3, no hip pain, and able to tolerate her HEP with no pain in the thoracic/ribs.    Target Date 11/15/20   Date Met      Progress:                      New Goal       Progress Toward Goals:   Progress this reporting period: Progress this reporting period: Patient is doing better with the LBP see the above goals. However as she is doing the ex's for the LB pt is getting more pain in the TL junction and lower rib area. However this is due to guarding for such a long time with the LBP, and also contributing factor is larger breast tissue that is pulling pt forward along with weakness in the back.           Plan:  Continue therapy per current plan of care.    Discharge:  No    Thank you for the referral,            Virginia Gayle PT

## 2020-08-25 ENCOUNTER — HOSPITAL ENCOUNTER (OUTPATIENT)
Dept: PHYSICAL THERAPY | Facility: CLINIC | Age: 45
Setting detail: THERAPIES SERIES
End: 2020-08-25
Attending: ANESTHESIOLOGY
Payer: COMMERCIAL

## 2020-08-25 PROCEDURE — 97140 MANUAL THERAPY 1/> REGIONS: CPT | Mod: GP | Performed by: PHYSICAL THERAPIST

## 2020-08-25 PROCEDURE — 97110 THERAPEUTIC EXERCISES: CPT | Mod: GP | Performed by: PHYSICAL THERAPIST

## 2020-08-27 ENCOUNTER — TRANSCRIBE ORDERS (OUTPATIENT)
Dept: OTHER | Age: 45
End: 2020-08-27

## 2020-08-27 DIAGNOSIS — G89.4 CHRONIC PAIN SYNDROME: Primary | ICD-10-CM

## 2020-10-22 ENCOUNTER — HOSPITAL ENCOUNTER (OUTPATIENT)
Dept: PHYSICAL THERAPY | Facility: CLINIC | Age: 45
Setting detail: THERAPIES SERIES
End: 2020-10-22
Attending: ANESTHESIOLOGY
Payer: COMMERCIAL

## 2020-10-22 PROCEDURE — 97110 THERAPEUTIC EXERCISES: CPT | Mod: GP | Performed by: PHYSICAL THERAPIST

## 2020-10-22 PROCEDURE — 97010 HOT OR COLD PACKS THERAPY: CPT | Mod: GP | Performed by: PHYSICAL THERAPIST

## 2020-10-22 PROCEDURE — 97035 APP MDLTY 1+ULTRASOUND EA 15: CPT | Mod: GP | Performed by: PHYSICAL THERAPIST

## 2020-10-22 NOTE — PROGRESS NOTES
"Encounter Date: 5/6/2018    SCRIBE #1 NOTE: I, Ronald Hoskins, am scribing for, and in the presence of,  Dr. Epperson. I have scribed the entire note.       History     Chief Complaint   Patient presents with    Diarrhea     Pt states," Diarrhea for three days. My stomach cramps before I have to go."     This is a 27 y.o. Female who presents with abdominal cramping, nausea, vomiting, and diarrhea for 3 days. She notes her symptoms started after having Popeyes for multiple meals the 2 days prior. She notes her vomiting has resolved, but the diarrhea has not. She notes she has 5 episodes of diarrhea today. She rates her pain at a 5/10 and is intermittent. She notes her food is worse with PO intake. She took Peptobismol, which did not alleviate her symptoms. She denies nay fever, dysuria, hematuria, or back pain.       The history is provided by the patient.     Review of patient's allergies indicates:  No Known Allergies  Past Medical History:   Diagnosis Date    Genital herpes      Past Surgical History:   Procedure Laterality Date    CERVICAL DISCECTOMY       History reviewed. No pertinent family history.  Social History   Substance Use Topics    Smoking status: Never Smoker    Smokeless tobacco: Not on file    Alcohol use No     Review of Systems   Constitutional: Negative for chills and fever.   Respiratory: Negative for shortness of breath.    Cardiovascular: Negative for chest pain.   Gastrointestinal: Positive for abdominal pain, diarrhea, nausea and vomiting.   Genitourinary: Negative for dysuria and hematuria.   Musculoskeletal: Negative for back pain.   Skin: Negative for rash.   Neurological: Negative for dizziness and headaches.       Physical Exam     Initial Vitals [05/06/18 1211]   BP Pulse Resp Temp SpO2   (!) 140/97 100 16 98 °F (36.7 °C) 99 %      MAP       111.33         Physical Exam    Nursing note and vitals reviewed.  Constitutional: She appears well-developed and well-nourished. She is " "Outpatient Physical Therapy Progress Note     Patient: Virginia Valenzuela  : 1975    Beginning/End Dates of Reporting Period:  20 to 10/22/2020    Referring Provider:     Therapy Diagnosis: LBP, hip pain and thoracic pain     Client Self Report: pain is in left hip and into the back and front of thigh.pain in the left hip and pelvis has been there for a couple of days, over the past 2 months the hip has not bothered a whole lot , but off and on moderate LBP.     Objective Measurements:  Objective Measure: MATTIE     Objective Measure: biofeedback   Details: in supine resting .4mv. with the 10 sec holds: quick peak at 20mv but then fell off over the 10 sec, avg is 10mv. resting after the contractions is slow to relax \"walking down the steps\". with the 2 sec holds went up to 20mv and the resting was quicker and better.    Objective Measure: urine flow  Details: doing well with this    Objective Measure: BM  Details: daily [place] #4    Objective Measure: urine leaking  Details: none    Objective Measure: LBP (after the hysterectomy 2 months no pain, then came back all at once).   Details: #5 across the back, left hip #6          Goals:  Goal Identifier MATTIE   Goal Description Patient will report improved tolerance to all functional activities via MATTIE score 30% to <15% to better tolerate her daily activities   Target Date 20   Date Met  20   Progress:     Goal Identifier Pain levels   Goal Description Patient will report a 25% reduction in average daily pain levels to better tolerance ADLs and exercise program   Target Date 20   Date Met  20   Progress:     Goal Identifier ROM   Goal Description Patient will have improved lumbar ROM from 50% limited to < 25% limited in all directions in 10 weeks   Target Date 20   Date Met  20   Progress:     Goal Identifier Pelvic floor   Goal Description Patient will have normal resting levels, function strength of PFM 4 and HEP for " not diaphoretic. No distress.   HENT:   Head: Normocephalic and atraumatic.   Eyes: EOM are normal. Pupils are equal, round, and reactive to light.   Cardiovascular: Normal rate, regular rhythm and normal heart sounds.   No murmur heard.  Pulmonary/Chest: Breath sounds normal. She has no wheezes. She has no rhonchi. She has no rales.   Abdominal: Soft. There is no tenderness. There is no rebound and no guarding.   Neurological: She is alert and oriented to person, place, and time.         ED Course   Procedures  Labs Reviewed   POCT URINE PREGNANCY             Medical Decision Making:   Initial Assessment:   27-year-old who complains of abdominal pain associated with diarrhea.  On exam patient appears well.  Abdomen is soft and nontender.  Vital signs stable  Clinical Tests:   Lab Tests: Ordered and Reviewed  ED Management:  Patient is tolerating p.o..  She had abdominal pain associated diarrhea but ate sausage and eggs  today.  Her symptoms then worsened.  Patient appears well. She was given Lomotil and Bentyl in the ED with improvement.  She was advised to take Imodium over the counter and we discharged on Bentyl.  She was also advised on a clear liquid diet and to avoid fatty foods in the future            Scribe Attestation:   Scribe #1: I performed the above scribed service and the documentation accurately describes the services I performed. I attest to the accuracy of the note.    Attending Attestation:           Physician Attestation for Scribe:  Physician Attestation Statement for Scribe #1: I, Dr. Epperson, reviewed documentation, as scribed by Ronald Hoskins in my presence, and it is both accurate and complete.                    Clinical Impression:     1. Diarrhea, unspecified type    2. Abdominal cramping                                Autumn Epperson MD  05/06/18 1800     pelvic floor strengthening to manage urinary post void leakage in 10 weeks   Target Date 07/31/20   Date Met  08/18/20   Progress:     Goal Identifier 5   Goal Description LBP of a #2-3, no hip pain, and able to tolerate her HEP with no pain in the thoracic/ribs.    Target Date 01/19/21   Date Met      Progress:       Progress this reporting period:  Patient has not been in PT for 2 months due to  having severe illness. Patient returns today with LBP and left hip pain. The left hip pain has only returned recently and the LBP is off and on in severity. Patient thought PT was helping and would like to cont to achieve the last goal above.       Plan:  Continue therapy per current plan of care.    Discharge:  No    Thank you for the referral,            Virginia Gayle PT

## 2020-10-22 NOTE — PROGRESS NOTES
[facility]      OUTPATIENT PHYSICAL THERAPY  PLAN OF TREATMENT FOR OUTPATIENT REHABILITATION    Patient's Last Name, First Name, M.I.                YOB: 1975  BiancaVirginia  BEN                        Provider's Name  [facility] Medical Record No.  [id number]                               Onset Date: 5/4/20   Start of Care Date: 5/22/20   Type:     _X_PT   ___OT   ___SLP Medical Diagnosis: SIJ pain and LBP                       PT Diagnosis: Reduced ROM, joint mobility, strength, LBP, referral pain to the left hip, urinary incontinence      _________________________________________________________________________________  Plan of Treatment:    Frequency/Duration: 1x wk 90 days   Goals:  Goal Identifier MATTIE   Goal Description Patient will report improved tolerance to all functional activities via MATTIE score 30% to <15% to better tolerate her daily activities   Target Date 08/14/20   Date Met  08/18/20   Progress:     Goal Identifier Pain levels   Goal Description Patient will report a 25% reduction in average daily pain levels to better tolerance ADLs and exercise program   Target Date 08/14/20   Date Met  08/18/20   Progress:     Goal Identifier ROM   Goal Description Patient will have improved lumbar ROM from 50% limited to < 25% limited in all directions in 10 weeks   Target Date 07/31/20   Date Met  08/18/20   Progress:     Goal Identifier Pelvic floor   Goal Description Patient will have normal resting levels, function strength of PFM 4 and HEP for pelvic floor strengthening to manage urinary post void leakage in 10 weeks   Target Date 07/31/20   Date Met  08/18/20   Progress:     Goal Identifier 5   Goal Description LBP of a #2-3, no hip pain, and able to tolerate her HEP with no pain in the thoracic/ribs.    Target Date 01/19/21   Date Met      Progress:                                          Not met       Progress Toward Goals:   Progress this reporting period: Patient has not been in PT for 2 months due to  having severe illness. Patient returns today with LBP and left hip pain. The left hip pain has only returned recently and the LBP is off and on in severity. Patient thought PT was helping and would like to cont to achieve the last goal above.           Certification date from 10/22/20 to 12/31/20.    Virginia Gayle, PT          I CERTIFY THE NEED FOR THESE SERVICES FURNISHED UNDER        THIS PLAN OF TREATMENT AND WHILE UNDER MY CARE .             Physician Signature               Date    X_____________________________________________________                      Referring Provider: Dereck Cabrera

## 2020-10-23 ENCOUNTER — HOSPITAL ENCOUNTER (EMERGENCY)
Facility: CLINIC | Age: 45
Discharge: HOME | End: 2020-10-23
Attending: PHYSICIAN ASSISTANT | Admitting: PHYSICIAN ASSISTANT
Payer: COMMERCIAL

## 2020-10-23 VITALS
DIASTOLIC BLOOD PRESSURE: 90 MMHG | OXYGEN SATURATION: 96 % | WEIGHT: 180 LBS | BODY MASS INDEX: 31.39 KG/M2 | HEART RATE: 111 BPM | TEMPERATURE: 98.1 F | RESPIRATION RATE: 16 BRPM | SYSTOLIC BLOOD PRESSURE: 136 MMHG

## 2020-10-23 DIAGNOSIS — M54.42 ACUTE LEFT-SIDED LOW BACK PAIN WITH LEFT-SIDED SCIATICA: ICD-10-CM

## 2020-10-23 PROCEDURE — 250N000011 HC RX IP 250 OP 636: Performed by: PHYSICIAN ASSISTANT

## 2020-10-23 PROCEDURE — 99284 EMERGENCY DEPT VISIT MOD MDM: CPT | Performed by: PHYSICIAN ASSISTANT

## 2020-10-23 PROCEDURE — 96372 THER/PROPH/DIAG INJ SC/IM: CPT | Performed by: PHYSICIAN ASSISTANT

## 2020-10-23 PROCEDURE — 99285 EMERGENCY DEPT VISIT HI MDM: CPT | Performed by: PHYSICIAN ASSISTANT

## 2020-10-23 RX ORDER — METHYLPREDNISOLONE 4 MG
TABLET, DOSE PACK ORAL
Qty: 21 TABLET | Refills: 0 | Status: SHIPPED | OUTPATIENT
Start: 2020-10-23 | End: 2022-01-28

## 2020-10-23 RX ORDER — HYDROCODONE BITARTRATE AND ACETAMINOPHEN 5; 325 MG/1; MG/1
1 TABLET ORAL EVERY 6 HOURS PRN
Qty: 10 TABLET | Refills: 0 | Status: SHIPPED | OUTPATIENT
Start: 2020-10-23 | End: 2020-10-26

## 2020-10-23 RX ORDER — ORPHENADRINE CITRATE 30 MG/ML
60 INJECTION INTRAMUSCULAR; INTRAVENOUS ONCE
Status: COMPLETED | OUTPATIENT
Start: 2020-10-23 | End: 2020-10-23

## 2020-10-23 RX ADMIN — HYDROMORPHONE HYDROCHLORIDE 1 MG: 1 INJECTION, SOLUTION INTRAMUSCULAR; INTRAVENOUS; SUBCUTANEOUS at 16:27

## 2020-10-23 RX ADMIN — ORPHENADRINE CITRATE 60 MG: 30 INJECTION INTRAMUSCULAR; INTRAVENOUS at 16:27

## 2020-10-23 NOTE — ED TRIAGE NOTES
Pt presents with left lower back pain and hip pain. Pt states hip and thigh pain on that side started 3 days ago and pt had lower back pain before that. Patient's airway, breathing, circulation, and disability/mental status (ABCDs) intact/WDL during triage.

## 2020-10-23 NOTE — ED PROVIDER NOTES
History     Chief Complaint   Patient presents with     Back Pain     HPI  Virginia Valenzuela is a 45 year old female who presents to the emergency department complaining of low back pain.  Patient reports she developed left-sided low back pain a couple weeks ago.  She has been taking ibuprofen as well as a muscle relaxer for this and has been going to physical therapy.  She denies any injury or trauma to the back in the last couple weeks but has a history of occasional back pain.  In the last 3 days, her symptoms have worsened and now she has as deep, throbbing pain going through her lateral left hip into her left lateral thigh.  Nothing seems to help the pain anymore.  She denies any weakness in her legs.  Laying flat makes her feel numbness and tingling down to her foot and the left leg.  She denies any numbness in her groin or loss of bowel/bladder control.  She denies any urinary symptoms or flank pain.  No fevers.  She did have an MRI completed in April 2020 of her lumbar spine and has an appointment with a spine specialist in November for her ongoing back pain.  She has never had pain down her leg like this before.      Allergies:  Allergies   Allergen Reactions     Bees Swelling     FINGER TIPS TO ELBOWS     Tylenol W/Codeine [Acetaminophen-Codeine] Other (See Comments)     Squeezing pain around abdomen       Problem List:    Patient Active Problem List    Diagnosis Date Noted     Chronic pelvic pain in female 09/15/2017     Priority: Medium     Trigger finger, left thumb 04/28/2017     Priority: Medium     Sesamoiditis 04/18/2017     Priority: Medium     Bilateral carpal tunnel syndrome 08/11/2016     Priority: Medium     Tobacco use disorder 08/17/2015     Priority: Medium     Ovarian cyst 01/17/2014     Priority: Medium     Problem list name updated by automated process. Provider to review       Female infertility 01/17/2014     Priority: Medium     Hypertension goal BP (blood pressure) < 140/90  2013     Priority: Medium     Papanicolaou smear of cervix with atypical squamous cells of undetermined significance (ASC-US) 2013     Priority: Medium     3/12/13 pap ASCUS HR HPV  13 colposcopy. ASCUS. Plan repeat pap in 6 months  7/6/15 pap NIL/neg HPV. Plan cotest in 3 yrs       GERD (gastroesophageal reflux disease) 2013     Priority: Medium     Vitamin D deficiency 2013     Priority: Medium     Problem list name updated by automated process. Provider to review and confirm  [employer] Update utility       HAMIDA (obstructive sleep apnea) 2013     Priority: Medium     Generalized anxiety disorder 2013     Priority: Medium     Seasonal allergic rhinitis 2013     Priority: Medium        Past Medical History:    Past Medical History:   Diagnosis Date     Allergic rhinitis, cause unspecified      Arthritis      ASCUS with positive high risk HPV 3/12/13     Depressive disorder, not elsewhere classified      Dysfunction of eustachian tube      Encounter for therapeutic drug monitoring      Generalized anxiety disorder      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     History of appendectomy      Hypertension 2000     Idiopathic urticaria      Obstructive sleep apnea (adult) (pediatric)      Other chronic allergic conjunctivitis      Other malaise and fatigue      Pain in joint, shoulder region      S/P laparoscopic cholecystectomy      Special screening examination for human papillomavirus (HPV)      Sprain of tibiofibular (ligament), distal of ankle      Threatened , unspecified as to episode of care      Tobacco use disorder      Unspecified essential hypertension      Unspecified vitamin D deficiency        Past Surgical History:    Past Surgical History:   Procedure Laterality Date     ABDOMEN SURGERY  2013    ovaian cyst and right ovary removal     APPENDECTOMY       CHOLECYSTECTOMY       COLONOSCOPY N/A 10/26/2017    Procedure: COMBINED COLONOSCOPY, SINGLE OR MULTIPLE  BIOPSY/POLYPECTOMY BY BIOPSY;  Colonoscopy with biopsies by biopsy;  Surgeon: Fred Faust DO;  Location: [facility]     EYE SURGERY  1979     GENITOURINARY SURGERY  July 2013    Ovary, fallopian tube     GYN SURGERY  July 2013    Ovary, Fallopian tube     INJECT EPIDURAL LUMBAR Bilateral 3/15/2019    Procedure: Inject Facets Lumbar 4-5, Bilateral;  Surgeon: Colin Nolasco MD;  Location: [facility]     INJECT FACET JOINT N/A 10/25/2018    Procedure: Injest Facet Joint Lumbar 4-5 Bilateral;  Surgeon: Colin Nolasco MD;  Location: [facility]     LAPAROSCOPIC LYSIS ADHESIONS Left 3/1/2016    Procedure: LAPAROSCOPIC LYSIS ADHESIONS;  Surgeon: Nate Mishra MD;  Location: [facility]     LAPAROSCOPIC LYSIS ADHESIONS N/A 7/13/2017    Procedure: LAPAROSCOPIC LYSIS ADHESIONS;   laparoscopic lysis of adhesions;  Surgeon: Nate Mishra MD;  Location: [facility]     LAPAROSCOPIC LYSIS ADHESIONS N/A 7/13/2017    Procedure: LAPAROSCOPIC LYSIS ADHESIONS;   laparoscopic lysis of adhesions;  Surgeon: Nate Milligan MD;  Location: [facility]     LAPAROSCOPIC LYSIS ADHESIONS N/A 5/14/2018    Procedure: LAPAROSCOPIC LYSIS ADHESIONS;;  Surgeon: Nate Mishra MD;  Location: [facility]     LAPAROSCOPIC SALPINGECTOMY Left 5/14/2018    Procedure: LAPAROSCOPIC SALPINGECTOMY;;  Surgeon: Nate Mishra MD;  Location: [facility]     LAPAROSCOPY DIAGNOSTIC (GYN) N/A 5/14/2018    Procedure: LAPAROSCOPY DIAGNOSTIC (GYN);  laparoscopy, left salpingectomy, left ovarian nodule removal and lysis of adhesions;  Surgeon: Nate Mishra MD;  Location: [facility]     RELEASE CARPAL TUNNEL Left 8/31/2016    Procedure: RELEASE CARPAL TUNNEL;  Surgeon: Gucci Hairston MD;  Location: [facility]     RELEASE CARPAL TUNNEL Right 12/7/2016    Procedure: RELEASE CARPAL TUNNEL;  Surgeon: Gucci Hairston MD;  Location: [facility]       Family History:    Family History   Problem Relation Age of Onset     Diabetes Father       Hypertension Father      Cardiovascular Mother 29        myocarditis      Other Cancer Brother         unknown origin       Social History:  Marital Status:   [2]  Social History     Tobacco Use     Smoking status: Current Every Day Smoker     Packs/day: 1.00     Years: 10.00     Pack years: 10.00     Types: Cigarettes     Smokeless tobacco: Never Used   Substance Use Topics     Alcohol use: No     Alcohol/week: 0.0 standard drinks     Drug use: No        Medications:         HYDROcodone-acetaminophen (NORCO) 5-325 MG tablet       methylPREDNISolone (MEDROL DOSEPAK) 4 MG tablet therapy pack       Acetaminophen (TYLENOL PO)       AmLODIPine Besylate (NORVASC PO)       cetirizine (ZYRTEC) 10 MG tablet       Cholecalciferol (VITAMIN D) 2000 units tablet       Cholecalciferol (VITAMIN D3) 2000 UNITS CAPS       diazepam (VALIUM) 5 MG tablet       EPINEPHrine (EPIPEN) 0.3 MG/0.3ML injection       Esomeprazole Magnesium (NEXIUM PO)       Lidocaine (LIDOCARE) 4 % Patch       losartan-hydrochlorothiazide (HYZAAR) 100-25 MG per tablet       Multiple Vitamins-Minerals (MULTIVITAMIN ADULT PO)       oxyCODONE (ROXICODONE) 5 MG tablet       oxyCODONE-acetaminophen (PERCOCET) 5-325 MG per tablet       potassium chloride ER (K-TAB/KLOR-CON) 10 MEQ CR tablet       predniSONE (DELTASONE) 20 MG tablet       Probiotic Product (PROBIOTIC DAILY PO)          Review of Systems   All other systems reviewed and are negative.      Physical Exam   BP: (!) 136/90  Pulse: 111  Temp: 98.1  F (36.7  C)  Resp: 16  Weight: 81.6 kg (180 lb)  SpO2: 96 %      Physical Exam  Vitals signs and nursing note reviewed.   Constitutional:       General: She is not in acute distress.     Appearance: Normal appearance. She is not ill-appearing, toxic-appearing or diaphoretic.   HENT:      Head: Normocephalic and atraumatic.      Nose: Nose normal.   Eyes:      Extraocular Movements: Extraocular movements intact.      Conjunctiva/sclera: Conjunctivae  normal.   Neck:      Musculoskeletal: Neck supple.   Pulmonary:      Effort: Pulmonary effort is normal. No respiratory distress.   Abdominal:      Tenderness: There is no right CVA tenderness or left CVA tenderness.   Musculoskeletal:      Right lower leg: No edema.      Left lower leg: No edema.      Comments: Low back: Tenderness to the low left back paraspinous region into the left sacral region.  Tenderness noted in left lateral hip as well.  Patient exhibits normal strength in bilateral lower extremities.  Negative straight leg raise on the right, positive straight leg raise on the left.   Skin:     General: Skin is warm and dry.   Neurological:      General: No focal deficit present.      Mental Status: She is alert and oriented to person, place, and time. Mental status is at baseline.      Motor: No weakness.      Gait: Gait normal.   Psychiatric:         Mood and Affect: Mood normal.         Behavior: Behavior normal.         ED Course        Procedures      No results found for this or any previous visit (from the past 24 hour(s)).    Medications   HYDROmorphone (DILAUDID) injection 1 mg (1 mg Intramuscular Given 10/23/20 1627)   orphenadrine (NORFLEX) injection 60 mg (60 mg Intramuscular Given 10/23/20 1627)       Assessments & Plan (with Medical Decision Making)  Virginia Valenzuela is a 45 year old male who presented to the ED with left-sided back pain, ongoing for the last 2 weeks, now with referred pain down the left leg for the past 3 days.  On arrival to the ED she was mildly tachycardic, otherwise unremarkable vital signs.  Exam today demonstrated tenderness to the left low lumbar/sacral spine region with tenderness to the lateral left thigh and hip.  She had a positive left leg raise, negative right leg raise.  No weakness noted on exam and without warning neurological symptoms or signs and no history of trauma I did not think imaging warranted at this point.  She did have an MRI in April 2020 which  showed mild degenerative disease, particularly mild left neural foraminal narrowing at L4-L5 which does fit the radicular pattern of her pain.  Question if this could be worsening versus flaring up.  Patient was given IM Dilaudid and Norflex here with good improvement of symptoms.  He was comfortable with plan to hold on imaging for now and treat things conservatively with an Medrol dose pack.  She will be also given Norco for pain control and I encouraged use of ibuprofen as well as the Flexeril she has at home for additional symptom management.  Side effects of medications were discussed.  She can follow-up with her clinic provider in a few days if symptoms are not improving.  She was encouraged gentle stretching and continuing with physical therapy for her back issues.  Return precautions were discussed.  All questions answered and the patient was discharged home in suitable condition.     I have reviewed the nursing notes.    I have reviewed the findings, diagnosis, plan and need for follow up with the patient.    Discharge Medication List as of 10/23/2020  5:49 PM      START taking these medications    Details   HYDROcodone-acetaminophen (NORCO) 5-325 MG tablet Take 1 tablet by mouth every 6 hours as needed for severe pain, Disp-10 tablet, R-0, E-Prescribe      methylPREDNISolone (MEDROL DOSEPAK) 4 MG tablet therapy pack Follow Package Directions, Disp-21 tablet, R-0, E-Prescribe             Final diagnoses:   Acute left-sided low back pain with left-sided sciatica     Note: Chart documentation done in part with Dragon Voice Recognition software. Although reviewed after completion, some word and grammatical errors may remain.      10/23/2020   [facility] EMERGENCY DEPT     Adelina Huber PA-C  10/23/20 1952

## 2020-10-23 NOTE — ED AVS SNAPSHOT
[facility] Emergency Dept  [address] [place] [address]  Phone: [phone]  Fax: [phone]                                    Virginia Valenzuela   MRN: [id number]    Department: [facility] Emergency Dept   Date of Visit: 10/23/2020           After Visit Summary Signature Page    I have received my discharge instructions, and my questions have been answered. I have discussed any challenges I see with this plan with the nurse or doctor.    ..........................................................................................................................................  Patient/Patient Representative Signature      ..........................................................................................................................................  Patient Representative Print Name and Relationship to Patient    ..................................................               ................................................  Date                                   Time    ..........................................................................................................................................  Reviewed by Signature/Title    ...................................................              ..............................................  Date                                               Time          22EPIC Rev 08/18

## 2020-10-23 NOTE — DISCHARGE INSTRUCTIONS
Please start the steroids today and take the full course as prescribed.  This should help reduce inflammation in your back.  Use the Norco as needed for severe pain in addition to over-the-counter ibuprofen.  Use the Flexeril you have at home as well for muscle spasm relief.  Practice gentle stretching.  You can continue with physical therapy as well.  Follow-up with your clinic provider in a few days if symptoms are not improving.  Return to the emergency department for any worsening concerns.    Thank you for choosing [facility] Emergency Department. It was a pleasure taking care of you today. If you have any questions, please call [phone].    Adelina Huber PA-C

## 2020-10-30 ENCOUNTER — HOSPITAL ENCOUNTER (OUTPATIENT)
Dept: PHYSICAL THERAPY | Facility: CLINIC | Age: 45
Setting detail: THERAPIES SERIES
End: 2020-10-30
Attending: ANESTHESIOLOGY
Payer: COMMERCIAL

## 2020-10-30 PROCEDURE — 97014 ELECTRIC STIMULATION THERAPY: CPT | Mod: GP | Performed by: PHYSICAL THERAPIST

## 2020-11-05 ENCOUNTER — HOSPITAL ENCOUNTER (OUTPATIENT)
Dept: PHYSICAL THERAPY | Facility: CLINIC | Age: 45
Setting detail: THERAPIES SERIES
End: 2020-11-05
Attending: ANESTHESIOLOGY
Payer: COMMERCIAL

## 2020-11-05 PROCEDURE — 97140 MANUAL THERAPY 1/> REGIONS: CPT | Mod: GP | Performed by: PHYSICAL THERAPIST

## 2020-11-05 PROCEDURE — 97014 ELECTRIC STIMULATION THERAPY: CPT | Mod: GP | Performed by: PHYSICAL THERAPIST

## 2020-11-12 ENCOUNTER — HOSPITAL ENCOUNTER (OUTPATIENT)
Dept: PHYSICAL THERAPY | Facility: CLINIC | Age: 45
Setting detail: THERAPIES SERIES
End: 2020-11-12
Attending: ANESTHESIOLOGY
Payer: COMMERCIAL

## 2020-11-12 PROCEDURE — 97014 ELECTRIC STIMULATION THERAPY: CPT | Mod: GP | Performed by: PHYSICAL THERAPIST

## 2020-11-12 PROCEDURE — 97110 THERAPEUTIC EXERCISES: CPT | Mod: GP | Performed by: PHYSICAL THERAPIST

## 2020-11-12 PROCEDURE — 97035 APP MDLTY 1+ULTRASOUND EA 15: CPT | Mod: GP | Performed by: PHYSICAL THERAPIST

## 2020-11-18 ENCOUNTER — HOSPITAL ENCOUNTER (OUTPATIENT)
Dept: MRI IMAGING | Facility: CLINIC | Age: 45
Discharge: HOME | End: 2020-11-18
Admitting: RADIOLOGY
Payer: COMMERCIAL

## 2020-11-18 DIAGNOSIS — M54.50 LOW BACK PAIN: ICD-10-CM

## 2020-11-18 PROCEDURE — 72148 MRI LUMBAR SPINE W/O DYE: CPT

## 2020-12-01 ENCOUNTER — HOSPITAL ENCOUNTER (OUTPATIENT)
Dept: PHYSICAL THERAPY | Facility: CLINIC | Age: 45
Setting detail: THERAPIES SERIES
End: 2020-12-01
Attending: ANESTHESIOLOGY
Payer: COMMERCIAL

## 2020-12-01 PROCEDURE — 97110 THERAPEUTIC EXERCISES: CPT | Mod: GP | Performed by: PHYSICAL THERAPIST

## 2020-12-10 ENCOUNTER — HOSPITAL ENCOUNTER (EMERGENCY)
Facility: CLINIC | Age: 45
Discharge: HOME | End: 2020-12-10
Attending: EMERGENCY MEDICINE | Admitting: EMERGENCY MEDICINE
Payer: COMMERCIAL

## 2020-12-10 VITALS
SYSTOLIC BLOOD PRESSURE: 158 MMHG | HEART RATE: 95 BPM | TEMPERATURE: 98 F | DIASTOLIC BLOOD PRESSURE: 90 MMHG | OXYGEN SATURATION: 98 % | RESPIRATION RATE: 20 BRPM

## 2020-12-10 DIAGNOSIS — M54.42 ACUTE LEFT-SIDED LOW BACK PAIN WITH LEFT-SIDED SCIATICA: ICD-10-CM

## 2020-12-10 LAB
ALBUMIN UR-MCNC: 30 MG/DL
APPEARANCE UR: ABNORMAL
BACTERIA #/AREA URNS HPF: ABNORMAL /HPF
BILIRUB UR QL STRIP: NEGATIVE
COLOR UR AUTO: YELLOW
GLUCOSE UR STRIP-MCNC: NEGATIVE MG/DL
HGB UR QL STRIP: NEGATIVE
HYALINE CASTS #/AREA URNS LPF: 3 /LPF (ref 0–2)
KETONES UR STRIP-MCNC: NEGATIVE MG/DL
LEUKOCYTE ESTERASE UR QL STRIP: NEGATIVE
MUCOUS THREADS #/AREA URNS LPF: PRESENT /LPF
NITRATE UR QL: NEGATIVE
PH UR STRIP: 5 PH (ref 5–7)
RBC #/AREA URNS AUTO: <1 /HPF (ref 0–2)
SOURCE: ABNORMAL
SP GR UR STRIP: 1.03 (ref 1–1.03)
SQUAMOUS #/AREA URNS AUTO: 10 /HPF (ref 0–1)
UROBILINOGEN UR STRIP-MCNC: 0 MG/DL (ref 0–2)
WBC #/AREA URNS AUTO: 2 /HPF (ref 0–5)

## 2020-12-10 PROCEDURE — 81001 URINALYSIS AUTO W/SCOPE: CPT | Performed by: EMERGENCY MEDICINE

## 2020-12-10 PROCEDURE — 96372 THER/PROPH/DIAG INJ SC/IM: CPT | Performed by: EMERGENCY MEDICINE

## 2020-12-10 PROCEDURE — 250N000011 HC RX IP 250 OP 636: Performed by: EMERGENCY MEDICINE

## 2020-12-10 PROCEDURE — 99284 EMERGENCY DEPT VISIT MOD MDM: CPT | Performed by: EMERGENCY MEDICINE

## 2020-12-10 RX ORDER — AMLODIPINE BESYLATE 10 MG/1
1 TABLET ORAL DAILY
COMMUNITY
Start: 2020-11-11

## 2020-12-10 RX ORDER — OXYCODONE AND ACETAMINOPHEN 5; 325 MG/1; MG/1
1-2 TABLET ORAL EVERY 4 HOURS PRN
Qty: 12 TABLET | Refills: 0 | Status: SHIPPED | OUTPATIENT
Start: 2020-12-10 | End: 2021-05-16

## 2020-12-10 RX ORDER — KETOROLAC TROMETHAMINE 30 MG/ML
60 INJECTION, SOLUTION INTRAMUSCULAR; INTRAVENOUS ONCE
Status: COMPLETED | OUTPATIENT
Start: 2020-12-10 | End: 2020-12-10

## 2020-12-10 RX ORDER — ESTRADIOL 0.03 MG/D
1 PATCH TRANSDERMAL
COMMUNITY
Start: 2020-10-01 | End: 2023-08-24

## 2020-12-10 RX ORDER — LOSARTAN POTASSIUM 100 MG/1
1 TABLET ORAL DAILY
COMMUNITY
Start: 2020-11-11

## 2020-12-10 RX ORDER — ACETAMINOPHEN 160 MG
1 TABLET,DISINTEGRATING ORAL DAILY
COMMUNITY
Start: 2020-08-03

## 2020-12-10 RX ORDER — CYCLOBENZAPRINE HCL 10 MG
10 TABLET ORAL 3 TIMES DAILY PRN
COMMUNITY
Start: 2020-11-18 | End: 2023-07-23

## 2020-12-10 RX ORDER — HYDROCHLOROTHIAZIDE 25 MG/1
1 TABLET ORAL DAILY
COMMUNITY
Start: 2020-11-11

## 2020-12-10 RX ADMIN — KETOROLAC TROMETHAMINE 60 MG: 30 INJECTION, SOLUTION INTRAMUSCULAR at 17:06

## 2020-12-10 NOTE — ED PROVIDER NOTES
History     Chief Complaint   Patient presents with     Post-op Problem     Back Pain     HPI  Virginia Valenzuela is a 45 year old female who is having chronic low back pain.  She has a multitude of problems at the L4-L5 level.  MRI completed November 18 showed that she had a 0.5 x 0.8 x 1.4 cm cyst causing effacement on the adjacent nerve root along with facet arthropathy and desiccation of the disc and loss of disc height.  Yesterday at [facility] located in [facility] she had an epidural steroid injection and they also ruptured the cyst.  She is having low back pain still having some paresthesias in the left leg.  She points in the distribution of the L5 dermatome all the way down to the toes.  She has had no fever or chills.  Back pain currently rated 6/10 intensity.  No change in bladder or bowel function but has been having some increased urinary frequency in the last few days.  Has had occasional urinary tract infections in the past.    Active tobacco use disorder.  Has noted a slight cough.  Now that atypical from her chronic cough but wanted it checked out with the Covid infection rate going up.  She has no other signs or symptoms of Covid.    Allergies:  Allergies   Allergen Reactions     Bees Swelling     FINGER TIPS TO ELBOWS     Tylenol W/Codeine [Acetaminophen-Codeine] Other (See Comments)     Squeezing pain around abdomen       Problem List:    Patient Active Problem List    Diagnosis Date Noted     Chronic pelvic pain in female 09/15/2017     Priority: Medium     Trigger finger, left thumb 04/28/2017     Priority: Medium     Sesamoiditis 04/18/2017     Priority: Medium     Bilateral carpal tunnel syndrome 08/11/2016     Priority: Medium     Tobacco use disorder 08/17/2015     Priority: Medium     Ovarian cyst 01/17/2014     Priority: Medium     Problem list name updated by automated process. Provider to review       Female infertility 01/17/2014     Priority: Medium     Hypertension goal BP  (blood pressure) < 140/90 2013     Priority: Medium     Papanicolaou smear of cervix with atypical squamous cells of undetermined significance (ASC-US) 2013     Priority: Medium     3/12/13 pap ASCUS HR HPV  13 colposcopy. ASCUS. Plan repeat pap in 6 months  7/6/15 pap NIL/neg HPV. Plan cotest in 3 yrs       GERD (gastroesophageal reflux disease) 2013     Priority: Medium     Vitamin D deficiency 2013     Priority: Medium     Problem list name updated by automated process. Provider to review and confirm  [employer] Update utility       HAMIDA (obstructive sleep apnea) 2013     Priority: Medium     Generalized anxiety disorder 2013     Priority: Medium     Seasonal allergic rhinitis 2013     Priority: Medium        Past Medical History:    Past Medical History:   Diagnosis Date     Allergic rhinitis, cause unspecified      Arthritis      ASCUS with positive high risk HPV 3/12/13     Depressive disorder, not elsewhere classified      Dysfunction of eustachian tube      Encounter for therapeutic drug monitoring      Generalized anxiety disorder      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     History of appendectomy      Hypertension 2000     Idiopathic urticaria      Obstructive sleep apnea (adult) (pediatric)      Other chronic allergic conjunctivitis      Other malaise and fatigue      Pain in joint, shoulder region      S/P laparoscopic cholecystectomy      Special screening examination for human papillomavirus (HPV)      Sprain of tibiofibular (ligament), distal of ankle      Threatened , unspecified as to episode of care      Tobacco use disorder      Unspecified essential hypertension      Unspecified vitamin D deficiency        Past Surgical History:    Past Surgical History:   Procedure Laterality Date     ABDOMEN SURGERY  2013    ovaian cyst and right ovary removal     APPENDECTOMY       CHOLECYSTECTOMY       COLONOSCOPY N/A 10/26/2017    Procedure: COMBINED  COLONOSCOPY, SINGLE OR MULTIPLE BIOPSY/POLYPECTOMY BY BIOPSY;  Colonoscopy with biopsies by biopsy;  Surgeon: Fred Faust DO;  Location: [facility]     EYE SURGERY  1979     GENITOURINARY SURGERY  July 2013    Ovary, fallopian tube     GYN SURGERY  July 2013    Ovary, Fallopian tube     INJECT EPIDURAL LUMBAR Bilateral 3/15/2019    Procedure: Inject Facets Lumbar 4-5, Bilateral;  Surgeon: Colin Nolasco MD;  Location: [facility]     INJECT FACET JOINT N/A 10/25/2018    Procedure: Injest Facet Joint Lumbar 4-5 Bilateral;  Surgeon: Colin Nolasco MD;  Location: [facility]     LAPAROSCOPIC LYSIS ADHESIONS Left 3/1/2016    Procedure: LAPAROSCOPIC LYSIS ADHESIONS;  Surgeon: Nate Mishra MD;  Location: [facility]     LAPAROSCOPIC LYSIS ADHESIONS N/A 7/13/2017    Procedure: LAPAROSCOPIC LYSIS ADHESIONS;   laparoscopic lysis of adhesions;  Surgeon: aNte Mishra MD;  Location: [facility]     LAPAROSCOPIC LYSIS ADHESIONS N/A 7/13/2017    Procedure: LAPAROSCOPIC LYSIS ADHESIONS;   laparoscopic lysis of adhesions;  Surgeon: Nate Milligan MD;  [facility]     LAPAROSCOPIC LYSIS ADHESIONS N/A 5/14/2018    Procedure: LAPAROSCOPIC LYSIS ADHESIONS;;  Surgeon: Nate Mishra MD;  Location[facility]     LAPAROSCOPIC SALPINGECTOMY Left 5/14/2018    Procedure: LAPAROSCOPIC SALPINGECTOMY;;  Surgeon: Nate Mishra MD;  Location: [facility]     LAPAROSCOPY DIAGNOSTIC (GYN) N/A 5/14/2018    Procedure: LAPAROSCOPY DIAGNOSTIC (GYN);  laparoscopy, left salpingectomy, left ovarian nodule removal and lysis of adhesions;  Surgeon: Nate Mishra MD;  Location[facility]     RELEASE CARPAL TUNNEL Left 8/31/2016    Procedure: RELEASE CARPAL TUNNEL;  Surgeon: Gucci Hairston MD;  Location: [facility]     RELEASE CARPAL TUNNEL Right 12/7/2016    Procedure: RELEASE CARPAL TUNNEL;  Surgeon: Gucci Hairston MD;  Location: [facility]       Family History:    Family History   Problem Relation Age of Onset      Diabetes Father      Hypertension Father      Cardiovascular Mother 29        myocarditis      Other Cancer Brother         unknown origin       Social History:  Marital Status:   [2]  Social History     Tobacco Use     Smoking status: Current Every Day Smoker     Packs/day: 1.00     Years: 10.00     Pack years: 10.00     Types: Cigarettes     Smokeless tobacco: Never Used   Substance Use Topics     Alcohol use: No     Alcohol/week: 0.0 standard drinks     Drug use: No        Medications:         Acetaminophen (TYLENOL PO)       AmLODIPine Besylate (NORVASC PO)       cetirizine (ZYRTEC) 10 MG tablet       Cholecalciferol (VITAMIN D) 2000 units tablet       Cholecalciferol (VITAMIN D3) 2000 UNITS CAPS       diazepam (VALIUM) 5 MG tablet       EPINEPHrine (EPIPEN) 0.3 MG/0.3ML injection       Esomeprazole Magnesium (NEXIUM PO)       Lidocaine (LIDOCARE) 4 % Patch       losartan-hydrochlorothiazide (HYZAAR) 100-25 MG per tablet       methylPREDNISolone (MEDROL DOSEPAK) 4 MG tablet therapy pack       Multiple Vitamins-Minerals (MULTIVITAMIN ADULT PO)       oxyCODONE (ROXICODONE) 5 MG tablet       oxyCODONE-acetaminophen (PERCOCET) 5-325 MG per tablet       potassium chloride ER (K-TAB/KLOR-CON) 10 MEQ CR tablet       predniSONE (DELTASONE) 20 MG tablet       Probiotic Product (PROBIOTIC DAILY PO)          Review of Systems   All other systems reviewed and are negative.      Physical Exam   BP: (!) 165/94  Pulse: 100  Temp: 98  F (36.7  C)  Resp: 20  SpO2: 100 %      Physical Exam  Vitals signs and nursing note reviewed.   Constitutional:       Appearance: Normal appearance.   HENT:      Head: Normocephalic.      Nose: No rhinorrhea.   Eyes:      Conjunctiva/sclera: Conjunctivae normal.   Cardiovascular:      Rate and Rhythm: Normal rate.      Pulses: Normal pulses.   Pulmonary:      Effort: Pulmonary effort is normal. No respiratory distress.      Breath sounds: Normal breath sounds. No wheezing, rhonchi or  rales.   Musculoskeletal:      Comments: Normal gait.  Straight leg raise test on the left was negative.  Sensation along the L5 dermatome is intact to light touch and skin pinching.  There was no weakness to extensor hallux longus on the left.  Patellar and Achilles reflexes were both symmetric bilateral.  Skin color and tone to the extremity was normal.  Her low back on inspection notes no bruising.  There is no palpable hematoma.  No soft tissue tenderness or signs for post procedure inflammation concerning for infection.   Neurological:      Mental Status: She is alert.         ED Course        Procedures                   Results for orders placed or performed during the hospital encounter of 12/10/20 (from the past 24 hour(s))   UA reflex to Microscopic and Culture    Specimen: Midstream Urine   Result Value Ref Range    Color Urine Yellow     Appearance Urine Slightly Cloudy     Glucose Urine Negative NEG^Negative mg/dL    Bilirubin Urine Negative NEG^Negative    Ketones Urine Negative NEG^Negative mg/dL    Specific Gravity Urine 1.029 1.003 - 1.035    Blood Urine Negative NEG^Negative    pH Urine 5.0 5.0 - 7.0 pH    Protein Albumin Urine 30 (A) NEG^Negative mg/dL    Urobilinogen mg/dL 0.0 0.0 - 2.0 mg/dL    Nitrite Urine Negative NEG^Negative    Leukocyte Esterase Urine Negative NEG^Negative    Source Midstream Urine     RBC Urine <1 0 - 2 /HPF    WBC Urine 2 0 - 5 /HPF    Bacteria Urine Few (A) NEG^Negative /HPF    Squamous Epithelial /HPF Urine 10 (H) 0 - 1 /HPF    Mucous Urine Present (A) NEG^Negative /LPF    Hyaline Casts 3 (H) 0 - 2 /LPF       Medications   ketorolac (TORADOL) injection 60 mg (has no administration in time range)       Assessments & Plan (with Medical Decision Making)  Stephanie is 45 years of age.  Suffers from chronic low back pain.  Status post epidural injection and rupture of cyst at the L4-L5 level.  Procedure completed yesterday through [facility] in [place].  She is having 6/10 low  back pain with some sensory complaints had to travel down the L5 nerve root to the dorsum of the great toe through the third toe.  She is ambulatory with discomfort worse when she is lying still or sitting.  No change in bladder bowel function.  Did mention some increased urinary frequency but no incontinence.  We did screen a urine which was negative except for some signs of contamination from collection process.  Her examination noted that she had a nonantalgic gait.  She did have limited forward flexion and extension due to discomfort.  The soft tissues in the low back in the region of the injection do not show any signs for infection, soft tissue tenderness or development of a hematoma.  Straight leg raise test was negative.  She had symmetric patellar and Achilles reflexes.  To the right.  There is no weakness to extensor hallux longus she still had discernment of sensation in the L5 dermatome.  Personally reviewed the MRI with the patient and gave a detailed scription of what was completed yesterday with regards to cyst rupture.  Symptoms that she is experiencing are not unusual post procedure from irritation of the needle going in plus the pH change of the injection of lidocaine and steroid as well as the irritation of tissues from intentional rupture of cyst measuring 0.5 x 0.8 x 1.4 cm.  She is having no central symptoms and minimal peripheral radicular symptoms.  Patient received Toradol 60 mg IM in the ED.  Her urinalysis was cleared for any concern for infection.  Discharged home with a #10 Percocet 5/325 for post procedure pain.  She is to follow-up with either primary care provider or contact [facility] for further care- as needed.  Addendum: Did screen new prescription monitoring program and her last prescription for any narcotic pain medication was 1 month ago.     I have reviewed the nursing notes.    I have reviewed the findings, diagnosis, plan and need for follow up with the patient.      New  Prescriptions    No medications on file       Final diagnoses:   Acute left-sided low back pain with left-sided sciatica - Status post epidural injection with L4-L5 cyst rupture       12/10/2020   [facility] EMERGENCY DEPT     Dimas Jay, DO  12/10/20 1723       Dimas Jay, DO  12/10/20 1724

## 2020-12-10 NOTE — ED TRIAGE NOTES
"Pt stated \"yesterday afternoon I had a epidural injection for my degenerative disc issues. They also bursted a L4 cyst yesterday. I have been feeling more flushed than normal after these injections. I am also experiencing pain in my back right here (pointing to lower back), its right below the injection site and my left leg hurts too.\"    No meds taken prior to arrival      "

## 2020-12-10 NOTE — ED AVS SNAPSHOT
[facility]  [address] [place] [address]  Phone: [phone]  Fax: [phone]                                    Virginia Valenzuela   MRN: [id number]    Department: [facility]   Date of Visit: 12/10/2020           After Visit Summary Signature Page    I have received my discharge instructions, and my questions have been answered. I have discussed any challenges I see with this plan with the nurse or doctor.    ..........................................................................................................................................  Patient/Patient Representative Signature      ..........................................................................................................................................  Patient Representative Print Name and Relationship to Patient    ..................................................               ................................................  Date                                   Time    ..........................................................................................................................................  Reviewed by Signature/Title    ...................................................              ..............................................  Date                                               Time          22EPIC Rev 08/18

## 2020-12-10 NOTE — DISCHARGE INSTRUCTIONS
Your symptoms that you are describing are typical post procedure both epidural and intentional cyst rupture.  Fortunately you are not having any complications of the spinal cord compression and I display no signs for bladder or bowel control problems.  Continue to monitor for such concerns if noted return to emergency room.  Urinary frequency at this time is not related to infection.  Referred pain and here left leg and sensory complaints in your toes does follow the L5 distribution and that is the nerve about the combination of arthritis, deterioration of disc and cyst was causing compression on.  Would recommend follow-up with your clinic provider in 10 to 14 days.  You have been given a prescription for Percocet for post procedure pain management.  This is a narcotic.  Do not mix with alcohol or other narcotics.  May use ibuprofen 400-10 mg every 6-8 hours as needed for pain.  Narcotics should be used only for pain that is not managed by NSAID use.  Apply ice to low back.  Do not operate machinery or drive an automobile while under the influence of narcotic.  Narcotics can also cause constipation.

## 2021-01-09 ENCOUNTER — HEALTH MAINTENANCE LETTER (OUTPATIENT)
Age: 46
End: 2021-01-09

## 2021-05-15 ENCOUNTER — APPOINTMENT (OUTPATIENT)
Dept: GENERAL RADIOLOGY | Facility: CLINIC | Age: 46
End: 2021-05-15
Attending: EMERGENCY MEDICINE
Payer: COMMERCIAL

## 2021-05-15 ENCOUNTER — HOSPITAL ENCOUNTER (EMERGENCY)
Facility: CLINIC | Age: 46
Discharge: HOME | End: 2021-05-15
Attending: EMERGENCY MEDICINE | Admitting: EMERGENCY MEDICINE
Payer: COMMERCIAL

## 2021-05-15 VITALS
TEMPERATURE: 98.4 F | SYSTOLIC BLOOD PRESSURE: 142 MMHG | RESPIRATION RATE: 16 BRPM | OXYGEN SATURATION: 98 % | BODY MASS INDEX: 34.87 KG/M2 | HEART RATE: 81 BPM | WEIGHT: 200 LBS | DIASTOLIC BLOOD PRESSURE: 77 MMHG

## 2021-05-15 DIAGNOSIS — M25.511 ACUTE PAIN OF RIGHT SHOULDER: ICD-10-CM

## 2021-05-15 PROCEDURE — 99285 EMERGENCY DEPT VISIT HI MDM: CPT | Performed by: EMERGENCY MEDICINE

## 2021-05-15 PROCEDURE — 96372 THER/PROPH/DIAG INJ SC/IM: CPT | Performed by: EMERGENCY MEDICINE

## 2021-05-15 PROCEDURE — 73030 X-RAY EXAM OF SHOULDER: CPT | Mod: RT

## 2021-05-15 PROCEDURE — 250N000011 HC RX IP 250 OP 636: Performed by: EMERGENCY MEDICINE

## 2021-05-15 RX ORDER — IBUPROFEN 200 MG
800 TABLET ORAL EVERY 4 HOURS PRN
COMMUNITY
End: 2022-01-28

## 2021-05-15 RX ORDER — HYDROCODONE BITARTRATE AND ACETAMINOPHEN 5; 325 MG/1; MG/1
1 TABLET ORAL EVERY 6 HOURS PRN
Qty: 10 TABLET | Refills: 0 | Status: SHIPPED | OUTPATIENT
Start: 2021-05-15 | End: 2021-05-18

## 2021-05-15 RX ADMIN — HYDROMORPHONE HYDROCHLORIDE 1 MG: 1 INJECTION, SOLUTION INTRAMUSCULAR; INTRAVENOUS; SUBCUTANEOUS at 11:58

## 2021-05-15 NOTE — ED TRIAGE NOTES
Pt reports she started having pain in the right arm and shoulder on Thursday, now pain is worse, denies injury, states the pain is worse if laying down and it is effecting her range of motion on that side

## 2021-05-15 NOTE — ED PROVIDER NOTES
History     Chief Complaint   Patient presents with     Arm Pain     HPI  Virginia Valenzuela is a 46 year old female who presents with right shoulder pain.  Pain is over the proximal humeral region.  Pain is moderate but severe with range of motion of the shoulder.  There is been no trauma.  This started 2 days ago.  There is some radiation of pain down the arm.    Allergies:  Allergies   Allergen Reactions     Bees Swelling     FINGER TIPS TO ELBOWS     Codeine Other (See Comments)     Squeezing pain around trunk/abdomen       Problem List:    Patient Active Problem List    Diagnosis Date Noted     Chronic pelvic pain in female 09/15/2017     Priority: Medium     Trigger finger, left thumb 04/28/2017     Priority: Medium     Sesamoiditis 04/18/2017     Priority: Medium     Bilateral carpal tunnel syndrome 08/11/2016     Priority: Medium     Tobacco use disorder 08/17/2015     Priority: Medium     Ovarian cyst 01/17/2014     Priority: Medium     Problem list name updated by automated process. Provider to review       Female infertility 01/17/2014     Priority: Medium     Hypertension goal BP (blood pressure) < 140/90 11/07/2013     Priority: Medium     Papanicolaou smear of cervix with atypical squamous cells of undetermined significance (ASC-US) 06/27/2013     Priority: Medium     3/12/13 pap ASCUS HR HPV  8/5/13 colposcopy. ASCUS. Plan repeat pap in 6 months  7/6/15 pap NIL/neg HPV. Plan cotest in 3 yrs       GERD (gastroesophageal reflux disease) 06/03/2013     Priority: Medium     Vitamin D deficiency 06/03/2013     Priority: Medium     Problem list name updated by automated process. Provider to review and confirm  Imo Update utility       HAMIDA (obstructive sleep apnea) 05/29/2013     Priority: Medium     Generalized anxiety disorder 05/29/2013     Priority: Medium     Seasonal allergic rhinitis 05/29/2013     Priority: Medium        Past Medical History:    Past Medical History:   Diagnosis Date     Allergic  rhinitis, cause unspecified      Arthritis      ASCUS with positive high risk HPV 3/12/13     Depressive disorder, not elsewhere classified      Dysfunction of eustachian tube      Encounter for therapeutic drug monitoring      Generalized anxiety disorder      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     History of appendectomy      Hypertension 2000     Idiopathic urticaria      Obstructive sleep apnea (adult) (pediatric)      Other chronic allergic conjunctivitis      Other malaise and fatigue      Pain in joint, shoulder region      S/P laparoscopic cholecystectomy      Special screening examination for human papillomavirus (HPV)      Sprain of tibiofibular (ligament), distal of ankle      Threatened , unspecified as to episode of care      Tobacco use disorder      Unspecified essential hypertension      Unspecified vitamin D deficiency        Past Surgical History:    Past Surgical History:   Procedure Laterality Date     ABDOMEN SURGERY  2013    ovaian cyst and right ovary removal     APPENDECTOMY       CHOLECYSTECTOMY       COLONOSCOPY N/A 10/26/2017    Procedure: COMBINED COLONOSCOPY, SINGLE OR MULTIPLE BIOPSY/POLYPECTOMY BY BIOPSY;  Colonoscopy with biopsies by biopsy;  Surgeon: Fred Faust DO;  Location:  GI     EYE SURGERY       GENITOURINARY SURGERY  2013    Ovary, fallopian tube     GYN SURGERY  2013    Ovary, Fallopian tube     INJECT EPIDURAL LUMBAR Bilateral 3/15/2019    Procedure: Inject Facets Lumbar 4-5, Bilateral;  Surgeon: Colin Nolasco MD;  [facility]     INJECT FACET JOINT N/A 10/25/2018    Procedure: Injest Facet Joint Lumbar 4-5 Bilateral;  Surgeon: Colin Nolasco MD;  [facility]     LAPAROSCOPIC LYSIS ADHESIONS Left 3/1/2016    Procedure: LAPAROSCOPIC LYSIS ADHESIONS;  Surgeon: Nate Mishra MD;  [facility]     LAPAROSCOPIC LYSIS ADHESIONS N/A 2017    Procedure: LAPAROSCOPIC LYSIS ADHESIONS;   laparoscopic lysis of  adhesions;  Surgeon: Nate Mishra MD;  [facility]     LAPAROSCOPIC LYSIS ADHESIONS N/A 7/13/2017    Procedure: LAPAROSCOPIC LYSIS ADHESIONS;   laparoscopic lysis of adhesions;  Surgeon: Nate Milligan MD;  [facility]     LAPAROSCOPIC LYSIS ADHESIONS N/A 5/14/2018    Procedure: LAPAROSCOPIC LYSIS ADHESIONS;;  Surgeon: Nate Mishra MD;  [facility]     LAPAROSCOPIC SALPINGECTOMY Left 5/14/2018    Procedure: LAPAROSCOPIC SALPINGECTOMY;;  Surgeon: Nate Mishra MD;  [facility]     LAPAROSCOPY DIAGNOSTIC (GYN) N/A 5/14/2018    Procedure: LAPAROSCOPY DIAGNOSTIC (GYN);  laparoscopy, left salpingectomy, left ovarian nodule removal and lysis of adhesions;  Surgeon: Nate Mishra MD;  [facility]     RELEASE CARPAL TUNNEL Left 8/31/2016    Procedure: RELEASE CARPAL TUNNEL;  Surgeon: Gucci Hairston MD;  [facility]     RELEASE CARPAL TUNNEL Right 12/7/2016    Procedure: RELEASE CARPAL TUNNEL;  Surgeon: Gucci Hairston MD;  [facility]       Family History:    Family History   Problem Relation Age of Onset     Diabetes Father      Hypertension Father      Cardiovascular Mother 29        myocarditis      Other Cancer Brother         unknown origin       Social History:  Marital Status:   [2]  Social History     Tobacco Use     Smoking status: Current Every Day Smoker     Packs/day: 1.00     Years: 10.00     Pack years: 10.00     Types: Cigarettes     Smokeless tobacco: Never Used   Substance Use Topics     Alcohol use: No     Alcohol/week: 0.0 standard drinks     Drug use: No        Medications:    cyclobenzaprine (FLEXERIL) 10 MG tablet  HYDROcodone-acetaminophen (NORCO) 5-325 MG tablet  ibuprofen (ADVIL/MOTRIN) 200 MG tablet  amLODIPine (NORVASC) 10 MG tablet  Cholecalciferol (VITAMIN D3) 50 MCG (2000 UT) CAPS  diazepam (VALIUM) 5 MG tablet  EPINEPHrine (EPIPEN) 0.3 MG/0.3ML injection  esomeprazole (NEXIUM) 20 MG   capsule  estradiol (FEMPATCH) 0.025 MG/24HR weekly patch  hydrochlorothiazide (HYDRODIURIL) 25 MG tablet  Lidocaine (LIDOCARE) 4 % Patch  losartan (COZAAR) 100 MG tablet  methylPREDNISolone (MEDROL DOSEPAK) 4 MG tablet therapy pack  Multiple Vitamins-Minerals (MULTIVITAMIN ADULT PO)  oxyCODONE (ROXICODONE) 5 MG tablet  oxyCODONE-acetaminophen (PERCOCET) 5-325 MG tablet  potassium chloride ER (K-TAB/KLOR-CON) 10 MEQ CR tablet  predniSONE (DELTASONE) 20 MG tablet  Probiotic Product (PROBIOTIC DAILY PO)          Review of Systems  All other systems are reviewed and are negative    Physical Exam   BP: (!) 142/77  Pulse: 81  Temp: 98.4  F (36.9  C)  Resp: 16  Weight: 90.7 kg (200 lb)  SpO2: 98 %      Physical Exam  Vitals signs reviewed.   Constitutional:       General: She is not in acute distress.     Appearance: She is not diaphoretic.   HENT:      Head: Normocephalic and atraumatic.      Nose: Nose normal.   Eyes:      General: No scleral icterus.        Right eye: No discharge.         Left eye: No discharge.      Conjunctiva/sclera: Conjunctivae normal.   Neck:      Musculoskeletal: Normal range of motion.   Pulmonary:      Effort: Pulmonary effort is normal.      Breath sounds: No stridor.   Musculoskeletal:      Comments: Right shoulder reveals no deformity.  There is some tenderness over the proximal humeral region.  There is decreased range of motion to the shoulder due to pain and pain with multidirectional testing of the rotator cuff.  Most notable with external rotation.   Skin:     General: Skin is warm and dry.      Findings: No rash.   Neurological:      General: No focal deficit present.      Mental Status: She is alert.      Comments: Normal speech and mentation   Psychiatric:         Mood and Affect: Mood normal.         Judgment: Judgment normal.         ED Course        Procedures               Critical Care time:  none               Results for orders placed or performed during the hospital  encounter of 05/15/21 (from the past 24 hour(s))   XR Shoulder Right 3 Views    Narrative    SHOULDER RIGHT THREE OR MORE VIEWS May 15, 2021 11:26 AM     HISTORY: Pain.      Impression    IMPRESSION: Mild AC arthrosis. At the posterior aspect of the humeral  neck, there is a 0.8 cm bony/calcific density which could represent an  articular body or rotator cuff region calcific tendinitis/bursitis.  There are four additional smaller calcific densities, many of which  could represent loose bodies. No acute fracture identified.    BARRY WISE MD       Medications   HYDROmorphone (DILAUDID) injection 1 mg (has no administration in time range)       Assessments & Plan (with Medical Decision Making)  46-year-old female who presents with some severe atraumatic right shoulder pain over the last 48 hours. Based on exam, history and above x-ray, this appears to be potentially from a rotator cuff and some tendinitis. Is given a sling and rest recommended. Given IM Dilaudid for acute pain relief. Referred to sports medicine for follow-up and to discuss potential injection if pain continues. Prescribed Norco if needed for some acute pain relief over the next few nights.     I have reviewed the nursing notes.    I have reviewed the findings, diagnosis, plan and need for follow up with the patient.       New Prescriptions    HYDROCODONE-ACETAMINOPHEN (NORCO) 5-325 MG TABLET    Take 1 tablet by mouth every 6 hours as needed for severe pain       Final diagnoses:   Acute pain of right shoulder       5/15/2021   [facility] EMERGENCY DEPT     Piyush Ding MD  05/15/21 0197

## 2021-05-15 NOTE — DISCHARGE INSTRUCTIONS
Recommend resting your arm in a sling over the next 2 to 4 days.  Ice.  May use ibuprofen up to 800 mg 3 times a day.

## 2021-05-15 NOTE — ED NOTES
Reviewed discharge instruction, verbalized understanding. No questions or concerns. Meds reviewed. Denies any s/sx reaction of med IM injection - pain improving.

## 2021-05-16 ENCOUNTER — HOSPITAL ENCOUNTER (EMERGENCY)
Facility: CLINIC | Age: 46
Discharge: HOME | End: 2021-05-16
Attending: FAMILY MEDICINE | Admitting: FAMILY MEDICINE
Payer: COMMERCIAL

## 2021-05-16 VITALS
OXYGEN SATURATION: 97 % | HEART RATE: 104 BPM | RESPIRATION RATE: 17 BRPM | WEIGHT: 200 LBS | DIASTOLIC BLOOD PRESSURE: 87 MMHG | BODY MASS INDEX: 35.44 KG/M2 | SYSTOLIC BLOOD PRESSURE: 152 MMHG | HEIGHT: 63 IN

## 2021-05-16 DIAGNOSIS — M25.511 ACUTE PAIN OF RIGHT SHOULDER: ICD-10-CM

## 2021-05-16 PROCEDURE — 99285 EMERGENCY DEPT VISIT HI MDM: CPT | Performed by: FAMILY MEDICINE

## 2021-05-16 PROCEDURE — 99285 EMERGENCY DEPT VISIT HI MDM: CPT | Mod: 25 | Performed by: FAMILY MEDICINE

## 2021-05-16 PROCEDURE — 96372 THER/PROPH/DIAG INJ SC/IM: CPT | Performed by: FAMILY MEDICINE

## 2021-05-16 PROCEDURE — 250N000011 HC RX IP 250 OP 636: Performed by: FAMILY MEDICINE

## 2021-05-16 PROCEDURE — 250N000012 HC RX MED GY IP 250 OP 636 PS 637: Performed by: FAMILY MEDICINE

## 2021-05-16 PROCEDURE — 250N000013 HC RX MED GY IP 250 OP 250 PS 637: Performed by: FAMILY MEDICINE

## 2021-05-16 RX ORDER — PREDNISONE 20 MG/1
20 TABLET ORAL 2 TIMES DAILY
Qty: 10 TABLET | Refills: 0 | Status: SHIPPED | OUTPATIENT
Start: 2021-05-16 | End: 2021-05-21

## 2021-05-16 RX ORDER — OXYCODONE AND ACETAMINOPHEN 5; 325 MG/1; MG/1
1 TABLET ORAL ONCE
Status: COMPLETED | OUTPATIENT
Start: 2021-05-16 | End: 2021-05-16

## 2021-05-16 RX ORDER — OXYCODONE AND ACETAMINOPHEN 5; 325 MG/1; MG/1
1 TABLET ORAL EVERY 6 HOURS PRN
Qty: 12 TABLET | Refills: 0 | Status: SHIPPED | OUTPATIENT
Start: 2021-05-16 | End: 2022-01-28

## 2021-05-16 RX ORDER — PREDNISONE 20 MG/1
60 TABLET ORAL ONCE
Status: COMPLETED | OUTPATIENT
Start: 2021-05-16 | End: 2021-05-16

## 2021-05-16 RX ADMIN — PREDNISONE 60 MG: 20 TABLET ORAL at 20:50

## 2021-05-16 RX ADMIN — OXYCODONE HYDROCHLORIDE AND ACETAMINOPHEN 1 TABLET: 5; 325 TABLET ORAL at 20:50

## 2021-05-16 RX ADMIN — HYDROMORPHONE HYDROCHLORIDE 1 MG: 1 INJECTION, SOLUTION INTRAMUSCULAR; INTRAVENOUS; SUBCUTANEOUS at 20:51

## 2021-05-16 ASSESSMENT — MIFFLIN-ST. JEOR: SCORE: 1521.32

## 2021-05-17 NOTE — ED TRIAGE NOTES
Was seen yesterday for shoulder pain here in ED and sent home with sling and pain meds and states the pain is worse today.  Does have appt with Dr. Duron on Tuesday. Denies N/T to right arm. Last had pain pill and ibuprofen at 1500 today

## 2021-05-17 NOTE — ED PROVIDER NOTES
History     Chief Complaint   Patient presents with     Shoulder Pain     HPI  Virginia Valenzuela is a 46 year old female who returns to the emergency room with right shoulder pain.  She has had trouble with the shoulder in the past.  She was seen yesterday given an injection of Dilaudid with some pain relief and sent home with a prescription of Vicodin.  She continues to have severe pain which has now worsened.  She has been in a sling since her discharge.  There is were no trauma.  An x-ray was done yesterday.    Allergies:  Allergies   Allergen Reactions     Bees Swelling     FINGER TIPS TO ELBOWS     Codeine Other (See Comments)     Squeezing pain around trunk/abdomen       Problem List:    Patient Active Problem List    Diagnosis Date Noted     Chronic pelvic pain in female 09/15/2017     Priority: Medium     Trigger finger, left thumb 04/28/2017     Priority: Medium     Sesamoiditis 04/18/2017     Priority: Medium     Bilateral carpal tunnel syndrome 08/11/2016     Priority: Medium     Tobacco use disorder 08/17/2015     Priority: Medium     Ovarian cyst 01/17/2014     Priority: Medium     Problem list name updated by automated process. Provider to review       Female infertility 01/17/2014     Priority: Medium     Hypertension goal BP (blood pressure) < 140/90 11/07/2013     Priority: Medium     Papanicolaou smear of cervix with atypical squamous cells of undetermined significance (ASC-US) 06/27/2013     Priority: Medium     3/12/13 pap ASCUS HR HPV  8/5/13 colposcopy. ASCUS. Plan repeat pap in 6 months  7/6/15 pap NIL/neg HPV. Plan cotest in 3 yrs       GERD (gastroesophageal reflux disease) 06/03/2013     Priority: Medium     Vitamin D deficiency 06/03/2013     Priority: Medium     Problem list name updated by automated process. Provider to review and confirm  Im[employer] Update utility       HAMIDA (obstructive sleep apnea) 05/29/2013     Priority: Medium     Generalized anxiety disorder 05/29/2013     Priority:  Medium     Seasonal allergic rhinitis 2013     Priority: Medium        Past Medical History:    Past Medical History:   Diagnosis Date     Allergic rhinitis, cause unspecified      Arthritis      ASCUS with positive high risk HPV 3/12/13     Depressive disorder, not elsewhere classified      Dysfunction of eustachian tube      Encounter for therapeutic drug monitoring      Generalized anxiety disorder      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     History of appendectomy      Hypertension 2000     Idiopathic urticaria      Obstructive sleep apnea (adult) (pediatric)      Other chronic allergic conjunctivitis      Other malaise and fatigue      Pain in joint, shoulder region      S/P laparoscopic cholecystectomy      Special screening examination for human papillomavirus (HPV)      Sprain of tibiofibular (ligament), distal of ankle      Threatened , unspecified as to episode of care      Tobacco use disorder      Unspecified essential hypertension      Unspecified vitamin D deficiency        Past Surgical History:    Past Surgical History:   Procedure Laterality Date     ABDOMEN SURGERY  2013    ovaian cyst and right ovary removal     APPENDECTOMY       CHOLECYSTECTOMY       COLONOSCOPY N/A 10/26/2017    Procedure: COMBINED COLONOSCOPY, SINGLE OR MULTIPLE BIOPSY/POLYPECTOMY BY BIOPSY;  Colonoscopy with biopsies by biopsy;  Surgeon: Fred Faust DO;  Location:  GI     EYE SURGERY  1979     GENITOURINARY SURGERY  2013    Ovary, fallopian tube     GYN SURGERY  2013    Ovary, Fallopian tube     INJECT EPIDURAL LUMBAR Bilateral 3/15/2019    Procedure: Inject Facets Lumbar 4-5, Bilateral;  Surgeon: Colin Nolasco MD;  [facility]     INJECT FACET JOINT N/A 10/25/2018    Procedure: Injest Facet Joint Lumbar 4-5 Bilateral;  Surgeon: Colin Nolasco MD;  [facility]     LAPAROSCOPIC LYSIS ADHESIONS Left 3/1/2016    Procedure: LAPAROSCOPIC LYSIS ADHESIONS;  Surgeon: Tad  Nate Jett MD;  [facility]     LAPAROSCOPIC LYSIS ADHESIONS N/A 7/13/2017    Procedure: LAPAROSCOPIC LYSIS ADHESIONS;   laparoscopic lysis of adhesions;  Surgeon: Nate Mishra MD;  [facility]     LAPAROSCOPIC LYSIS ADHESIONS N/A 7/13/2017    Procedure: LAPAROSCOPIC LYSIS ADHESIONS;   laparoscopic lysis of adhesions;  Surgeon: Nate Milligan MD;  [facility]     LAPAROSCOPIC LYSIS ADHESIONS N/A 5/14/2018    Procedure: LAPAROSCOPIC LYSIS ADHESIONS;;  Surgeon: Nate Mishra MD;  [facility]     LAPAROSCOPIC SALPINGECTOMY Left 5/14/2018    Procedure: LAPAROSCOPIC SALPINGECTOMY;;  Surgeon: Nate Mishra MD;  [facility]     LAPAROSCOPY DIAGNOSTIC (GYN) N/A 5/14/2018    Procedure: LAPAROSCOPY DIAGNOSTIC (GYN);  laparoscopy, left salpingectomy, left ovarian nodule removal and lysis of adhesions;  Surgeon: Nate Mishra MD;  [facility]     RELEASE CARPAL TUNNEL Left 8/31/2016    Procedure: RELEASE CARPAL TUNNEL;  Surgeon: Gucci Hairston MD;  [facility]     RELEASE CARPAL TUNNEL Right 12/7/2016    Procedure: RELEASE CARPAL TUNNEL;  Surgeon: Gucci Hairston MD;  [facility]       Family History:    Family History   Problem Relation Age of Onset     Diabetes Father      Hypertension Father      Cardiovascular Mother 29        myocarditis      Other Cancer Brother         unknown origin       Social History:  Marital Status:   [2]  Social History     Tobacco Use     Smoking status: Current Every Day Smoker     Packs/day: 1.00     Years: 10.00     Pack years: 10.00     Types: Cigarettes     Smokeless tobacco: Never Used   Substance Use Topics     Alcohol use: No     Alcohol/week: 0.0 standard drinks     Drug use: No        Medications:    oxyCODONE-acetaminophen (PERCOCET) 5-325 MG tablet  predniSONE (DELTASONE) 20 MG tablet  amLODIPine (NORVASC) 10 MG tablet  Cholecalciferol (VITAMIN D3) 50 MCG (2000 UT)  "CAPS  cyclobenzaprine (FLEXERIL) 10 MG tablet  diazepam (VALIUM) 5 MG tablet  EPINEPHrine (EPIPEN) 0.3 MG/0.3ML injection  esomeprazole (NEXIUM) 20 MG DR capsule  estradiol (FEMPATCH) 0.025 MG/24HR weekly patch  hydrochlorothiazide (HYDRODIURIL) 25 MG tablet  HYDROcodone-acetaminophen (NORCO) 5-325 MG tablet  ibuprofen (ADVIL/MOTRIN) 200 MG tablet  Lidocaine (LIDOCARE) 4 % Patch  losartan (COZAAR) 100 MG tablet  methylPREDNISolone (MEDROL DOSEPAK) 4 MG tablet therapy pack  Multiple Vitamins-Minerals (MULTIVITAMIN ADULT PO)  oxyCODONE (ROXICODONE) 5 MG tablet  potassium chloride ER (K-TAB/KLOR-CON) 10 MEQ CR tablet  Probiotic Product (PROBIOTIC DAILY PO)          Review of Systems   All other systems reviewed and are negative.      Physical Exam   BP: (!) 152/87  Pulse: 104  Resp: 17  Height: 160 cm (5' 3\")  Weight: 90.7 kg (200 lb)  SpO2: 97 %      Physical Exam  Vitals signs and nursing note reviewed.   Constitutional:       Appearance: Normal appearance.   HENT:      Head: Normocephalic and atraumatic.      Nose: Nose normal.   Eyes:      Conjunctiva/sclera: Conjunctivae normal.   Neck:      Musculoskeletal: Normal range of motion.      Comments: No tenderness on palpation of her neck.  No neck pain.  Cardiovascular:      Pulses: Normal pulses.   Pulmonary:      Effort: Pulmonary effort is normal.   Musculoskeletal:      Comments: Right shoulder area shows no skin rash to suggest shingles.  She is tender over the anterior lateral deltoid area.  She is point tender to the tendinous insertion at the supraspinatus and anterior humeral head.  Range of motion cannot be tested as any abduction of the shoulder causes pain.  Distal CMS is intact.   Skin:     Capillary Refill: Capillary refill takes less than 2 seconds.   Neurological:      General: No focal deficit present.      Mental Status: She is alert and oriented to person, place, and time.   Psychiatric:         Mood and Affect: Mood normal.         Behavior: " Behavior normal.         ED Course        Procedures               Critical Care time:  none         Results for orders placed or performed during the hospital encounter of 05/15/21   XR Shoulder Right 3 Views    Narrative    SHOULDER RIGHT THREE OR MORE VIEWS May 15, 2021 11:26 AM     HISTORY: Pain.      Impression    IMPRESSION: Mild AC arthrosis. At the posterior aspect of the humeral  neck, there is a 0.8 cm bony/calcific density which could represent an  articular body or rotator cuff region calcific tendinitis/bursitis.  There are four additional smaller calcific densities, many of which  could represent loose bodies. No acute fracture identified.    BARRY WISE MD                No results found for this or any previous visit (from the past 24 hour(s)).    Medications   HYDROmorphone (DILAUDID) injection 1 mg (has no administration in time range)   predniSONE (DELTASONE) tablet 60 mg (has no administration in time range)   oxyCODONE-acetaminophen (PERCOCET) 5-325 MG per tablet 1 tablet (has no administration in time range)       Assessments & Plan (with Medical Decision Making)   MDM--46-year-old female who returns with severe shoulder pain.  X-ray yesterday showed findings of rotator cuff calcific tendinitis/bursitis.  Her exam is consistent with a rotator cuff inflammation.  Patient was given Dilaudid and sent home with Vicodin and a sling.  Here today I gave her another shot of Dilaudid, a Percocet tablet and a loading dose of prednisone 60 mg.  Syndrome with a prescription of prednisone 20 mg twice daily 5 days.  She has taken this in the past for her low back and for sciatica with out problems.  I gave her a prescription of Percocet No. 12 tablets 1 every 6 hours as needed for severe pain.  No driving and no mixing with alcohol.  No refills.  She already has a follow-up appoint with sports medicine on Tuesday.  I think she would benefit from a steroid injection.  All this was discussed with the patient  and she is comfortable with this treatment plan and discharged in stable condition.  I have reviewed the nursing notes.    I have reviewed the findings, diagnosis, plan and need for follow up with the patient.       New Prescriptions    OXYCODONE-ACETAMINOPHEN (PERCOCET) 5-325 MG TABLET    Take 1 tablet by mouth every 6 hours as needed for severe pain    PREDNISONE (DELTASONE) 20 MG TABLET    Take 1 tablet (20 mg) by mouth 2 times daily for 5 days       Final diagnoses:   Acute pain of right shoulder       5/16/2021   [facility] EMERGENCY DEPT     Deyanira, Colin ABAD MD  05/16/21 2050

## 2021-05-17 NOTE — DISCHARGE INSTRUCTIONS
Follow-up with the sports medicine on Tuesday as planned.  Rest.  Sling.  Ice.  Prednisone 20 mg twice a day for inflammation x5 days.  Ibuprofen 600 to 800 mg 3 times a day with food.  Percocet for severe pain only.  Do not drive or mix with alcohol.
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
## 2021-05-18 ENCOUNTER — OFFICE VISIT (OUTPATIENT)
Dept: ORTHOPEDICS | Facility: CLINIC | Age: 46
End: 2021-05-18
Payer: COMMERCIAL

## 2021-05-18 VITALS — SYSTOLIC BLOOD PRESSURE: 118 MMHG | WEIGHT: 200 LBS | BODY MASS INDEX: 35.43 KG/M2 | DIASTOLIC BLOOD PRESSURE: 68 MMHG

## 2021-05-18 DIAGNOSIS — M25.511 ACUTE PAIN OF RIGHT SHOULDER: Primary | ICD-10-CM

## 2021-05-18 DIAGNOSIS — M75.31 CALCIFIC TENDINITIS OF RIGHT SHOULDER: ICD-10-CM

## 2021-05-18 PROCEDURE — 99214 OFFICE O/P EST MOD 30 MIN: CPT | Mod: 25 | Performed by: PHYSICAL MEDICINE & REHABILITATION

## 2021-05-18 PROCEDURE — 20610 DRAIN/INJ JOINT/BURSA W/O US: CPT | Mod: RT | Performed by: PHYSICAL MEDICINE & REHABILITATION

## 2021-05-18 RX ORDER — TRIAMCINOLONE ACETONIDE 40 MG/ML
40 INJECTION, SUSPENSION INTRA-ARTICULAR; INTRAMUSCULAR
Status: SHIPPED | OUTPATIENT
Start: 2021-05-18

## 2021-05-18 RX ADMIN — TRIAMCINOLONE ACETONIDE 40 MG: 40 INJECTION, SUSPENSION INTRA-ARTICULAR; INTRAMUSCULAR at 10:47
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
## 2021-05-18 NOTE — PATIENT INSTRUCTIONS
-Discontinue sling.  Provided home exercises.  -Steroid injection performed today.  Take it easy over the next few days. Keep in mind that the steroid may take up to 3 days to start working and up to 2 weeks to reach maximal effect.    -Complete course of prednisone.    -Reserve Percocet for severe pain.  Can cause sedation.  Do not take prior to driving.    -Patient's preferred over the counter medication as directed on packaging as needed for pain or soreness.  -Ice or heat 15-20 minutes as needed (Avoid sleeping on a heating pad or ice).    -Follow up as needed if symptoms fail to improve or worsen.  Please call with questions or concerns.

## 2021-07-12 NOTE — NURSING NOTE
"Chief Complaint   Patient presents with     RECHECK     right wrist pain        Initial /72 mmHg  Pulse 107  Temp(Src) 98.9  F (37.2  C) (Tympanic)  Resp 18  Wt 201 lb 12.8 oz (91.536 kg)  SpO2 98% Estimated body mass index is 34.28 kg/(m^2) as calculated from the following:    Height as of 1/11/17: 5' 4.33\" (1.634 m).    Weight as of this encounter: 201 lb 12.8 oz (91.536 kg).  BP completed using cuff size: rosalva Valdes CMA     "
[[id number]]

## 2021-10-23 ENCOUNTER — HEALTH MAINTENANCE LETTER (OUTPATIENT)
Age: 46
End: 2021-10-23

## 2021-11-17 ENCOUNTER — HOSPITAL ENCOUNTER (OUTPATIENT)
Dept: MAMMOGRAPHY | Facility: CLINIC | Age: 46
Discharge: HOME | End: 2021-11-17
Attending: PHYSICIAN ASSISTANT | Admitting: PHYSICIAN ASSISTANT
Payer: COMMERCIAL

## 2021-11-17 DIAGNOSIS — Z12.31 VISIT FOR SCREENING MAMMOGRAM: ICD-10-CM

## 2021-11-17 PROCEDURE — 77063 BREAST TOMOSYNTHESIS BI: CPT

## 2021-11-23 NOTE — PROGRESS NOTES
"[facility]    Outpatient Physical Therapy Discharge Note  Patient: Virginia Valenzuela  : 1975    Beginning/End Dates of Reporting Period:  20 to 20    Referring Provider: Dr. Harden Cabrera    Therapy Diagnosis: LBP, SI dysfunction, left hip pain     Client Self Report: In the past 3 days increase pain in the left hip, left butt and LBP. Patient has a cyst that is pressing on the exiting L5 nerve on the left. pt is supossed to get the cyst drained but [facility] has not scheduled yet.     Objective Measurements:  Objective Measure: MATTIE     Objective Measure: biofeedback   Details: in supine resting .4mv. with the 10 sec holds: quick peak at 20mv but then fell off over the 10 sec, avg is 10mv. resting after the contractions is slow to relax \"walking down the steps\". with the 2 sec holds went up to 20mv and the resting was quicker and better.    Objective Measure: urine flow  Details: doing well with this    Objective Measure: BM  Details: daily Taylor #4       Objective Measure: LBP (after the hysterectomy 2 months no pain, then came back all at once).   Details: LBP/left SI region is a #7.       Goals:  Not known if met pt never returned to PT    Plan:  Discharge from therapy.    Discharge:    Reason for Discharge: Patient has failed to schedule further appointments.        Thank you for the referral,            Virginia Gayle PT    "

## 2022-01-28 ENCOUNTER — OFFICE VISIT (OUTPATIENT)
Dept: INTERNAL MEDICINE | Facility: CLINIC | Age: 47
End: 2022-01-28
Payer: COMMERCIAL

## 2022-01-28 VITALS
WEIGHT: 203 LBS | RESPIRATION RATE: 10 BRPM | BODY MASS INDEX: 35.96 KG/M2 | DIASTOLIC BLOOD PRESSURE: 60 MMHG | OXYGEN SATURATION: 98 % | HEART RATE: 110 BPM | SYSTOLIC BLOOD PRESSURE: 128 MMHG | TEMPERATURE: 99.8 F

## 2022-01-28 DIAGNOSIS — M77.11 LATERAL EPICONDYLITIS OF RIGHT ELBOW: Primary | ICD-10-CM

## 2022-01-28 DIAGNOSIS — Q80.9 XERODERMA: ICD-10-CM

## 2022-01-28 PROCEDURE — 99203 OFFICE O/P NEW LOW 30 MIN: CPT | Performed by: INTERNAL MEDICINE

## 2022-01-28 RX ORDER — AZELASTINE 1 MG/ML
2 SPRAY, METERED NASAL 2 TIMES DAILY PRN
COMMUNITY
Start: 2021-08-20 | End: 2023-10-30

## 2022-01-28 RX ORDER — PREDNISONE 20 MG/1
40 TABLET ORAL DAILY
Qty: 10 TABLET | Refills: 0 | Status: SHIPPED | OUTPATIENT
Start: 2022-01-28 | End: 2023-08-24

## 2022-01-28 ASSESSMENT — ANXIETY QUESTIONNAIRES
3. WORRYING TOO MUCH ABOUT DIFFERENT THINGS: NOT AT ALL
7. FEELING AFRAID AS IF SOMETHING AWFUL MIGHT HAPPEN: NOT AT ALL
2. NOT BEING ABLE TO STOP OR CONTROL WORRYING: NOT AT ALL
7. FEELING AFRAID AS IF SOMETHING AWFUL MIGHT HAPPEN: NOT AT ALL
1. FEELING NERVOUS, ANXIOUS, OR ON EDGE: NOT AT ALL
GAD7 TOTAL SCORE: 1
4. TROUBLE RELAXING: NOT AT ALL
6. BECOMING EASILY ANNOYED OR IRRITABLE: SEVERAL DAYS
GAD7 TOTAL SCORE: 1
5. BEING SO RESTLESS THAT IT IS HARD TO SIT STILL: NOT AT ALL

## 2022-01-28 NOTE — PROGRESS NOTES
Sarah Brown is a 46 year old who presents for the following health issues     HPI     Chief Complaint   Patient presents with     Musculoskeletal Problem     Right elbow     Derm Problem     Dry skin on hands        EMR reviewed including:             Complaint, History of Chief Complaint, Corresponding Review of Systems, and Complaint Specific Physical Examination.    #1   Right elbow pain.  Has been present for several weeks.  Does not remember injury or trauma.  Reproducible with rotation of the arm or palpation of the radial head  No evidence of dislocation  No distal weakness in the hand.  No signs of ulnar nerve involvement.        Exam:   MS: Minimal crepitance is noted in the extremities. No deformity is present. Muscle strength is appropriate and equal bilaterally. No acute joint erythema or swelling is present.      #2   Dry/cracked/itchy skin on hands.  No repetitive contact the chemicals etc.  Is present every winter  Occasionally uses hand cream.        Exam:  Hands are somewhat reddened, chapped and cracked.  No significant findings of infection or joint inflammation.        Patient has been interviewed, applicable history and applied review of systems have been performed.    Vital Signs:   /60   Pulse 110   Temp 99.8  F (37.7  C)   Resp 10   Wt 92.1 kg (203 lb)   LMP 10/26/2018   SpO2 98%   BMI 35.96 kg/m        Decision Making    Problem and Complexity     1. Lateral epicondylitis of right elbow  Discussed various treatment options.  Patient is already been using over-the-counter anti-inflammatories.  Hesitant to proceed to radial head injection.  We will place a short course of oral prednisone.    - predniSONE (DELTASONE) 20 MG tablet; Take 2 tablets (40 mg) by mouth daily  Dispense: 10 tablet; Refill: 0    2. Xeroderma  Recommend continued use of emollients.  Recommend use of all of oil to avoid possible hand cream additive  allergy                                FOLLOW  UP   I have asked the patient to make an appointment for followup with me or the provider of her choice in 2 weeks if not markedly improved.        I have carefully explained the diagnosis and treatment options to the patient.  The patient has displayed an understanding of the above, and all subsequent questions were answered.      DO JENIFER Judge    Portions of this note were produced using [employer]  Although every attempt at real-time proof reading has been made, occasional grammar/syntax errors may have been missed.

## 2022-01-29 ASSESSMENT — ANXIETY QUESTIONNAIRES: GAD7 TOTAL SCORE: 1

## 2022-02-12 ENCOUNTER — HEALTH MAINTENANCE LETTER (OUTPATIENT)
Age: 47
End: 2022-02-12

## 2022-02-15 ENCOUNTER — MYC MEDICAL ADVICE (OUTPATIENT)
Dept: INTERNAL MEDICINE | Facility: CLINIC | Age: 47
End: 2022-02-15
Payer: COMMERCIAL

## 2022-02-15 DIAGNOSIS — M77.11 LATERAL EPICONDYLITIS OF RIGHT ELBOW: Primary | ICD-10-CM

## 2022-02-24 ENCOUNTER — OFFICE VISIT (OUTPATIENT)
Dept: ORTHOPEDICS | Facility: CLINIC | Age: 47
End: 2022-02-24
Payer: COMMERCIAL

## 2022-02-24 VITALS — HEIGHT: 63 IN | RESPIRATION RATE: 18 BRPM | BODY MASS INDEX: 34.91 KG/M2 | WEIGHT: 197 LBS

## 2022-02-24 DIAGNOSIS — M77.11 LATERAL EPICONDYLITIS OF RIGHT ELBOW: Primary | ICD-10-CM

## 2022-02-24 PROCEDURE — 99243 OFF/OP CNSLTJ NEW/EST LOW 30: CPT | Performed by: ORTHOPAEDIC SURGERY

## 2022-02-24 NOTE — LETTER
2022         RE: Virginia Valenzuela  [address] [place] [id number]        Dear Colleague,    Thank you for referring your patient, Virginia Valenzuela, to the [facility]. Please see a copy of my visit note below.    Virginia Valenzuela is a 46 year old female who is seen in consultation at the request of Dr. Rafael Fields  for right elbow pain.  She has had pain in the lateral elbow and down the forearm for the last several months.  She has no idea how this happened.  She is left-hand dominant para.  She has pain if she leaves her arm straight or bent too much.  Lifting objects hurts.  She hss been to her chiropractor for this x-ray there was negative.  She has had ultrasound through her chiropractor.  She has also had oral prednisone, but not iontophoresis or injection.      Past Medical History:   Diagnosis Date     Allergic rhinitis, cause unspecified      Arthritis      ASCUS with positive high risk HPV 3/12/13     Depressive disorder, not elsewhere classified      Dysfunction of eustachian tube      Encounter for therapeutic drug monitoring      Generalized anxiety disorder      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     History of appendectomy      Hypertension      Idiopathic urticaria      Obstructive sleep apnea (adult) (pediatric)      Other chronic allergic conjunctivitis      Other malaise and fatigue      Pain in joint, shoulder region      S/P laparoscopic cholecystectomy      Special screening examination for human papillomavirus (HPV)      Sprain of tibiofibular (ligament), distal of ankle      Threatened , unspecified as to episode of care      Tobacco use disorder      Unspecified essential hypertension      Unspecified vitamin D deficiency        Past Surgical History:   Procedure Laterality Date     ABDOMEN SURGERY  2013    ovaian cyst and right ovary removal     APPENDECTOMY       CHOLECYSTECTOMY       COLONOSCOPY N/A 10/26/2017    Procedure:  COMBINED COLONOSCOPY, SINGLE OR MULTIPLE BIOPSY/POLYPECTOMY BY BIOPSY;  Colonoscopy with biopsies by biopsy;  Surgeon: Fred Faust DO;  Location: [facility]     EYE SURGERY  1979     GENITOURINARY SURGERY  July 2013    Ovary, fallopian tube     GYN SURGERY  July 2013    Ovary, Fallopian tube     INJECT EPIDURAL LUMBAR Bilateral 3/15/2019    Procedure: Inject Facets Lumbar 4-5, Bilateral;  Surgeon: Colin Nolasco MD;  [facility]     INJECT FACET JOINT N/A 10/25/2018    Procedure: Injest Facet Joint Lumbar 4-5 Bilateral;  Surgeon: Colin Nolasco MD;  [facility]     LAPAROSCOPIC LYSIS ADHESIONS Left 3/1/2016    Procedure: LAPAROSCOPIC LYSIS ADHESIONS;  Surgeon: Nate Mishra MD;  [facility]     LAPAROSCOPIC LYSIS ADHESIONS N/A 7/13/2017    Procedure: LAPAROSCOPIC LYSIS ADHESIONS;   laparoscopic lysis of adhesions;  Surgeon: Nate Mishra MD;  [facility]     LAPAROSCOPIC LYSIS ADHESIONS N/A 7/13/2017    Procedure: LAPAROSCOPIC LYSIS ADHESIONS;   laparoscopic lysis of adhesions;  Surgeon: Nate Milligan MD;  [facility]     LAPAROSCOPIC LYSIS ADHESIONS N/A 5/14/2018    Procedure: LAPAROSCOPIC LYSIS ADHESIONS;;  Surgeon: Nate Mishra MD;  [facility]     LAPAROSCOPIC SALPINGECTOMY Left 5/14/2018    Procedure: LAPAROSCOPIC SALPINGECTOMY;;  Surgeon: Nate Mishra MD;  [facility]     LAPAROSCOPY DIAGNOSTIC (GYN) N/A 5/14/2018    Procedure: LAPAROSCOPY DIAGNOSTIC (GYN);  laparoscopy, left salpingectomy, left ovarian nodule removal and lysis of adhesions;  Surgeon: Nate Mishra MD;  [facility]     RELEASE CARPAL TUNNEL Left 8/31/2016    Procedure: RELEASE CARPAL TUNNEL;  Surgeon: Gucci Hairston MD;  [facility]     RELEASE CARPAL TUNNEL Right 12/7/2016    Procedure: RELEASE CARPAL TUNNEL;  Surgeon: Gucci Hairston MD;  [facility]       Family History   Problem Relation Age of Onset      Diabetes Father      Hypertension Father      Cardiovascular Mother 29        myocarditis      Other Cancer Brother         unknown origin       Social History     Socioeconomic History     Marital status:      Spouse name: Not on file     Number of children: Not on file     Years of education: Not on file     Highest education level: Not on file   Occupational History     Not on file   Tobacco Use     Smoking status: Current Every Day Smoker     Packs/day: 1.00     Years: 10.00     Pack years: 10.00     Types: Cigarettes     Smokeless tobacco: Never Used   Substance and Sexual Activity     Alcohol use: No     Alcohol/week: 0.0 standard drinks     Drug use: No     Sexual activity: Yes     Partners: Male     Birth control/protection: None     Comment: Trying to conceive   Other Topics Concern     Parent/sibling w/ CABG, MI or angioplasty before 65F 55M? No   Social History Narrative     Not on file     Social Determinants of Health     Financial Resource Strain: Not on file   Food Insecurity: Not on file   Transportation Needs: Not on file   Physical Activity: Not on file   Stress: Not on file   Social Connections: Not on file   Intimate Partner Violence: Not on file   Housing Stability: Not on file       Current Outpatient Medications   Medication Sig Dispense Refill     amLODIPine (NORVASC) 10 MG tablet Take 10 mg by mouth daily       azelastine (ASTELIN) 0.1 % nasal spray 2 sprays       Cholecalciferol (VITAMIN D3) 50 MCG (2000 UT) CAPS Take 2,000 Units by mouth daily       cyclobenzaprine (FLEXERIL) 10 MG tablet Take 10 mg by mouth 3 times daily as needed for muscle spasms       EPINEPHrine (EPIPEN) 0.3 MG/0.3ML injection Inject 0.3 mLs (0.3 mg) into the muscle once as needed 1 each 0     esomeprazole (NEXIUM) 20 MG DR capsule Take 20 mg by mouth every morning (before breakfast) Take 30-60 minutes before eating.       estradiol (FEMPATCH) 0.025 MG/24HR weekly patch Apply 1 patch topically every 7 days  "Thursdays       hydrochlorothiazide (HYDRODIURIL) 25 MG tablet Take 25 mg by mouth daily       Lidocaine (LIDOCARE) 4 % Patch Place 1 patch onto the skin every 8 hours as needed for moderate pain Salon Pas is the brand name of the patch and can be purchased without a prescription.       losartan (COZAAR) 100 MG tablet Take 100 mg by mouth daily       Multiple Vitamins-Minerals (MULTIVITAMIN ADULT PO) Take 1 tablet by mouth daily       potassium chloride ER (K-TAB/KLOR-CON) 10 MEQ CR tablet TAKE ONE TABLET BY MOUTH EVERY DAY (Patient not taking: Reported on 1/28/2022) 90 tablet 1     predniSONE (DELTASONE) 20 MG tablet Take 2 tablets (40 mg) by mouth daily 10 tablet 0     Probiotic Product (PROBIOTIC DAILY PO) Take by mouth daily         Allergies   Allergen Reactions     Bees Swelling     FINGER TIPS TO ELBOWS     Codeine Other (See Comments)     Squeezing pain around trunk/abdomen       REVIEW OF SYSTEMS:  CONSTITUTIONAL:  NEGATIVE for fever, chills, change in weight, not feeling tired  SKIN:  NEGATIVE for worrisome rashes, no skin lumps, no skin ulcers and no non-healing wounds  EYES:  NEGATIVE for vision changes or irritation.  ENT/MOUTH:  NEGATIVE.  No hearing loss, no hoarseness, no difficulty swallowing.  RESP:  NEGATIVE. No cough or shortness of breath.  CV:  NEGATIVE for chest pain, palpitations or peripheral edema  GI:  NEGATIVE for nausea, abdominal pain, heartburn, or change in bowel habits  :  Negative. No dysuria, no hematuria  MUSCULOSKELETAL:  See HPI above  NEURO:  NEGATIVE . No headaches, no dizziness,  no numbness  ENDOCRINE:  NEGATIVE for temperature intolerance, skin/hair changes  HEME/ALLERGY/IMMUNE:  NEGATIVE for bleeding problems  PSYCHIATRIC:  NEGATIVE. no anxiety, no depression.     Exam:  Vitals: Resp 18   Ht 1.6 m (5' 3\")   Wt 89.4 kg (197 lb)   LMP 10/26/2018   BMI 34.90 kg/m    BMI= Body mass index is 34.9 kg/m .  Constitutional:  healthy, alert and no distress  Neuro: Alert and " Oriented x 3, no focal defects.  Psych: Affect normal   Respiratory: Breathing not labored.  Cardiovascular: normal peripheral pulses  Lymph: no adenopathy  Skin: No rashes,worrisome lesions or skin problems  She has tenderness at the lateral epicondyle right elbow.  There is no tenderness at the medial epicondyle or olecranon.  She has full range of motion of the elbow.  She has definite pain with pronated lifting, wrist and finger extension against resistance.  She also has some pain with resisted supination and some with resisted pronation.  There is no pain with resisted wrist flexion or lifting in a supinated position.  Sensation and circulation are intact.    Assessment:  Right lateral epicondylitis.    Plan:  Titan wrist brace applied.  She should avoid palm down lifting.  Try to minimize any lifting.  We have gone over stretching and cross friction massage.  She would like to have therapy for iontophoresis and exercises.      Again, thank you for allowing me to participate in the care of your patient.        Sincerely,        Sergio Holland MD

## 2022-02-24 NOTE — PATIENT INSTRUCTIONS
Lateral Epicondylitis (Tennis Elbow)     Avoid aggravation       Avoid palm down lifting.  Minimize any lifting and gripping.    Warm up the muscles with gentle movements before use and stretching.  Ice the outer side of the elbow and muscles after use.    A wrist brace can be very helpful to allow the muscles to rest.  A tennis elbow strap may also be tried.    Steroid application by Physical Therapy or by injection may be tried.  Injections can damage the muscle and tendon, however.    It will take a long time to recover.    Rehabilitation Exercises   You may do the stretching exercises right away. You may do the strengthening exercises when stretching is nearly painless.   Do cross friction massage of the muscle about 1 inch below the painful point of the bone on the side of elbow.  Follow this with stretching the wrist downwards.  Stretching exercises   Wrist range of motion:    0. Flexion: Gently bend your wrist forward. Hold for 5 seconds. Do 3 sets of 10.   0. Extension: Gently bend your wrist backward. Hold this position 5 seconds. Do 3 sets of 10.   0. Side to side: Gently move your wrist from side to side (a handshake motion). Hold for 5 seconds in each direction. Do 3 sets of 10.     Wrist stretch: Press the back of the hand on your injured side with your other hand to help bend your wrist. Hold for 15 to 30 seconds. Next, stretch the hand back by pressing the fingers in a backward direction. Hold for 15 to 30 seconds. Keep the arm on your injured side straight during this exercise. Do 3 sets.     Pronation and supination of the forearm: With your elbow bent 90 , turn your palm upward and hold for 5 seconds. Slowly turn your palm downward and hold for 5 seconds. Make sure you keep your elbow at your side and bent 90  throughout this exercise. Do 3 sets of 10.     Elbow range of motion: Gently bring your palm up toward your shoulder and bend your elbow as far as you can. Then straighten your  elbow as far as you can 10 times. Do 3 sets of 10.   Strengthening exercises     Wrist flexion exercise: Hold a can or hammer handle in your hand with your palm facing up. Bend your wrist upward. Slowly lower the weight and return to the starting position. Do 3 sets of 10. Gradually increase the weight of the can or weight you are holding.     Wrist extension exercise: Hold a soup can or hammer handle in your hand with your palm facing down. Slowly bend your wrist upward. Slowly lower the weight down into the starting position. Do 3 sets of 10. Gradually increase the weight of the object you are holding.     Wrist radial deviation strengthening: Put your wrist in the sideways position with your thumb up. Hold a can of soup or a hammer handle and gently bend your wrist up, with the thumb reaching toward the ceiling. Slowly lower to the starting position. Do not move your forearm throughout this exercise. Do 3 sets of 10.     Forearm pronation and supination strengthening: Hold a soup can or hammer handle in your hand and bend your elbow 90 . Slowly rotate your hand with your palm upward and then palm down. Do 3 sets of 10.     Wrist extension (with broom handle): Stand up and hold a broom handle in both hands. With your arms at shoulder level, elbows straight and palms down, roll the broom handle backward in your hand as if you are reeling something in using a broom handle. Do 3 sets of 10.   Written by Afua Ryan, MS, PT, for [employer].   Published by [employer].   This content is reviewed periodically and is subject to change as new health information becomes available. The information is intended to inform and educate and is not a replacement for medical evaluation, advice, diagnosis or treatment by a healthcare professional.   Sports Medicine Advisor 2003.1 Index  Sports Medicine Advisor 2003.1 Credits   Copyright   2003 [employer]. All rights reserved.

## 2022-02-25 NOTE — PROGRESS NOTES
Virginia Valenzuela is a 46 year old female who is seen in consultation at the request of Dr. Rafael Fields  for right elbow pain.  She has had pain in the lateral elbow and down the forearm for the last several months.  She has no idea how this happened.  She is left-hand dominant para.  She has pain if she leaves her arm straight or bent too much.  Lifting objects hurts.  She hss been to her chiropractor for this x-ray there was negative.  She has had ultrasound through her chiropractor.  She has also had oral prednisone, but not iontophoresis or injection.      Past Medical History:   Diagnosis Date     Allergic rhinitis, cause unspecified      Arthritis      ASCUS with positive high risk HPV 3/12/13     Depressive disorder, not elsewhere classified      Dysfunction of eustachian tube      Encounter for therapeutic drug monitoring      Generalized anxiety disorder      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     History of appendectomy      Hypertension      Idiopathic urticaria      Obstructive sleep apnea (adult) (pediatric)      Other chronic allergic conjunctivitis      Other malaise and fatigue      Pain in joint, shoulder region      S/P laparoscopic cholecystectomy      Special screening examination for human papillomavirus (HPV)      Sprain of tibiofibular (ligament), distal of ankle      Threatened , unspecified as to episode of care      Tobacco use disorder      Unspecified essential hypertension      Unspecified vitamin D deficiency        Past Surgical History:   Procedure Laterality Date     ABDOMEN SURGERY  2013    ovaian cyst and right ovary removal     APPENDECTOMY       CHOLECYSTECTOMY       COLONOSCOPY N/A 10/26/2017    Procedure: COMBINED COLONOSCOPY, SINGLE OR MULTIPLE BIOPSY/POLYPECTOMY BY BIOPSY;  Colonoscopy with biopsies by biopsy;  Surgeon: Fred Faust DO;  Location:  GI     EYE SURGERY       GENITOURINARY SURGERY  2013    Ovary, fallopian tube     GYN  SURGERY  July 2013    Ovary, Fallopian tube     INJECT EPIDURAL LUMBAR Bilateral 3/15/2019    Procedure: Inject Facets Lumbar 4-5, Bilateral;  Surgeon: Colin Nolasco MD;  [facility]     INJECT FACET JOINT N/A 10/25/2018    Procedure: Injest Facet Joint Lumbar 4-5 Bilateral;  Surgeon: Colin Nolasco MD;  [facility]     LAPAROSCOPIC LYSIS ADHESIONS Left 3/1/2016    Procedure: LAPAROSCOPIC LYSIS ADHESIONS;  Surgeon: Nate Mishra MD;  [facility]     LAPAROSCOPIC LYSIS ADHESIONS N/A 7/13/2017    Procedure: LAPAROSCOPIC LYSIS ADHESIONS;   laparoscopic lysis of adhesions;  Surgeon: Nate Mishra MD;  [facility]     LAPAROSCOPIC LYSIS ADHESIONS N/A 7/13/2017    Procedure: LAPAROSCOPIC LYSIS ADHESIONS;   laparoscopic lysis of adhesions;  Surgeon: Nate Milligan MD;  [facility]     LAPAROSCOPIC LYSIS ADHESIONS N/A 5/14/2018    Procedure: LAPAROSCOPIC LYSIS ADHESIONS;;  Surgeon: Nate Mishra MD;  [facility]     LAPAROSCOPIC SALPINGECTOMY Left 5/14/2018    Procedure: LAPAROSCOPIC SALPINGECTOMY;;  Surgeon: Nate Mishra MD;  [facility]     LAPAROSCOPY DIAGNOSTIC (GYN) N/A 5/14/2018    Procedure: LAPAROSCOPY DIAGNOSTIC (GYN);  laparoscopy, left salpingectomy, left ovarian nodule removal and lysis of adhesions;  Surgeon: Nate Mishra MD;  [facility]     RELEASE CARPAL TUNNEL Left 8/31/2016    Procedure: RELEASE CARPAL TUNNEL;  Surgeon: Gucci Hairston MD;  [facility]     RELEASE CARPAL TUNNEL Right 12/7/2016    Procedure: RELEASE CARPAL TUNNEL;  Surgeon: Gucci Hairston MD;  [facility]       Family History   Problem Relation Age of Onset     Diabetes Father      Hypertension Father      Cardiovascular Mother 29        myocarditis      Other Cancer Brother         unknown origin       Social History     Socioeconomic History     Marital status:      Spouse name: Not on file     Number of  children: Not on file     Years of education: Not on file     Highest education level: Not on file   Occupational History     Not on file   Tobacco Use     Smoking status: Current Every Day Smoker     Packs/day: 1.00     Years: 10.00     Pack years: 10.00     Types: Cigarettes     Smokeless tobacco: Never Used   Substance and Sexual Activity     Alcohol use: No     Alcohol/week: 0.0 standard drinks     Drug use: No     Sexual activity: Yes     Partners: Male     Birth control/protection: None     Comment: Trying to conceive   Other Topics Concern     Parent/sibling w/ CABG, MI or angioplasty before 65F 55M? No   Social History Narrative     Not on file     Social Determinants of Health     Financial Resource Strain: Not on file   Food Insecurity: Not on file   Transportation Needs: Not on file   Physical Activity: Not on file   Stress: Not on file   Social Connections: Not on file   Intimate Partner Violence: Not on file   Housing Stability: Not on file       Current Outpatient Medications   Medication Sig Dispense Refill     amLODIPine (NORVASC) 10 MG tablet Take 10 mg by mouth daily       azelastine (ASTELIN) 0.1 % nasal spray 2 sprays       Cholecalciferol (VITAMIN D3) 50 MCG (2000 UT) CAPS Take 2,000 Units by mouth daily       cyclobenzaprine (FLEXERIL) 10 MG tablet Take 10 mg by mouth 3 times daily as needed for muscle spasms       EPINEPHrine (EPIPEN) 0.3 MG/0.3ML injection Inject 0.3 mLs (0.3 mg) into the muscle once as needed 1 each 0     esomeprazole (NEXIUM) 20 MG DR capsule Take 20 mg by mouth every morning (before breakfast) Take 30-60 minutes before eating.       estradiol (FEMPATCH) 0.025 MG/24HR weekly patch Apply 1 patch topically every 7 days Thursdays       hydrochlorothiazide (HYDRODIURIL) 25 MG tablet Take 25 mg by mouth daily       Lidocaine (LIDOCARE) 4 % Patch Place 1 patch onto the skin every 8 hours as needed for moderate pain Salon Pas is the brand name of the patch and can be purchased  "without a prescription.       losartan (COZAAR) 100 MG tablet Take 100 mg by mouth daily       Multiple Vitamins-Minerals (MULTIVITAMIN ADULT PO) Take 1 tablet by mouth daily       potassium chloride ER (K-TAB/KLOR-CON) 10 MEQ CR tablet TAKE ONE TABLET BY MOUTH EVERY DAY (Patient not taking: Reported on 1/28/2022) 90 tablet 1     predniSONE (DELTASONE) 20 MG tablet Take 2 tablets (40 mg) by mouth daily 10 tablet 0     Probiotic Product (PROBIOTIC DAILY PO) Take by mouth daily         Allergies   Allergen Reactions     Bees Swelling     FINGER TIPS TO ELBOWS     Codeine Other (See Comments)     Squeezing pain around trunk/abdomen       REVIEW OF SYSTEMS:  CONSTITUTIONAL:  NEGATIVE for fever, chills, change in weight, not feeling tired  SKIN:  NEGATIVE for worrisome rashes, no skin lumps, no skin ulcers and no non-healing wounds  EYES:  NEGATIVE for vision changes or irritation.  ENT/MOUTH:  NEGATIVE.  No hearing loss, no hoarseness, no difficulty swallowing.  RESP:  NEGATIVE. No cough or shortness of breath.  CV:  NEGATIVE for chest pain, palpitations or peripheral edema  GI:  NEGATIVE for nausea, abdominal pain, heartburn, or change in bowel habits  :  Negative. No dysuria, no hematuria  MUSCULOSKELETAL:  See HPI above  NEURO:  NEGATIVE . No headaches, no dizziness,  no numbness  ENDOCRINE:  NEGATIVE for temperature intolerance, skin/hair changes  HEME/ALLERGY/IMMUNE:  NEGATIVE for bleeding problems  PSYCHIATRIC:  NEGATIVE. no anxiety, no depression.     Exam:  Vitals: Resp 18   Ht 1.6 m (5' 3\")   Wt 89.4 kg (197 lb)   LMP 10/26/2018   BMI 34.90 kg/m    BMI= Body mass index is 34.9 kg/m .  Constitutional:  healthy, alert and no distress  Neuro: Alert and Oriented x 3, no focal defects.  Psych: Affect normal   Respiratory: Breathing not labored.  Cardiovascular: normal peripheral pulses  Lymph: no adenopathy  Skin: No rashes,worrisome lesions or skin problems  She has tenderness at the lateral epicondyle right " elbow.  There is no tenderness at the medial epicondyle or olecranon.  She has full range of motion of the elbow.  She has definite pain with pronated lifting, wrist and finger extension against resistance.  She also has some pain with resisted supination and some with resisted pronation.  There is no pain with resisted wrist flexion or lifting in a supinated position.  Sensation and circulation are intact.    Assessment:  Right lateral epicondylitis.    Plan:  Titan wrist brace applied.  She should avoid palm down lifting.  Try to minimize any lifting.  We have gone over stretching and cross friction massage.  She would like to have therapy for iontophoresis and exercises.

## 2022-03-16 DIAGNOSIS — M25.531 PAIN IN JOINT OF RIGHT WRIST: ICD-10-CM

## 2022-03-16 DIAGNOSIS — M77.11 LATERAL EPICONDYLITIS OF RIGHT ELBOW: Primary | ICD-10-CM

## 2022-03-22 NOTE — PROGRESS NOTES
DME order diagnosis changed from wrist pain to correct diagnosis of Lateral epicondylitis of right elbow.

## 2022-03-28 ENCOUNTER — HOSPITAL ENCOUNTER (EMERGENCY)
Facility: CLINIC | Age: 47
Discharge: HOME | End: 2022-03-28
Attending: FAMILY MEDICINE | Admitting: FAMILY MEDICINE
Payer: COMMERCIAL

## 2022-03-28 ENCOUNTER — APPOINTMENT (OUTPATIENT)
Dept: GENERAL RADIOLOGY | Facility: CLINIC | Age: 47
End: 2022-03-28
Attending: FAMILY MEDICINE
Payer: COMMERCIAL

## 2022-03-28 VITALS
SYSTOLIC BLOOD PRESSURE: 111 MMHG | RESPIRATION RATE: 16 BRPM | TEMPERATURE: 99 F | HEART RATE: 80 BPM | BODY MASS INDEX: 35.44 KG/M2 | DIASTOLIC BLOOD PRESSURE: 73 MMHG | HEIGHT: 63 IN | WEIGHT: 200 LBS | OXYGEN SATURATION: 98 %

## 2022-03-28 DIAGNOSIS — M25.512 NONTRAUMATIC SHOULDER PAIN, LEFT: ICD-10-CM

## 2022-03-28 DIAGNOSIS — M54.42 LEFT-SIDED LOW BACK PAIN WITH LEFT-SIDED SCIATICA, UNSPECIFIED CHRONICITY: ICD-10-CM

## 2022-03-28 DIAGNOSIS — M79.605 PAIN OF LEFT LOWER EXTREMITY: ICD-10-CM

## 2022-03-28 PROCEDURE — 99285 EMERGENCY DEPT VISIT HI MDM: CPT | Performed by: FAMILY MEDICINE

## 2022-03-28 PROCEDURE — 73030 X-RAY EXAM OF SHOULDER: CPT | Mod: LT

## 2022-03-28 PROCEDURE — 96372 THER/PROPH/DIAG INJ SC/IM: CPT | Performed by: FAMILY MEDICINE

## 2022-03-28 PROCEDURE — 250N000011 HC RX IP 250 OP 636: Performed by: FAMILY MEDICINE

## 2022-03-28 RX ORDER — OXYCODONE HYDROCHLORIDE 5 MG/1
5 TABLET ORAL EVERY 6 HOURS PRN
Qty: 16 TABLET | Refills: 0 | Status: SHIPPED | OUTPATIENT
Start: 2022-03-28 | End: 2022-04-01

## 2022-03-28 RX ORDER — KETOROLAC TROMETHAMINE 30 MG/ML
60 INJECTION, SOLUTION INTRAMUSCULAR; INTRAVENOUS ONCE
Status: COMPLETED | OUTPATIENT
Start: 2022-03-28 | End: 2022-03-28

## 2022-03-28 RX ORDER — ORPHENADRINE CITRATE 30 MG/ML
60 INJECTION INTRAMUSCULAR; INTRAVENOUS ONCE
Status: COMPLETED | OUTPATIENT
Start: 2022-03-28 | End: 2022-03-28

## 2022-03-28 RX ORDER — METHYLPREDNISOLONE 4 MG
TABLET, DOSE PACK ORAL
Qty: 21 TABLET | Refills: 0 | Status: SHIPPED | OUTPATIENT
Start: 2022-03-28 | End: 2023-07-23

## 2022-03-28 RX ADMIN — HYDROMORPHONE HYDROCHLORIDE 1 MG: 1 INJECTION, SOLUTION INTRAMUSCULAR; INTRAVENOUS; SUBCUTANEOUS at 19:34

## 2022-03-28 RX ADMIN — KETOROLAC TROMETHAMINE 60 MG: 30 INJECTION, SOLUTION INTRAMUSCULAR at 18:03

## 2022-03-28 RX ADMIN — ORPHENADRINE CITRATE 60 MG: 30 INJECTION INTRAMUSCULAR; INTRAVENOUS at 18:03

## 2022-03-28 NOTE — ED PROVIDER NOTES
[facility] ED Provider Note   Patient: Virginia Valenzuela  MRN #:  [id number]  Date of Visit: March 28, 2022    CC:     Chief Complaint   Patient presents with     Shoulder Pain     HPI:  Virginia Valenzuela is a 46 year old female who presented to the emergency department with musculoskeletal pain, and several regions including left shoulder, low back, and left calf.  She has been dealing with elbow pain and has seen several providers including Dr. Holland, and diagnosed with lateral epicondylitis of the right elbow.  She denies any recent injury or trauma, is left-hand dominant, and reports severe left shoulder pain that started slightly last night and becoming much worse this afternoon.  She is unable to raise her arm above her shoulder and pain is worse when she tries to bring her arm out to the side.  She does not have any numbness or tingling from the neck down into the arm.  She also reports increased left-sided low back pain with now new onset pain in the left calf.  She has not noticed any swelling, redness or increased warmth in the left leg.  She has no prior history of DVT.  She had a superficial thromboembolism of the right upper extremity after she had a peripheral IV.  Patient rates her left shoulder pain 8 out of 10 and her left calf pain 6 out of 10.  She took some ibuprofen earlier today.  She works as a para at school, and does not recall doing anything that would injure her left shoulder or leg.  She does not have any chest pain, shortness of breath or abdominal pain.  The pain in the left calf and back is worsened by weightbearing.  She denies any bowel or bladder symptoms, does not have any perceived weakness, but does report some chronic numbness and tingling into both lower extremities.    Problem List:  Patient Active Problem List    Diagnosis Date Noted     Chronic pelvic pain in female 09/15/2017     Priority: Medium      Trigger finger, left thumb 04/28/2017     Priority: Medium     Sesamoiditis 04/18/2017     Priority: Medium     Bilateral carpal tunnel syndrome 08/11/2016     Priority: Medium     Tobacco use disorder 08/17/2015     Priority: Medium     Ovarian cyst 01/17/2014     Priority: Medium     Problem list name updated by automated process. Provider to review       Female infertility 01/17/2014     Priority: Medium     Hypertension goal BP (blood pressure) < 140/90 11/07/2013     Priority: Medium     Papanicolaou smear of cervix with atypical squamous cells of undetermined significance (ASC-US) 06/27/2013     Priority: Medium     3/12/13 pap ASCUS HR HPV  8/5/13 colposcopy. ASCUS. Plan repeat pap in 6 months  7/6/15 pap NIL/neg HPV. Plan cotest in 3 yrs       GERD (gastroesophageal reflux disease) 06/03/2013     Priority: Medium     Vitamin D deficiency 06/03/2013     Priority: Medium     Problem list name updated by automated process. Provider to review and confirm  Imo Update utility       HAMIDA (obstructive sleep apnea) 05/29/2013     Priority: Medium     Generalized anxiety disorder 05/29/2013     Priority: Medium     Seasonal allergic rhinitis 05/29/2013     Priority: Medium       Past Medical History:   Diagnosis Date     Allergic rhinitis, cause unspecified      Arthritis      ASCUS with positive high risk HPV 3/12/13     Depressive disorder, not elsewhere classified      Dysfunction of eustachian tube      Encounter for therapeutic drug monitoring      Generalized anxiety disorder      High risk HPV infection 5/12/09     History of appendectomy      Hypertension 2000     Idiopathic urticaria      Obstructive sleep apnea (adult) (pediatric)      Other chronic allergic conjunctivitis      Other malaise and fatigue      Pain in joint, shoulder region      S/P laparoscopic cholecystectomy      Special screening examination for human papillomavirus (HPV)      Sprain of tibiofibular (ligament), distal of ankle       Threatened , unspecified as to episode of care      Tobacco use disorder      Unspecified essential hypertension      Unspecified vitamin D deficiency        MEDS: amLODIPine (NORVASC) 10 MG tablet  azelastine (ASTELIN) 0.1 % nasal spray  Cholecalciferol (VITAMIN D3) 50 MCG ( UT) CAPS  cyclobenzaprine (FLEXERIL) 10 MG tablet  EPINEPHrine (EPIPEN) 0.3 MG/0.3ML injection  esomeprazole (NEXIUM) 20 MG DR capsule  estradiol (FEMPATCH) 0.025 MG/24HR weekly patch  hydrochlorothiazide (HYDRODIURIL) 25 MG tablet  Lidocaine (LIDOCARE) 4 % Patch  losartan (COZAAR) 100 MG tablet  methylPREDNISolone (MEDROL DOSEPAK) 4 MG tablet therapy pack  oxyCODONE (ROXICODONE) 5 MG tablet  Probiotic Product (PROBIOTIC DAILY PO)  potassium chloride ER (K-TAB/KLOR-CON) 10 MEQ CR tablet  predniSONE (DELTASONE) 20 MG tablet        ALLERGIES:    Allergies   Allergen Reactions     Bees Swelling     FINGER TIPS TO ELBOWS     Codeine Other (See Comments)     Squeezing pain around trunk/abdomen       Past Surgical History:   Procedure Laterality Date     ABDOMEN SURGERY  2013    ovaian cyst and right ovary removal     APPENDECTOMY       CHOLECYSTECTOMY       COLONOSCOPY N/A 10/26/2017    Procedure: COMBINED COLONOSCOPY, SINGLE OR MULTIPLE BIOPSY/POLYPECTOMY BY BIOPSY;  Colonoscopy with biopsies by biopsy;  Surgeon: Fred Faust DO;  Location:  GI     EYE SURGERY       GENITOURINARY SURGERY  2013    Ovary, fallopian tube     GYN SURGERY  2013    Ovary, Fallopian tube     INJECT EPIDURAL LUMBAR Bilateral 3/15/2019    Procedure: Inject Facets Lumbar 4-5, Bilateral;  Surgeon: Colin Nolasco MD;  [facility]     INJECT FACET JOINT N/A 10/25/2018    Procedure: Injest Facet Joint Lumbar 4-5 Bilateral;  Surgeon: Colin Nolasco MD;  [facility]     LAPAROSCOPIC LYSIS ADHESIONS Left 3/1/2016    Procedure: LAPAROSCOPIC LYSIS ADHESIONS;  Surgeon: Nate Mishra MD;  [facility]      "LAPAROSCOPIC LYSIS ADHESIONS N/A 7/13/2017    Procedure: LAPAROSCOPIC LYSIS ADHESIONS;   laparoscopic lysis of adhesions;  Surgeon: Nate Mishra MD;  [facility]     LAPAROSCOPIC LYSIS ADHESIONS N/A 7/13/2017    Procedure: LAPAROSCOPIC LYSIS ADHESIONS;   laparoscopic lysis of adhesions;  Surgeon: Nate Milligan MD;  [facility]     LAPAROSCOPIC LYSIS ADHESIONS N/A 5/14/2018    Procedure: LAPAROSCOPIC LYSIS ADHESIONS;;  Surgeon: Nate Mishra MD;  [facility]     LAPAROSCOPIC SALPINGECTOMY Left 5/14/2018    Procedure: LAPAROSCOPIC SALPINGECTOMY;;  Surgeon: Nate Mishra MD;  [facility]     LAPAROSCOPY DIAGNOSTIC (GYN) N/A 5/14/2018    Procedure: LAPAROSCOPY DIAGNOSTIC (GYN);  laparoscopy, left salpingectomy, left ovarian nodule removal and lysis of adhesions;  Surgeon: Nate Mishra MD;  [facility]     RELEASE CARPAL TUNNEL Left 8/31/2016    Procedure: RELEASE CARPAL TUNNEL;  Surgeon: Gucci Hairston MD;  [facility]     RELEASE CARPAL TUNNEL Right 12/7/2016    Procedure: RELEASE CARPAL TUNNEL;  Surgeon: Gucci Hairston MD;  [facility]       Social History     Tobacco Use     Smoking status: Current Every Day Smoker     Packs/day: 1.00     Years: 10.00     Pack years: 10.00     Types: Cigarettes     Smokeless tobacco: Never Used   Substance Use Topics     Alcohol use: No     Alcohol/week: 0.0 standard drinks     Drug use: No         Review of Systems   Except as noted in HPI, all other systems were reviewed and are negative    Physical Exam     Vitals were reviewed  Patient Vitals for the past 12 hrs:   BP Temp Temp src Pulse Resp SpO2 Height Weight   03/28/22 2017 111/73 -- -- -- -- -- -- --   03/28/22 1611 (!) 141/101 99  F (37.2  C) Oral 80 16 98 % 1.6 m (5' 3\") 90.7 kg (200 lb)     GENERAL APPEARANCE: Alert, tearful  FACE: normal facies  EYES: Pupils are equal  HENT: normal external exam  NECK: no adenopathy or " asymmetry  RESP: normal respiratory effort; clear breath sounds bilaterally  CV: regular rate and rhythm; no significant murmurs, gallops or rubs  ABD: soft, obese, no tenderness; no rebound or guarding; bowel sounds are normal  MS: no gross deformities noted; normal muscle tone.  EXT: Patient has pain in the left shoulder with flexion and abduction at 90 degrees.  Positive impingement in the left shoulder joint; good peripheral pulses; no significant reproducible calf tenderness.  No palpable cord.  SKIN: no worrisome rash  NEURO: no facial droop; no focal deficits, speech is normal; deep tendon reflexes are 2+/4+ bilaterally  PSYCH: Flat affect      Available Lab/Imaging Results     Results for orders placed or performed during the hospital encounter of 03/28/22 (from the past 24 hour(s))   XR Shoulder Left G/E 3 Views    Narrative    EXAM: XR SHOULDER LEFT G/E 3 VIEWS  LOCATION: [facility]  DATE/TIME: 3/28/2022 6:06 PM    INDICATION: atraumatic shoulder pain  COMPARISON: None.      Impression    IMPRESSION: No acute fracture or malalignment. There is normal glenohumeral joint spacing. Mild acromioclavicular joint degenerative changes. Calcification adjacent to the greater tuberosity suggests chronic rotator cuff pathology.              Impression     Final diagnoses:   Nontraumatic shoulder pain, left   Pain of left lower extremity   Left-sided low back pain with left-sided sciatica         ED Course & Medical Decision Making   Virginia Valenzuela is a 46 year old female who presented to the emergency department with acute onset of left shoulder pain that started last night becoming worse today.  She did not have any trauma or overuse.  She is left-hand dominant.  She was seen last May with right shoulder pain and has possibly chronic rotator cuff tendinitis with calcifications noted on the x-ray.  Patient has trouble lifting her arm above her shoulder, and states that her pain level  is 9 out of 10.  She has some other areas of chronic pain which is making it more difficult for her to manage new areas of pain.  The [place] prescription monitoring profile indicates that her last opiate prescription was in May of last year.  She typically tries to manage with a regimen of over-the-counter medication, ice/heat, but has not been able to keep her pain under control.  Patient also has new left calf pain in the setting of increased left low back pain.  She has a history of degenerative disc disease.  Patient was seen shortly after arrival.  Vital signs reveal elevated blood pressure of 141/101.  Patient rates her current pain level in the left shoulder 8 out of 10, and pain in the left calf at 6 out of 10.  Exam reveals positive impingement sign in the left shoulder and pain with passive flexion and abduction.  Patient has no palpable tenderness over the calf and there is no palpable cord, redness or increased warmth to suggest DVT.  She has positive straight leg raise.  Deep tendon reflexes are symmetrical.    7:21 PM: Patient reports no significant pain improvement after Toradol 60 mg IM and Norflex 60 mg IM.  She feels frustrated that she is having severe pain in yet nothing is showing up.  Since her last ER visit for joint pain was in May 2021, I offered her a single shot of Dilaudid.    8:00 PM: Patient's pain is down to 4 out of 10.  She feels improved.  Her PDMP reveals that her last prescription was in June of last year.  We discussed treatment with ibuprofen and Tylenol for mild to moderate pain, over-the-counter lidocaine patches to both the back and the shoulder, ice/heat, and to reserve oxycodone for severe pain greater than a level of 7 out of 10.  She will follow-up with her primary care provider in 3 days.  Return to the ED if symptoms worsen.  Please see discharge instructions below.          Written after-visit summary and instructions were given at the time of discharge.    Follow  up Plan:   [facility]  [address]   [place] [address]  [facility] [address]  [phone]    In 3 days      [facility] Emergency Dept  [address] [place] [address] [phone]    If symptoms worsen      Discharge Instructions:   You have some calcification of the left shoulder joint suggestive of chronic rotator cuff tendinitis.  Ice the area frequently and use the sling for comfort.  Continue with your ibuprofen and Tylenol for mild to moderate pain and reserve oxycodone for severe breakthrough pain.  Begin Medrol Dosepak and take as directed for suspected sciatica.  Follow-up with your primary care provider within the next 3 days.       Disclaimer: This note consists of words and symbols derived from keyboarding and dictation using voice recognition software.  As a result, there may be errors that have gone undetected.  Please consider this when interpreting information found in this note.       Abran Hartley MD  03/28/22 2034

## 2022-03-28 NOTE — ED TRIAGE NOTES
Pt complain of low back, left shoulder, right elbow and left calf pain.  Has chronic back pain but states her usual methods are not working today. Took advil with no change.

## 2022-03-29 NOTE — DISCHARGE INSTRUCTIONS
You have some calcification of the left shoulder joint suggestive of chronic rotator cuff tendinitis.  Ice the area frequently and use the sling for comfort.  Continue with your ibuprofen and Tylenol for mild to moderate pain and reserve oxycodone for severe breakthrough pain.  Begin Medrol Dosepak and take as directed for suspected sciatica.  Follow-up with your primary care provider within the next 3 days.

## 2022-04-29 ENCOUNTER — HOSPITAL ENCOUNTER (EMERGENCY)
Facility: CLINIC | Age: 47
Discharge: HOME | End: 2022-04-29
Attending: FAMILY MEDICINE | Admitting: FAMILY MEDICINE
Payer: COMMERCIAL

## 2022-04-29 VITALS
DIASTOLIC BLOOD PRESSURE: 77 MMHG | HEART RATE: 97 BPM | BODY MASS INDEX: 35.43 KG/M2 | TEMPERATURE: 98.1 F | SYSTOLIC BLOOD PRESSURE: 159 MMHG | WEIGHT: 200 LBS | RESPIRATION RATE: 18 BRPM | OXYGEN SATURATION: 97 %

## 2022-04-29 DIAGNOSIS — M25.521 RIGHT ELBOW PAIN: ICD-10-CM

## 2022-04-29 PROCEDURE — 99284 EMERGENCY DEPT VISIT MOD MDM: CPT | Performed by: FAMILY MEDICINE

## 2022-04-29 PROCEDURE — 99283 EMERGENCY DEPT VISIT LOW MDM: CPT | Performed by: FAMILY MEDICINE

## 2022-04-29 RX ORDER — HYDROCODONE BITARTRATE AND ACETAMINOPHEN 5; 325 MG/1; MG/1
1 TABLET ORAL EVERY 6 HOURS PRN
Qty: 12 TABLET | Refills: 0 | Status: SHIPPED | OUTPATIENT
Start: 2022-04-29 | End: 2022-11-21

## 2022-04-30 NOTE — DISCHARGE INSTRUCTIONS
1.  I would place an Ace wrap on your elbow in position of comfort for compression, this may help with the pain.  Do some gentle range of motion exercises starting tomorrow.  Please contact your surgeon who did the injection tomorrow if things or not improving.    Cortisone shots   How does this medicine help?  We inject the medicine into the sore area to help control pain, reduce swelling and improve joint movement.  This medicine is used to treat early wear and tear arthritis. In general, we do not suggest this type of shot for young people with healthy cartilage.  What should I expect?  We may mix the cortisone with a numbing medicine (lidocaine). In that case, you will feel numbness for 3 or 4 hours. The numbness tells you that the medicine went to the right area.  It takes about 2 days for the steroid medicine to start working and 2 weeks until it takes full effect.  The risk of an infection from an injection is about 1 in 10,000.  We can repeat these injections about 3 times a year.

## 2022-04-30 NOTE — ED TRIAGE NOTES
"    Pt got a steroid injection in her right elbow today.  Once the lidocaine wore off she said \"It feels like a bomb went off in there\".  C/o increased pain and difficulty moving elbow.,   "

## 2022-04-30 NOTE — ED PROVIDER NOTES
History     Chief Complaint   Patient presents with     Arm Pain     HPI  Virginia Valenzuela is a 47 year old female who presents with concerns of right elbow pain.  Patient had a joint injection done earlier today and initially if felt okay but then this evening as the numbing medicine wore off it started to feel more painful.  She describes it as something exploding in her elbow.  Denies any trauma.  Denies any recent fevers or chills.  It hurts to touch or move the area.    Allergies:  Allergies   Allergen Reactions     Bees Swelling     FINGER TIPS TO ELBOWS     Codeine Other (See Comments)     Squeezing pain around trunk/abdomen       Problem List:    Patient Active Problem List    Diagnosis Date Noted     Chronic pelvic pain in female 09/15/2017     Priority: Medium     Trigger finger, left thumb 04/28/2017     Priority: Medium     Sesamoiditis 04/18/2017     Priority: Medium     Bilateral carpal tunnel syndrome 08/11/2016     Priority: Medium     Tobacco use disorder 08/17/2015     Priority: Medium     Ovarian cyst 01/17/2014     Priority: Medium     Problem list name updated by automated process. Provider to review       Female infertility 01/17/2014     Priority: Medium     Hypertension goal BP (blood pressure) < 140/90 11/07/2013     Priority: Medium     Papanicolaou smear of cervix with atypical squamous cells of undetermined significance (ASC-US) 06/27/2013     Priority: Medium     3/12/13 pap ASCUS HR HPV  8/5/13 colposcopy. ASCUS. Plan repeat pap in 6 months  7/6/15 pap NIL/neg HPV. Plan cotest in 3 yrs       GERD (gastroesophageal reflux disease) 06/03/2013     Priority: Medium     Vitamin D deficiency 06/03/2013     Priority: Medium     Problem list name updated by automated process. Provider to review and confirm  Im[employer] Update utility       HAMIDA (obstructive sleep apnea) 05/29/2013     Priority: Medium     Generalized anxiety disorder 05/29/2013     Priority: Medium     Seasonal allergic rhinitis  2013     Priority: Medium        Past Medical History:    Past Medical History:   Diagnosis Date     Allergic rhinitis, cause unspecified      Arthritis      ASCUS with positive high risk HPV 3/12/13     Depressive disorder, not elsewhere classified      Dysfunction of eustachian tube      Encounter for therapeutic drug monitoring      Generalized anxiety disorder      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     History of appendectomy      Hypertension 2000     Idiopathic urticaria      Obstructive sleep apnea (adult) (pediatric)      Other chronic allergic conjunctivitis      Other malaise and fatigue      Pain in joint, shoulder region      S/P laparoscopic cholecystectomy      Special screening examination for human papillomavirus (HPV)      Sprain of tibiofibular (ligament), distal of ankle      Threatened , unspecified as to episode of care      Tobacco use disorder      Unspecified essential hypertension      Unspecified vitamin D deficiency        Past Surgical History:    Past Surgical History:   Procedure Laterality Date     ABDOMEN SURGERY  2013    ovaian cyst and right ovary removal     APPENDECTOMY       CHOLECYSTECTOMY       COLONOSCOPY N/A 10/26/2017    Procedure: COMBINED COLONOSCOPY, SINGLE OR MULTIPLE BIOPSY/POLYPECTOMY BY BIOPSY;  Colonoscopy with biopsies by biopsy;  Surgeon: Fred Faust DO;  Location:  GI     EYE SURGERY  1979     GENITOURINARY SURGERY  2013    Ovary, fallopian tube     GYN SURGERY  2013    Ovary, Fallopian tube     INJECT EPIDURAL LUMBAR Bilateral 3/15/2019    Procedure: Inject Facets Lumbar 4-5, Bilateral;  Surgeon: Colin Nolasco MD;  [facility]     INJECT FACET JOINT N/A 10/25/2018    Procedure: Injest Facet Joint Lumbar 4-5 Bilateral;  Surgeon: Colin Nolasco MD;  [facility]     LAPAROSCOPIC LYSIS ADHESIONS Left 3/1/2016    Procedure: LAPAROSCOPIC LYSIS ADHESIONS;  Surgeon: Nate Mishra MD;  [facility]      LAPAROSCOPIC LYSIS ADHESIONS N/A 7/13/2017    Procedure: LAPAROSCOPIC LYSIS ADHESIONS;   laparoscopic lysis of adhesions;  Surgeon: Nate Mishra MD;  [facility]     LAPAROSCOPIC LYSIS ADHESIONS N/A 7/13/2017    Procedure: LAPAROSCOPIC LYSIS ADHESIONS;   laparoscopic lysis of adhesions;  Surgeon: Nate Milligan MD;  [facility]     LAPAROSCOPIC LYSIS ADHESIONS N/A 5/14/2018    Procedure: LAPAROSCOPIC LYSIS ADHESIONS;;  Surgeon: Nate Mishra MD;  [facility]     LAPAROSCOPIC SALPINGECTOMY Left 5/14/2018    Procedure: LAPAROSCOPIC SALPINGECTOMY;;  Surgeon: Nate Mishra MD;  [facility]     LAPAROSCOPY DIAGNOSTIC (GYN) N/A 5/14/2018    Procedure: LAPAROSCOPY DIAGNOSTIC (GYN);  laparoscopy, left salpingectomy, left ovarian nodule removal and lysis of adhesions;  Surgeon: Nate Mishra MD;  [facility]     RELEASE CARPAL TUNNEL Left 8/31/2016    Procedure: RELEASE CARPAL TUNNEL;  Surgeon: Gucci Hairston MD;  [facility]     RELEASE CARPAL TUNNEL Right 12/7/2016    Procedure: RELEASE CARPAL TUNNEL;  Surgeon: Gucci Hairston MD;  [facility]       Family History:    Family History   Problem Relation Age of Onset     Diabetes Father      Hypertension Father      Cardiovascular Mother 29        myocarditis      Other Cancer Brother         unknown origin       Social History:  Marital Status:   [2]  Social History     Tobacco Use     Smoking status: Current Every Day Smoker     Packs/day: 1.00     Years: 10.00     Pack years: 10.00     Types: Cigarettes     Smokeless tobacco: Never Used   Substance Use Topics     Alcohol use: No     Alcohol/week: 0.0 standard drinks     Drug use: No        Medications:    HYDROcodone-acetaminophen (NORCO) 5-325 MG tablet  amLODIPine (NORVASC) 10 MG tablet  azelastine (ASTELIN) 0.1 % nasal spray  Cholecalciferol (VITAMIN D3) 50 MCG (2000 UT) CAPS  cyclobenzaprine (FLEXERIL) 10 MG  tablet  EPINEPHrine (EPIPEN) 0.3 MG/0.3ML injection  esomeprazole (NEXIUM) 20 MG DR capsule  estradiol (FEMPATCH) 0.025 MG/24HR weekly patch  hydrochlorothiazide (HYDRODIURIL) 25 MG tablet  Lidocaine (LIDOCARE) 4 % Patch  losartan (COZAAR) 100 MG tablet  methylPREDNISolone (MEDROL DOSEPAK) 4 MG tablet therapy pack  potassium chloride ER (K-TAB/KLOR-CON) 10 MEQ CR tablet  predniSONE (DELTASONE) 20 MG tablet  Probiotic Product (PROBIOTIC DAILY PO)          Review of Systems   All other systems reviewed and are negative.      Physical Exam   BP: (!) 159/77  Pulse: 97  Temp: 98.1  F (36.7  C)  Resp: 18  Weight: 90.7 kg (200 lb)  SpO2: 97 %      Physical Exam  Vitals and nursing note reviewed.   Constitutional:       General: She is not in acute distress.     Appearance: Normal appearance. She is not ill-appearing.   Cardiovascular:      Pulses: Normal pulses.   Musculoskeletal:      Right elbow: Decreased range of motion. Tenderness present.   Skin:     Capillary Refill: Capillary refill takes less than 2 seconds.   Neurological:      General: No focal deficit present.      Mental Status: She is alert.      Sensory: No sensory deficit.         ED Course                 Procedures      No results found for this or any previous visit (from the past 24 hour(s)).    Medications - No data to display     Exam is unremarkable, patient has normal range of motion distally and normal pulses, I think that the pain is just that the steroids have not kicked in yet as the anesthetic has worn off.  I do not see any signs of infection of the joint.  Would recommend just conservative care with pain medications.  Patient will be discharged with some Norco's.  Patient will follow up if there is no improvement in the next few days.    Assessments & Plan (with Medical Decision Making)  Right elbow pain     I have reviewed the nursing notes.    I have reviewed the findings, diagnosis, plan and need for follow up with the patient.      New  Prescriptions    HYDROCODONE-ACETAMINOPHEN (NORCO) 5-325 MG TABLET    Take 1 tablet by mouth every 6 hours as needed for severe pain       Final diagnoses:   Right elbow pain       4/29/2022   [facility] EMERGENCY DEPT     Michael Fajardo MD  04/29/22 [id number]

## 2022-10-09 ENCOUNTER — HEALTH MAINTENANCE LETTER (OUTPATIENT)
Age: 47
End: 2022-10-09

## 2022-10-17 ENCOUNTER — HOSPITAL ENCOUNTER (EMERGENCY)
Facility: CLINIC | Age: 47
Discharge: HOME | End: 2022-10-18
Attending: EMERGENCY MEDICINE | Admitting: EMERGENCY MEDICINE
Payer: COMMERCIAL

## 2022-10-17 ENCOUNTER — APPOINTMENT (OUTPATIENT)
Dept: GENERAL RADIOLOGY | Facility: CLINIC | Age: 47
End: 2022-10-17
Attending: EMERGENCY MEDICINE
Payer: COMMERCIAL

## 2022-10-17 DIAGNOSIS — D72.829 LEUKOCYTOSIS, UNSPECIFIED TYPE: ICD-10-CM

## 2022-10-17 DIAGNOSIS — J02.9 PHARYNGITIS, UNSPECIFIED ETIOLOGY: ICD-10-CM

## 2022-10-17 DIAGNOSIS — E87.6 HYPOKALEMIA: ICD-10-CM

## 2022-10-17 DIAGNOSIS — J18.9 PNEUMONIA DUE TO INFECTIOUS ORGANISM, UNSPECIFIED LATERALITY, UNSPECIFIED PART OF LUNG: ICD-10-CM

## 2022-10-17 DIAGNOSIS — H92.03 OTALGIA OF BOTH EARS: ICD-10-CM

## 2022-10-17 LAB
ALBUMIN SERPL-MCNC: 3.5 G/DL (ref 3.4–5)
ALP SERPL-CCNC: 129 U/L (ref 40–150)
ALT SERPL W P-5'-P-CCNC: 19 U/L (ref 0–50)
ANION GAP SERPL CALCULATED.3IONS-SCNC: 6 MMOL/L (ref 3–14)
AST SERPL W P-5'-P-CCNC: 12 U/L (ref 0–45)
BASOPHILS # BLD AUTO: 0.1 10E3/UL (ref 0–0.2)
BASOPHILS NFR BLD AUTO: 0 %
BILIRUB SERPL-MCNC: 0.2 MG/DL (ref 0.2–1.3)
BUN SERPL-MCNC: 11 MG/DL (ref 7–30)
CALCIUM SERPL-MCNC: 8.9 MG/DL (ref 8.5–10.1)
CHLORIDE BLD-SCNC: 103 MMOL/L (ref 94–109)
CO2 SERPL-SCNC: 30 MMOL/L (ref 20–32)
CREAT SERPL-MCNC: 0.53 MG/DL (ref 0.52–1.04)
DEPRECATED S PYO AG THROAT QL EIA: NEGATIVE
EOSINOPHIL # BLD AUTO: 0.1 10E3/UL (ref 0–0.7)
EOSINOPHIL NFR BLD AUTO: 1 %
ERYTHROCYTE [DISTWIDTH] IN BLOOD BY AUTOMATED COUNT: 13.2 % (ref 10–15)
FLUAV RNA SPEC QL NAA+PROBE: NEGATIVE
FLUBV RNA RESP QL NAA+PROBE: NEGATIVE
GFR SERPL CREATININE-BSD FRML MDRD: >90 ML/MIN/1.73M2
GLUCOSE BLD-MCNC: 156 MG/DL (ref 70–99)
HCT VFR BLD AUTO: 41.2 % (ref 35–47)
HGB BLD-MCNC: 14.2 G/DL (ref 11.7–15.7)
HOLD SPECIMEN: NORMAL
IMM GRANULOCYTES # BLD: 0.1 10E3/UL
IMM GRANULOCYTES NFR BLD: 1 %
LACTATE SERPL-SCNC: 2.3 MMOL/L (ref 0.7–2)
LYMPHOCYTES # BLD AUTO: 4.1 10E3/UL (ref 0.8–5.3)
LYMPHOCYTES NFR BLD AUTO: 19 %
MCH RBC QN AUTO: 29.9 PG (ref 26.5–33)
MCHC RBC AUTO-ENTMCNC: 34.5 G/DL (ref 31.5–36.5)
MCV RBC AUTO: 87 FL (ref 78–100)
MONOCYTES # BLD AUTO: 1 10E3/UL (ref 0–1.3)
MONOCYTES NFR BLD AUTO: 4 %
NEUTROPHILS # BLD AUTO: 16.4 10E3/UL (ref 1.6–8.3)
NEUTROPHILS NFR BLD AUTO: 75 %
NRBC # BLD AUTO: 0 10E3/UL
NRBC BLD AUTO-RTO: 0 /100
PLATELET # BLD AUTO: 301 10E3/UL (ref 150–450)
POTASSIUM BLD-SCNC: 3.1 MMOL/L (ref 3.4–5.3)
PROT SERPL-MCNC: 7.5 G/DL (ref 6.8–8.8)
RBC # BLD AUTO: 4.75 10E6/UL (ref 3.8–5.2)
RSV RNA SPEC NAA+PROBE: NEGATIVE
SARS-COV-2 RNA RESP QL NAA+PROBE: NEGATIVE
SODIUM SERPL-SCNC: 139 MMOL/L (ref 133–144)
WBC # BLD AUTO: 21.8 10E3/UL (ref 4–11)

## 2022-10-17 PROCEDURE — 99284 EMERGENCY DEPT VISIT MOD MDM: CPT | Mod: CS | Performed by: EMERGENCY MEDICINE

## 2022-10-17 PROCEDURE — C9803 HOPD COVID-19 SPEC COLLECT: HCPCS | Performed by: EMERGENCY MEDICINE

## 2022-10-17 PROCEDURE — 99284 EMERGENCY DEPT VISIT MOD MDM: CPT | Mod: CS,25 | Performed by: EMERGENCY MEDICINE

## 2022-10-17 PROCEDURE — 96361 HYDRATE IV INFUSION ADD-ON: CPT | Performed by: EMERGENCY MEDICINE

## 2022-10-17 PROCEDURE — 258N000003 HC RX IP 258 OP 636: Performed by: EMERGENCY MEDICINE

## 2022-10-17 PROCEDURE — 71045 X-RAY EXAM CHEST 1 VIEW: CPT

## 2022-10-17 PROCEDURE — 87637 SARSCOV2&INF A&B&RSV AMP PRB: CPT | Performed by: EMERGENCY MEDICINE

## 2022-10-17 PROCEDURE — 80053 COMPREHEN METABOLIC PANEL: CPT | Performed by: EMERGENCY MEDICINE

## 2022-10-17 PROCEDURE — 96375 TX/PRO/DX INJ NEW DRUG ADDON: CPT | Performed by: EMERGENCY MEDICINE

## 2022-10-17 PROCEDURE — 96365 THER/PROPH/DIAG IV INF INIT: CPT | Performed by: EMERGENCY MEDICINE

## 2022-10-17 PROCEDURE — 87040 BLOOD CULTURE FOR BACTERIA: CPT | Performed by: EMERGENCY MEDICINE

## 2022-10-17 PROCEDURE — 85025 COMPLETE CBC W/AUTO DIFF WBC: CPT | Performed by: EMERGENCY MEDICINE

## 2022-10-17 PROCEDURE — 250N000011 HC RX IP 250 OP 636: Performed by: EMERGENCY MEDICINE

## 2022-10-17 PROCEDURE — 36415 COLL VENOUS BLD VENIPUNCTURE: CPT | Performed by: EMERGENCY MEDICINE

## 2022-10-17 PROCEDURE — 87651 STREP A DNA AMP PROBE: CPT | Performed by: EMERGENCY MEDICINE

## 2022-10-17 PROCEDURE — 83605 ASSAY OF LACTIC ACID: CPT | Performed by: EMERGENCY MEDICINE

## 2022-10-17 RX ORDER — SODIUM CHLORIDE 9 MG/ML
INJECTION, SOLUTION INTRAVENOUS CONTINUOUS
Status: DISCONTINUED | OUTPATIENT
Start: 2022-10-17 | End: 2022-10-18 | Stop reason: HOSPADM

## 2022-10-17 RX ORDER — KETOROLAC TROMETHAMINE 15 MG/ML
15 INJECTION, SOLUTION INTRAMUSCULAR; INTRAVENOUS ONCE
Status: COMPLETED | OUTPATIENT
Start: 2022-10-17 | End: 2022-10-17

## 2022-10-17 RX ORDER — CEFTRIAXONE 2 G/1
2 INJECTION, POWDER, FOR SOLUTION INTRAMUSCULAR; INTRAVENOUS EVERY 24 HOURS
Status: DISCONTINUED | OUTPATIENT
Start: 2022-10-17 | End: 2022-10-18 | Stop reason: HOSPADM

## 2022-10-17 RX ADMIN — SODIUM CHLORIDE 1000 ML: 9 INJECTION, SOLUTION INTRAVENOUS at 21:37

## 2022-10-17 RX ADMIN — KETOROLAC TROMETHAMINE 15 MG: 15 INJECTION, SOLUTION INTRAMUSCULAR; INTRAVENOUS at 21:37

## 2022-10-17 RX ADMIN — SODIUM CHLORIDE: 9 INJECTION, SOLUTION INTRAVENOUS at 23:12

## 2022-10-17 RX ADMIN — CEFTRIAXONE SODIUM 2 G: 2 INJECTION, POWDER, FOR SOLUTION INTRAMUSCULAR; INTRAVENOUS at 22:27

## 2022-10-17 ASSESSMENT — ACTIVITIES OF DAILY LIVING (ADL)
ADLS_ACUITY_SCORE: 35
ADLS_ACUITY_SCORE: 35

## 2022-10-18 VITALS
DIASTOLIC BLOOD PRESSURE: 92 MMHG | SYSTOLIC BLOOD PRESSURE: 149 MMHG | TEMPERATURE: 98.6 F | RESPIRATION RATE: 20 BRPM | OXYGEN SATURATION: 94 % | WEIGHT: 196.8 LBS | BODY MASS INDEX: 34.86 KG/M2 | HEART RATE: 79 BPM

## 2022-10-18 LAB
GROUP A STREP BY PCR: NOT DETECTED
LACTATE SERPL-SCNC: 0.7 MMOL/L (ref 0.7–2)

## 2022-10-18 PROCEDURE — 83605 ASSAY OF LACTIC ACID: CPT | Performed by: EMERGENCY MEDICINE

## 2022-10-18 PROCEDURE — 250N000013 HC RX MED GY IP 250 OP 250 PS 637: Performed by: FAMILY MEDICINE

## 2022-10-18 PROCEDURE — 36415 COLL VENOUS BLD VENIPUNCTURE: CPT | Performed by: EMERGENCY MEDICINE

## 2022-10-18 RX ORDER — AZITHROMYCIN 250 MG/1
250 TABLET, FILM COATED ORAL DAILY
Qty: 4 TABLET | Refills: 0 | Status: SHIPPED | OUTPATIENT
Start: 2022-10-19 | End: 2022-10-23

## 2022-10-18 RX ORDER — POTASSIUM CHLORIDE 1.5 G/1.58G
40 POWDER, FOR SOLUTION ORAL ONCE
Status: COMPLETED | OUTPATIENT
Start: 2022-10-18 | End: 2022-10-18

## 2022-10-18 RX ORDER — AZITHROMYCIN 250 MG/1
500 TABLET, FILM COATED ORAL ONCE
Status: COMPLETED | OUTPATIENT
Start: 2022-10-18 | End: 2022-10-18

## 2022-10-18 RX ORDER — CEFDINIR 300 MG/1
300 CAPSULE ORAL 2 TIMES DAILY
Qty: 20 CAPSULE | Refills: 0 | Status: SHIPPED | OUTPATIENT
Start: 2022-10-18 | End: 2023-08-24

## 2022-10-18 RX ADMIN — AZITHROMYCIN MONOHYDRATE 500 MG: 250 TABLET ORAL at 01:50

## 2022-10-18 RX ADMIN — POTASSIUM CHLORIDE 40 MEQ: 1.5 FOR SOLUTION ORAL at 01:50

## 2022-10-18 ASSESSMENT — ACTIVITIES OF DAILY LIVING (ADL): ADLS_ACUITY_SCORE: 35

## 2022-10-18 NOTE — ED TRIAGE NOTES
Patient having sore throat and has pain through out her body. Tested at urgent care week ago for covid and was negative.

## 2022-10-18 NOTE — ED PROVIDER NOTES
I assumed patient's care at change of shift from Dr. Acevedo.  She presented with cough body aches headache sore throat ear pain nonproductive cough congestion a fever up to 100.  Symptoms started about 2-1/2 weeks ago.  She has been on prednisone and amoxicillin for sinusitis and was COVID-negative at that time.  Is been off of the prednisone for 5 days.  Just finished the amoxicillin.    Her lactic acid was up to 2.3 her white count was 21.8.  She was given 2 g of IV Rocephin to cover for possible respiratory source.  I was asked to follow-up on her chest x-ray and repeat lactic acid.  Blood cultures were sent.  Her heart rate was 103 initially.    Results for orders placed or performed during the hospital encounter of 10/17/22 (from the past 24 hour(s))   CBC with platelets differential    Narrative    The following orders were created for panel order CBC with platelets differential.  Procedure                               Abnormality         Status                     ---------                               -----------         ------                     CBC with platelets and d...[[id number]]  Abnormal            Final result                 Please view results for these tests on the individual orders.   Comprehensive metabolic panel   Result Value Ref Range    Sodium 139 133 - 144 mmol/L    Potassium 3.1 (L) 3.4 - 5.3 mmol/L    Chloride 103 94 - 109 mmol/L    Carbon Dioxide (CO2) 30 20 - 32 mmol/L    Anion Gap 6 3 - 14 mmol/L    Urea Nitrogen 11 7 - 30 mg/dL    Creatinine 0.53 0.52 - 1.04 mg/dL    Calcium 8.9 8.5 - 10.1 mg/dL    Glucose 156 (H) 70 - 99 mg/dL    Alkaline Phosphatase 129 40 - 150 U/L    AST 12 0 - 45 U/L    ALT 19 0 - 50 U/L    Protein Total 7.5 6.8 - 8.8 g/dL    Albumin 3.5 3.4 - 5.0 g/dL    Bilirubin Total 0.2 0.2 - 1.3 mg/dL    GFR Estimate >90 >60 mL/min/1.73m2   Lactic acid whole blood   Result Value Ref Range    Lactic Acid 2.3 (H) 0.7 - 2.0 mmol/L   CBC with platelets and differential    Result Value Ref Range    WBC Count 21.8 (H) 4.0 - 11.0 10e3/uL    RBC Count 4.75 3.80 - 5.20 10e6/uL    Hemoglobin 14.2 11.7 - 15.7 g/dL    Hematocrit 41.2 35.0 - 47.0 %    MCV 87 78 - 100 fL    MCH 29.9 26.5 - 33.0 pg    MCHC 34.5 31.5 - 36.5 g/dL    RDW 13.2 10.0 - 15.0 %    Platelet Count 301 150 - 450 10e3/uL    % Neutrophils 75 %    % Lymphocytes 19 %    % Monocytes 4 %    % Eosinophils 1 %    % Basophils 0 %    % Immature Granulocytes 1 %    NRBCs per 100 WBC 0 <1 /100    Absolute Neutrophils 16.4 (H) 1.6 - 8.3 10e3/uL    Absolute Lymphocytes 4.1 0.8 - 5.3 10e3/uL    Absolute Monocytes 1.0 0.0 - 1.3 10e3/uL    Absolute Eosinophils 0.1 0.0 - 0.7 10e3/uL    Absolute Basophils 0.1 0.0 - 0.2 10e3/uL    Absolute Immature Granulocytes 0.1 <=0.4 10e3/uL    Absolute NRBCs 0.0 10e3/uL   Symptomatic; Unknown Influenza A/B & SARS-CoV2 (COVID-19) Virus PCR Multiplex Nose    Specimen: Nose; Swab   Result Value Ref Range    Influenza A PCR Negative Negative    Influenza B PCR Negative Negative    RSV PCR Negative Negative    SARS CoV2 PCR Negative Negative    Narrative    Testing was performed using the Xpert Xpress CoV2/Flu/RSV Assay on the [employer] GeneXpert Instrument. This test should be ordered for the detection of SARS-CoV-2 and influenza viruses in individuals who meet clinical and/or epidemiological criteria. Test performance is unknown in asymptomatic patients. This test is for in vitro diagnostic use under the FDA EUA for laboratories certified under CLIA to perform high or moderate complexity testing. This test has not been FDA cleared or approved. A negative result does not rule out the presence of PCR inhibitors in the specimen or target RNA in concentration below the limit of detection for the assay. If only one viral target is positive but coinfection with multiple targets is suspected, the sample should be re-tested with another FDA cleared, approved, or authorized test, if coinfection would change  clinical management. This test was validated by the [facility]. These laboratories are certified under the Clinical Laboratory Improvement Amendments of 1988 (CLIA-88) as qualified to perform high complexity laboratory testing.   Streptococcus A Rapid Scr w Reflx to PCR    Specimen: Throat; Swab   Result Value Ref Range    Group A Strep antigen Negative Negative   XR Chest Port 1 View    Narrative    EXAM: XR CHEST PORT 1 VIEW  LOCATION: [facility]  DATE/TIME: 10/17/2022 9:54 PM    INDICATION: cough sob  COMPARISON: Chest radiograph 08/07/2020.      Impression    IMPRESSION: Negative chest.   Extra Tube ([place] Draw)    Narrative    The following orders were created for panel order Extra Tube ([place] Draw).  Procedure                               Abnormality         Status                     ---------                               -----------         ------                     Extra Urine Collection[[id number]]                           Final result                 Please view results for these tests on the individual orders.   Extra Urine Collection   Result Value Ref Range    Hold Specimen     Lactic acid whole blood   Result Value Ref Range    Lactic Acid 0.7 0.7 - 2.0 mmol/L          (J18.9) Pneumonia due to infectious organism, unspecified laterality, unspecified part of lung  Comment: possible  Plan: Omnicef and Zithromax to cover any bacterial component.  If it is viral, will need to run its course.    (J02.9) Pharyngitis, unspecified etiology  Comment:   Plan: Symptomatic treatment    (H92.03) Otalgia of both ears  Comment:   Plan: Likely referred from the throat    (D72.829) Leukocytosis, unspecified type  Comment:   Plan: As above    (E87.6) Hypokalemia  Comment: mild - 3.1  Plan: She was given a dose of K-Monique, 40 mEq orally in the ED.  Hypokalemia discharge instructions sent.  We discussed reportable signs and when to return.  Verbal and written  discharge instructions given.  She is comfortable with this plan.        Diogo Lee MD  10/18/22 0142

## 2022-10-18 NOTE — DISCHARGE INSTRUCTIONS
Take the Omnicef and Zithromax as directed.  This should cover any bacterial component causing your sore throat and cough.  Its possible you could have a pneumonia that is just not showing up on your x-ray.  If it is viral, it will need to run its course.  Your potassium was just a little bit low so we gave you a dose here in the ED.  Encourage fluids.  Tylenol/ibuprofen as needed for fever/pain.  Popsicles, ice chips, ice cream, over-the-counter throat lozenges/sprays, etc. can all be helpful.  Recheck in clinic if persistent problems.  Return to the ED if worse/concerns.  It was nice visiting with you tonight. I hope you feel better soon.    Thank you for choosing [facility]. We appreciate the opportunity to meet your urgent medical needs. Please let us know if we could have done anything to make your stay more satisfying.    After discharge, please closely monitor for any new or worsening symptoms. Return to the Emergency Department if you develop any acute worsening signs or symptoms.    If you had lab work, cultures or imaging studies done during your stay, the final results may still be pending. We will call you if your plan of care needs to change. However, if you are not improving as expected, please follow up with your primary care provider or clinic.     Start any prescription medications that were prescribed to you and take them as directed.     Please see additional handouts that may be pertinent to your condition.

## 2022-10-18 NOTE — ED PROVIDER NOTES
History     Chief Complaint   Patient presents with     Pain     HPI  Virginia Valenzuela is a 47 year old female who presents to the emergency department secondary to multiple symptoms including cough, body aches, headache, sore throat, ear pain, swollen lymph nodes, rib pain, cough is nonproductive, congestion, fever up to 100.  Symptoms started about 2-1/2 weeks ago.  She was seen in urgent care in [place] a week and a half ago and given prednisone for 5 days along with 10 days of amoxicillin.  She was diagnosed with sinusitis.  COVID swab was negative at the time.  She feels worse today than she did then.  She is not having abdominal pain or vomiting.  She feels dehydrated and weak.      Allergies:  Allergies   Allergen Reactions     Bees Swelling     FINGER TIPS TO ELBOWS     Codeine Other (See Comments)     Squeezing pain around trunk/abdomen       Problem List:    Patient Active Problem List    Diagnosis Date Noted     Chronic pelvic pain in female 09/15/2017     Priority: Medium     Trigger finger, left thumb 04/28/2017     Priority: Medium     Sesamoiditis 04/18/2017     Priority: Medium     Bilateral carpal tunnel syndrome 08/11/2016     Priority: Medium     Tobacco use disorder 08/17/2015     Priority: Medium     Ovarian cyst 01/17/2014     Priority: Medium     Problem list name updated by automated process. Provider to review       Female infertility 01/17/2014     Priority: Medium     Hypertension goal BP (blood pressure) < 140/90 11/07/2013     Priority: Medium     Papanicolaou smear of cervix with atypical squamous cells of undetermined significance (ASC-US) 06/27/2013     Priority: Medium     3/12/13 pap ASCUS HR HPV  8/5/13 colposcopy. ASCUS. Plan repeat pap in 6 months  7/6/15 pap NIL/neg HPV. Plan cotest in 3 yrs       GERD (gastroesophageal reflux disease) 06/03/2013     Priority: Medium     Vitamin D deficiency 06/03/2013     Priority: Medium     Problem list name updated by automated process.  Provider to review and confirm  [employer] Update utility       HAMIDA (obstructive sleep apnea) 2013     Priority: Medium     Generalized anxiety disorder 2013     Priority: Medium     Seasonal allergic rhinitis 2013     Priority: Medium        Past Medical History:    Past Medical History:   Diagnosis Date     Allergic rhinitis, cause unspecified      Arthritis      ASCUS with positive high risk HPV 3/12/13     Depressive disorder, not elsewhere classified      Dysfunction of eustachian tube      Encounter for therapeutic drug monitoring      Generalized anxiety disorder      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     History of appendectomy      Hypertension 2000     Idiopathic urticaria      Obstructive sleep apnea (adult) (pediatric)      Other chronic allergic conjunctivitis      Other malaise and fatigue      Pain in joint, shoulder region      S/P laparoscopic cholecystectomy      Special screening examination for human papillomavirus (HPV)      Sprain of tibiofibular (ligament), distal of ankle      Threatened , unspecified as to episode of care      Tobacco use disorder      Unspecified essential hypertension      Unspecified vitamin D deficiency        Past Surgical History:    Past Surgical History:   Procedure Laterality Date     ABDOMEN SURGERY  2013    ovaian cyst and right ovary removal     APPENDECTOMY       CHOLECYSTECTOMY       COLONOSCOPY N/A 10/26/2017    Procedure: COMBINED COLONOSCOPY, SINGLE OR MULTIPLE BIOPSY/POLYPECTOMY BY BIOPSY;  Colonoscopy with biopsies by biopsy;  Surgeon: Fred Faust DO;  Location:  GI     EYE SURGERY  1979     GENITOURINARY SURGERY  2013    Ovary, fallopian tube     GYN SURGERY  2013    Ovary, Fallopian tube     INJECT EPIDURAL LUMBAR Bilateral 3/15/2019    Procedure: Inject Facets Lumbar 4-5, Bilateral;  Surgeon: Colin Nolasco MD;  Location:  OR     INJECT FACET JOINT N/A 10/25/2018    Procedure: Injest Facet Joint Lumbar  4-5 Bilateral;  Surgeon: Colin Nolasco MD;  Location: [facility] OR     LAPAROSCOPIC LYSIS ADHESIONS Left 3/1/2016    Procedure: LAPAROSCOPIC LYSIS ADHESIONS;  Surgeon: Nate Mishra MD;  Location: [facility]     LAPAROSCOPIC LYSIS ADHESIONS N/A 7/13/2017    Procedure: LAPAROSCOPIC LYSIS ADHESIONS;   laparoscopic lysis of adhesions;  Surgeon: Nate Mishra MD;  Location: [facility]     LAPAROSCOPIC LYSIS ADHESIONS N/A 7/13/2017    Procedure: LAPAROSCOPIC LYSIS ADHESIONS;   laparoscopic lysis of adhesions;  Surgeon: Nate Milligan MD;  [facility]     LAPAROSCOPIC LYSIS ADHESIONS N/A 5/14/2018    Procedure: LAPAROSCOPIC LYSIS ADHESIONS;;  Surgeon: Nate Mishra MD;  Location[facility]     LAPAROSCOPIC SALPINGECTOMY Left 5/14/2018    Procedure: LAPAROSCOPIC SALPINGECTOMY;;  Surgeon: Nate Mishra MD;  Location: [facility]     LAPAROSCOPY DIAGNOSTIC (GYN) N/A 5/14/2018    Procedure: LAPAROSCOPY DIAGNOSTIC (GYN);  laparoscopy, left salpingectomy, left ovarian nodule removal and lysis of adhesions;  Surgeon: Nate Mishra MD;  Location: [facility]     RELEASE CARPAL TUNNEL Left 8/31/2016    Procedure: RELEASE CARPAL TUNNEL;  Surgeon: Gucci Hairston MD;  Location: [facility]     RELEASE CARPAL TUNNEL Right 12/7/2016    Procedure: RELEASE CARPAL TUNNEL;  Surgeon: Gucci Hairston MD;  Location: [facility]       Family History:    Family History   Problem Relation Age of Onset     Diabetes Father      Hypertension Father      Cardiovascular Mother 29        myocarditis      Other Cancer Brother         unknown origin       Social History:  Marital Status:   [2]  Social History     Tobacco Use     Smoking status: Every Day     Packs/day: 1.00     Years: 10.00     Pack years: 10.00     Types: Cigarettes     Smokeless tobacco: Never   Substance Use Topics     Alcohol use: No     Alcohol/week: 0.0 standard drinks     Drug use: No        Medications:    amLODIPine (NORVASC) 10 MG  tablet  azelastine (ASTELIN) 0.1 % nasal spray  Cholecalciferol (VITAMIN D3) 50 MCG (2000 UT) CAPS  cyclobenzaprine (FLEXERIL) 10 MG tablet  EPINEPHrine (EPIPEN) 0.3 MG/0.3ML injection  esomeprazole (NEXIUM) 20 MG DR capsule  estradiol (FEMPATCH) 0.025 MG/24HR weekly patch  hydrochlorothiazide (HYDRODIURIL) 25 MG tablet  HYDROcodone-acetaminophen (NORCO) 5-325 MG tablet  Lidocaine (LIDOCARE) 4 % Patch  losartan (COZAAR) 100 MG tablet  methylPREDNISolone (MEDROL DOSEPAK) 4 MG tablet therapy pack  potassium chloride ER (K-TAB/KLOR-CON) 10 MEQ CR tablet  predniSONE (DELTASONE) 20 MG tablet  Probiotic Product (PROBIOTIC DAILY PO)          Review of Systems   All other systems reviewed and are negative.      Physical Exam   BP: (!) 154/80  Pulse: 103  Temp: 98.6  F (37  C)  Resp: 20  Weight: 89.3 kg (196 lb 12.8 oz)  SpO2: 97 %      Physical Exam  Vitals and nursing note reviewed.   Constitutional:       General: She is not in acute distress.     Appearance: Normal appearance. She is well-developed and well-nourished. She is not diaphoretic.   HENT:      Head: Normocephalic and atraumatic.      Ears:      Comments: Bilateral tympanic membrane scarring without erythema or bulging tympanic membranes     Mouth/Throat:      Mouth: Mucous membranes are moist.   Eyes:      General: No scleral icterus.        Right eye: No discharge.         Left eye: No discharge.      Extraocular Movements: Extraocular movements intact.      Conjunctiva/sclera: Conjunctivae normal.      Pupils: Pupils are equal, round, and reactive to light.   Cardiovascular:      Rate and Rhythm: Tachycardia present.   Musculoskeletal:         General: Normal range of motion.      Cervical back: Normal range of motion and neck supple.      Right lower leg: No edema.      Left lower leg: No edema.   Skin:     General: Skin is warm and dry.      Coloration: Skin is not pale.      Findings: No erythema or rash.   Neurological:      General: No focal deficit  present.      Mental Status: She is alert and oriented to person, place, and time.   Psychiatric:         Mood and Affect: Mood normal.         ED Course                 Procedures      Lactate elevated, signs of sepsis.  Patient given one liter of ns, will give rocephin for possible pneumonia, empiric coverage.  Wbc count is elevated, could be due to infection vs recent steroids.          Results for orders placed or performed during the hospital encounter of 10/17/22 (from the past 24 hour(s))   CBC with platelets differential    Narrative    The following orders were created for panel order CBC with platelets differential.  Procedure                               Abnormality         Status                     ---------                               -----------         ------                     CBC with platelets and d...[[id number]]  Abnormal            Final result                 Please view results for these tests on the individual orders.   Comprehensive metabolic panel   Result Value Ref Range    Sodium 139 133 - 144 mmol/L    Potassium 3.1 (L) 3.4 - 5.3 mmol/L    Chloride 103 94 - 109 mmol/L    Carbon Dioxide (CO2)      Anion Gap      Urea Nitrogen      Creatinine      Calcium      Glucose      Alkaline Phosphatase      AST      ALT      Protein Total      Albumin      Bilirubin Total      GFR Estimate     Lactic acid whole blood   Result Value Ref Range    Lactic Acid 2.3 (H) 0.7 - 2.0 mmol/L   CBC with platelets and differential   Result Value Ref Range    WBC Count 21.8 (H) 4.0 - 11.0 10e3/uL    RBC Count 4.75 3.80 - 5.20 10e6/uL    Hemoglobin 14.2 11.7 - 15.7 g/dL    Hematocrit 41.2 35.0 - 47.0 %    MCV 87 78 - 100 fL    MCH 29.9 26.5 - 33.0 pg    MCHC 34.5 31.5 - 36.5 g/dL    RDW 13.2 10.0 - 15.0 %    Platelet Count 301 150 - 450 10e3/uL    % Neutrophils 75 %    % Lymphocytes 19 %    % Monocytes 4 %    % Eosinophils 1 %    % Basophils 0 %    % Immature Granulocytes 1 %    NRBCs per 100 WBC 0 <1 /100     Absolute Neutrophils 16.4 (H) 1.6 - 8.3 10e3/uL    Absolute Lymphocytes 4.1 0.8 - 5.3 10e3/uL    Absolute Monocytes 1.0 0.0 - 1.3 10e3/uL    Absolute Eosinophils 0.1 0.0 - 0.7 10e3/uL    Absolute Basophils 0.1 0.0 - 0.2 10e3/uL    Absolute Immature Granulocytes 0.1 <=0.4 10e3/uL    Absolute NRBCs 0.0 10e3/uL   XR Chest Port 1 View    Narrative    EXAM: XR CHEST PORT 1 VIEW  LOCATION: [facility]  DATE/TIME: 10/17/2022 9:54 PM    INDICATION: cough sob  COMPARISON: Chest radiograph 08/07/2020.      Impression    IMPRESSION: Negative chest.       Medications   0.9% sodium chloride BOLUS (1,000 mLs Intravenous New Bag 10/17/22 2137)     Followed by   sodium chloride 0.9% infusion (has no administration in time range)   cefTRIAXone (ROCEPHIN) 2 g vial to attach to  ml bag for ADULTS or NS 50 ml bag for PEDS (has no administration in time range)   ketorolac (TORADOL) injection 15 mg (15 mg Intravenous Given 10/17/22 2137)       Assessments & Plan (with Medical Decision Making)  Cough, body aches, fever, swollen lymph nodes  Already treated with amoxicillin and prednisone, not better.  Will get labs, iv toradol, ivf, covid swab strep, lactate.   Lactate mildly elevated, she is getting ivf and we will repeat lactate.  Given rocephin 2 g iv.  toradol for body aches. Blood cultures ordered.  cxr negative but still could be pneumonia.  Infectious repiratory illness favored given history and symptoms. Covid/influenza pending.  Wbc count elevated.  Could be due to infection vs recent prednisone. Vitals are stable with exception of mild elevation of heart rate.   Signed over to Dr. Lee at change of shift.      I have reviewed the nursing notes.    I have reviewed the findings, diagnosis, plan and need for follow up with the patient.      New Prescriptions    No medications on file       Final diagnoses:   None       10/17/2022   [facility] EMERGENCY DEPT     Dimas Acevedo  MD [place]  10/17/22 5117

## 2022-10-20 ENCOUNTER — TELEPHONE (OUTPATIENT)
Dept: EMERGENCY MEDICINE | Facility: CLINIC | Age: 47
End: 2022-10-20

## 2022-10-20 NOTE — TELEPHONE ENCOUNTER
"[facility] () Emergency Department Lab result notification     Patient/parent Name  Patient - Virginia    Reason for call  Patient requesting lab result    Lab Result  Blood work/cultures    Current symptoms  11:14 AM - patient reports cough is still pretty gross - is adding in probiotics -   Diarrhea:  A lot of diarrhea - started after initiated additional 2 antibiotics -  2nd day of antibiotics - yesterday 6 episodes - today x 1 - stools are liquid/loose she says \"kind of both\" - No blood in stool - stomach ache relieved by passing stool  Fever:  No  SOB/Worsening:  A little SOB -  wheezing 'not all the time' - she has an inhaler from [facility] - no worsening since emergency department - \"probably the same\" - she is speaking in full sentences and no wheezing is heard over the phone  Cough:  She says 'worsening' meaning her cough is becoming productive and she is coughing up sputum - no blood in mucous - light green in color  Hydration:  Says \"I've been really thirst so I've been drinking a lot of water\" - 24 oz cup drank 3-4 cups a day - last urinated 30 minutes ago - urine light yellow in color -    Mobile number phone is broke - for any positive results returning please call Home number - [phone]    Recommendations/Instructions  Advised of blood cultures in process and ED lab process of calling on any positive results - she is almost 3 days out from ED visit with symptoms unchanged on antibiotics and so is encouraged to contact her PCP clinic today to follow up - she was advised we do not want her to suffer through diarrhea during her entire course of antibiotic treatment and so again to please follow up - she was encouraged to continue with fluids, electrolyte replacement starch/bland diet, warm liquids, humidifier, steam showers - encouraged elimination of sputum if possible without straining - she encouraged and welcome to return at anytime and for any worsening including weakness, dehydration, " breathing difficulty, returning fevers, severe diarrhea - patient verbalized understanding and agrees with plan.    Contact your PCP clinic or return to the Emergency department if your:    Symptoms return.    Symptoms do not improved after 3 days on antibiotic.    Symptoms do not resolve after completing antibiotic.    Symptoms worsen or other concerning symptom's.    PCP follow-up Questions asked: YES       Zita Sanchez RN  [facility]  Emergency Dept Lab Result RN  Ph# [phone]

## 2022-10-23 LAB
BACTERIA BLD CULT: NO GROWTH
BACTERIA BLD CULT: NO GROWTH

## 2022-11-21 ENCOUNTER — HOSPITAL ENCOUNTER (EMERGENCY)
Facility: CLINIC | Age: 47
Discharge: HOME | End: 2022-11-21
Attending: EMERGENCY MEDICINE | Admitting: EMERGENCY MEDICINE
Payer: COMMERCIAL

## 2022-11-21 ENCOUNTER — APPOINTMENT (OUTPATIENT)
Dept: GENERAL RADIOLOGY | Facility: CLINIC | Age: 47
End: 2022-11-21
Attending: EMERGENCY MEDICINE
Payer: COMMERCIAL

## 2022-11-21 VITALS
HEART RATE: 90 BPM | OXYGEN SATURATION: 96 % | WEIGHT: 190 LBS | BODY MASS INDEX: 33.66 KG/M2 | TEMPERATURE: 99.1 F | RESPIRATION RATE: 20 BRPM

## 2022-11-21 DIAGNOSIS — J10.1 INFLUENZA A: ICD-10-CM

## 2022-11-21 LAB
FLUAV RNA SPEC QL NAA+PROBE: POSITIVE
FLUBV RNA RESP QL NAA+PROBE: NEGATIVE
RSV RNA SPEC NAA+PROBE: NEGATIVE
SARS-COV-2 RNA RESP QL NAA+PROBE: NEGATIVE

## 2022-11-21 PROCEDURE — C9803 HOPD COVID-19 SPEC COLLECT: HCPCS

## 2022-11-21 PROCEDURE — 71045 X-RAY EXAM CHEST 1 VIEW: CPT

## 2022-11-21 PROCEDURE — 87637 SARSCOV2&INF A&B&RSV AMP PRB: CPT | Performed by: EMERGENCY MEDICINE

## 2022-11-21 PROCEDURE — 99282 EMERGENCY DEPT VISIT SF MDM: CPT | Mod: CS | Performed by: EMERGENCY MEDICINE

## 2022-11-21 PROCEDURE — 99284 EMERGENCY DEPT VISIT MOD MDM: CPT | Mod: 25

## 2022-11-21 ASSESSMENT — ACTIVITIES OF DAILY LIVING (ADL): ADLS_ACUITY_SCORE: 35

## 2022-11-21 NOTE — LETTER
November 21, 2022      To Whom It May Concern:      Virginia Valenzuela was seen in our Emergency Department today, 11/21/22.  I expect her condition to improve over the next 4-5 days.  She may return to work when improved.    Sincerely,        Benjie Samayoa MD

## 2022-11-22 NOTE — DISCHARGE INSTRUCTIONS
You are positive for influenza A.  I suspect this is the cause your cough.  COVID and RSV testing was negative.  Try to avoid tobacco as this may make your cough worse.  Your chest x-ray was clear.  We do not have a lot of treatments for influenza A.  Tamiflu has a lot of side effects and very little benefit so I would not recommend it to my family or myself.  Get plenty of rest and drink fluids.  You can take Tylenol ibuprofen for fever or body aches.

## 2022-11-22 NOTE — ED PROVIDER NOTES
"  History     Chief Complaint   Patient presents with     Allergic Reaction     Cough     HPI  Virginia Valenzuela is a 47 year old female who presents with concerns for possible allergic reaction to \"pine\" wood that she is exposed to at work but also noted that she had a refill at her home.  Patient denies any facial or oral swelling.  No difficulty speech or swallowing.  Denies runny nose.  She does not have chest pain.  She does have mild shortness of breath but attributes that to her recent diagnosis of pneumonia that she was treated with home antibiotics.  She denies previous similar issues.  Does admit that her son is recently diagnosed with influenza A.  Her only complaint right now is a cough.  Denies chest pain.  She does not feel short of breath per se but states her lungs never got back to baseline from her previous infection.  When asked if she had a lobar pneumonia she said no her x-ray was clear.  They did however treat her with Omnicef and Zithromax.  Daily smoker.  Denies any stomach issues such as vomiting or diarrhea.  No abdominal pain.  No leg pain or swelling.    Allergies:  Allergies   Allergen Reactions     Bees Swelling     FINGER TIPS TO ELBOWS     Codeine Other (See Comments)     Squeezing pain around trunk/abdomen     Pine        Problem List:    Patient Active Problem List    Diagnosis Date Noted     Chronic pelvic pain in female 09/15/2017     Priority: Medium     Trigger finger, left thumb 04/28/2017     Priority: Medium     Sesamoiditis 04/18/2017     Priority: Medium     Bilateral carpal tunnel syndrome 08/11/2016     Priority: Medium     Tobacco use disorder 08/17/2015     Priority: Medium     Ovarian cyst 01/17/2014     Priority: Medium     Problem list name updated by automated process. Provider to review       Female infertility 01/17/2014     Priority: Medium     Hypertension goal BP (blood pressure) < 140/90 11/07/2013     Priority: Medium     Papanicolaou smear of cervix with " atypical squamous cells of undetermined significance (ASC-US) 2013     Priority: Medium     3/12/13 pap ASCUS HR HPV  13 colposcopy. ASCUS. Plan repeat pap in 6 months  7/6/15 pap NIL/neg HPV. Plan cotest in 3 yrs       GERD (gastroesophageal reflux disease) 2013     Priority: Medium     Vitamin D deficiency 2013     Priority: Medium     Problem list name updated by automated process. Provider to review and confirm  [employer] Update utility       HAMIDA (obstructive sleep apnea) 2013     Priority: Medium     Generalized anxiety disorder 2013     Priority: Medium     Seasonal allergic rhinitis 2013     Priority: Medium        Past Medical History:    Past Medical History:   Diagnosis Date     Allergic rhinitis, cause unspecified      Arthritis      ASCUS with positive high risk HPV 3/12/13     Depressive disorder, not elsewhere classified      Dysfunction of eustachian tube      Encounter for therapeutic drug monitoring      Generalized anxiety disorder      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     History of appendectomy      Hypertension 2000     Idiopathic urticaria      Obstructive sleep apnea (adult) (pediatric)      Other chronic allergic conjunctivitis      Other malaise and fatigue      Pain in joint, shoulder region      S/P laparoscopic cholecystectomy      Special screening examination for human papillomavirus (HPV)      Sprain of tibiofibular (ligament), distal of ankle      Threatened , unspecified as to episode of care      Tobacco use disorder      Unspecified essential hypertension      Unspecified vitamin D deficiency        Past Surgical History:    Past Surgical History:   Procedure Laterality Date     ABDOMEN SURGERY  2013    ovaian cyst and right ovary removal     APPENDECTOMY       CHOLECYSTECTOMY       COLONOSCOPY N/A 10/26/2017    Procedure: COMBINED COLONOSCOPY, SINGLE OR MULTIPLE BIOPSY/POLYPECTOMY BY BIOPSY;  Colonoscopy with biopsies by biopsy;   Surgeon: Fred Faust DO;  Location: [facility]     EYE SURGERY  1979     GENITOURINARY SURGERY  July 2013    Ovary, fallopian tube     GYN SURGERY  July 2013    Ovary, Fallopian tube     INJECT EPIDURAL LUMBAR Bilateral 3/15/2019    Procedure: Inject Facets Lumbar 4-5, Bilateral;  Surgeon: Colin Nolasco MD;  Location: [facility]     INJECT FACET JOINT N/A 10/25/2018    Procedure: Injest Facet Joint Lumbar 4-5 Bilateral;  Surgeon: Colin Nolasco MD;  Location: [facility]     LAPAROSCOPIC LYSIS ADHESIONS Left 3/1/2016    Procedure: LAPAROSCOPIC LYSIS ADHESIONS;  Surgeon: Nate Mishra MD;  Location: [facility]     LAPAROSCOPIC LYSIS ADHESIONS N/A 7/13/2017    Procedure: LAPAROSCOPIC LYSIS ADHESIONS;   laparoscopic lysis of adhesions;  Surgeon: Nate Mishra MD;  Location: [facility]     LAPAROSCOPIC LYSIS ADHESIONS N/A 7/13/2017    Procedure: LAPAROSCOPIC LYSIS ADHESIONS;   laparoscopic lysis of adhesions;  Surgeon: Nate Milligan MD;  Location: [facility]     LAPAROSCOPIC LYSIS ADHESIONS N/A 5/14/2018    Procedure: LAPAROSCOPIC LYSIS ADHESIONS;;  Surgeon: Nate Mishra MD;  Location: [facility]     LAPAROSCOPIC SALPINGECTOMY Left 5/14/2018    Procedure: LAPAROSCOPIC SALPINGECTOMY;;  Surgeon: Nate Mishra MD;  Location: [facility]     LAPAROSCOPY DIAGNOSTIC (GYN) N/A 5/14/2018    Procedure: LAPAROSCOPY DIAGNOSTIC (GYN);  laparoscopy, left salpingectomy, left ovarian nodule removal and lysis of adhesions;  Surgeon: Nate Mishra MD;  Location: [facility]     RELEASE CARPAL TUNNEL Left 8/31/2016    Procedure: RELEASE CARPAL TUNNEL;  Surgeon: Gucci Hairston MD;  Location: [facility]     RELEASE CARPAL TUNNEL Right 12/7/2016    Procedure: RELEASE CARPAL TUNNEL;  Surgeon: Gucci Hairston MD;  Location: [facility]       Family History:    Family History   Problem Relation Age of Onset     Diabetes Father      Hypertension Father      Cardiovascular Mother 29        myocarditis       Other Cancer Brother         unknown origin       Social History:  Marital Status:   [2]  Social History     Tobacco Use     Smoking status: Every Day     Packs/day: 1.00     Years: 10.00     Pack years: 10.00     Types: Cigarettes     Smokeless tobacco: Never   Substance Use Topics     Alcohol use: No     Alcohol/week: 0.0 standard drinks     Drug use: No        Medications:    amLODIPine (NORVASC) 10 MG tablet  azelastine (ASTELIN) 0.1 % nasal spray  cefdinir (OMNICEF) 300 MG capsule  Cholecalciferol (VITAMIN D3) 50 MCG (2000 UT) CAPS  cyclobenzaprine (FLEXERIL) 10 MG tablet  EPINEPHrine (EPIPEN) 0.3 MG/0.3ML injection  estradiol (FEMPATCH) 0.025 MG/24HR weekly patch  hydrochlorothiazide (HYDRODIURIL) 25 MG tablet  Lidocaine (LIDOCARE) 4 % Patch  losartan (COZAAR) 100 MG tablet  methylPREDNISolone (MEDROL DOSEPAK) 4 MG tablet therapy pack  predniSONE (DELTASONE) 20 MG tablet  Probiotic Product (PROBIOTIC DAILY PO)          Review of Systems all other systems are reviewed and are negative.    Physical Exam   Pulse: 90  Temp: 99.1  F (37.3  C)  Resp: 20  Weight: 86.2 kg (190 lb)  SpO2: 96 %      Physical Exam alert cooperative female who does not look toxic or ill.  HEENT shows some mild nasal mucosal swelling.  No oral lesions.  Speech is clear.  Neck is supple.  Lungs reveal no adventitious wheezing, rales or rhonchi.  Cardiac auscultation is normal.    ED Course                 Procedures              Critical Care time:  none               Results for orders placed or performed during the hospital encounter of 11/21/22 (from the past 24 hour(s))   Symptomatic; Yes; 11/21/2022 Influenza A/B & SARS-CoV2 (COVID-19) Virus PCR Multiplex Nose    Specimen: Nose; Swab   Result Value Ref Range    Influenza A PCR Positive (A) Negative    Influenza B PCR Negative Negative    RSV PCR Negative Negative    SARS CoV2 PCR Negative Negative    Narrative    Testing was performed using the Xpert Xpress CoV2/Flu/RSV Assay on  "the [employer] GeneXpert Instrument. This test should be ordered for the detection of SARS-CoV-2 and influenza viruses in individuals who meet clinical and/or epidemiological criteria. Test performance is unknown in asymptomatic patients. This test is for in vitro diagnostic use under the FDA EUA for laboratories certified under CLIA to perform high or moderate complexity testing. This test has not been FDA cleared or approved. A negative result does not rule out the presence of PCR inhibitors in the specimen or target RNA in concentration below the limit of detection for the assay. If only one viral target is positive but coinfection with multiple targets is suspected, the sample should be re-tested with another FDA cleared, approved, or authorized test, if coinfection would change clinical management. This test was validated by the [facility]. These laboratories are certified under the Clinical Laboratory Improvement Amendments of 1988 (CLIA-88) as qualified to perform high complexity laboratory testing.   XR Chest Port 1 View    Narrative    EXAM: XR CHEST PORT 1 VIEW  LOCATION: [facility]  DATE/TIME: 11/21/2022 8:52 PM    INDICATION: Cough with recent diagnosis of pneumonia.  COMPARISON: 10/17/2022      Impression    IMPRESSION: Negative chest.       Medications - No data to display  Influenza A was positive.  Negative for RSV and COVID.  Review of her previous records do show she had a clear x-ray of her lungs.  She had leukocytosis and cough so was treated with above antibiotics.  Assessments & Plan (with Medical Decision Making)   Virginia Valenzuela is a 47 year old female who presents with concerns for possible allergic reaction to \"pine\" wood that she is exposed to at work but also noted that she had a refill at her home.  Patient denies any facial or oral swelling.  No difficulty speech or swallowing.  Denies runny nose.  She does not have chest pain.  She " "does have mild shortness of breath but attributes that to her recent diagnosis of pneumonia that she was treated with home antibiotics.  She denies previous similar issues.  Does admit that her son is recently diagnosed with influenza A.  Her only complaint right now is a cough.  Denies chest pain.  She does not feel short of breath per se but states her lungs never got back to baseline from her previous infection.  When asked if she had a lobar pneumonia she said no her x-ray was clear.  They did however treat her with Omnicef and Zithromax.  Daily smoker.  Denies any stomach issues such as vomiting or diarrhea.  No abdominal pain.  No leg pain or swelling.  On presentation patient is afebrile and vitally stable patient not hypoxic.  On exam she has some nasal mucosal swelling.  No oral lesions.  Neck was supple.  Lungs were clear.  She is positive for influenza A.  COVID and RSV were negative.  Due to her ongoing issues with \"shortness of breath\" repeat x-ray was obtained and showed.  Discussed treatment for influenza including Tamiflu.  After risk and benefit discussed patient defers not to proceed with this.  Symptomatic treatment otherwise as discussed.  Information on influenza is provided.  Advised to quit smoking.  I have reviewed the nursing notes.    I have reviewed the findings, diagnosis, plan and need for follow up with the patient.       New Prescriptions    No medications on file       Final diagnoses:   Influenza A       11/21/2022   [facility] EMERGENCY DEPT     Benjie Samayoa MD  11/21/22 [id number]    "

## 2022-11-22 NOTE — ED TRIAGE NOTES
Patient reports she thinks she's having an allergic reaction to pine that was at work, states she also had a wreath in her house before she realized. Cough, SOB.      Triage Assessment     Row Name 11/21/22 1915       Triage Assessment (Adult)    Airway WDL WDL       Respiratory WDL    Respiratory WDL cough;X    Cough Frequency frequent

## 2022-12-29 ENCOUNTER — HOSPITAL ENCOUNTER (OUTPATIENT)
Dept: MAMMOGRAPHY | Facility: CLINIC | Age: 47
Discharge: HOME | End: 2022-12-29
Attending: PHYSICIAN ASSISTANT | Admitting: PHYSICIAN ASSISTANT
Payer: COMMERCIAL

## 2022-12-29 DIAGNOSIS — Z12.31 VISIT FOR SCREENING MAMMOGRAM: ICD-10-CM

## 2022-12-29 PROCEDURE — 77067 SCR MAMMO BI INCL CAD: CPT

## 2023-01-23 ENCOUNTER — HOSPITAL ENCOUNTER (EMERGENCY)
Facility: CLINIC | Age: 48
Discharge: HOME | End: 2023-01-23
Attending: EMERGENCY MEDICINE | Admitting: EMERGENCY MEDICINE
Payer: OTHER MISCELLANEOUS

## 2023-01-23 ENCOUNTER — APPOINTMENT (OUTPATIENT)
Dept: GENERAL RADIOLOGY | Facility: CLINIC | Age: 48
End: 2023-01-23
Attending: FAMILY MEDICINE
Payer: OTHER MISCELLANEOUS

## 2023-01-23 ENCOUNTER — APPOINTMENT (OUTPATIENT)
Dept: CT IMAGING | Facility: CLINIC | Age: 48
End: 2023-01-23
Attending: EMERGENCY MEDICINE
Payer: OTHER MISCELLANEOUS

## 2023-01-23 VITALS
TEMPERATURE: 98.4 F | HEART RATE: 91 BPM | SYSTOLIC BLOOD PRESSURE: 122 MMHG | BODY MASS INDEX: 33.66 KG/M2 | WEIGHT: 190 LBS | RESPIRATION RATE: 16 BRPM | OXYGEN SATURATION: 99 % | DIASTOLIC BLOOD PRESSURE: 79 MMHG
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
## 2023-01-23 DIAGNOSIS — S92.254A CLOSED NONDISPLACED FRACTURE OF NAVICULAR BONE OF RIGHT FOOT, INITIAL ENCOUNTER: ICD-10-CM
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
## 2023-01-23 PROCEDURE — 28430 CLTX TALUS FRACTURE W/O MNPJ: CPT | Mod: 54 | Performed by: EMERGENCY MEDICINE

## 2023-01-23 PROCEDURE — 99284 EMERGENCY DEPT VISIT MOD MDM: CPT | Mod: 25 | Performed by: EMERGENCY MEDICINE

## 2023-01-23 PROCEDURE — 28430 CLTX TALUS FRACTURE W/O MNPJ: CPT | Mod: RT | Performed by: EMERGENCY MEDICINE

## 2023-01-23 PROCEDURE — 73700 CT LOWER EXTREMITY W/O DYE: CPT | Mod: RT

## 2023-01-23 PROCEDURE — 73630 X-RAY EXAM OF FOOT: CPT | Mod: RT

## 2023-01-23 PROCEDURE — 250N000013 HC RX MED GY IP 250 OP 250 PS 637: Performed by: EMERGENCY MEDICINE

## 2023-01-23 RX ORDER — NAPROXEN SODIUM 550 MG/1
550 TABLET ORAL 2 TIMES DAILY PRN
COMMUNITY
Start: 2023-01-18 | End: 2023-10-30

## 2023-01-23 RX ORDER — ALBUTEROL SULFATE 90 UG/1
2 AEROSOL, METERED RESPIRATORY (INHALATION) PRN
COMMUNITY
Start: 2022-10-10 | End: 2023-08-24

## 2023-01-23 RX ORDER — OXYCODONE AND ACETAMINOPHEN 5; 325 MG/1; MG/1
1 TABLET ORAL EVERY 6 HOURS PRN
Qty: 6 TABLET | Refills: 0 | Status: SHIPPED | OUTPATIENT
Start: 2023-01-23 | End: 2023-01-26

## 2023-01-23 RX ORDER — OXYCODONE AND ACETAMINOPHEN 5; 325 MG/1; MG/1
2 TABLET ORAL ONCE
Status: COMPLETED | OUTPATIENT
Start: 2023-01-23 | End: 2023-01-23

## 2023-01-23 RX ADMIN — OXYCODONE HYDROCHLORIDE AND ACETAMINOPHEN 2 TABLET: 5; 325 TABLET ORAL at 17:48

## 2023-01-23 ASSESSMENT — ACTIVITIES OF DAILY LIVING (ADL)
ADLS_ACUITY_SCORE: 35
ADLS_ACUITY_SCORE: 33

## 2023-01-23 NOTE — ED TRIAGE NOTES
Pt here after missing the last step inside at work.  Pain to right foot.         Triage Assessment     Row Name 01/23/23 7212       Triage Assessment (Adult)    Airway WDL WDL       Respiratory WDL    Respiratory WDL WDL

## 2023-01-24 NOTE — DISCHARGE INSTRUCTIONS
Rest, ice, elevate foot to decrease pain and inflammation.    Use the walking boot for comfort and protection.  Okay to take off at night.  Follow-up with podiatry.  Referral sent.    Ibuprofen or Aleve to decrease pain and inflammation.  Okay to add Tylenol if needed.  Percocet if needed for severe pain.  No driving while on this medication.  It can cause constipation so take stool softeners if needed.    Return for significant worsening, changes or concerns.    I hope that you heal quickly!!!

## 2023-01-24 NOTE — ED PROVIDER NOTES
History     Chief Complaint   Patient presents with     Foot Pain     HPI   History per patient, medical records    This is a 47-year-old female, history as below, presenting with foot pain.  Patient accidentally missed a step while at work and slipped, fell and injured her right foot.  She noted pain over the top of the right foot, some into the bottom of the foot.  This occurred at about noon.  No history of injury to this foot or ankle in the past.  No numbness or tingling.  She was wearing tennis shoes at the time.  No other injuries or complaints.    Allergies:  Allergies   Allergen Reactions     Bees Swelling     FINGER TIPS TO ELBOWS     Codeine Other (See Comments)     Squeezing pain around trunk/abdomen     Pine        Problem List:    Patient Active Problem List    Diagnosis Date Noted     Chronic pelvic pain in female 09/15/2017     Priority: Medium     Trigger finger, left thumb 04/28/2017     Priority: Medium     Sesamoiditis 04/18/2017     Priority: Medium     Bilateral carpal tunnel syndrome 08/11/2016     Priority: Medium     Tobacco use disorder 08/17/2015     Priority: Medium     Ovarian cyst 01/17/2014     Priority: Medium     Problem list name updated by automated process. Provider to review       Female infertility 01/17/2014     Priority: Medium     Hypertension goal BP (blood pressure) < 140/90 11/07/2013     Priority: Medium     Papanicolaou smear of cervix with atypical squamous cells of undetermined significance (ASC-US) 06/27/2013     Priority: Medium     3/12/13 pap ASCUS HR HPV  8/5/13 colposcopy. ASCUS. Plan repeat pap in 6 months  7/6/15 pap NIL/neg HPV. Plan cotest in 3 yrs       GERD (gastroesophageal reflux disease) 06/03/2013     Priority: Medium     Vitamin D deficiency 06/03/2013     Priority: Medium     Problem list name updated by automated process. Provider to review and confirm  [employer] Update utility       HAMIDA (obstructive sleep apnea) 05/29/2013     Priority: Medium      Generalized anxiety disorder 2013     Priority: Medium     Seasonal allergic rhinitis 2013     Priority: Medium        Past Medical History:    Past Medical History:   Diagnosis Date     Allergic rhinitis, cause unspecified      Arthritis      ASCUS with positive high risk HPV 3/12/13     Depressive disorder, not elsewhere classified      Dysfunction of eustachian tube      Encounter for therapeutic drug monitoring      Generalized anxiety disorder      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     History of appendectomy      Hypertension 2000     Idiopathic urticaria      Obstructive sleep apnea (adult) (pediatric)      Other chronic allergic conjunctivitis      Other malaise and fatigue      Pain in joint, shoulder region      S/P laparoscopic cholecystectomy      Special screening examination for human papillomavirus (HPV)      Sprain of tibiofibular (ligament), distal of ankle      Threatened , unspecified as to episode of care      Tobacco use disorder      Unspecified essential hypertension      Unspecified vitamin D deficiency        Past Surgical History:    Past Surgical History:   Procedure Laterality Date     ABDOMEN SURGERY  2013    ovaian cyst and right ovary removal     APPENDECTOMY       CHOLECYSTECTOMY       COLONOSCOPY N/A 10/26/2017    Procedure: COMBINED COLONOSCOPY, SINGLE OR MULTIPLE BIOPSY/POLYPECTOMY BY BIOPSY;  Colonoscopy with biopsies by biopsy;  Surgeon: Fred Faust DO;  Location:  GI     EYE SURGERY  1979     GENITOURINARY SURGERY  2013    Ovary, fallopian tube     GYN SURGERY  2013    Ovary, Fallopian tube     INJECT EPIDURAL LUMBAR Bilateral 3/15/2019    Procedure: Inject Facets Lumbar 4-5, Bilateral;  Surgeon: Colin Nolasco MD;  [facility]     INJECT FACET JOINT N/A 10/25/2018    Procedure: Injest Facet Joint Lumbar 4-5 Bilateral;  Surgeon: Colin Nolasco MD;  [facility]     LAPAROSCOPIC LYSIS ADHESIONS Left 3/1/2016    Procedure:  LAPAROSCOPIC LYSIS ADHESIONS;  Surgeon: Nate Mishra MD;  [facility]     LAPAROSCOPIC LYSIS ADHESIONS N/A 7/13/2017    Procedure: LAPAROSCOPIC LYSIS ADHESIONS;   laparoscopic lysis of adhesions;  Surgeon: Nate Mishra MD;  [facility]     LAPAROSCOPIC LYSIS ADHESIONS N/A 7/13/2017    Procedure: LAPAROSCOPIC LYSIS ADHESIONS;   laparoscopic lysis of adhesions;  Surgeon: Nate Milligan MD;  [facility]     LAPAROSCOPIC LYSIS ADHESIONS N/A 5/14/2018    Procedure: LAPAROSCOPIC LYSIS ADHESIONS;;  Surgeon: Nate Mishra MD;  [facility]     LAPAROSCOPIC SALPINGECTOMY Left 5/14/2018    Procedure: LAPAROSCOPIC SALPINGECTOMY;;  Surgeon: Nate Mishra MD;  [facility]     LAPAROSCOPY DIAGNOSTIC (GYN) N/A 5/14/2018    Procedure: LAPAROSCOPY DIAGNOSTIC (GYN);  laparoscopy, left salpingectomy, left ovarian nodule removal and lysis of adhesions;  Surgeon: Nate iMshra MD;  [facility]     RELEASE CARPAL TUNNEL Left 8/31/2016    Procedure: RELEASE CARPAL TUNNEL;  Surgeon: Gucci Hairston MD;  [facility]     RELEASE CARPAL TUNNEL Right 12/7/2016    Procedure: RELEASE CARPAL TUNNEL;  Surgeon: Gucci Hairston MD;  [facility]       Family History:    Family History   Problem Relation Age of Onset     Diabetes Father      Hypertension Father      Cardiovascular Mother 29        myocarditis      Other Cancer Brother         unknown origin       Social History:  Marital Status:   [2]  Social History     Tobacco Use     Smoking status: Every Day     Packs/day: 1.00     Years: 10.00     Pack years: 10.00     Types: Cigarettes     Smokeless tobacco: Never   Substance Use Topics     Alcohol use: No     Alcohol/week: 0.0 standard drinks     Drug use: No        Medications:    amLODIPine (NORVASC) 10 MG tablet  azelastine (ASTELIN) 0.1 % nasal spray  Cholecalciferol (VITAMIN D3) 50 MCG (2000 UT) CAPS  cyclobenzaprine  (FLEXERIL) 10 MG tablet  estradiol (FEMPATCH) 0.025 MG/24HR weekly patch  hydrochlorothiazide (HYDRODIURIL) 25 MG tablet  Lidocaine (LIDOCARE) 4 % Patch  losartan (COZAAR) 100 MG tablet  naproxen sodium (ANAPROX) 550 MG tablet  oxyCODONE-acetaminophen (PERCOCET) 5-325 MG tablet  Probiotic Product (PROBIOTIC DAILY PO)  VENTOLIN  (90 Base) MCG/ACT inhaler  cefdinir (OMNICEF) 300 MG capsule  EPINEPHrine (EPIPEN) 0.3 MG/0.3ML injection  methylPREDNISolone (MEDROL DOSEPAK) 4 MG tablet therapy pack  predniSONE (DELTASONE) 20 MG tablet          Review of Systems   All other ROS reviewed and are negative or non-contributory except as stated in HPI.     Physical Exam   BP: 122/79  Pulse: 91  Temp: 98.4  F (36.9  C)  Resp: 16  Weight: 86.2 kg (190 lb)  SpO2: 99 %      Physical Exam  Vitals and nursing note reviewed.   Constitutional:       Appearance: Normal appearance.      Comments: Very pleasant, healthy-appearing female sitting in the bed   HENT:      Head: Normocephalic.   Eyes:      Extraocular Movements: Extraocular movements intact.   Cardiovascular:      Rate and Rhythm: Normal rate and regular rhythm.      Pulses: Normal pulses.   Pulmonary:      Effort: Pulmonary effort is normal.   Musculoskeletal:      Cervical back: Normal range of motion.        Feet:    Skin:     General: Skin is warm and dry.   Neurological:      General: No focal deficit present.      Mental Status: She is alert.   Psychiatric:         Mood and Affect: Mood normal.         Behavior: Behavior normal.         ED Course (with Medical Decision Making)    Pt seen and examined by me.  RN and EPIC notes reviewed.       Patient with slip and fall on some stairs.  She injured her right foot.  There is swelling and tenderness on exam as noted.  X-ray done via triage.    X-ray shows concern for possible fracture of the navicular bone.    X-ray was reviewed by podiatry, Dr. Longoria.  He recommends CT scan of the foot.    CT scan was done.  There  appears to be some old injury although patient does not remember an old injury.  Possible small superimposed nondisplaced acute fracture through the bone fragment.    Results discussed with the patient.   Podiatry recommended patient be placed in a fracture boot.  Okay to weight-bear based on the findings.  Rest, ice, elevate.  Follow-up with podiatry for recheck in about a week or so.  Recommend using Tylenol for pain, ibuprofen or Aleve should help with inflammation.   Patient was given a small amount oxycodone to use if needed for severe pain.  Return for significant worsening, changes or concerns.   Procedures    Results for orders placed or performed during the hospital encounter of 01/23/23 (from the past 24 hour(s))   XR Foot Right G/E 3 Views    Narrative    EXAM: XR FOOT RIGHT G/E 3 VIEWS  LOCATION: [facility]  DATE/TIME: 1/23/2023 4:30 PM    INDICATION: Injury, pain  COMPARISON: None.      Impression    IMPRESSION: Normal joint spaces and alignment. There is chronic bony hypertrophy of the dorsal aspect of the navicular bone adjacent to the talonavicular joint with a superimposed linear linear lucency which could represent a nondisplaced fracture or   summation artifact.. Adjacent soft tissue swelling.   CT Foot Right w/o Contrast    Narrative    EXAM: CT FOOT RIGHT W/O CONTRAST  LOCATION: [facility]  DATE/TIME: 1/23/2023 6:41 PM    INDICATION: Fall, subtle possible navicular fracture on x-ray.  Please evaluate for fracture.  COMPARISON: Same day radiograph.  TECHNIQUE: Noncontrast. Axial, sagittal and coronal thin-section reconstruction. Dose reduction techniques were used.     FINDINGS:     BONES:  -There is evidence of prior injury to the dorsal talonavicular joint where there is a well-corticated bone fragment which shows incomplete bony bridging. There may be a small superimposed acute fracture through the bone fragment as seen on axial  image   25, nondisplaced. There is adjacent soft tissue versus capsular thickening in this area.    Bony proliferative change anterior process of the calcaneus with adjacent bone formation of the cuboid which does appear to be chronic.    Joint spaces are maintained. Mild bony proliferative change anterolateral tibial plafond at the tibiotalar joint. Calcaneal heel spur.    SOFT TISSUES:  -There is no large soft tissue hematoma. Muscle bulk is maintained.      Impression    IMPRESSION:  1.  Evidence of prior injury to the dorsal talonavicular joint where there is a well-corticated bone fragment which shows incomplete bony bridging. There may be a small superimposed nondisplaced acute fracture through the bone fragment as described in   the appropriate clinical setting. Correlation with point tenderness is recommended.  2.  Bony proliferative change anterior process of the calcaneus with adjacent bone formation of the cuboid does is related to a chronic injury.  3.  No significant joint space narrowing.         Medications   oxyCODONE-acetaminophen (PERCOCET) 5-325 MG per tablet 2 tablet (2 tablets Oral Given 1/23/23 8948)       Assessments & Plan     I have reviewed the findings, diagnosis, plan and need for follow up with the patient.  Discharge Medication List as of 1/23/2023  7:15 PM      START taking these medications    Details   oxyCODONE-acetaminophen (PERCOCET) 5-325 MG tablet Take 1 tablet by mouth every 6 hours as needed for pain, Disp-6 tablet, R-0, E-Prescribe             Final diagnoses:   Closed nondisplaced fracture of navicular bone of right foot, initial encounter   Fall (on) (from) unspecified stairs and steps, initial encounter   Work related injury     Disposition: Patient discharged home in stable condition.  Plan as above.  Return for concerns.     Note: Chart documentation done in part with Dragon Voice Recognition software. Although reviewed after completion, some word and grammatical errors  may remain.     1/23/2023   [facility] EMERGENCY DEPT     Croinne Lee MD  01/24/23 0216

## 2023-01-31 ENCOUNTER — OFFICE VISIT (OUTPATIENT)
Dept: PODIATRY | Facility: CLINIC | Age: 48
End: 2023-01-31
Attending: EMERGENCY MEDICINE
Payer: OTHER MISCELLANEOUS

## 2023-01-31 VITALS
DIASTOLIC BLOOD PRESSURE: 80 MMHG | SYSTOLIC BLOOD PRESSURE: 140 MMHG | WEIGHT: 190 LBS | BODY MASS INDEX: 33.66 KG/M2 | HEIGHT: 63 IN
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
## 2023-01-31 DIAGNOSIS — S92.254A CLOSED NONDISPLACED FRACTURE OF NAVICULAR BONE OF RIGHT FOOT, INITIAL ENCOUNTER: ICD-10-CM
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
## 2023-01-31 PROCEDURE — 99203 OFFICE O/P NEW LOW 30 MIN: CPT | Performed by: PODIATRIST

## 2023-01-31 ASSESSMENT — PAIN SCALES - GENERAL: PAINLEVEL: MODERATE PAIN (5)

## 2023-01-31 NOTE — LETTER
1/31/2023         RE: Virginia Valenzuela  [address] [facility] [id number]        Dear Colleague,    Thank you for referring your patient, Virginia Valenzuela, to the [facility]. Please see a copy of my visit note below.    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 Finished her work day and then reported to ED DEPT. Presents with dorsal and lateral Right foot pain, WB w/tall gray fx boot.  Constant, swelling, bruising, dull ache. Intermittent burning pain 5-7/10.     Rest, elevation, ice. Only wearing fx boot for daily activity. Does not wear fx boot while driving. Some weight bearing at home w/o fx boot.    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 No work has been missed since accident.       ROS:  10 point ROS neg other than the symptoms noted above in the HPI.    Patient Active Problem List   Diagnosis     HAMIDA (obstructive sleep apnea)     Generalized anxiety disorder     Seasonal allergic rhinitis     GERD (gastroesophageal reflux disease)     Vitamin D deficiency     Papanicolaou smear of cervix with atypical squamous cells of undetermined significance (ASC-US)     Hypertension goal BP (blood pressure) < 140/90     Ovarian cyst     Female infertility     Tobacco use disorder     Bilateral carpal tunnel syndrome     Sesamoiditis     Trigger finger, left thumb     Chronic pelvic pain in female       PAST MEDICAL HISTORY:   Past Medical History:   Diagnosis Date     Allergic rhinitis, cause unspecified      Arthritis      ASCUS with positive high risk HPV 3/12/13     Depressive disorder, not elsewhere classified       Dysfunction of eustachian tube      Encounter for therapeutic drug monitoring      Generalized anxiety disorder      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     History of appendectomy      Hypertension 2000     Idiopathic urticaria      Obstructive sleep apnea (adult) (pediatric)      Other chronic allergic conjunctivitis      Other malaise and fatigue      Pain in joint, shoulder region      S/P laparoscopic cholecystectomy      Special screening examination for human papillomavirus (HPV)      Sprain of tibiofibular (ligament), distal of ankle      Threatened , unspecified as to episode of care      Tobacco use disorder      Unspecified essential hypertension      Unspecified vitamin D deficiency         PAST SURGICAL HISTORY:   Past Surgical History:   Procedure Laterality Date     ABDOMEN SURGERY  2013    ovaian cyst and right ovary removal     APPENDECTOMY       CHOLECYSTECTOMY       COLONOSCOPY N/A 10/26/2017    Procedure: COMBINED COLONOSCOPY, SINGLE OR MULTIPLE BIOPSY/POLYPECTOMY BY BIOPSY;  Colonoscopy with biopsies by biopsy;  Surgeon: Fred Faust DO;  [facility]     EYE SURGERY       GENITOURINARY SURGERY  2013    Ovary, fallopian tube     GYN SURGERY  2013    Ovary, Fallopian tube     INJECT EPIDURAL LUMBAR Bilateral 3/15/2019    Procedure: Inject Facets Lumbar 4-5, Bilateral;  Surgeon: Colin Nolasco MD;  [facility]     INJECT FACET JOINT N/A 10/25/2018    Procedure: Injest Facet Joint Lumbar 4-5 Bilateral;  Surgeon: Colin Nolasco MD;  [facility]     LAPAROSCOPIC LYSIS ADHESIONS Left 3/1/2016    Procedure: LAPAROSCOPIC LYSIS ADHESIONS;  Surgeon: Nate Mishra MD;  [facility]     LAPAROSCOPIC LYSIS ADHESIONS N/A 2017    Procedure: LAPAROSCOPIC LYSIS ADHESIONS;   laparoscopic lysis of adhesions;  Surgeon: Nate Mishra MD;  [facility]     LAPAROSCOPIC LYSIS ADHESIONS N/A 2017    Procedure: LAPAROSCOPIC  LYSIS ADHESIONS;   laparoscopic lysis of adhesions;  Surgeon: Nate Milligan MD;  [facility]     LAPAROSCOPIC LYSIS ADHESIONS N/A 5/14/2018    Procedure: LAPAROSCOPIC LYSIS ADHESIONS;;  Surgeon: Nate Mishra MD;  [facility]     LAPAROSCOPIC SALPINGECTOMY Left 5/14/2018    Procedure: LAPAROSCOPIC SALPINGECTOMY;;  Surgeon: Nate Mishra MD;  [facility]     LAPAROSCOPY DIAGNOSTIC (GYN) N/A 5/14/2018    Procedure: LAPAROSCOPY DIAGNOSTIC (GYN);  laparoscopy, left salpingectomy, left ovarian nodule removal and lysis of adhesions;  Surgeon: Nate Mishra MD;  [facility]     RELEASE CARPAL TUNNEL Left 8/31/2016    Procedure: RELEASE CARPAL TUNNEL;  Surgeon: Gucci Hairston MD;  [facility]     RELEASE CARPAL TUNNEL Right 12/7/2016    Procedure: RELEASE CARPAL TUNNEL;  Surgeon: Gucci Hairston MD;  [facility]        MEDICATIONS:   Current Outpatient Medications:      amLODIPine (NORVASC) 10 MG tablet, Take 10 mg by mouth daily, Disp: , Rfl:      Cholecalciferol (VITAMIN D3) 50 MCG (2000 UT) CAPS, Take 2,000 Units by mouth daily, Disp: , Rfl:      estradiol (FEMPATCH) 0.025 MG/24HR weekly patch, Apply 1 patch topically every 7 days Thursdays, Disp: , Rfl:      hydrochlorothiazide (HYDRODIURIL) 25 MG tablet, Take 25 mg by mouth daily, Disp: , Rfl:      losartan (COZAAR) 100 MG tablet, Take 100 mg by mouth daily, Disp: , Rfl:      Probiotic Product (PROBIOTIC DAILY PO), Take by mouth At Bedtime , Disp: , Rfl:      azelastine (ASTELIN) 0.1 % nasal spray, Spray 2 sprays into both nostrils 2 times daily as needed for rhinitis or allergies , Disp: , Rfl:      cefdinir (OMNICEF) 300 MG capsule, Take 1 capsule (300 mg) by mouth 2 times daily (Patient not taking: Reported on 1/23/2023), Disp: 20 capsule, Rfl: 0     cyclobenzaprine (FLEXERIL) 10 MG tablet, Take 10 mg by mouth 3 times daily as needed for muscle spasms, Disp: , Rfl:      EPINEPHrine  (EPIPEN) 0.3 MG/0.3ML injection, Inject 0.3 mLs (0.3 mg) into the muscle once as needed, Disp: 1 each, Rfl: 0     Lidocaine (LIDOCARE) 4 % Patch, Place 1 patch onto the skin every 8 hours as needed for moderate pain [place] is the brand name of the patch and can be purchased without a prescription., Disp: , Rfl:      methylPREDNISolone (MEDROL DOSEPAK) 4 MG tablet therapy pack, Take as directed (Patient not taking: Reported on 1/23/2023), Disp: 21 tablet, Rfl: 0     naproxen sodium (ANAPROX) 550 MG tablet, Take 550 mg by mouth 2 times daily as needed, Disp: , Rfl:      predniSONE (DELTASONE) 20 MG tablet, Take 2 tablets (40 mg) by mouth daily (Patient not taking: Reported on 1/23/2023), Disp: 10 tablet, Rfl: 0     VENTOLIN  (90 Base) MCG/ACT inhaler, Inhale 2 puffs into the lungs as needed, Disp: , Rfl:     Current Facility-Administered Medications:      triamcinolone (KENALOG-40) injection 40 mg, 40 mg, , , Claire Duron MD, 40 mg at 05/18/21 1047     ALLERGIES:    Allergies   Allergen Reactions     Bees Swelling     FINGER TIPS TO ELBOWS     Codeine Other (See Comments)     Squeezing pain around trunk/abdomen     Pender         SOCIAL HISTORY:   Social History     Socioeconomic History     Marital status:      Spouse name: Not on file     Number of children: Not on file     Years of education: Not on file     Highest education level: Not on file   Occupational History     Not on file   Tobacco Use     Smoking status: Every Day     Packs/day: 1.00     Years: 10.00     Pack years: 10.00     Types: Cigarettes     Smokeless tobacco: Never   Substance and Sexual Activity     Alcohol use: No     Alcohol/week: 0.0 standard drinks     Drug use: No     Sexual activity: Yes     Partners: Male     Birth control/protection: None     Comment: Trying to conceive   Other Topics Concern     Parent/sibling w/ CABG, MI or angioplasty before 65F 55M? No   Social History Narrative     Not on file     Social  "Determinants of Health     Financial Resource Strain: Not on file   Food Insecurity: Not on file   Transportation Needs: Not on file   Physical Activity: Not on file   Stress: Not on file   Social Connections: Not on file   Intimate Partner Violence: Not on file   Housing Stability: Not on file        FAMILY HISTORY:   Family History   Problem Relation Age of Onset     Diabetes Father      Hypertension Father      Cardiovascular Mother 29        myocarditis      Other Cancer Brother         unknown origin        EXAM:Vitals: BP (!) 140/80 (BP Location: Left arm, Patient Position: Sitting, Cuff Size: Adult Regular)   Ht 1.6 m (5' 3\")   Wt 86.2 kg (190 lb)   LMP 10/26/2018   BMI 33.66 kg/m    BMI= Body mass index is 33.66 kg/m .    General appearance: Patient is alert and fully cooperative with history & exam.  No sign of distress is noted during the visit.     Psychiatric: Affect is pleasant & appropriate.  Patient appears motivated to improve health.     Respiratory: Breathing is regular & unlabored while sitting.     HEENT: Hearing is intact to spoken word.  Speech is clear.  No gross evidence of visual impairment that would impact ambulation.     Vascular: DP & PT pulses are intact & regular bilaterally.  No significant edema or varicosities noted.  CFT and skin temperature is normal to both lower extremities.     Neurologic: Lower extremity sensation is intact to light touch.  No evidence of weakness or contracture in the lower extremities.  No evidence of neuropathy.    Dermatologic: Skin is intact to both lower extremities with adequate texture, turgor and tone about the integument.  No paronychia or evidence of soft tissue infection is noted.     Musculoskeletal: Patient is ambulatory with fracture boot and notes pain over the dorsal prominence of the navicular just anterior to the right ankle.  There is subtle crepitus with firm manipulation of the midtarsal joint right ankle not the left.  There is " subtle induration and discomfort with firm palpation of the dorsal navicular and plantarflexion of the foot and ankle.    Xray:  1/23 right ankle demonstrate what appears to be a chronic loose body or osteophyte over the dorsal navicular that possibly has refractured and become aggravated again no step-off is noted but there is sclerotic changes consistent with chronic change.    CT1/23 demonstrates a fairly large loose body possibly 5 mm over the dorsal navicular with punched out lesion that locates this at the talus navicular joint.  Possibly a small fracture fragment in this pocket of this loose body and this appears to be old with chronic sclerotic change.  Similar sclerotic sclerotic changes noted about the anterior process of the calcaneus and cuboid but no full osseous coalition is noted.     ASSESSMENT:       ICD-10-CM    1. Closed nondisplaced fracture of navicular bone of right foot, initial encounter  S92.254A Orthopedic  Referral      2. Fall (on) (from) unspecified stairs and steps, initial encounter  W10.9XXA Orthopedic  Referral      3. Work related injury  Y99.0 Orthopedic  Referral           PLAN:  Reviewed patient's chart in [facility].      1/31/2023   Interpreted radiographs and CT scan.  No full osseous coalition is identified but there are radiographic changes that appear to be chronic on the anterior process of the calcaneus and cuboid and dorsal navicular.  I suspect this will calm down with immobilization in a fracture boot activities weightbearing as tolerated.  Letter for some restrictions today recommend compression during the day fracture boot for all weightbearing activity.  Follow-up again in 3-4 weeks.  If this is unresolved we may consider excision of this however I suspect it is simply become exacerbated with this misstep.        Wilder Longoria DPM        Again, thank you for allowing me to participate in the care of your patient.        Sincerely,        Wilder  Johnie Longoria DPM

## 2023-01-31 NOTE — PROGRESS NOTES
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 Finished her work day and then reported to ED DEPT. Presents with dorsal and lateral Right foot pain, WB w/tall gray fx boot.  Constant, swelling, bruising, dull ache. Intermittent burning pain 5-7/10.     Rest, elevation, ice. Only wearing fx boot for daily activity. Does not wear fx boot while driving. Some weight bearing at home w/o fx boot.    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       ROS:  10 point ROS neg other than the symptoms noted above in the HPI.    Patient Active Problem List   Diagnosis     HAMIDA (obstructive sleep apnea)     Generalized anxiety disorder     Seasonal allergic rhinitis     GERD (gastroesophageal reflux disease)     Vitamin D deficiency     Papanicolaou smear of cervix with atypical squamous cells of undetermined significance (ASC-US)     Hypertension goal BP (blood pressure) < 140/90     Ovarian cyst     Female infertility     Tobacco use disorder     Bilateral carpal tunnel syndrome     Sesamoiditis     Trigger finger, left thumb     Chronic pelvic pain in female       PAST MEDICAL HISTORY:   Past Medical History:   Diagnosis Date     Allergic rhinitis, cause unspecified      Arthritis      ASCUS with positive high risk HPV 3/12/13     Depressive disorder, not elsewhere classified      Dysfunction of eustachian tube      Encounter for therapeutic drug monitoring      Generalized anxiety disorder      High risk HPV infection 5/12/09     History of appendectomy      Hypertension 2000     Idiopathic urticaria      Obstructive sleep  apnea (adult) (pediatric)      Other chronic allergic conjunctivitis      Other malaise and fatigue      Pain in joint, shoulder region      S/P laparoscopic cholecystectomy      Special screening examination for human papillomavirus (HPV)      Sprain of tibiofibular (ligament), distal of ankle      Threatened , unspecified as to episode of care      Tobacco use disorder      Unspecified essential hypertension      Unspecified vitamin D deficiency         PAST SURGICAL HISTORY:   Past Surgical History:   Procedure Laterality Date     ABDOMEN SURGERY  2013    ovaian cyst and right ovary removal     APPENDECTOMY       CHOLECYSTECTOMY       COLONOSCOPY N/A 10/26/2017    Procedure: COMBINED COLONOSCOPY, SINGLE OR MULTIPLE BIOPSY/POLYPECTOMY BY BIOPSY;  Colonoscopy with biopsies by biopsy;  Surgeon: Fred Faust DO;  Location:  GI     EYE SURGERY  1979     GENITOURINARY SURGERY  2013    Ovary, fallopian tube     GYN SURGERY  2013    Ovary, Fallopian tube     INJECT EPIDURAL LUMBAR Bilateral 3/15/2019    Procedure: Inject Facets Lumbar 4-5, Bilateral;  Surgeon: Colin Nolasco MD;  [facility]     INJECT FACET JOINT N/A 10/25/2018    Procedure: Injest Facet Joint Lumbar 4-5 Bilateral;  Surgeon: Colin Nolasco MD;  [facility]     LAPAROSCOPIC LYSIS ADHESIONS Left 3/1/2016    Procedure: LAPAROSCOPIC LYSIS ADHESIONS;  Surgeon: Nate Mishra MD;  [facility]     LAPAROSCOPIC LYSIS ADHESIONS N/A 2017    Procedure: LAPAROSCOPIC LYSIS ADHESIONS;   laparoscopic lysis of adhesions;  Surgeon: Nate Mishra MD;  [facility]     LAPAROSCOPIC LYSIS ADHESIONS N/A 2017    Procedure: LAPAROSCOPIC LYSIS ADHESIONS;   laparoscopic lysis of adhesions;  Surgeon: Nate Milligan MD;  [facility]     LAPAROSCOPIC LYSIS ADHESIONS N/A 2018    Procedure: LAPAROSCOPIC LYSIS ADHESIONS;;  Surgeon: Nate Mishra MD;  [facility]      LAPAROSCOPIC SALPINGECTOMY Left 5/14/2018    Procedure: LAPAROSCOPIC SALPINGECTOMY;;  Surgeon: Nate Mishra MD;  [facility]     LAPAROSCOPY DIAGNOSTIC (GYN) N/A 5/14/2018    Procedure: LAPAROSCOPY DIAGNOSTIC (GYN);  laparoscopy, left salpingectomy, left ovarian nodule removal and lysis of adhesions;  Surgeon: Nate Mishra MD;  [facility]     RELEASE CARPAL TUNNEL Left 8/31/2016    Procedure: RELEASE CARPAL TUNNEL;  Surgeon: Gucci Hairston MD;  [facility]     RELEASE CARPAL TUNNEL Right 12/7/2016    Procedure: RELEASE CARPAL TUNNEL;  Surgeon: Gucci Hairston MD;  [facility]        MEDICATIONS:   Current Outpatient Medications:      amLODIPine (NORVASC) 10 MG tablet, Take 10 mg by mouth daily, Disp: , Rfl:      Cholecalciferol (VITAMIN D3) 50 MCG (2000 UT) CAPS, Take 2,000 Units by mouth daily, Disp: , Rfl:      estradiol (FEMPATCH) 0.025 MG/24HR weekly patch, Apply 1 patch topically every 7 days Thursdays, Disp: , Rfl:      hydrochlorothiazide (HYDRODIURIL) 25 MG tablet, Take 25 mg by mouth daily, Disp: , Rfl:      losartan (COZAAR) 100 MG tablet, Take 100 mg by mouth daily, Disp: , Rfl:      Probiotic Product (PROBIOTIC DAILY PO), Take by mouth At Bedtime , Disp: , Rfl:      azelastine (ASTELIN) 0.1 % nasal spray, Spray 2 sprays into both nostrils 2 times daily as needed for rhinitis or allergies , Disp: , Rfl:      cefdinir (OMNICEF) 300 MG capsule, Take 1 capsule (300 mg) by mouth 2 times daily (Patient not taking: Reported on 1/23/2023), Disp: 20 capsule, Rfl: 0     cyclobenzaprine (FLEXERIL) 10 MG tablet, Take 10 mg by mouth 3 times daily as needed for muscle spasms, Disp: , Rfl:      EPINEPHrine (EPIPEN) 0.3 MG/0.3ML injection, Inject 0.3 mLs (0.3 mg) into the muscle once as needed, Disp: 1 each, Rfl: 0     Lidocaine (LIDOCARE) 4 % Patch, Place 1 patch onto the skin every 8 hours as needed for moderate pain Sal[place] is the brand name of the patch and  can be purchased without a prescription., Disp: , Rfl:      methylPREDNISolone (MEDROL DOSEPAK) 4 MG tablet therapy pack, Take as directed (Patient not taking: Reported on 1/23/2023), Disp: 21 tablet, Rfl: 0     naproxen sodium (ANAPROX) 550 MG tablet, Take 550 mg by mouth 2 times daily as needed, Disp: , Rfl:      predniSONE (DELTASONE) 20 MG tablet, Take 2 tablets (40 mg) by mouth daily (Patient not taking: Reported on 1/23/2023), Disp: 10 tablet, Rfl: 0     VENTOLIN  (90 Base) MCG/ACT inhaler, Inhale 2 puffs into the lungs as needed, Disp: , Rfl:     Current Facility-Administered Medications:      triamcinolone (KENALOG-40) injection 40 mg, 40 mg, , , Claire Duron MD, 40 mg at 05/18/21 1047     ALLERGIES:    Allergies   Allergen Reactions     Bees Swelling     FINGER TIPS TO ELBOWS     Codeine Other (See Comments)     Squeezing pain around trunk/abdomen     [place]         SOCIAL HISTORY:   Social History     Socioeconomic History     Marital status:      Spouse name: Not on file     Number of children: Not on file     Years of education: Not on file     Highest education level: Not on file   Occupational History     Not on file   Tobacco Use     Smoking status: Every Day     Packs/day: 1.00     Years: 10.00     Pack years: 10.00     Types: Cigarettes     Smokeless tobacco: Never   Substance and Sexual Activity     Alcohol use: No     Alcohol/week: 0.0 standard drinks     Drug use: No     Sexual activity: Yes     Partners: Male     Birth control/protection: None     Comment: Trying to conceive   Other Topics Concern     Parent/sibling w/ CABG, MI or angioplasty before 65F 55M? No   Social History Narrative     Not on file     Social Determinants of Health     Financial Resource Strain: Not on file   Food Insecurity: Not on file   Transportation Needs: Not on file   Physical Activity: Not on file   Stress: Not on file   Social Connections: Not on file   Intimate Partner Violence: Not on  "file   Housing Stability: Not on file        FAMILY HISTORY:   Family History   Problem Relation Age of Onset     Diabetes Father      Hypertension Father      Cardiovascular Mother 29        myocarditis      Other Cancer Brother         unknown origin        EXAM:Vitals: BP (!) 140/80 (BP Location: Left arm, Patient Position: Sitting, Cuff Size: Adult Regular)   Ht 1.6 m (5' 3\")   Wt 86.2 kg (190 lb)   LMP 10/26/2018   BMI 33.66 kg/m    BMI= Body mass index is 33.66 kg/m .    General appearance: Patient is alert and fully cooperative with history & exam.  No sign of distress is noted during the visit.     Psychiatric: Affect is pleasant & appropriate.  Patient appears motivated to improve health.     Respiratory: Breathing is regular & unlabored while sitting.     HEENT: Hearing is intact to spoken word.  Speech is clear.  No gross evidence of visual impairment that would impact ambulation.     Vascular: DP & PT pulses are intact & regular bilaterally.  No significant edema or varicosities noted.  CFT and skin temperature is normal to both lower extremities.     Neurologic: Lower extremity sensation is intact to light touch.  No evidence of weakness or contracture in the lower extremities.  No evidence of neuropathy.    Dermatologic: Skin is intact to both lower extremities with adequate texture, turgor and tone about the integument.  No paronychia or evidence of soft tissue infection is noted.     Musculoskeletal: Patient is ambulatory with fracture boot and notes pain over the dorsal prominence of the navicular just anterior to the right ankle.  There is subtle crepitus with firm manipulation of the midtarsal joint right ankle not the left.  There is subtle induration and discomfort with firm palpation of the dorsal navicular and plantarflexion of the foot and ankle.    Xray:  1/23 right ankle demonstrate what appears to be a chronic loose body or osteophyte over the dorsal navicular that possibly has " refractured and become aggravated again no step-off is noted but there is sclerotic changes consistent with chronic change.    CT1/23 demonstrates a fairly large loose body possibly 5 mm over the dorsal navicular with punched out lesion that locates this at the talus navicular joint.  Possibly a small fracture fragment in this pocket of this loose body and this appears to be old with chronic sclerotic change.  Similar sclerotic sclerotic changes noted about the anterior process of the calcaneus and cuboid but no full osseous coalition is noted.     ASSESSMENT:       ICD-10-CM    1. Closed nondisplaced fracture of navicular bone of right foot, initial encounter  S92.254A Orthopedic  Referral      2. Fall (on) (from) unspecified stairs and steps, initial encounter  W10.9XXA Orthopedic  Referral      3. Work related injury  Y99.0 Orthopedic  Referral           PLAN:  Reviewed patient's chart in [facility].      1/31/2023   Interpreted radiographs and CT scan.  No full osseous coalition is identified but there are radiographic changes that appear to be chronic on the anterior process of the calcaneus and cuboid and dorsal navicular.  I suspect this will calm down with immobilization in a fracture boot activities weightbearing as tolerated.  Letter for some restrictions today recommend compression during the day fracture boot for all weightbearing activity.  Follow-up again in 3-4 weeks.  If this is unresolved we may consider excision of this however I suspect it is simply become exacerbated with this misstep.        Wilder Longoria DPM

## 2023-01-31 NOTE — LETTER
2023      RE:  Virginia Valenzuela  : 1975      To whom it may concern:    This patient may return to light duty work as tolerated in a fracture boot for up to 6 weeks.  Also will provide handicap parking placard for 3 months use.     Sincerely,          Wilder Longoria DPM

## 2023-02-02 ENCOUNTER — MYC MEDICAL ADVICE (OUTPATIENT)
Dept: PODIATRY | Facility: CLINIC | Age: 48
End: 2023-02-02
Payer: COMMERCIAL

## 2023-02-02 NOTE — TELEPHONE ENCOUNTER
Please see patient's mychart message. She states that the ACE wrap has been causing a throbbing pain to the outer aspect of her foot. Can she switch to using a compression sock, or continue wearing the fracture boot without the ACE wrap? Please advise.     Cira Laughlin RN   [facility]

## 2023-02-02 NOTE — TELEPHONE ENCOUNTER
That would be fine or back off the level of compression.  The lateral portion of the foot is where the compression is anchored.

## 2023-02-18 ENCOUNTER — HEALTH MAINTENANCE LETTER (OUTPATIENT)
Age: 48
End: 2023-02-18

## 2023-02-21 ENCOUNTER — OFFICE VISIT (OUTPATIENT)
Dept: PODIATRY | Facility: CLINIC | Age: 48
End: 2023-02-21
Payer: OTHER MISCELLANEOUS

## 2023-02-21 VITALS
SYSTOLIC BLOOD PRESSURE: 126 MMHG | WEIGHT: 190 LBS | DIASTOLIC BLOOD PRESSURE: 72 MMHG | HEIGHT: 63 IN | BODY MASS INDEX: 33.66 KG/M2
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
## 2023-02-21 DIAGNOSIS — S92.254A CLOSED NONDISPLACED FRACTURE OF NAVICULAR BONE OF RIGHT FOOT, INITIAL ENCOUNTER: Primary | ICD-10-CM
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
## 2023-02-21 PROCEDURE — 99213 OFFICE O/P EST LOW 20 MIN: CPT | Performed by: PODIATRIST

## 2023-02-21 ASSESSMENT — PAIN SCALES - GENERAL: PAINLEVEL: MILD PAIN (2)

## 2023-02-21 NOTE — LETTER
2/21/2023         RE: Virginia Valenzuela  [address] [facility] [address]        Dear Colleague,    Thank you for referring your patient, Virginia Valenzuela, to the [facility]. Please see a copy of my visit note below.    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 Finished her work day and then reported to ED DEPT. Presents with dorsal and lateral Right foot pain, WB w/tall gray fx boot.  Constant, swelling, bruising, dull ache. Intermittent burning pain 5-7/10.     Rest, elevation, ice. Only wearing fx boot for daily activity. Does not wear fx boot while driving. Some weight bearing at home w/o fx boot.    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 No work has been missed since accident.       ROS:  10 point ROS neg other than the symptoms noted above in the HPI.      Patient Active Problem List   Diagnosis     HAMIDA (obstructive sleep apnea)     Generalized anxiety disorder     Seasonal allergic rhinitis     GERD (gastroesophageal reflux disease)     Vitamin D deficiency     Papanicolaou smear of cervix with atypical squamous cells of undetermined significance (ASC-US)     Hypertension goal BP (blood pressure) < 140/90     Ovarian cyst     Female infertility     Tobacco use disorder     Bilateral carpal tunnel syndrome     Sesamoiditis     Trigger finger, left thumb     Chronic pelvic pain in female       PAST  MEDICAL HISTORY:   Past Medical History:   Diagnosis Date     Allergic rhinitis, cause unspecified      Arthritis      ASCUS with positive high risk HPV 3/12/13     Depressive disorder, not elsewhere classified      Dysfunction of eustachian tube      Encounter for therapeutic drug monitoring      Generalized anxiety disorder      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     History of appendectomy      Hypertension 2000     Idiopathic urticaria      Obstructive sleep apnea (adult) (pediatric)      Other chronic allergic conjunctivitis      Other malaise and fatigue      Pain in joint, shoulder region      S/P laparoscopic cholecystectomy      Special screening examination for human papillomavirus (HPV)      Sprain of tibiofibular (ligament), distal of ankle      Threatened , unspecified as to episode of care      Tobacco use disorder      Unspecified essential hypertension      Unspecified vitamin D deficiency         PAST SURGICAL HISTORY:   Past Surgical History:   Procedure Laterality Date     ABDOMEN SURGERY  2013    ovaian cyst and right ovary removal     APPENDECTOMY       CHOLECYSTECTOMY       COLONOSCOPY N/A 10/26/2017    Procedure: COMBINED COLONOSCOPY, SINGLE OR MULTIPLE BIOPSY/POLYPECTOMY BY BIOPSY;  Colonoscopy with biopsies by biopsy;  Surgeon: Fred Faust DO;  Location:  GI     EYE SURGERY  1979     GENITOURINARY SURGERY  2013    Ovary, fallopian tube     GYN SURGERY  2013    Ovary, Fallopian tube     INJECT EPIDURAL LUMBAR Bilateral 3/15/2019    Procedure: Inject Facets Lumbar 4-5, Bilateral;  Surgeon: Colin Nolasco MD;  Location: [facility]     INJECT FACET JOINT N/A 10/25/2018    Procedure: Injest Facet Joint Lumbar 4-5 Bilateral;  Surgeon: Colin Nolasco MD;  Location: [facility]     LAPAROSCOPIC LYSIS ADHESIONS Left 3/1/2016    Procedure: LAPAROSCOPIC LYSIS ADHESIONS;  Surgeon: Nate Mishra MD;  [facility]     LAPAROSCOPIC LYSIS ADHESIONS N/A 2017     Procedure: LAPAROSCOPIC LYSIS ADHESIONS;   laparoscopic lysis of adhesions;  Surgeon: Nate Mishra MD;  [facility]     LAPAROSCOPIC LYSIS ADHESIONS N/A 7/13/2017    Procedure: LAPAROSCOPIC LYSIS ADHESIONS;   laparoscopic lysis of adhesions;  Surgeon: Nate Milligan MD;  [facility]     LAPAROSCOPIC LYSIS ADHESIONS N/A 5/14/2018    Procedure: LAPAROSCOPIC LYSIS ADHESIONS;;  Surgeon: Nate Mishra MD;  [facility]     LAPAROSCOPIC SALPINGECTOMY Left 5/14/2018    Procedure: LAPAROSCOPIC SALPINGECTOMY;;  Surgeon: Nate Mishra MD;  [facility]     LAPAROSCOPY DIAGNOSTIC (GYN) N/A 5/14/2018    Procedure: LAPAROSCOPY DIAGNOSTIC (GYN);  laparoscopy, left salpingectomy, left ovarian nodule removal and lysis of adhesions;  Surgeon: Nate Mishra MD;  [facility]     RELEASE CARPAL TUNNEL Left 8/31/2016    Procedure: RELEASE CARPAL TUNNEL;  Surgeon: Gucci Hairston MD;  [facility]     RELEASE CARPAL TUNNEL Right 12/7/2016    Procedure: RELEASE CARPAL TUNNEL;  Surgeon: Gucci Hairston MD;  Location[facility]        MEDICATIONS:   Current Outpatient Medications:      amLODIPine (NORVASC) 10 MG tablet, Take 10 mg by mouth daily, Disp: , Rfl:      azelastine (ASTELIN) 0.1 % nasal spray, Spray 2 sprays into both nostrils 2 times daily as needed for rhinitis or allergies , Disp: , Rfl:      Cholecalciferol (VITAMIN D3) 50 MCG (2000 UT) CAPS, Take 2,000 Units by mouth daily, Disp: , Rfl:      estradiol (FEMPATCH) 0.025 MG/24HR weekly patch, Apply 1 patch topically every 7 days Thursdays, Disp: , Rfl:      hydrochlorothiazide (HYDRODIURIL) 25 MG tablet, Take 25 mg by mouth daily, Disp: , Rfl:      losartan (COZAAR) 100 MG tablet, Take 100 mg by mouth daily, Disp: , Rfl:      naproxen sodium (ANAPROX) 550 MG tablet, Take 550 mg by mouth 2 times daily as needed, Disp: , Rfl:      Probiotic Product (PROBIOTIC DAILY PO), Take by mouth At Bedtime ,  Disp: , Rfl:      cefdinir (OMNICEF) 300 MG capsule, Take 1 capsule (300 mg) by mouth 2 times daily (Patient not taking: Reported on 1/23/2023), Disp: 20 capsule, Rfl: 0     cyclobenzaprine (FLEXERIL) 10 MG tablet, Take 10 mg by mouth 3 times daily as needed for muscle spasms (Patient not taking: Reported on 2/21/2023), Disp: , Rfl:      EPINEPHrine (EPIPEN) 0.3 MG/0.3ML injection, Inject 0.3 mLs (0.3 mg) into the muscle once as needed, Disp: 1 each, Rfl: 0     Lidocaine (LIDOCARE) 4 % Patch, Place 1 patch onto the skin every 8 hours as needed for moderate pain [place] is the brand name of the patch and can be purchased without a prescription. (Patient not taking: Reported on 2/21/2023), Disp: , Rfl:      methylPREDNISolone (MEDROL DOSEPAK) 4 MG tablet therapy pack, Take as directed (Patient not taking: Reported on 1/23/2023), Disp: 21 tablet, Rfl: 0     predniSONE (DELTASONE) 20 MG tablet, Take 2 tablets (40 mg) by mouth daily (Patient not taking: Reported on 1/23/2023), Disp: 10 tablet, Rfl: 0     VENTOLIN  (90 Base) MCG/ACT inhaler, Inhale 2 puffs into the lungs as needed (Patient not taking: Reported on 2/21/2023), Disp: , Rfl:     Current Facility-Administered Medications:      triamcinolone (KENALOG-40) injection 40 mg, 40 mg, , , Claire Duron MD, 40 mg at 05/18/21 1047     ALLERGIES:    Allergies   Allergen Reactions     Bees Swelling     FINGER TIPS TO ELBOWS     Codeine Other (See Comments)     Squeezing pain around trunk/abdomen     [place]         SOCIAL HISTORY:   Social History     Socioeconomic History     Marital status:      Spouse name: Not on file     Number of children: Not on file     Years of education: Not on file     Highest education level: Not on file   Occupational History     Not on file   Tobacco Use     Smoking status: Every Day     Packs/day: 1.00     Years: 10.00     Pack years: 10.00     Types: Cigarettes     Smokeless tobacco: Never   Substance and Sexual  "Activity     Alcohol use: No     Alcohol/week: 0.0 standard drinks     Drug use: No     Sexual activity: Yes     Partners: Male     Birth control/protection: None     Comment: Trying to conceive   Other Topics Concern     Parent/sibling w/ CABG, MI or angioplasty before 65F 55M? No   Social History Narrative     Not on file     Social Determinants of Health     Financial Resource Strain: Not on file   Food Insecurity: Not on file   Transportation Needs: Not on file   Physical Activity: Not on file   Stress: Not on file   Social Connections: Not on file   Intimate Partner Violence: Not on file   Housing Stability: Not on file        FAMILY HISTORY:   Family History   Problem Relation Age of Onset     Diabetes Father      Hypertension Father      Cardiovascular Mother 29        myocarditis      Other Cancer Brother         unknown origin        EXAM:Vitals: /72 (BP Location: Left arm, Patient Position: Sitting, Cuff Size: Adult Large)   Ht 1.6 m (5' 3\")   Wt 86.2 kg (190 lb)   LMP 10/26/2018   BMI 33.66 kg/m    BMI= Body mass index is 33.66 kg/m .    General appearance: Patient is alert and fully cooperative with history & exam.  No sign of distress is noted during the visit.     Psychiatric: Affect is pleasant & appropriate.  Patient appears motivated to improve health.     Respiratory: Breathing is regular & unlabored while sitting.     HEENT: Hearing is intact to spoken word.  Speech is clear.  No gross evidence of visual impairment that would impact ambulation.     Vascular: DP & PT pulses are intact & regular bilaterally.  No significant edema or varicosities noted.  CFT and skin temperature is normal to both lower extremities.     Neurologic: Lower extremity sensation is intact to light touch.  No evidence of weakness or contracture in the lower extremities.  No evidence of neuropathy.    Dermatologic: Skin is intact to both lower extremities with adequate texture, turgor and tone about the integument. "  No paronychia or evidence of soft tissue infection is noted.     Musculoskeletal: Patient is ambulatory with fracture boot and notes pain over the dorsal prominence of the navicular just anterior to the right ankle.  There is subtle crepitus with firm manipulation of the midtarsal joint right ankle not the left.  There is subtle induration and discomfort with firm palpation of the dorsal navicular and plantarflexion of the foot and ankle.    Xray:  1/23 right ankle demonstrate what appears to be a chronic loose body or osteophyte over the dorsal navicular that possibly has refractured and become aggravated again no step-off is noted but there is sclerotic changes consistent with chronic change.    CT1/23 demonstrates a fairly large loose body possibly 5 mm over the dorsal navicular with punched out lesion that locates this at the talus navicular joint.  Possibly a small fracture fragment in this pocket of this loose body and this appears to be old with chronic sclerotic change.  Similar sclerotic sclerotic changes noted about the anterior process of the calcaneus and cuboid but no full osseous coalition is noted.     ASSESSMENT:       ICD-10-CM    1. Closed nondisplaced fracture of navicular bone of right foot, initial encounter  S92.254A Physical Therapy Referral      2. Work related injury  Y99.0 Physical Therapy Referral      3. Fall (on) (from) unspecified stairs and steps, initial encounter  W10.9XXA Physical Therapy Referral           PLAN:  Reviewed patient's chart in [facility].      1/31/2023   Interpreted radiographs and CT scan.  No full osseous coalition is identified but there are radiographic changes that appear to be chronic on the anterior process of the calcaneus and cuboid and dorsal navicular.  I suspect this will calm down with immobilization in a fracture boot activities weightbearing as tolerated.  Letter for some restrictions today recommend compression during the day fracture boot for all  weightbearing activity.  Follow-up again in 3-4 weeks.  If this is unresolved we may consider excision of this however I suspect it is simply become exacerbated with this misstep.        2/21/2023  Stay in boot for rest and minimal WB  If she calls in next 3 weeks requesting work restrictions this can be provided, if she requests light duty seated work or no work this can be provided.   May begin physical therapy and PT may remove the boot and progress as tolerated, this is an avulsion of the dorsal navicular  we will keep her in the boot at home and she can begin removing the boot for rest and sleep at 6 weeks post injury.  We discussed alternative treatment options  She may add a lift to the other extremity to help her back and hips be more stable  Offered a rollabout order or wheelchair or further work restrictions  Follow-up in 2 to 3 weeks and repeat imaging ordered future today.    Wilder Longoria DPM        Again, thank you for allowing me to participate in the care of your patient.        Sincerely,        Wilder Longoria DPM

## 2023-02-21 NOTE — PROGRESS NOTES
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 Finished her work day and then reported to ED DEPT. Presents with dorsal and lateral Right foot pain, WB w/tall gray fx boot.  Constant, swelling, bruising, dull ache. Intermittent burning pain 5-7/10.     Rest, elevation, ice. Only wearing fx boot for daily activity. Does not wear fx boot while driving. Some weight bearing at home w/o fx boot.    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 No work has been missed since accident.       ROS:  10 point ROS neg other than the symptoms noted above in the HPI.      Patient Active Problem List   Diagnosis     HAMIDA (obstructive sleep apnea)     Generalized anxiety disorder     Seasonal allergic rhinitis     GERD (gastroesophageal reflux disease)     Vitamin D deficiency     Papanicolaou smear of cervix with atypical squamous cells of undetermined significance (ASC-US)     Hypertension goal BP (blood pressure) < 140/90     Ovarian cyst     Female infertility     Tobacco use disorder     Bilateral carpal tunnel syndrome     Sesamoiditis     Trigger finger, left thumb     Chronic pelvic pain in female       PAST MEDICAL HISTORY:   Past Medical History:   Diagnosis Date     Allergic rhinitis, cause unspecified      Arthritis      ASCUS with positive high risk HPV 3/12/13     Depressive disorder, not elsewhere classified      Dysfunction of eustachian tube       Encounter for therapeutic drug monitoring      Generalized anxiety disorder      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     History of appendectomy      Hypertension 2000     Idiopathic urticaria      Obstructive sleep apnea (adult) (pediatric)      Other chronic allergic conjunctivitis      Other malaise and fatigue      Pain in joint, shoulder region      S/P laparoscopic cholecystectomy      Special screening examination for human papillomavirus (HPV)      Sprain of tibiofibular (ligament), distal of ankle      Threatened , unspecified as to episode of care      Tobacco use disorder      Unspecified essential hypertension      Unspecified vitamin D deficiency         PAST SURGICAL HISTORY:   Past Surgical History:   Procedure Laterality Date     ABDOMEN SURGERY  2013    ovaian cyst and right ovary removal     APPENDECTOMY       CHOLECYSTECTOMY       COLONOSCOPY N/A 10/26/2017    Procedure: COMBINED COLONOSCOPY, SINGLE OR MULTIPLE BIOPSY/POLYPECTOMY BY BIOPSY;  Colonoscopy with biopsies by biopsy;  Surgeon: Fred Faust DO;  Location:  [facility]     EYE SURGERY       GENITOURINARY SURGERY  2013    Ovary, fallopian tube     GYN SURGERY  2013    Ovary, Fallopian tube     INJECT EPIDURAL LUMBAR Bilateral 3/15/2019    Procedure: Inject Facets Lumbar 4-5, Bilateral;  Surgeon: Colin Nolasco MD;  Location: [facility]     INJECT FACET JOINT N/A 10/25/2018    Procedure: Injest Facet Joint Lumbar 4-5 Bilateral;  Surgeon: Colin Nolasco MD;  Location[facility]     LAPAROSCOPIC LYSIS ADHESIONS Left 3/1/2016    Procedure: LAPAROSCOPIC LYSIS ADHESIONS;  Surgeon: Nate Mishra MD;  [facility]     LAPAROSCOPIC LYSIS ADHESIONS N/A 2017    Procedure: LAPAROSCOPIC LYSIS ADHESIONS;   laparoscopic lysis of adhesions;  Surgeon: Nate Mishra MD;  [facility]     LAPAROSCOPIC LYSIS ADHESIONS N/A 2017    Procedure: LAPAROSCOPIC LYSIS ADHESIONS;   laparoscopic lysis of  adhesions;  Surgeon: Nate Milligan MD;  [facility]     LAPAROSCOPIC LYSIS ADHESIONS N/A 5/14/2018    Procedure: LAPAROSCOPIC LYSIS ADHESIONS;;  Surgeon: Nate Mishra MD;  [facility]     LAPAROSCOPIC SALPINGECTOMY Left 5/14/2018    Procedure: LAPAROSCOPIC SALPINGECTOMY;;  Surgeon: Nate Mishra MD;  [facility]     LAPAROSCOPY DIAGNOSTIC (GYN) N/A 5/14/2018    Procedure: LAPAROSCOPY DIAGNOSTIC (GYN);  laparoscopy, left salpingectomy, left ovarian nodule removal and lysis of adhesions;  Surgeon: Nate Mishra MD;  [facility]     RELEASE CARPAL TUNNEL Left 8/31/2016    Procedure: RELEASE CARPAL TUNNEL;  Surgeon: Gucci Hairston MD;  [facility]     RELEASE CARPAL TUNNEL Right 12/7/2016    Procedure: RELEASE CARPAL TUNNEL;  Surgeon: Gucci Hairston MD;  Location[facility]        MEDICATIONS:   Current Outpatient Medications:      amLODIPine (NORVASC) 10 MG tablet, Take 10 mg by mouth daily, Disp: , Rfl:      azelastine (ASTELIN) 0.1 % nasal spray, Spray 2 sprays into both nostrils 2 times daily as needed for rhinitis or allergies , Disp: , Rfl:      Cholecalciferol (VITAMIN D3) 50 MCG (2000 UT) CAPS, Take 2,000 Units by mouth daily, Disp: , Rfl:      estradiol (FEMPATCH) 0.025 MG/24HR weekly patch, Apply 1 patch topically every 7 days Thursdays, Disp: , Rfl:      hydrochlorothiazide (HYDRODIURIL) 25 MG tablet, Take 25 mg by mouth daily, Disp: , Rfl:      losartan (COZAAR) 100 MG tablet, Take 100 mg by mouth daily, Disp: , Rfl:      naproxen sodium (ANAPROX) 550 MG tablet, Take 550 mg by mouth 2 times daily as needed, Disp: , Rfl:      Probiotic Product (PROBIOTIC DAILY PO), Take by mouth At Bedtime , Disp: , Rfl:      cefdinir (OMNICEF) 300 MG capsule, Take 1 capsule (300 mg) by mouth 2 times daily (Patient not taking: Reported on 1/23/2023), Disp: 20 capsule, Rfl: 0     cyclobenzaprine (FLEXERIL) 10 MG tablet, Take 10 mg by mouth 3 times daily as  needed for muscle spasms (Patient not taking: Reported on 2/21/2023), Disp: , Rfl:      EPINEPHrine (EPIPEN) 0.3 MG/0.3ML injection, Inject 0.3 mLs (0.3 mg) into the muscle once as needed, Disp: 1 each, Rfl: 0     Lidocaine (LIDOCARE) 4 % Patch, Place 1 patch onto the skin every 8 hours as needed for moderate pain [place] is the brand name of the patch and can be purchased without a prescription. (Patient not taking: Reported on 2/21/2023), Disp: , Rfl:      methylPREDNISolone (MEDROL DOSEPAK) 4 MG tablet therapy pack, Take as directed (Patient not taking: Reported on 1/23/2023), Disp: 21 tablet, Rfl: 0     predniSONE (DELTASONE) 20 MG tablet, Take 2 tablets (40 mg) by mouth daily (Patient not taking: Reported on 1/23/2023), Disp: 10 tablet, Rfl: 0     VENTOLIN  (90 Base) MCG/ACT inhaler, Inhale 2 puffs into the lungs as needed (Patient not taking: Reported on 2/21/2023), Disp: , Rfl:     Current Facility-Administered Medications:      triamcinolone (KENALOG-40) injection 40 mg, 40 mg, , , Claire Duron MD, 40 mg at 05/18/21 1047     ALLERGIES:    Allergies   Allergen Reactions     Bees Swelling     FINGER TIPS TO ELBOWS     Codeine Other (See Comments)     Squeezing pain around trunk/abdomen     [place]         SOCIAL HISTORY:   Social History     Socioeconomic History     Marital status:      Spouse name: Not on file     Number of children: Not on file     Years of education: Not on file     Highest education level: Not on file   Occupational History     Not on file   Tobacco Use     Smoking status: Every Day     Packs/day: 1.00     Years: 10.00     Pack years: 10.00     Types: Cigarettes     Smokeless tobacco: Never   Substance and Sexual Activity     Alcohol use: No     Alcohol/week: 0.0 standard drinks     Drug use: No     Sexual activity: Yes     Partners: Male     Birth control/protection: None     Comment: Trying to conceive   Other Topics Concern     Parent/sibling w/ CABG, MI or  "angioplasty before 65F 55M? No   Social History Narrative     Not on file     Social Determinants of Health     Financial Resource Strain: Not on file   Food Insecurity: Not on file   Transportation Needs: Not on file   Physical Activity: Not on file   Stress: Not on file   Social Connections: Not on file   Intimate Partner Violence: Not on file   Housing Stability: Not on file        FAMILY HISTORY:   Family History   Problem Relation Age of Onset     Diabetes Father      Hypertension Father      Cardiovascular Mother 29        myocarditis      Other Cancer Brother         unknown origin        EXAM:Vitals: /72 (BP Location: Left arm, Patient Position: Sitting, Cuff Size: Adult Large)   Ht 1.6 m (5' 3\")   Wt 86.2 kg (190 lb)   LMP 10/26/2018   BMI 33.66 kg/m    BMI= Body mass index is 33.66 kg/m .    General appearance: Patient is alert and fully cooperative with history & exam.  No sign of distress is noted during the visit.     Psychiatric: Affect is pleasant & appropriate.  Patient appears motivated to improve health.     Respiratory: Breathing is regular & unlabored while sitting.     HEENT: Hearing is intact to spoken word.  Speech is clear.  No gross evidence of visual impairment that would impact ambulation.     Vascular: DP & PT pulses are intact & regular bilaterally.  No significant edema or varicosities noted.  CFT and skin temperature is normal to both lower extremities.     Neurologic: Lower extremity sensation is intact to light touch.  No evidence of weakness or contracture in the lower extremities.  No evidence of neuropathy.    Dermatologic: Skin is intact to both lower extremities with adequate texture, turgor and tone about the integument.  No paronychia or evidence of soft tissue infection is noted.     Musculoskeletal: Patient is ambulatory with fracture boot and notes pain over the dorsal prominence of the navicular just anterior to the right ankle.  There is subtle crepitus with " firm manipulation of the midtarsal joint right ankle not the left.  There is subtle induration and discomfort with firm palpation of the dorsal navicular and plantarflexion of the foot and ankle.    Xray:  1/23 right ankle demonstrate what appears to be a chronic loose body or osteophyte over the dorsal navicular that possibly has refractured and become aggravated again no step-off is noted but there is sclerotic changes consistent with chronic change.    CT1/23 demonstrates a fairly large loose body possibly 5 mm over the dorsal navicular with punched out lesion that locates this at the talus navicular joint.  Possibly a small fracture fragment in this pocket of this loose body and this appears to be old with chronic sclerotic change.  Similar sclerotic sclerotic changes noted about the anterior process of the calcaneus and cuboid but no full osseous coalition is noted.     ASSESSMENT:       ICD-10-CM    1. Closed nondisplaced fracture of navicular bone of right foot, initial encounter  S92.254A Physical Therapy Referral      2. Work related injury  Y99.0 Physical Therapy Referral      3. Fall (on) (from) unspecified stairs and steps, initial encounter  W10.9XXA Physical Therapy Referral           PLAN:  Reviewed patient's chart in [facility].      1/31/2023   Interpreted radiographs and CT scan.  No full osseous coalition is identified but there are radiographic changes that appear to be chronic on the anterior process of the calcaneus and cuboid and dorsal navicular.  I suspect this will calm down with immobilization in a fracture boot activities weightbearing as tolerated.  Letter for some restrictions today recommend compression during the day fracture boot for all weightbearing activity.  Follow-up again in 3-4 weeks.  If this is unresolved we may consider excision of this however I suspect it is simply become exacerbated with this misstep.        2/21/2023  Stay in boot for rest and minimal WB  If she calls in next  3 weeks requesting work restrictions this can be provided, if she requests light duty seated work or no work this can be provided.   May begin physical therapy and PT may remove the boot and progress as tolerated, this is an avulsion of the dorsal navicular  we will keep her in the boot at home and she can begin removing the boot for rest and sleep at 6 weeks post injury.  We discussed alternative treatment options  She may add a lift to the other extremity to help her back and hips be more stable  Offered a rollabout order or wheelchair or further work restrictions  Follow-up in 2 to 3 weeks and repeat imaging ordered future today.    Wilder Longoria DPM

## 2023-03-14 ENCOUNTER — OFFICE VISIT (OUTPATIENT)
Dept: PODIATRY | Facility: CLINIC | Age: 48
End: 2023-03-14
Payer: OTHER MISCELLANEOUS

## 2023-03-14 ENCOUNTER — ANCILLARY PROCEDURE (OUTPATIENT)
Dept: GENERAL RADIOLOGY | Facility: CLINIC | Age: 48
End: 2023-03-14
Attending: PODIATRIST
Payer: OTHER MISCELLANEOUS

## 2023-03-14 VITALS
DIASTOLIC BLOOD PRESSURE: 80 MMHG | HEIGHT: 63 IN | SYSTOLIC BLOOD PRESSURE: 142 MMHG | WEIGHT: 190 LBS | BODY MASS INDEX: 33.66 KG/M2

## 2023-03-14 DIAGNOSIS — S92.254A CLOSED NONDISPLACED FRACTURE OF NAVICULAR BONE OF RIGHT FOOT, INITIAL ENCOUNTER: Primary | ICD-10-CM
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
## 2023-03-14 DIAGNOSIS — S92.254A CLOSED NONDISPLACED FRACTURE OF NAVICULAR BONE OF RIGHT FOOT, INITIAL ENCOUNTER: ICD-10-CM

## 2023-03-14 PROCEDURE — 73630 X-RAY EXAM OF FOOT: CPT | Mod: TC | Performed by: RADIOLOGY

## 2023-03-14 PROCEDURE — 99213 OFFICE O/P EST LOW 20 MIN: CPT | Performed by: PODIATRIST

## 2023-03-14 ASSESSMENT — PAIN SCALES - GENERAL: PAINLEVEL: NO PAIN (0)

## 2023-03-14 NOTE — PROGRESS NOTES
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 Finished her work day and then reported to ED DEPT. Presents with dorsal and lateral Right foot pain, WB w/tall gray fx boot.  Constant, swelling, bruising, dull ache. Intermittent burning pain 5-7/10.     Rest, elevation, ice. Only wearing fx boot for daily activity. Does not wear fx boot while driving. Some weight bearing at home w/o fx boot.    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 No work has been missed since accident.       ROS:  10 point ROS neg other than the symptoms noted above in the HPI.      Patient Active Problem List   Diagnosis     HAMIDA (obstructive sleep apnea)     Generalized anxiety disorder     Seasonal allergic rhinitis     GERD (gastroesophageal reflux disease)     Vitamin D deficiency     Papanicolaou smear of cervix with atypical squamous cells of undetermined significance (ASC-US)     Hypertension goal BP (blood pressure) < 140/90     Ovarian cyst     Female infertility     Tobacco use disorder     Bilateral carpal tunnel syndrome     Sesamoiditis     Trigger finger, left thumb     Chronic pelvic pain in female       PAST MEDICAL HISTORY:   Past Medical History:   Diagnosis Date     Allergic rhinitis, cause unspecified      Arthritis      ASCUS with positive high risk HPV 3/12/13     Depressive disorder, not elsewhere  classified      Dysfunction of eustachian tube      Encounter for therapeutic drug monitoring      Generalized anxiety disorder      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     History of appendectomy      Hypertension 2000     Idiopathic urticaria      Obstructive sleep apnea (adult) (pediatric)      Other chronic allergic conjunctivitis      Other malaise and fatigue      Pain in joint, shoulder region      S/P laparoscopic cholecystectomy      Special screening examination for human papillomavirus (HPV)      Sprain of tibiofibular (ligament), distal of ankle      Threatened , unspecified as to episode of care      Tobacco use disorder      Unspecified essential hypertension      Unspecified vitamin D deficiency         PAST SURGICAL HISTORY:   Past Surgical History:   Procedure Laterality Date     ABDOMEN SURGERY  2013    ovaian cyst and right ovary removal     APPENDECTOMY       CHOLECYSTECTOMY       COLONOSCOPY N/A 10/26/2017    Procedure: COMBINED COLONOSCOPY, SINGLE OR MULTIPLE BIOPSY/POLYPECTOMY BY BIOPSY;  Colonoscopy with biopsies by biopsy;  Surgeon: Fred Faust DO;  Location: [facility]     EYE SURGERY       GENITOURINARY SURGERY  2013    Ovary, fallopian tube     GYN SURGERY  2013    Ovary, Fallopian tube     INJECT EPIDURAL LUMBAR Bilateral 3/15/2019    Procedure: Inject Facets Lumbar 4-5, Bilateral;  Surgeon: Colin Nolasco MD;  Location: [facility]     INJECT FACET JOINT N/A 10/25/2018    Procedure: Injest Facet Joint Lumbar 4-5 Bilateral;  Surgeon: Colin Nolasco MD;  Location: [facility]     LAPAROSCOPIC LYSIS ADHESIONS Left 3/1/2016    Procedure: LAPAROSCOPIC LYSIS ADHESIONS;  Surgeon: Nate Mishra MD;  Location: [facility]     LAPAROSCOPIC LYSIS ADHESIONS N/A 2017    Procedure: LAPAROSCOPIC LYSIS ADHESIONS;   laparoscopic lysis of adhesions;  Surgeon: Nate Mishra MD;  Location[facility]     LAPAROSCOPIC LYSIS ADHESIONS N/A 2017    Procedure:  LAPAROSCOPIC LYSIS ADHESIONS;   laparoscopic lysis of adhesions;  Surgeon: Nate Milligan MD;  [facility]     LAPAROSCOPIC LYSIS ADHESIONS N/A 5/14/2018    Procedure: LAPAROSCOPIC LYSIS ADHESIONS;;  Surgeon: Nate Mishra MD;  [facility]     LAPAROSCOPIC SALPINGECTOMY Left 5/14/2018    Procedure: LAPAROSCOPIC SALPINGECTOMY;;  Surgeon: Nate Mishra MD;  [facility]     LAPAROSCOPY DIAGNOSTIC (GYN) N/A 5/14/2018    Procedure: LAPAROSCOPY DIAGNOSTIC (GYN);  laparoscopy, left salpingectomy, left ovarian nodule removal and lysis of adhesions;  Surgeon: Nate Mishra MD;  [facility]     RELEASE CARPAL TUNNEL Left 8/31/2016    Procedure: RELEASE CARPAL TUNNEL;  Surgeon: Gucci Hairston MD;  Location[facility]     RELEASE CARPAL TUNNEL Right 12/7/2016    Procedure: RELEASE CARPAL TUNNEL;  Surgeon: Gucci Hairston MD;  Location[facility]        MEDICATIONS:   Current Outpatient Medications:      amLODIPine (NORVASC) 10 MG tablet, Take 10 mg by mouth daily, Disp: , Rfl:      azelastine (ASTELIN) 0.1 % nasal spray, Spray 2 sprays into both nostrils 2 times daily as needed for rhinitis or allergies , Disp: , Rfl:      Cholecalciferol (VITAMIN D3) 50 MCG (2000 UT) CAPS, Take 2,000 Units by mouth daily, Disp: , Rfl:      estradiol (FEMPATCH) 0.025 MG/24HR weekly patch, Apply 1 patch topically every 7 days Thursdays, Disp: , Rfl:      hydrochlorothiazide (HYDRODIURIL) 25 MG tablet, Take 25 mg by mouth daily, Disp: , Rfl:      naproxen sodium (ANAPROX) 550 MG tablet, Take 550 mg by mouth 2 times daily as needed, Disp: , Rfl:      Probiotic Product (PROBIOTIC DAILY PO), Take by mouth At Bedtime , Disp: , Rfl:      cefdinir (OMNICEF) 300 MG capsule, Take 1 capsule (300 mg) by mouth 2 times daily (Patient not taking: Reported on 1/23/2023), Disp: 20 capsule, Rfl: 0     cyclobenzaprine (FLEXERIL) 10 MG tablet, Take 10 mg by mouth 3 times daily as needed for muscle spasms  (Patient not taking: Reported on 2/21/2023), Disp: , Rfl:      EPINEPHrine (EPIPEN) 0.3 MG/0.3ML injection, Inject 0.3 mLs (0.3 mg) into the muscle once as needed, Disp: 1 each, Rfl: 0     Lidocaine (LIDOCARE) 4 % Patch, Place 1 patch onto the skin every 8 hours as needed for moderate pain Salon Pas is the brand name of the patch and can be purchased without a prescription. (Patient not taking: Reported on 2/21/2023), Disp: , Rfl:      losartan (COZAAR) 100 MG tablet, Take 100 mg by mouth daily, Disp: , Rfl:      methylPREDNISolone (MEDROL DOSEPAK) 4 MG tablet therapy pack, Take as directed (Patient not taking: Reported on 1/23/2023), Disp: 21 tablet, Rfl: 0     predniSONE (DELTASONE) 20 MG tablet, Take 2 tablets (40 mg) by mouth daily (Patient not taking: Reported on 1/23/2023), Disp: 10 tablet, Rfl: 0     VENTOLIN  (90 Base) MCG/ACT inhaler, Inhale 2 puffs into the lungs as needed (Patient not taking: Reported on 2/21/2023), Disp: , Rfl:     Current Facility-Administered Medications:      triamcinolone (KENALOG-40) injection 40 mg, 40 mg, , , Claire Duron MD, 40 mg at 05/18/21 1047     ALLERGIES:    Allergies   Allergen Reactions     Bees Swelling     FINGER TIPS TO ELBOWS     Codeine Other (See Comments)     Squeezing pain around trunk/abdomen     Bryan         SOCIAL HISTORY:   Social History     Socioeconomic History     Marital status:      Spouse name: Not on file     Number of children: Not on file     Years of education: Not on file     Highest education level: Not on file   Occupational History     Not on file   Tobacco Use     Smoking status: Every Day     Packs/day: 1.00     Years: 10.00     Pack years: 10.00     Types: Cigarettes     Smokeless tobacco: Never   Substance and Sexual Activity     Alcohol use: No     Alcohol/week: 0.0 standard drinks     Drug use: No     Sexual activity: Yes     Partners: Male     Birth control/protection: None     Comment: Trying to conceive  "  Other Topics Concern     Parent/sibling w/ CABG, MI or angioplasty before 65F 55M? No   Social History Narrative     Not on file     Social Determinants of Health     Financial Resource Strain: Not on file   Food Insecurity: Not on file   Transportation Needs: Not on file   Physical Activity: Not on file   Stress: Not on file   Social Connections: Not on file   Intimate Partner Violence: Not on file   Housing Stability: Not on file        FAMILY HISTORY:   Family History   Problem Relation Age of Onset     Diabetes Father      Hypertension Father      Cardiovascular Mother 29        myocarditis      Other Cancer Brother         unknown origin        EXAM:Vitals: BP (!) 142/80 (BP Location: Left arm, Patient Position: Sitting, Cuff Size: Adult Large)   Ht 1.6 m (5' 3\")   Wt 86.2 kg (190 lb)   LMP 10/26/2018   BMI 33.66 kg/m    BMI= Body mass index is 33.66 kg/m .    General appearance: Patient is alert and fully cooperative with history & exam.  No sign of distress is noted during the visit.     Psychiatric: Affect is pleasant & appropriate.  Patient appears motivated to improve health.     Respiratory: Breathing is regular & unlabored while sitting.     HEENT: Hearing is intact to spoken word.  Speech is clear.  No gross evidence of visual impairment that would impact ambulation.     Vascular: DP & PT pulses are intact & regular bilaterally.  No significant edema or varicosities noted.  CFT and skin temperature is normal to both lower extremities.     Neurologic: Lower extremity sensation is intact to light touch.  No evidence of weakness or contracture in the lower extremities.  No evidence of neuropathy.    Dermatologic: Skin is intact to both lower extremities with adequate texture, turgor and tone about the integument.  No paronychia or evidence of soft tissue infection is noted.     Musculoskeletal: Patient is ambulatory with fracture boot and notes pain over the dorsal prominence of the navicular just " anterior to the right ankle.  There is subtle crepitus with firm manipulation of the midtarsal joint right ankle not the left.  There is reduced edema and reduced discomfort at the navicular about the right ankle.    Xray:  1/23 right ankle demonstrate what appears to be a chronic loose body or osteophyte over the dorsal navicular that possibly has refractured and become aggravated again no step-off is noted but there is sclerotic changes consistent with chronic change.    CT1/23 demonstrates a fairly large loose body possibly 5 mm over the dorsal navicular with punched out lesion that locates this at the talus navicular joint.  Possibly a small fracture fragment in this pocket of this loose body and this appears to be old with chronic sclerotic change.  Similar sclerotic sclerotic changes noted about the anterior process of the calcaneus and cuboid but no full osseous coalition is noted.     ASSESSMENT:       ICD-10-CM    1. Closed nondisplaced fracture of navicular bone of right foot, initial encounter  S92.254A       2. Work related injury  Y99.0            PLAN:  Reviewed patient's [facility] in [facility].      1/31/2023   Interpreted radiographs and CT scan.  No full osseous coalition is identified but there are radiographic changes that appear to be chronic on the anterior process of the calcaneus and cuboid and dorsal navicular.  I suspect this will calm down with immobilization in a fracture boot activities weightbearing as tolerated.  Letter for some restrictions today recommend compression during the day fracture boot for all weightbearing activity.  Follow-up again in 3-4 weeks.  If this is unresolved we may consider excision of this however I suspect it is simply become exacerbated with this misstep.      2/21/2023  Stay in boot for rest and minimal WB  If she calls in next 3 weeks requesting work restrictions this can be provided, if she requests light duty seated work or no work this can be provided.   May begin  physical therapy and PT may remove the boot and progress as tolerated, this is an avulsion of the dorsal navicular  we will keep her in the boot at home and she can begin removing the boot for rest and sleep at 6 weeks post injury.  We discussed alternative treatment options  She may add a lift to the other extremity to help her back and hips be more stable  Offered a rollabout order or wheelchair or further work restrictions  Follow-up in 2 to 3 weeks and repeat imaging ordered future today.      3/14/2023    Needs PT, she has not scheduled this yet.  Stay in booty for WB to tolerance at work in the boot until PT starts then progress out of boot as toelrtated in sturday shoes.  Can stop the boot at home for rest and light duty short distances.   Stay in the fracture boot at work or when leaving the house until PT starts and progresses then progress out of the fracture boot  Follow-up with me in 3 weeks.        Wilder Longoria DPM

## 2023-03-14 NOTE — LETTER
3/14/2023         RE: Virginia Valenzuela  [address] [facility] [id number]        Dear Colleague,    Thank you for referring your patient, Virginia Valenzuela, to the [facility]. Please see a copy of my visit note below.    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 Finished her work day and then reported to ED DEPT. Presents with dorsal and lateral Right foot pain, WB w/tall gray fx boot.  Constant, swelling, bruising, dull ache. Intermittent burning pain 5-7/10.     Rest, elevation, ice. Only wearing fx boot for daily activity. Does not wear fx boot while driving. Some weight bearing at home w/o fx boot.    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 No work has been missed since accident.       ROS:  10 point ROS neg other than the symptoms noted above in the HPI.      Patient Active Problem List   Diagnosis     HAMIDA (obstructive sleep apnea)     Generalized anxiety disorder     Seasonal allergic rhinitis     GERD (gastroesophageal reflux disease)     Vitamin D deficiency     Papanicolaou smear of cervix with atypical squamous cells of undetermined significance (ASC-US)     Hypertension goal BP (blood pressure) < 140/90     Ovarian cyst     Female infertility     Tobacco use disorder     Bilateral carpal tunnel syndrome     Sesamoiditis     Trigger finger, left thumb      Chronic pelvic pain in female       PAST MEDICAL HISTORY:   Past Medical History:   Diagnosis Date     Allergic rhinitis, cause unspecified      Arthritis      ASCUS with positive high risk HPV 3/12/13     Depressive disorder, not elsewhere classified      Dysfunction of eustachian tube      Encounter for therapeutic drug monitoring      Generalized anxiety disorder      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     History of appendectomy      Hypertension 2000     Idiopathic urticaria      Obstructive sleep apnea (adult) (pediatric)      Other chronic allergic conjunctivitis      Other malaise and fatigue      Pain in joint, shoulder region      S/P laparoscopic cholecystectomy      Special screening examination for human papillomavirus (HPV)      Sprain of tibiofibular (ligament), distal of ankle      Threatened , unspecified as to episode of care      Tobacco use disorder      Unspecified essential hypertension      Unspecified vitamin D deficiency         PAST SURGICAL HISTORY:   Past Surgical History:   Procedure Laterality Date     ABDOMEN SURGERY  2013    ovaian cyst and right ovary removal     APPENDECTOMY       CHOLECYSTECTOMY       COLONOSCOPY N/A 10/26/2017    Procedure: COMBINED COLONOSCOPY, SINGLE OR MULTIPLE BIOPSY/POLYPECTOMY BY BIOPSY;  Colonoscopy with biopsies by biopsy;  Surgeon: Fred Faust DO;  Location:  GI     EYE SURGERY  1979     GENITOURINARY SURGERY  2013    Ovary, fallopian tube     GYN SURGERY  2013    Ovary, Fallopian tube     INJECT EPIDURAL LUMBAR Bilateral 3/15/2019    Procedure: Inject Facets Lumbar 4-5, Bilateral;  Surgeon: Colin Nolasco MD;  Location: [facility]     INJECT FACET JOINT N/A 10/25/2018    Procedure: Injest Facet Joint Lumbar 4-5 Bilateral;  Surgeon: Colin Nolasco MD;  Location[facility]     LAPAROSCOPIC LYSIS ADHESIONS Left 3/1/2016    Procedure: LAPAROSCOPIC LYSIS ADHESIONS;  Surgeon: Nate Mishra MD;  [facility]      LAPAROSCOPIC LYSIS ADHESIONS N/A 7/13/2017    Procedure: LAPAROSCOPIC LYSIS ADHESIONS;   laparoscopic lysis of adhesions;  Surgeon: Nate Mishra MD;  [facility]     LAPAROSCOPIC LYSIS ADHESIONS N/A 7/13/2017    Procedure: LAPAROSCOPIC LYSIS ADHESIONS;   laparoscopic lysis of adhesions;  Surgeon: Nate Milligan MD;  [facility]     LAPAROSCOPIC LYSIS ADHESIONS N/A 5/14/2018    Procedure: LAPAROSCOPIC LYSIS ADHESIONS;;  Surgeon: Nate Mishra MD;  [facility]     LAPAROSCOPIC SALPINGECTOMY Left 5/14/2018    Procedure: LAPAROSCOPIC SALPINGECTOMY;;  Surgeon: Nate Mishra MD;  [facility]     LAPAROSCOPY DIAGNOSTIC (GYN) N/A 5/14/2018    Procedure: LAPAROSCOPY DIAGNOSTIC (GYN);  laparoscopy, left salpingectomy, left ovarian nodule removal and lysis of adhesions;  Surgeon: Nate Mishra MD;  [facility]     RELEASE CARPAL TUNNEL Left 8/31/2016    Procedure: RELEASE CARPAL TUNNEL;  Surgeon: Gucci Hairston MD;  [facility]     RELEASE CARPAL TUNNEL Right 12/7/2016    Procedure: RELEASE CARPAL TUNNEL;  Surgeon: Gucci Hairston MD;  [facility]        MEDICATIONS:   Current Outpatient Medications:      amLODIPine (NORVASC) 10 MG tablet, Take 10 mg by mouth daily, Disp: , Rfl:      azelastine (ASTELIN) 0.1 % nasal spray, Spray 2 sprays into both nostrils 2 times daily as needed for rhinitis or allergies , Disp: , Rfl:      Cholecalciferol (VITAMIN D3) 50 MCG (2000 UT) CAPS, Take 2,000 Units by mouth daily, Disp: , Rfl:      estradiol (FEMPATCH) 0.025 MG/24HR weekly patch, Apply 1 patch topically every 7 days Thursdays, Disp: , Rfl:      hydrochlorothiazide (HYDRODIURIL) 25 MG tablet, Take 25 mg by mouth daily, Disp: , Rfl:      naproxen sodium (ANAPROX) 550 MG tablet, Take 550 mg by mouth 2 times daily as needed, Disp: , Rfl:      Probiotic Product (PROBIOTIC DAILY PO), Take by mouth At Bedtime , Disp: , Rfl:      cefdinir  (OMNICEF) 300 MG capsule, Take 1 capsule (300 mg) by mouth 2 times daily (Patient not taking: Reported on 1/23/2023), Disp: 20 capsule, Rfl: 0     cyclobenzaprine (FLEXERIL) 10 MG tablet, Take 10 mg by mouth 3 times daily as needed for muscle spasms (Patient not taking: Reported on 2/21/2023), Disp: , Rfl:      EPINEPHrine (EPIPEN) 0.3 MG/0.3ML injection, Inject 0.3 mLs (0.3 mg) into the muscle once as needed, Disp: 1 each, Rfl: 0     Lidocaine (LIDOCARE) 4 % Patch, Place 1 patch onto the skin every 8 hours as needed for moderate pain Salon Pas is the brand name of the patch and can be purchased without a prescription. (Patient not taking: Reported on 2/21/2023), Disp: , Rfl:      losartan (COZAAR) 100 MG tablet, Take 100 mg by mouth daily, Disp: , Rfl:      methylPREDNISolone (MEDROL DOSEPAK) 4 MG tablet therapy pack, Take as directed (Patient not taking: Reported on 1/23/2023), Disp: 21 tablet, Rfl: 0     predniSONE (DELTASONE) 20 MG tablet, Take 2 tablets (40 mg) by mouth daily (Patient not taking: Reported on 1/23/2023), Disp: 10 tablet, Rfl: 0     VENTOLIN  (90 Base) MCG/ACT inhaler, Inhale 2 puffs into the lungs as needed (Patient not taking: Reported on 2/21/2023), Disp: , Rfl:     Current Facility-Administered Medications:      triamcinolone (KENALOG-40) injection 40 mg, 40 mg, , , Claire Duron MD, 40 mg at 05/18/21 1047     ALLERGIES:    Allergies   Allergen Reactions     Bees Swelling     FINGER TIPS TO ELBOWS     Codeine Other (See Comments)     Squeezing pain around trunk/abdomen     Towns         SOCIAL HISTORY:   Social History     Socioeconomic History     Marital status:      Spouse name: Not on file     Number of children: Not on file     Years of education: Not on file     Highest education level: Not on file   Occupational History     Not on file   Tobacco Use     Smoking status: Every Day     Packs/day: 1.00     Years: 10.00     Pack years: 10.00     Types: Cigarettes  "    Smokeless tobacco: Never   Substance and Sexual Activity     Alcohol use: No     Alcohol/week: 0.0 standard drinks     Drug use: No     Sexual activity: Yes     Partners: Male     Birth control/protection: None     Comment: Trying to conceive   Other Topics Concern     Parent/sibling w/ CABG, MI or angioplasty before 65F 55M? No   Social History Narrative     Not on file     Social Determinants of Health     Financial Resource Strain: Not on file   Food Insecurity: Not on file   Transportation Needs: Not on file   Physical Activity: Not on file   Stress: Not on file   Social Connections: Not on file   Intimate Partner Violence: Not on file   Housing Stability: Not on file        FAMILY HISTORY:   Family History   Problem Relation Age of Onset     Diabetes Father      Hypertension Father      Cardiovascular Mother 29        myocarditis      Other Cancer Brother         unknown origin        EXAM:Vitals: BP (!) 142/80 (BP Location: Left arm, Patient Position: Sitting, Cuff Size: Adult Large)   Ht 1.6 m (5' 3\")   Wt 86.2 kg (190 lb)   LMP 10/26/2018   BMI 33.66 kg/m    BMI= Body mass index is 33.66 kg/m .    General appearance: Patient is alert and fully cooperative with history & exam.  No sign of distress is noted during the visit.     Psychiatric: Affect is pleasant & appropriate.  Patient appears motivated to improve health.     Respiratory: Breathing is regular & unlabored while sitting.     HEENT: Hearing is intact to spoken word.  Speech is clear.  No gross evidence of visual impairment that would impact ambulation.     Vascular: DP & PT pulses are intact & regular bilaterally.  No significant edema or varicosities noted.  CFT and skin temperature is normal to both lower extremities.     Neurologic: Lower extremity sensation is intact to light touch.  No evidence of weakness or contracture in the lower extremities.  No evidence of neuropathy.    Dermatologic: Skin is intact to both lower extremities with " adequate texture, turgor and tone about the integument.  No paronychia or evidence of soft tissue infection is noted.     Musculoskeletal: Patient is ambulatory with fracture boot and notes pain over the dorsal prominence of the navicular just anterior to the right ankle.  There is subtle crepitus with firm manipulation of the midtarsal joint right ankle not the left.  There is reduced edema and reduced discomfort at the navicular about the right ankle.    Xray:  1/23 right ankle demonstrate what appears to be a chronic loose body or osteophyte over the dorsal navicular that possibly has refractured and become aggravated again no step-off is noted but there is sclerotic changes consistent with chronic change.    CT1/23 demonstrates a fairly large loose body possibly 5 mm over the dorsal navicular with punched out lesion that locates this at the talus navicular joint.  Possibly a small fracture fragment in this pocket of this loose body and this appears to be old with chronic sclerotic change.  Similar sclerotic sclerotic changes noted about the anterior process of the calcaneus and cuboid but no full osseous coalition is noted.     ASSESSMENT:       ICD-10-CM    1. Closed nondisplaced fracture of navicular bone of right foot, initial encounter  S92.254A       2. Work related injury  Y99.0            PLAN:  Reviewed patient's chart in [facility].      1/31/2023   Interpreted radiographs and CT scan.  No full osseous coalition is identified but there are radiographic changes that appear to be chronic on the anterior process of the calcaneus and cuboid and dorsal navicular.  I suspect this will calm down with immobilization in a fracture boot activities weightbearing as tolerated.  Letter for some restrictions today recommend compression during the day fracture boot for all weightbearing activity.  Follow-up again in 3-4 weeks.  If this is unresolved we may consider excision of this however I suspect it is simply become  exacerbated with this misstep.      2/21/2023  Stay in boot for rest and minimal WB  If she calls in next 3 weeks requesting work restrictions this can be provided, if she requests light duty seated work or no work this can be provided.   May begin physical therapy and PT may remove the boot and progress as tolerated, this is an avulsion of the dorsal navicular  we will keep her in the boot at home and she can begin removing the boot for rest and sleep at 6 weeks post injury.  We discussed alternative treatment options  She may add a lift to the other extremity to help her back and hips be more stable  Offered a rollabout order or wheelchair or further work restrictions  Follow-up in 2 to 3 weeks and repeat imaging ordered future today.      3/14/2023    Needs PT, she has not scheduled this yet.  Stay in booty for WB to tolerance at work in the boot until PT starts then progress out of boot as toelrtated in sturday shoes.  Can stop the boot at home for rest and light duty short distances.   Stay in the fracture boot at work or when leaving the house until PT starts and progresses then progress out of the fracture boot  Follow-up with me in 3 weeks.        Wilder Longoria DPM        Again, thank you for allowing me to participate in the care of your patient.        Sincerely,        Wilder Longoria DPM

## 2023-03-31 ENCOUNTER — HOSPITAL ENCOUNTER (OUTPATIENT)
Dept: PHYSICAL THERAPY | Facility: CLINIC | Age: 48
Setting detail: THERAPIES SERIES
Discharge: HOME | End: 2023-03-31
Attending: PODIATRIST
Payer: OTHER MISCELLANEOUS
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
## 2023-03-31 DIAGNOSIS — S92.254A CLOSED NONDISPLACED FRACTURE OF NAVICULAR BONE OF RIGHT FOOT, INITIAL ENCOUNTER: ICD-10-CM
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
## 2023-03-31 PROCEDURE — 97161 PT EVAL LOW COMPLEX 20 MIN: CPT | Mod: GP | Performed by: PHYSICAL THERAPIST

## 2023-03-31 PROCEDURE — 97112 NEUROMUSCULAR REEDUCATION: CPT | Mod: GP | Performed by: PHYSICAL THERAPIST

## 2023-03-31 PROCEDURE — 97110 THERAPEUTIC EXERCISES: CPT | Mod: GP | Performed by: PHYSICAL THERAPIST

## 2023-03-31 NOTE — PROGRESS NOTES
03/31/23 1331   General Information   Type of Visit Initial OP Ortho PT Evaluation   Start of Care Date 03/31/23   Referring Physician Wilder Longoria DPM   Patient/Family Goals Statement get out the boot, walk x 2 miles at [employer], [employer] bike - cross  type bike   Orders Evaluate and Treat   Orders Comment per associated diagnosis   Date of Order 02/21/23   Certification Required? No  (Work comp, authorization AFTER eval)   Medical Diagnosis Work related injury (Y99.0)     Closed nondisplaced fracture of navicular bone of right foot, initial encounter (S92.254A)     Fall (on) (from) unspecified stairs and steps, initial encounter (W10.9XXA)   Surgical/Medical history reviewed Yes   Precautions/Limitations other (see comments)  (provider guidelines for WB and activity)   Weight-Bearing Status - LUE full weight-bearing   Weight-Bearing Status - RUE full weight-bearing   Weight-Bearing Status - LLE full weight-bearing   Weight-Bearing Status - RLE   (progress out of boot as tolerated)   Special Instructions from provider note Stay in booty for WB to tolerance at work in the boot until PT starts then progress out of boot as toelrtated in sturday shoes.  Can stop the boot at home for rest and light duty short distances.   Stay in the fracture boot at work or when leaving the house until PT starts and progresses then progress out of the fracture boot   General Information Comments History: HAMIDA, MARY, GERD, Vitamin D deficiency, HTN, ovarian cyst, tobacco use disorder, bilateral CTS, sesamoditis, trigger finger L thumb, chronic pelvic pain in female.       Present No   Body Part(s)   Body Part(s) Ankle/Foot   Presentation and Etiology   Pertinent history of current problem (include personal factors and/or comorbidities that impact the POC) 46 yo female referred to PT for foot fracture. On 1- , she went down steps at work and missed the last step injuring her R foot. Previous history  of R foot injury but this resolved. Symptoms in feet from low back, neuropathy  and other issues. No pain noted since the first few weeks of injury when swelling subsided. Wearing boot today on R and non support type tennis shoe on L. Wearing boot outside and work. Not wearing boot at home. Concerned about  lump  on dorsal surface of the R foot near navicular.   Impairments H. Impaired gait;G. Impaired balance   Functional Limitations perform activities of daily living;perform required work activities;perform desired leisure / sports activities  (due to to boot,)   Symptom Location no pain   How/Where did it occur With a fall  (see above)   Onset date of current episode/exacerbation 01/23/23   Chronicity New   Pain rating (0-10 point scale) Denies pain   Progression of symptoms since onset: Improved   Current / Previous Interventions   Diagnostic Tests: CT scan;X-ray  (CT from 1-; XR from 3-2023)   X-ray Results Results   X-ray results 1. Small fracture fragment from the dorsal navicular fracture at the  talonavicular joint does not appear appreciably changed in alignment  since the prior study. No definite bridging callus formation is seen.  2. Mild enthesopathic changes of the plantar aponeurosis origin at the  calcaneus again noted.  3. No other changes. Per EPIC report   CT Results Results   CT results Evidence of prior injury to the dorsal talonavicular joint where there is a well-corticated bone fragment which shows incomplete bony bridging. There may be a small superimposed nondisplaced acute fracture through the bone fragment as described in   the appropriate clinical setting. Correlation with point tenderness is recommended.  2.  Bony proliferative change anterior process of the calcaneus with adjacent bone formation of the cuboid does is related to a chronic injury.  3.  No significant joint space narrowing. Per EPIC report   Prior Level of Function   Functional Level Prior Comment independent    Current Level of Function   Current Community Support   ( and 15 yo son)   Patient role/employment history A. 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   Home/community accessibility no issues, other than driving but getting help with that.   Fall Risk Screen   Fall screen completed by PT   Have you fallen 2 or more times in the past year? No   Have you fallen and had an injury in the past year? Yes   Is patient a fall risk? No   Fall screen comments Situational   Abuse Screen (yes response referral indicated)   Feels Unsafe at Home or Work/School no   Feels Threatened by Someone no   Does Anyone Try to Keep You From Having Contact with Others or Doing Things Outside Your Home? no   Physical Signs of Abuse Present no   Functional Scales   Functional Scales Other   Other Scales  50/80   Ankle/Foot Objective Findings   Observation no acute distress   Integumentary very slight swelling with dorsal bump of soft tissue over navicular area R   Ankle/Foot ROM Comment supine: Bilateral: DF: 3 degrees, PF: 50 degrees; adduction: 32 degrees, Abduction: 25 degrees; Measures with towel roll under the ankle. equal movement toe flexion and extension bilaterally.   Palpation non tender except over medial plantar nerve and sural nerve distribution with palpation of these nerve. - she feels this is related more to her back.   Gait/Locomotion antalgic with boot on R foot   Ankle/Foot Strength Comments WNL bilaterally with MRROM   Posture Forward head position   Planned Therapy Interventions   Planned Therapy Interventions gait training;balance training;joint mobilization;manual therapy;neuromuscular re-education;stretching   Planned Modality Interventions   Planned Modality Interventions Comments as needed   Clinical Impression   Criteria for Skilled Therapeutic Interventions Met yes, treatment indicated   PT Diagnosis R foot  decreased WB   Influenced by the following impairments fracture, boot since January 2023   Functional limitations due to impairments walking, steps, squats   Clinical Presentation Stable/Uncomplicated   Clinical Presentation Rationale clinical judgement   Clinical Decision Making (Complexity) Low complexity   Predicted Duration of Therapy Intervention (days/wks) 1-2 times per week x 6 weeks   Risk & Benefits of therapy have been explained Yes   Patient, Family & other staff in agreement with plan of care Yes   Clinical Impression Comments BALANCE: Single leg stand fair minus L: 33 seconds, R: 36 secondsSusimary has been weaning from her boot at home. She is wearing boot outside of home. There is minimal swelling. Good AROM compared to the L with bilateral calf tightness. No pain. Concern about lump on doral aspect of the R foot. She has good strength. Skilled PT for functional ROM and strength, gait pattern and balance.   Education Assessment   Preferred Learning Style Listening;Reading;Demonstration;Pictures/video   Barriers to Learning No barriers   ORTHO GOALS   PT Ortho Eval Goals 1;2;3   Ortho Goal 1   Goal Identifier gait pattern   Goal Description Stephanie will walk with normal pattern without boot 1 x 1/2 miles   Target Date 04/28/23   Ortho Goal 2   Goal Identifier Steps   Goal Description Stephanie will negotiate 10 steps with railings and reciprocally with good ROM and control for getting around home and school   Target Date 04/28/23   Ortho Goal 3   Goal Identifier Walking   Goal Description Stephanie will walk up to 2 miles as she has previously   Target Date 06/16/23   Total Evaluation Time   PT Roma Low Complexity Minutes ([id number]) 19

## 2023-04-04 ENCOUNTER — OFFICE VISIT (OUTPATIENT)
Dept: PODIATRY | Facility: CLINIC | Age: 48
End: 2023-04-04
Payer: OTHER MISCELLANEOUS

## 2023-04-04 VITALS
WEIGHT: 190 LBS | HEIGHT: 63 IN | SYSTOLIC BLOOD PRESSURE: 124 MMHG | DIASTOLIC BLOOD PRESSURE: 80 MMHG | BODY MASS INDEX: 33.66 KG/M2
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
## 2023-04-04 DIAGNOSIS — S92.254A CLOSED NONDISPLACED FRACTURE OF NAVICULAR BONE OF RIGHT FOOT, INITIAL ENCOUNTER: ICD-10-CM

## 2023-04-04 PROCEDURE — 99213 OFFICE O/P EST LOW 20 MIN: CPT | Performed by: PODIATRIST

## 2023-04-04 ASSESSMENT — PAIN SCALES - GENERAL: PAINLEVEL: NO PAIN (0)

## 2023-04-04 NOTE — LETTER
4/4/2023         RE: Virginia Valenzuela  [address] [facility] [id number]        Dear Colleague,    Thank you for referring your patient, Virginia Valenzuela, to the [facility]. Please see a copy of my visit note below.    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 Finished her work day and then reported to ED DEPT. Presents with dorsal and lateral Right foot pain, WB w/tall gray fx boot.  Constant, swelling, bruising, dull ache. Intermittent burning pain 5-7/10.     Rest, elevation, ice. Only wearing fx boot for daily activity. Does not wear fx boot while driving. Some weight bearing at home w/o fx boot.    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 No work has been missed since accident.       ROS:  10 point ROS neg other than the symptoms noted above in the HPI.      Patient Active Problem List   Diagnosis     HAMIDA (obstructive sleep apnea)     Generalized anxiety disorder     Seasonal allergic rhinitis     GERD (gastroesophageal reflux disease)     Vitamin D deficiency     Papanicolaou smear of cervix with atypical squamous cells of undetermined significance (ASC-US)     Hypertension goal BP (blood pressure) < 140/90     Ovarian cyst     Female infertility     Tobacco use disorder     Bilateral carpal tunnel syndrome     Sesamoiditis     Trigger finger, left thumb     Chronic pelvic pain in female       PAST  MEDICAL HISTORY:   Past Medical History:   Diagnosis Date     Allergic rhinitis, cause unspecified      Arthritis      ASCUS with positive high risk HPV 3/12/13     Depressive disorder, not elsewhere classified      Dysfunction of eustachian tube      Encounter for therapeutic drug monitoring      Generalized anxiety disorder      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     History of appendectomy      Hypertension 2000     Idiopathic urticaria      Obstructive sleep apnea (adult) (pediatric)      Other chronic allergic conjunctivitis      Other malaise and fatigue      Pain in joint, shoulder region      S/P laparoscopic cholecystectomy      Special screening examination for human papillomavirus (HPV)      Sprain of tibiofibular (ligament), distal of ankle      Threatened , unspecified as to episode of care      Tobacco use disorder      Unspecified essential hypertension      Unspecified vitamin D deficiency         PAST SURGICAL HISTORY:   Past Surgical History:   Procedure Laterality Date     ABDOMEN SURGERY  2013    ovaian cyst and right ovary removal     APPENDECTOMY       CHOLECYSTECTOMY       COLONOSCOPY N/A 10/26/2017    Procedure: COMBINED COLONOSCOPY, SINGLE OR MULTIPLE BIOPSY/POLYPECTOMY BY BIOPSY;  Colonoscopy with biopsies by biopsy;  Surgeon: Fred Faust DO;  Location:  GI     EYE SURGERY  1979     GENITOURINARY SURGERY  2013    Ovary, fallopian tube     GYN SURGERY  2013    Ovary, Fallopian tube     INJECT EPIDURAL LUMBAR Bilateral 3/15/2019    Procedure: Inject Facets Lumbar 4-5, Bilateral;  Surgeon: Colin Nolasco MD;  Location[facility]     INJECT FACET JOINT N/A 10/25/2018    Procedure: Injest Facet Joint Lumbar 4-5 Bilateral;  Surgeon: Colin Nolasco MD;  Location[facility]     LAPAROSCOPIC LYSIS ADHESIONS Left 3/1/2016    Procedure: LAPAROSCOPIC LYSIS ADHESIONS;  Surgeon: Nate Mishra MD;  [facility]     LAPAROSCOPIC LYSIS ADHESIONS N/A 2017     Procedure: LAPAROSCOPIC LYSIS ADHESIONS;   laparoscopic lysis of adhesions;  Surgeon: Nate Mishra MD;  [facility]     LAPAROSCOPIC LYSIS ADHESIONS N/A 7/13/2017    Procedure: LAPAROSCOPIC LYSIS ADHESIONS;   laparoscopic lysis of adhesions;  Surgeon: Nate Milligan MD;  [facility]     LAPAROSCOPIC LYSIS ADHESIONS N/A 5/14/2018    Procedure: LAPAROSCOPIC LYSIS ADHESIONS;;  Surgeon: Nate Mishra MD;  [facility]     LAPAROSCOPIC SALPINGECTOMY Left 5/14/2018    Procedure: LAPAROSCOPIC SALPINGECTOMY;;  Surgeon: Nate Mishra MD;  [facility]     LAPAROSCOPY DIAGNOSTIC (GYN) N/A 5/14/2018    Procedure: LAPAROSCOPY DIAGNOSTIC (GYN);  laparoscopy, left salpingectomy, left ovarian nodule removal and lysis of adhesions;  Surgeon: Nate Mishra MD;  [facility]     RELEASE CARPAL TUNNEL Left 8/31/2016    Procedure: RELEASE CARPAL TUNNEL;  Surgeon: Gucci Hairston MD;  [facility]     RELEASE CARPAL TUNNEL Right 12/7/2016    Procedure: RELEASE CARPAL TUNNEL;  Surgeon: Gucci Hairston MD;  Location[facility]        MEDICATIONS:   Current Outpatient Medications:      amLODIPine (NORVASC) 10 MG tablet, Take 10 mg by mouth daily, Disp: , Rfl:      azelastine (ASTELIN) 0.1 % nasal spray, Spray 2 sprays into both nostrils 2 times daily as needed for rhinitis or allergies , Disp: , Rfl:      Cholecalciferol (VITAMIN D3) 50 MCG (2000 UT) CAPS, Take 2,000 Units by mouth daily, Disp: , Rfl:      estradiol (FEMPATCH) 0.025 MG/24HR weekly patch, Apply 1 patch topically every 7 days Thursdays, Disp: , Rfl:      hydrochlorothiazide (HYDRODIURIL) 25 MG tablet, Take 25 mg by mouth daily, Disp: , Rfl:      naproxen sodium (ANAPROX) 550 MG tablet, Take 550 mg by mouth 2 times daily as needed, Disp: , Rfl:      Probiotic Product (PROBIOTIC DAILY PO), Take by mouth At Bedtime , Disp: , Rfl:      cefdinir (OMNICEF) 300 MG capsule, Take 1 capsule (300 mg) by  mouth 2 times daily (Patient not taking: Reported on 1/23/2023), Disp: 20 capsule, Rfl: 0     cyclobenzaprine (FLEXERIL) 10 MG tablet, Take 10 mg by mouth 3 times daily as needed for muscle spasms (Patient not taking: Reported on 2/21/2023), Disp: , Rfl:      EPINEPHrine (EPIPEN) 0.3 MG/0.3ML injection, Inject 0.3 mLs (0.3 mg) into the muscle once as needed, Disp: 1 each, Rfl: 0     Lidocaine (LIDOCARE) 4 % Patch, Place 1 patch onto the skin every 8 hours as needed for moderate pain Salon [place] is the brand name of the patch and can be purchased without a prescription. (Patient not taking: Reported on 2/21/2023), Disp: , Rfl:      losartan (COZAAR) 100 MG tablet, Take 100 mg by mouth daily, Disp: , Rfl:      methylPREDNISolone (MEDROL DOSEPAK) 4 MG tablet therapy pack, Take as directed (Patient not taking: Reported on 1/23/2023), Disp: 21 tablet, Rfl: 0     predniSONE (DELTASONE) 20 MG tablet, Take 2 tablets (40 mg) by mouth daily (Patient not taking: Reported on 1/23/2023), Disp: 10 tablet, Rfl: 0     VENTOLIN  (90 Base) MCG/ACT inhaler, Inhale 2 puffs into the lungs as needed (Patient not taking: Reported on 2/21/2023), Disp: , Rfl:     Current Facility-Administered Medications:      triamcinolone (KENALOG-40) injection 40 mg, 40 mg, , , Claire Duron MD, 40 mg at 05/18/21 1047     ALLERGIES:    Allergies   Allergen Reactions     Bees Swelling     FINGER TIPS TO ELBOWS     Codeine Other (See Comments)     Squeezing pain around trunk/abdomen     [place]         SOCIAL HISTORY:   Social History     Socioeconomic History     Marital status:      Spouse name: Not on file     Number of children: Not on file     Years of education: Not on file     Highest education level: Not on file   Occupational History     Not on file   Tobacco Use     Smoking status: Every Day     Packs/day: 1.00     Years: 10.00     Pack years: 10.00     Types: Cigarettes     Smokeless tobacco: Never   Vaping Use     Vaping  "status: Not on file   Substance and Sexual Activity     Alcohol use: No     Alcohol/week: 0.0 standard drinks of alcohol     Drug use: No     Sexual activity: Yes     Partners: Male     Birth control/protection: None     Comment: Trying to conceive   Other Topics Concern     Parent/sibling w/ CABG, MI or angioplasty before 65F 55M? No   Social History Narrative     Not on file     Social Determinants of Health     Financial Resource Strain: Not on file   Food Insecurity: Not on file   Transportation Needs: Not on file   Physical Activity: Not on file   Stress: Not on file   Social Connections: Not on file   Intimate Partner Violence: Not on file   Housing Stability: Not on file        FAMILY HISTORY:   Family History   Problem Relation Age of Onset     Diabetes Father      Hypertension Father      Cardiovascular Mother 29        myocarditis      Other Cancer Brother         unknown origin        EXAM:Vitals: /80 (BP Location: Left arm, Patient Position: Sitting, Cuff Size: Adult Large)   Ht 1.6 m (5' 3\")   Wt 86.2 kg (190 lb)   LMP 10/26/2018   BMI 33.66 kg/m    BMI= Body mass index is 33.66 kg/m .    General appearance: Patient is alert and fully cooperative with history & exam.  No sign of distress is noted during the visit.     Psychiatric: Affect is pleasant & appropriate.  Patient appears motivated to improve health.     Respiratory: Breathing is regular & unlabored while sitting.     HEENT: Hearing is intact to spoken word.  Speech is clear.  No gross evidence of visual impairment that would impact ambulation.     Vascular: DP & PT pulses are intact & regular bilaterally.  No significant edema or varicosities noted.  CFT and skin temperature is normal to both lower extremities.     Neurologic: Lower extremity sensation is intact to light touch.  No evidence of weakness or contracture in the lower extremities.  No evidence of neuropathy.    Dermatologic: Skin is intact to both lower extremities with " adequate texture, turgor and tone about the integument.  No paronychia or evidence of soft tissue infection is noted.     Musculoskeletal: Patient is ambulatory with fracture boot and notes minimal discomfort over the dorsal prominence of the navicular just anterior to the right ankle.  There is subtle crepitus with firm manipulation of the midtarsal joint right ankle not the left.  There is reduced edema and reduced discomfort at the navicular about the right ankle.    Xray:  1/23 right ankle demonstrate what appears to be a chronic loose body or osteophyte over the dorsal navicular that possibly has refractured and become aggravated again no step-off is noted but there is sclerotic changes consistent with chronic change.    CT1/23 demonstrates a fairly large loose body possibly 5 mm over the dorsal navicular with punched out lesion that locates this at the talus navicular joint.  Possibly a small fracture fragment in this pocket of this loose body and this appears to be old with chronic sclerotic change.  Similar sclerotic sclerotic changes noted about the anterior process of the calcaneus and cuboid but no full osseous coalition is noted.     ASSESSMENT:       ICD-10-CM    1. Work related injury  Y99.0       2. Closed nondisplaced fracture of navicular bone of right foot, initial encounter  S92.254A            PLAN:  [facility].      1/31/2023   Interpreted radiographs and CT scan.  No full osseous coalition is identified but there are radiographic changes that appear to be chronic on the anterior process of the calcaneus and cuboid and dorsal navicular.  I suspect this will calm down with immobilization in a fracture boot activities weightbearing as tolerated.  Letter for some restrictions today recommend compression during the day fracture boot for all weightbearing activity.  Follow-up again in 3-4 weeks.  If this is unresolved we may consider excision of this however I suspect it is  simply become exacerbated with this misstep.      2/21/2023  Stay in boot for rest and minimal WB  If she calls in next 3 weeks requesting work restrictions this can be provided, if she requests light duty seated work or no work this can be provided.   May begin physical therapy and PT may remove the boot and progress as tolerated, this is an avulsion of the dorsal navicular  we will keep her in the boot at home and she can begin removing the boot for rest and sleep at 6 weeks post injury.  We discussed alternative treatment options  She may add a lift to the other extremity to help her back and hips be more stable  Offered a rollabout order or wheelchair or further work restrictions  Follow-up in 2 to 3 weeks and repeat imaging ordered future today.      3/14/2023    Needs PT, she has not scheduled this yet.  Stay in boot for WB to tolerance at work in the boot until PT starts then progress out of boot as toelrtated in sturdy shoes.  Can stop the boot at home for rest and light duty short distances.   Stay in fracture boot at work or when leaving the house until PT starts and progresses then progress out of the fracture boot  Follow-up with me in 3 weeks.    4/4/2023  Progress out of the boot now at work  Stay on light duty work as tolerated and progress to regular shoes light duty.   Follow up in 3-4 weeks for final evaluation and determinations.             Wilder Longoria DPM        Again, thank you for allowing me to participate in the care of your patient.        Sincerely,        Wilder Longoria DPM

## 2023-04-04 NOTE — PROGRESS NOTES
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 Finished her work day and then reported to ED DEPT. Presents with dorsal and lateral Right foot pain, WB w/tall gray fx boot.  Constant, swelling, bruising, dull ache. Intermittent burning pain 5-7/10.     Rest, elevation, ice. Only wearing fx boot for daily activity. Does not wear fx boot while driving. Some weight bearing at home w/o fx boot.    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 No work has been missed since accident.       ROS:  10 point ROS neg other than the symptoms noted above in the HPI.      Patient Active Problem List   Diagnosis     HAMIDA (obstructive sleep apnea)     Generalized anxiety disorder     Seasonal allergic rhinitis     GERD (gastroesophageal reflux disease)     Vitamin D deficiency     Papanicolaou smear of cervix with atypical squamous cells of undetermined significance (ASC-US)     Hypertension goal BP (blood pressure) < 140/90     Ovarian cyst     Female infertility     Tobacco use disorder     Bilateral carpal tunnel syndrome     Sesamoiditis     Trigger finger, left thumb     Chronic pelvic pain in female       PAST MEDICAL HISTORY:   Past Medical History:   Diagnosis Date     Allergic rhinitis, cause unspecified      Arthritis      ASCUS with positive high risk HPV 3/12/13     Depressive disorder, not elsewhere classified      Dysfunction of eustachian tube       Encounter for therapeutic drug monitoring      Generalized anxiety disorder      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     History of appendectomy      Hypertension 2000     Idiopathic urticaria      Obstructive sleep apnea (adult) (pediatric)      Other chronic allergic conjunctivitis      Other malaise and fatigue      Pain in joint, shoulder region      S/P laparoscopic cholecystectomy      Special screening examination for human papillomavirus (HPV)      Sprain of tibiofibular (ligament), distal of ankle      Threatened , unspecified as to episode of care      Tobacco use disorder      Unspecified essential hypertension      Unspecified vitamin D deficiency         PAST SURGICAL HISTORY:   Past Surgical History:   Procedure Laterality Date     ABDOMEN SURGERY  2013    ovaian cyst and right ovary removal     APPENDECTOMY       CHOLECYSTECTOMY       COLONOSCOPY N/A 10/26/2017    Procedure: COMBINED COLONOSCOPY, SINGLE OR MULTIPLE BIOPSY/POLYPECTOMY BY BIOPSY;  Colonoscopy with biopsies by biopsy;  Surgeon: Fred Faust DO;  Location:  GI     EYE SURGERY       GENITOURINARY SURGERY  2013    Ovary, fallopian tube     GYN SURGERY  2013    Ovary, Fallopian tube     INJECT EPIDURAL LUMBAR Bilateral 3/15/2019    Procedure: Inject Facets Lumbar 4-5, Bilateral;  Surgeon: Colin Nolasco MD;  Location: [facility]     INJECT FACET JOINT N/A 10/25/2018    Procedure: Injest Facet Joint Lumbar 4-5 Bilateral;  Surgeon: Colin Nolasco MD;  Location[facility]     LAPAROSCOPIC LYSIS ADHESIONS Left 3/1/2016    Procedure: LAPAROSCOPIC LYSIS ADHESIONS;  Surgeon: Nate Mishra MD;  [facility]     LAPAROSCOPIC LYSIS ADHESIONS N/A 2017    Procedure: LAPAROSCOPIC LYSIS ADHESIONS;   laparoscopic lysis of adhesions;  Surgeon: Nate Mishra MD;  [facility]     LAPAROSCOPIC LYSIS ADHESIONS N/A 2017    Procedure: LAPAROSCOPIC LYSIS ADHESIONS;   laparoscopic lysis of  adhesions;  Surgeon: Nate Milligan MD;  [facility]     LAPAROSCOPIC LYSIS ADHESIONS N/A 5/14/2018    Procedure: LAPAROSCOPIC LYSIS ADHESIONS;;  Surgeon: Nate Mishra MD;  [facility]     LAPAROSCOPIC SALPINGECTOMY Left 5/14/2018    Procedure: LAPAROSCOPIC SALPINGECTOMY;;  Surgeon: Nate Mishra MD;  [facility]     LAPAROSCOPY DIAGNOSTIC (GYN) N/A 5/14/2018    Procedure: LAPAROSCOPY DIAGNOSTIC (GYN);  laparoscopy, left salpingectomy, left ovarian nodule removal and lysis of adhesions;  Surgeon: Nate Mishra MD;  [facility]     RELEASE CARPAL TUNNEL Left 8/31/2016    Procedure: RELEASE CARPAL TUNNEL;  Surgeon: Gucci Hairston MD;  [facility]     RELEASE CARPAL TUNNEL Right 12/7/2016    Procedure: RELEASE CARPAL TUNNEL;  Surgeon: Gucci Hairston MD;  [facility]        MEDICATIONS:   Current Outpatient Medications:      amLODIPine (NORVASC) 10 MG tablet, Take 10 mg by mouth daily, Disp: , Rfl:      azelastine (ASTELIN) 0.1 % nasal spray, Spray 2 sprays into both nostrils 2 times daily as needed for rhinitis or allergies , Disp: , Rfl:      Cholecalciferol (VITAMIN D3) 50 MCG (2000 UT) CAPS, Take 2,000 Units by mouth daily, Disp: , Rfl:      estradiol (FEMPATCH) 0.025 MG/24HR weekly patch, Apply 1 patch topically every 7 days Thursdays, Disp: , Rfl:      hydrochlorothiazide (HYDRODIURIL) 25 MG tablet, Take 25 mg by mouth daily, Disp: , Rfl:      naproxen sodium (ANAPROX) 550 MG tablet, Take 550 mg by mouth 2 times daily as needed, Disp: , Rfl:      Probiotic Product (PROBIOTIC DAILY PO), Take by mouth At Bedtime , Disp: , Rfl:      cefdinir (OMNICEF) 300 MG capsule, Take 1 capsule (300 mg) by mouth 2 times daily (Patient not taking: Reported on 1/23/2023), Disp: 20 capsule, Rfl: 0     cyclobenzaprine (FLEXERIL) 10 MG tablet, Take 10 mg by mouth 3 times daily as needed for muscle spasms (Patient not taking: Reported on 2/21/2023), Disp: ,  Rfl:      EPINEPHrine (EPIPEN) 0.3 MG/0.3ML injection, Inject 0.3 mLs (0.3 mg) into the muscle once as needed, Disp: 1 each, Rfl: 0     Lidocaine (LIDOCARE) 4 % Patch, Place 1 patch onto the skin every 8 hours as needed for moderate pain Salon [place] is the brand name of the patch and can be purchased without a prescription. (Patient not taking: Reported on 2/21/2023), Disp: , Rfl:      losartan (COZAAR) 100 MG tablet, Take 100 mg by mouth daily, Disp: , Rfl:      methylPREDNISolone (MEDROL DOSEPAK) 4 MG tablet therapy pack, Take as directed (Patient not taking: Reported on 1/23/2023), Disp: 21 tablet, Rfl: 0     predniSONE (DELTASONE) 20 MG tablet, Take 2 tablets (40 mg) by mouth daily (Patient not taking: Reported on 1/23/2023), Disp: 10 tablet, Rfl: 0     VENTOLIN  (90 Base) MCG/ACT inhaler, Inhale 2 puffs into the lungs as needed (Patient not taking: Reported on 2/21/2023), Disp: , Rfl:     Current Facility-Administered Medications:      triamcinolone (KENALOG-40) injection 40 mg, 40 mg, , , Claire Duron MD, 40 mg at 05/18/21 1047     ALLERGIES:    Allergies   Allergen Reactions     Bees Swelling     FINGER TIPS TO ELBOWS     Codeine Other (See Comments)     Squeezing pain around trunk/abdomen     [place]         SOCIAL HISTORY:   Social History     Socioeconomic History     Marital status:      Spouse name: Not on file     Number of children: Not on file     Years of education: Not on file     Highest education level: Not on file   Occupational History     Not on file   Tobacco Use     Smoking status: Every Day     Packs/day: 1.00     Years: 10.00     Pack years: 10.00     Types: Cigarettes     Smokeless tobacco: Never   Vaping Use     Vaping status: Not on file   Substance and Sexual Activity     Alcohol use: No     Alcohol/week: 0.0 standard drinks of alcohol     Drug use: No     Sexual activity: Yes     Partners: Male     Birth control/protection: None     Comment: Trying to conceive  "  Other Topics Concern     Parent/sibling w/ CABG, MI or angioplasty before 65F 55M? No   Social History Narrative     Not on file     Social Determinants of Health     Financial Resource Strain: Not on file   Food Insecurity: Not on file   Transportation Needs: Not on file   Physical Activity: Not on file   Stress: Not on file   Social Connections: Not on file   Intimate Partner Violence: Not on file   Housing Stability: Not on file        FAMILY HISTORY:   Family History   Problem Relation Age of Onset     Diabetes Father      Hypertension Father      Cardiovascular Mother 29        myocarditis      Other Cancer Brother         unknown origin        EXAM:Vitals: /80 (BP Location: Left arm, Patient Position: Sitting, Cuff Size: Adult Large)   Ht 1.6 m (5' 3\")   Wt 86.2 kg (190 lb)   LMP 10/26/2018   BMI 33.66 kg/m    BMI= Body mass index is 33.66 kg/m .    General appearance: Patient is alert and fully cooperative with history & exam.  No sign of distress is noted during the visit.     Psychiatric: Affect is pleasant & appropriate.  Patient appears motivated to improve health.     Respiratory: Breathing is regular & unlabored while sitting.     HEENT: Hearing is intact to spoken word.  Speech is clear.  No gross evidence of visual impairment that would impact ambulation.     Vascular: DP & PT pulses are intact & regular bilaterally.  No significant edema or varicosities noted.  CFT and skin temperature is normal to both lower extremities.     Neurologic: Lower extremity sensation is intact to light touch.  No evidence of weakness or contracture in the lower extremities.  No evidence of neuropathy.    Dermatologic: Skin is intact to both lower extremities with adequate texture, turgor and tone about the integument.  No paronychia or evidence of soft tissue infection is noted.     Musculoskeletal: Patient is ambulatory with fracture boot and notes minimal discomfort over the dorsal prominence of the " navicular just anterior to the right ankle.  There is subtle crepitus with firm manipulation of the midtarsal joint right ankle not the left.  There is reduced edema and reduced discomfort at the navicular about the right ankle.    Xray:  1/23 right ankle demonstrate what appears to be a chronic loose body or osteophyte over the dorsal navicular that possibly has refractured and become aggravated again no step-off is noted but there is sclerotic changes consistent with chronic change.    CT1/23 demonstrates a fairly large loose body possibly 5 mm over the dorsal navicular with punched out lesion that locates this at the talus navicular joint.  Possibly a small fracture fragment in this pocket of this loose body and this appears to be old with chronic sclerotic change.  Similar sclerotic sclerotic changes noted about the anterior process of the calcaneus and cuboid but no full osseous coalition is noted.     ASSESSMENT:       ICD-10-CM    1. Work related injury  Y99.0       2. Closed nondisplaced fracture of navicular bone of right foot, initial encounter  S92.254A            PLAN:  Reviewed patient's chart in [facility].      1/31/2023   Interpreted radiographs and CT scan.  No full osseous coalition is identified but there are radiographic changes that appear to be chronic on the anterior process of the calcaneus and cuboid and dorsal navicular.  I suspect this will calm down with immobilization in a fracture boot activities weightbearing as tolerated.  Letter for some restrictions today recommend compression during the day fracture boot for all weightbearing activity.  Follow-up again in 3-4 weeks.  If this is unresolved we may consider excision of this however I suspect it is simply become exacerbated with this misstep.      2/21/2023  Stay in boot for rest and minimal WB  If she calls in next 3 weeks requesting work restrictions this can be provided, if she requests light duty seated work or no work this can be  provided.   May begin physical therapy and PT may remove the boot and progress as tolerated, this is an avulsion of the dorsal navicular  we will keep her in the boot at home and she can begin removing the boot for rest and sleep at 6 weeks post injury.  We discussed alternative treatment options  She may add a lift to the other extremity to help her back and hips be more stable  Offered a rollabout order or wheelchair or further work restrictions  Follow-up in 2 to 3 weeks and repeat imaging ordered future today.      3/14/2023    Needs PT, she has not scheduled this yet.  Stay in boot for WB to tolerance at work in the boot until PT starts then progress out of boot as toelrtated in sturdy shoes.  Can stop the boot at home for rest and light duty short distances.   Stay in fracture boot at work or when leaving the house until PT starts and progresses then progress out of the fracture boot  Follow-up with me in 3 weeks.    4/4/2023  Progress out of the boot now at work  Stay on light duty work as tolerated and progress to regular shoes light duty.   Follow up in 3-4 weeks for final evaluation and determinations.             Wilder Longoria DPM

## 2023-04-21 ENCOUNTER — HOSPITAL ENCOUNTER (OUTPATIENT)
Dept: PHYSICAL THERAPY | Facility: CLINIC | Age: 48
Setting detail: THERAPIES SERIES
Discharge: HOME | End: 2023-04-21
Attending: PODIATRIST
Payer: OTHER MISCELLANEOUS

## 2023-04-21 PROCEDURE — 97110 THERAPEUTIC EXERCISES: CPT | Mod: GP | Performed by: PHYSICAL THERAPIST

## 2023-04-28 ENCOUNTER — HOSPITAL ENCOUNTER (OUTPATIENT)
Dept: PHYSICAL THERAPY | Facility: CLINIC | Age: 48
Setting detail: THERAPIES SERIES
Discharge: HOME | End: 2023-04-28
Attending: PODIATRIST
Payer: OTHER MISCELLANEOUS

## 2023-04-28 PROCEDURE — 97112 NEUROMUSCULAR REEDUCATION: CPT | Mod: GP | Performed by: PHYSICAL THERAPIST

## 2023-05-02 ENCOUNTER — OFFICE VISIT (OUTPATIENT)
Dept: PODIATRY | Facility: CLINIC | Age: 48
End: 2023-05-02
Payer: OTHER MISCELLANEOUS

## 2023-05-02 VITALS
BODY MASS INDEX: 34.2 KG/M2 | HEIGHT: 63 IN | SYSTOLIC BLOOD PRESSURE: 134 MMHG | DIASTOLIC BLOOD PRESSURE: 80 MMHG | WEIGHT: 193 LBS

## 2023-05-02 DIAGNOSIS — S92.254A CLOSED NONDISPLACED FRACTURE OF NAVICULAR BONE OF RIGHT FOOT, INITIAL ENCOUNTER: ICD-10-CM
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
## 2023-05-02 PROCEDURE — 99213 OFFICE O/P EST LOW 20 MIN: CPT | Performed by: PODIATRIST

## 2023-05-02 ASSESSMENT — PAIN SCALES - GENERAL: PAINLEVEL: NO PAIN (0)

## 2023-05-02 NOTE — LETTER
5/2/2023         RE: Virginia Valenzuela  [address] [facility] [id number]        Dear Colleague,    Thank you for referring your patient, Virginia Valenzuela, to the [facility]. Please see a copy of my visit note below.    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 Finished her work day and then reported to ED DEPT. Presents with dorsal and lateral Right foot pain, WB w/tall gray fx boot.  Constant, swelling, bruising, dull ache. Intermittent burning pain 5-7/10.     Rest, elevation, ice. Only wearing fx boot for daily activity. Does not wear fx boot while driving. Some weight bearing at home w/o fx boot.    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 No work has been missed since accident.       Since last visit she has returned to regular shoe gear regular activities without restrictions.  She is quite pleased with her outcome.    ROS:  10 point ROS neg other than the symptoms noted above in the HPI.      Patient Active Problem List   Diagnosis     HAMIDA (obstructive sleep apnea)     Generalized anxiety disorder     Seasonal allergic rhinitis     GERD (gastroesophageal reflux disease)     Vitamin D deficiency     Papanicolaou smear of cervix with atypical squamous cells of undetermined significance (ASC-US)     Hypertension goal BP (blood pressure) < 140/90     Ovarian cyst     Female infertility     Tobacco use disorder      Bilateral carpal tunnel syndrome     Sesamoiditis     Trigger finger, left thumb     Chronic pelvic pain in female       PAST MEDICAL HISTORY:   Past Medical History:   Diagnosis Date     Allergic rhinitis, cause unspecified      Arthritis      ASCUS with positive high risk HPV 3/12/13     Depressive disorder, not elsewhere classified      Dysfunction of eustachian tube      Encounter for therapeutic drug monitoring      Generalized anxiety disorder      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     History of appendectomy      Hypertension 2000     Idiopathic urticaria      Obstructive sleep apnea (adult) (pediatric)      Other chronic allergic conjunctivitis      Other malaise and fatigue      Pain in joint, shoulder region      S/P laparoscopic cholecystectomy      Special screening examination for human papillomavirus (HPV)      Sprain of tibiofibular (ligament), distal of ankle      Threatened , unspecified as to episode of care      Tobacco use disorder      Unspecified essential hypertension      Unspecified vitamin D deficiency         PAST SURGICAL HISTORY:   Past Surgical History:   Procedure Laterality Date     ABDOMEN SURGERY  2013    ovaian cyst and right ovary removal     APPENDECTOMY       CHOLECYSTECTOMY       COLONOSCOPY N/A 10/26/2017    Procedure: COMBINED COLONOSCOPY, SINGLE OR MULTIPLE BIOPSY/POLYPECTOMY BY BIOPSY;  Colonoscopy with biopsies by biopsy;  Surgeon: Fred Faust DO;  Location:  GI     EYE SURGERY  1979     GENITOURINARY SURGERY  2013    Ovary, fallopian tube     GYN SURGERY  2013    Ovary, Fallopian tube     INJECT EPIDURAL LUMBAR Bilateral 3/15/2019    Procedure: Inject Facets Lumbar 4-5, Bilateral;  Surgeon: Colin Nolasco MD;  Location: [facility]     INJECT FACET JOINT N/A 10/25/2018    Procedure: Injest Facet Joint Lumbar 4-5 Bilateral;  Surgeon: Colin Nolasco MD;  Location[facility]     LAPAROSCOPIC LYSIS ADHESIONS Left 3/1/2016    Procedure:  LAPAROSCOPIC LYSIS ADHESIONS;  Surgeon: Nate Mishra MD;  [facility]     LAPAROSCOPIC LYSIS ADHESIONS N/A 7/13/2017    Procedure: LAPAROSCOPIC LYSIS ADHESIONS;   laparoscopic lysis of adhesions;  Surgeon: Nate Mishra MD;  [facility]     LAPAROSCOPIC LYSIS ADHESIONS N/A 7/13/2017    Procedure: LAPAROSCOPIC LYSIS ADHESIONS;   laparoscopic lysis of adhesions;  Surgeon: Nate Milligan MD;  [facility]     LAPAROSCOPIC LYSIS ADHESIONS N/A 5/14/2018    Procedure: LAPAROSCOPIC LYSIS ADHESIONS;;  Surgeon: Nate Mishra MD;  [facility]     LAPAROSCOPIC SALPINGECTOMY Left 5/14/2018    Procedure: LAPAROSCOPIC SALPINGECTOMY;;  Surgeon: Nate Mishra MD;  [facility]     LAPAROSCOPY DIAGNOSTIC (GYN) N/A 5/14/2018    Procedure: LAPAROSCOPY DIAGNOSTIC (GYN);  laparoscopy, left salpingectomy, left ovarian nodule removal and lysis of adhesions;  Surgeon: Nate Mishra MD;  [facility]     RELEASE CARPAL TUNNEL Left 8/31/2016    Procedure: RELEASE CARPAL TUNNEL;  Surgeon: Gucic Hairston MD;  [facility]     RELEASE CARPAL TUNNEL Right 12/7/2016    Procedure: RELEASE CARPAL TUNNEL;  Surgeon: Gucci Hairston MD;  Location[facility]        MEDICATIONS:   Current Outpatient Medications:      amLODIPine (NORVASC) 10 MG tablet, Take 10 mg by mouth daily, Disp: , Rfl:      azelastine (ASTELIN) 0.1 % nasal spray, Spray 2 sprays into both nostrils 2 times daily as needed for rhinitis or allergies , Disp: , Rfl:      Cholecalciferol (VITAMIN D3) 50 MCG (2000 UT) CAPS, Take 2,000 Units by mouth daily, Disp: , Rfl:      estradiol (FEMPATCH) 0.025 MG/24HR weekly patch, Apply 1 patch topically every 7 days Thursdays, Disp: , Rfl:      hydrochlorothiazide (HYDRODIURIL) 25 MG tablet, Take 25 mg by mouth daily, Disp: , Rfl:      losartan (COZAAR) 100 MG tablet, Take 100 mg by mouth daily, Disp: , Rfl:      naproxen sodium (ANAPROX) 550 MG  tablet, Take 550 mg by mouth 2 times daily as needed, Disp: , Rfl:      Probiotic Product (PROBIOTIC DAILY PO), Take by mouth At Bedtime , Disp: , Rfl:      cefdinir (OMNICEF) 300 MG capsule, Take 1 capsule (300 mg) by mouth 2 times daily (Patient not taking: Reported on 1/23/2023), Disp: 20 capsule, Rfl: 0     cyclobenzaprine (FLEXERIL) 10 MG tablet, Take 10 mg by mouth 3 times daily as needed for muscle spasms (Patient not taking: Reported on 2/21/2023), Disp: , Rfl:      EPINEPHrine (EPIPEN) 0.3 MG/0.3ML injection, Inject 0.3 mLs (0.3 mg) into the muscle once as needed, Disp: 1 each, Rfl: 0     Lidocaine (LIDOCARE) 4 % Patch, Place 1 patch onto the skin every 8 hours as needed for moderate pain Sal[place] is the brand name of the patch and can be purchased without a prescription. (Patient not taking: Reported on 2/21/2023), Disp: , Rfl:      methylPREDNISolone (MEDROL DOSEPAK) 4 MG tablet therapy pack, Take as directed (Patient not taking: Reported on 1/23/2023), Disp: 21 tablet, Rfl: 0     predniSONE (DELTASONE) 20 MG tablet, Take 2 tablets (40 mg) by mouth daily (Patient not taking: Reported on 1/23/2023), Disp: 10 tablet, Rfl: 0     VENTOLIN  (90 Base) MCG/ACT inhaler, Inhale 2 puffs into the lungs as needed (Patient not taking: Reported on 2/21/2023), Disp: , Rfl:     Current Facility-Administered Medications:      triamcinolone (KENALOG-40) injection 40 mg, 40 mg, , , Claire Duron MD, 40 mg at 05/18/21 1047     ALLERGIES:    Allergies   Allergen Reactions     Bees Swelling     FINGER TIPS TO ELBOWS     Codeine Other (See Comments)     Squeezing pain around trunk/abdomen     [place]         SOCIAL HISTORY:   Social History     Socioeconomic History     Marital status:      Spouse name: Not on file     Number of children: Not on file     Years of education: Not on file     Highest education level: Not on file   Occupational History     Not on file   Tobacco Use     Smoking status: Every  "Day     Packs/day: 1.00     Years: 10.00     Pack years: 10.00     Types: Cigarettes     Smokeless tobacco: Never   Vaping Use     Vaping status: Not on file   Substance and Sexual Activity     Alcohol use: No     Alcohol/week: 0.0 standard drinks of alcohol     Drug use: No     Sexual activity: Yes     Partners: Male     Birth control/protection: None     Comment: Trying to conceive   Other Topics Concern     Parent/sibling w/ CABG, MI or angioplasty before 65F 55M? No   Social History Narrative     Not on file     Social Determinants of Health     Financial Resource Strain: Not on file   Food Insecurity: Not on file   Transportation Needs: Not on file   Physical Activity: Not on file   Stress: Not on file   Social Connections: Not on file   Intimate Partner Violence: Not on file   Housing Stability: Not on file        FAMILY HISTORY:   Family History   Problem Relation Age of Onset     Diabetes Father      Hypertension Father      Cardiovascular Mother 29        myocarditis      Other Cancer Brother         unknown origin        EXAM:Vitals: /80 (BP Location: Left arm, Patient Position: Sitting, Cuff Size: Adult Large)   Ht 1.6 m (5' 3\")   Wt 87.5 kg (193 lb)   LMP 10/26/2018   BMI 34.19 kg/m    BMI= Body mass index is 34.19 kg/m .    General appearance: Patient is alert and fully cooperative with history & exam.  No sign of distress is noted during the visit.     Psychiatric: Affect is pleasant & appropriate.  Patient appears motivated to improve health.     Respiratory: Breathing is regular & unlabored while sitting.     HEENT: Hearing is intact to spoken word.  Speech is clear.  No gross evidence of visual impairment that would impact ambulation.     Vascular: DP & PT pulses are intact & regular bilaterally.  No significant edema or varicosities noted.  CFT and skin temperature is normal to both lower extremities.     Neurologic: Lower extremity sensation is intact to light touch.  No evidence of " weakness or contracture in the lower extremities.  No evidence of neuropathy.    Dermatologic: Skin is intact to both lower extremities with adequate texture, turgor and tone about the integument.  No paronychia or evidence of soft tissue infection is noted.     Musculoskeletal: Patient is ambulatory with regular shoe gear and denies any discomfort with direct palpation to the dorsal right navicular.  Full range of motion noted throughout the right foot and ankle without reproduction of discomfort.    Xray:  1/23 right ankle demonstrate what appears to be a chronic loose body or osteophyte over the dorsal navicular that possibly has refractured and become aggravated again no step-off is noted but there is sclerotic changes consistent with chronic change.    CT1/23 demonstrates a fairly large loose body possibly 5 mm over the dorsal navicular with punched out lesion that locates this at the talus navicular joint.  Possibly a small fracture fragment in this pocket of this loose body and this appears to be old with chronic sclerotic change.  Similar sclerotic sclerotic changes noted about the anterior process of the calcaneus and cuboid but no full osseous coalition is noted.     ASSESSMENT:       ICD-10-CM    1. Work related injury  Y99.0       2. Closed nondisplaced fracture of navicular bone of right foot, initial encounter  S92.254A            PLAN:  [facility] in [facility].      1/31/2023   Interpreted radiographs and CT scan.  No full osseous coalition is identified but there are radiographic changes that appear to be chronic on the anterior process of the calcaneus and cuboid and dorsal navicular.  I suspect this will calm down with immobilization in a fracture boot activities weightbearing as tolerated.  Letter for some restrictions today recommend compression during the day fracture boot for all weightbearing activity.  Follow-up again in 3-4 weeks.  If this is unresolved we may consider excision of  this however I suspect it is simply become exacerbated with this misstep.      2/21/2023  Stay in boot for rest and minimal WB  If she calls in next 3 weeks requesting work restrictions this can be provided, if she requests light duty seated work or no work this can be provided.   May begin physical therapy and PT may remove the boot and progress as tolerated, this is an avulsion of the dorsal navicular  we will keep her in the boot at home and she can begin removing the boot for rest and sleep at 6 weeks post injury.  We discussed alternative treatment options  She may add a lift to the other extremity to help her back and hips be more stable  Offered a rollabout order or wheelchair or further work restrictions  Follow-up in 2 to 3 weeks and repeat imaging ordered future today.      3/14/2023    Needs PT, she has not scheduled this yet.  Stay in boot for WB to tolerance at work in the boot until PT starts then progress out of boot as toelrtated in sturdy shoes.  Can stop the boot at home for rest and light duty short distances.   Stay in fracture boot at work or when leaving the house until PT starts and progresses then progress out of the fracture boot  Follow-up with me in 3 weeks.    4/4/2023  Progress out of the boot now at work  Stay on light duty work as tolerated and progress to regular shoes light duty.   Follow up in 3-4 weeks for final evaluation and determinations.      5/2/2023  Patient has reached maximum medical improvement and can return to all activities without restrictions.  Follow-up with any continued questions or concerns but all questions were answered today no limitations no restrictions.  Normal function noted at this time.           Wilder Longoria DPM        Again, thank you for allowing me to participate in the care of your patient.        Sincerely,        Wilder Longoria DPM

## 2023-05-02 NOTE — PROGRESS NOTES
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 Finished her work day and then reported to ED DEPT. Presents with dorsal and lateral Right foot pain, WB w/tall gray fx boot.  Constant, swelling, bruising, dull ache. Intermittent burning pain 5-7/10.     Rest, elevation, ice. Only wearing fx boot for daily activity. Does not wear fx boot while driving. Some weight bearing at home w/o fx boot.    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 No work has been missed since accident.       Since last visit she has returned to regular shoe gear regular activities without restrictions.  She is quite pleased with her outcome.    ROS:  10 point ROS neg other than the symptoms noted above in the HPI.      Patient Active Problem List   Diagnosis     HAMIDA (obstructive sleep apnea)     Generalized anxiety disorder     Seasonal allergic rhinitis     GERD (gastroesophageal reflux disease)     Vitamin D deficiency     Papanicolaou smear of cervix with atypical squamous cells of undetermined significance (ASC-US)     Hypertension goal BP (blood pressure) < 140/90     Ovarian cyst     Female infertility     Tobacco use disorder     Bilateral carpal tunnel syndrome     Sesamoiditis     Trigger finger, left thumb     Chronic pelvic pain in female       PAST MEDICAL HISTORY:   Past Medical History:   Diagnosis Date     Allergic rhinitis, cause unspecified      Arthritis       ASCUS with positive high risk HPV 3/12/13     Depressive disorder, not elsewhere classified      Dysfunction of eustachian tube      Encounter for therapeutic drug monitoring      Generalized anxiety disorder      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     History of appendectomy      Hypertension 2000     Idiopathic urticaria      Obstructive sleep apnea (adult) (pediatric)      Other chronic allergic conjunctivitis      Other malaise and fatigue      Pain in joint, shoulder region      S/P laparoscopic cholecystectomy      Special screening examination for human papillomavirus (HPV)      Sprain of tibiofibular (ligament), distal of ankle      Threatened , unspecified as to episode of care      Tobacco use disorder      Unspecified essential hypertension      Unspecified vitamin D deficiency         PAST SURGICAL HISTORY:   Past Surgical History:   Procedure Laterality Date     ABDOMEN SURGERY  2013    ovaian cyst and right ovary removal     APPENDECTOMY       CHOLECYSTECTOMY       COLONOSCOPY N/A 10/26/2017    Procedure: COMBINED COLONOSCOPY, SINGLE OR MULTIPLE BIOPSY/POLYPECTOMY BY BIOPSY;  Colonoscopy with biopsies by biopsy;  Surgeon: Fred Faust DO;  Location:  GI     EYE SURGERY       GENITOURINARY SURGERY  2013    Ovary, fallopian tube     GYN SURGERY  2013    Ovary, Fallopian tube     INJECT EPIDURAL LUMBAR Bilateral 3/15/2019    Procedure: Inject Facets Lumbar 4-5, Bilateral;  Surgeon: Colin Nolasco MD;  Location[facility]     INJECT FACET JOINT N/A 10/25/2018    Procedure: Injest Facet Joint Lumbar 4-5 Bilateral;  Surgeon: Colin Noalsco MD;  Location[facility]     LAPAROSCOPIC LYSIS ADHESIONS Left 3/1/2016    Procedure: LAPAROSCOPIC LYSIS ADHESIONS;  Surgeon: Nate Mishra MD;  [facility]     LAPAROSCOPIC LYSIS ADHESIONS N/A 2017    Procedure: LAPAROSCOPIC LYSIS ADHESIONS;   laparoscopic lysis of adhesions;  Surgeon: Nate Mishra MD;   [facility]     LAPAROSCOPIC LYSIS ADHESIONS N/A 7/13/2017    Procedure: LAPAROSCOPIC LYSIS ADHESIONS;   laparoscopic lysis of adhesions;  Surgeon: Nate Milligan MD;  [facility]     LAPAROSCOPIC LYSIS ADHESIONS N/A 5/14/2018    Procedure: LAPAROSCOPIC LYSIS ADHESIONS;;  Surgeon: Nate Mishra MD;  [facility]     LAPAROSCOPIC SALPINGECTOMY Left 5/14/2018    Procedure: LAPAROSCOPIC SALPINGECTOMY;;  Surgeon: Nate Mishra MD;  [facility]     LAPAROSCOPY DIAGNOSTIC (GYN) N/A 5/14/2018    Procedure: LAPAROSCOPY DIAGNOSTIC (GYN);  laparoscopy, left salpingectomy, left ovarian nodule removal and lysis of adhesions;  Surgeon: Nate Mishra MD;  [facility]     RELEASE CARPAL TUNNEL Left 8/31/2016    Procedure: RELEASE CARPAL TUNNEL;  Surgeon: Gucci Hairston MD;  [facility]     RELEASE CARPAL TUNNEL Right 12/7/2016    Procedure: RELEASE CARPAL TUNNEL;  Surgeon: Gucci Hairston MD;  [facility]        MEDICATIONS:   Current Outpatient Medications:      amLODIPine (NORVASC) 10 MG tablet, Take 10 mg by mouth daily, Disp: , Rfl:      azelastine (ASTELIN) 0.1 % nasal spray, Spray 2 sprays into both nostrils 2 times daily as needed for rhinitis or allergies , Disp: , Rfl:      Cholecalciferol (VITAMIN D3) 50 MCG (2000 UT) CAPS, Take 2,000 Units by mouth daily, Disp: , Rfl:      estradiol (FEMPATCH) 0.025 MG/24HR weekly patch, Apply 1 patch topically every 7 days Thursdays, Disp: , Rfl:      hydrochlorothiazide (HYDRODIURIL) 25 MG tablet, Take 25 mg by mouth daily, Disp: , Rfl:      losartan (COZAAR) 100 MG tablet, Take 100 mg by mouth daily, Disp: , Rfl:      naproxen sodium (ANAPROX) 550 MG tablet, Take 550 mg by mouth 2 times daily as needed, Disp: , Rfl:      Probiotic Product (PROBIOTIC DAILY PO), Take by mouth At Bedtime , Disp: , Rfl:      cefdinir (OMNICEF) 300 MG capsule, Take 1 capsule (300 mg) by mouth 2 times daily (Patient not taking:  Reported on 1/23/2023), Disp: 20 capsule, Rfl: 0     cyclobenzaprine (FLEXERIL) 10 MG tablet, Take 10 mg by mouth 3 times daily as needed for muscle spasms (Patient not taking: Reported on 2/21/2023), Disp: , Rfl:      EPINEPHrine (EPIPEN) 0.3 MG/0.3ML injection, Inject 0.3 mLs (0.3 mg) into the muscle once as needed, Disp: 1 each, Rfl: 0     Lidocaine (LIDOCARE) 4 % Patch, Place 1 patch onto the skin every 8 hours as needed for moderate pain Salon Pas is the brand name of the patch and can be purchased without a prescription. (Patient not taking: Reported on 2/21/2023), Disp: , Rfl:      methylPREDNISolone (MEDROL DOSEPAK) 4 MG tablet therapy pack, Take as directed (Patient not taking: Reported on 1/23/2023), Disp: 21 tablet, Rfl: 0     predniSONE (DELTASONE) 20 MG tablet, Take 2 tablets (40 mg) by mouth daily (Patient not taking: Reported on 1/23/2023), Disp: 10 tablet, Rfl: 0     VENTOLIN  (90 Base) MCG/ACT inhaler, Inhale 2 puffs into the lungs as needed (Patient not taking: Reported on 2/21/2023), Disp: , Rfl:     Current Facility-Administered Medications:      triamcinolone (KENALOG-40) injection 40 mg, 40 mg, , , Claire Duron MD, 40 mg at 05/18/21 1047     ALLERGIES:    Allergies   Allergen Reactions     Bees Swelling     FINGER TIPS TO ELBOWS     Codeine Other (See Comments)     Squeezing pain around trunk/abdomen     Firth         SOCIAL HISTORY:   Social History     Socioeconomic History     Marital status:      Spouse name: Not on file     Number of children: Not on file     Years of education: Not on file     Highest education level: Not on file   Occupational History     Not on file   Tobacco Use     Smoking status: Every Day     Packs/day: 1.00     Years: 10.00     Pack years: 10.00     Types: Cigarettes     Smokeless tobacco: Never   Vaping Use     Vaping status: Not on file   Substance and Sexual Activity     Alcohol use: No     Alcohol/week: 0.0 standard drinks of alcohol  "    Drug use: No     Sexual activity: Yes     Partners: Male     Birth control/protection: None     Comment: Trying to conceive   Other Topics Concern     Parent/sibling w/ CABG, MI or angioplasty before 65F 55M? No   Social History Narrative     Not on file     Social Determinants of Health     Financial Resource Strain: Not on file   Food Insecurity: Not on file   Transportation Needs: Not on file   Physical Activity: Not on file   Stress: Not on file   Social Connections: Not on file   Intimate Partner Violence: Not on file   Housing Stability: Not on file        FAMILY HISTORY:   Family History   Problem Relation Age of Onset     Diabetes Father      Hypertension Father      Cardiovascular Mother 29        myocarditis      Other Cancer Brother         unknown origin        EXAM:Vitals: /80 (BP Location: Left arm, Patient Position: Sitting, Cuff Size: Adult Large)   Ht 1.6 m (5' 3\")   Wt 87.5 kg (193 lb)   LMP 10/26/2018   BMI 34.19 kg/m    BMI= Body mass index is 34.19 kg/m .    General appearance: Patient is alert and fully cooperative with history & exam.  No sign of distress is noted during the visit.     Psychiatric: Affect is pleasant & appropriate.  Patient appears motivated to improve health.     Respiratory: Breathing is regular & unlabored while sitting.     HEENT: Hearing is intact to spoken word.  Speech is clear.  No gross evidence of visual impairment that would impact ambulation.     Vascular: DP & PT pulses are intact & regular bilaterally.  No significant edema or varicosities noted.  CFT and skin temperature is normal to both lower extremities.     Neurologic: Lower extremity sensation is intact to light touch.  No evidence of weakness or contracture in the lower extremities.  No evidence of neuropathy.    Dermatologic: Skin is intact to both lower extremities with adequate texture, turgor and tone about the integument.  No paronychia or evidence of soft tissue infection is noted. "     Musculoskeletal: Patient is ambulatory with regular shoe gear and denies any discomfort with direct palpation to the dorsal right navicular.  Full range of motion noted throughout the right foot and ankle without reproduction of discomfort.    Xray:  1/23 right ankle demonstrate what appears to be a chronic loose body or osteophyte over the dorsal navicular that possibly has refractured and become aggravated again no step-off is noted but there is sclerotic changes consistent with chronic change.    CT1/23 demonstrates a fairly large loose body possibly 5 mm over the dorsal navicular with punched out lesion that locates this at the talus navicular joint.  Possibly a small fracture fragment in this pocket of this loose body and this appears to be old with chronic sclerotic change.  Similar sclerotic sclerotic changes noted about the anterior process of the calcaneus and cuboid but no full osseous coalition is noted.     ASSESSMENT:       ICD-10-CM    1. Work related injury  Y99.0       2. Closed nondisplaced fracture of navicular bone of right foot, initial encounter  S92.254A            PLAN:  Reviewed patient's chart in [facility].      1/31/2023   Interpreted radiographs and CT scan.  No full osseous coalition is identified but there are radiographic changes that appear to be chronic on the anterior process of the calcaneus and cuboid and dorsal navicular.  I suspect this will calm down with immobilization in a fracture boot activities weightbearing as tolerated.  Letter for some restrictions today recommend compression during the day fracture boot for all weightbearing activity.  Follow-up again in 3-4 weeks.  If this is unresolved we may consider excision of this however I suspect it is simply become exacerbated with this misstep.      2/21/2023  Stay in boot for rest and minimal WB  If she calls in next 3 weeks requesting work restrictions this can be provided, if she requests light duty seated work or no work  this can be provided.   May begin physical therapy and PT may remove the boot and progress as tolerated, this is an avulsion of the dorsal navicular  we will keep her in the boot at home and she can begin removing the boot for rest and sleep at 6 weeks post injury.  We discussed alternative treatment options  She may add a lift to the other extremity to help her back and hips be more stable  Offered a rollabout order or wheelchair or further work restrictions  Follow-up in 2 to 3 weeks and repeat imaging ordered future today.      3/14/2023    Needs PT, she has not scheduled this yet.  Stay in boot for WB to tolerance at work in the boot until PT starts then progress out of boot as toelrtated in sturdy shoes.  Can stop the boot at home for rest and light duty short distances.   Stay in fracture boot at work or when leaving the house until PT starts and progresses then progress out of the fracture boot  Follow-up with me in 3 weeks.    4/4/2023  Progress out of the boot now at work  Stay on light duty work as tolerated and progress to regular shoes light duty.   Follow up in 3-4 weeks for final evaluation and determinations.      5/2/2023  Patient has reached maximum medical improvement and can return to all activities without restrictions.  Follow-up with any continued questions or concerns but all questions were answered today no limitations no restrictions.  Normal function noted at this time.           Wilder Longoria DPM

## 2023-05-02 NOTE — PROGRESS NOTES
[facility]    Outpatient Physical Therapy Progress Note  Patient: Virginia Valenzuela  : 1975    Beginning/End Dates of Reporting Period:  3 sessions between 3- and 2023    Referring Provider: Wilder Longoria DPM    Therapy Diagnosis: Decreased R foot WB     Client Self Report: Doing everything she had been doing prior to the foot and ankle injury. Feels the exercises are helping.    Objective Measurements:  Objective Measure: AROM DF bilaterally, supine with towel roll under ankle  Details: 9 degrees  Objective Measure: MMT  Details: DF: 4+/5, PF: 5/5, Inv: 4/5, Ev: 5/5.  Objective Measure: LEFS -   Details:         Goals:  Goal Identifier gait pattern   Goal Description Stephanie will walk with normal pattern without boot 1 x 1/2 miles   Target Date 23   Date Met  23 (Problem with hip and back)   Progress (detail required for progress note):       Goal Identifier Steps   Goal Description Stephanie will negotiate 10 steps with railings and reciprocally with good ROM and control for getting around home and school   Target Date 23   Date Met  23   Progress (detail required for progress note):       Goal Identifier Walking   Goal Description Stephanie will walk up to 2 miles as she has previously   Target Date 23   Date Met      Progress (detail required for progress note): progressing  - foot better, low back and hips are flared up.     Plan:  Changes to therapy plan of care: Sees Dr. Longoria for follow up 2023. Will review his note and continue if he has recommendations. Otherwise Stephanie feels she is ready to be discharged from PT.     Discharge:  No

## 2023-05-02 NOTE — LETTER
May 2, 2023      Virginia Valenzuela  [address] [place] [id number]        To Whom It May Concern:    Virginia Valenzuela was seen in our clinic. She may return to all activities without restrictions.        Sincerely,        Wilder Longoria DPM

## 2023-05-23 NOTE — PROGRESS NOTES
04/28/23 1431   Appointment Info   Signing clinician's name / credentials Lita Canales PT [facility]   Visits Used 3   Progress Note/Certification   Progress Note Completed Date 04/28/23       Present No   Subjective Report   Subjective Report Doing everything she had been doing prior to the foot and ankle injury. Feels the exercises are helping.   Objective Measures   Objective Measures Objective Measure 1   Objective Measure 1   Objective Measure AROM DF bilaterally, supine with towel roll under ankle   Details 9 degrees   Objective Measure 2   Objective Measure MMT   Details DF: 4+/5, PF: 5/5, Inv: 4/5, Ev: 5/5.   Objective Measure 3   Objective Measure LEFS - 50/80   Details 62/80   Treatment Interventions (PT)   Interventions Neuromuscular Re-education   Neuromuscular Re-education   Neuromuscular re-ed of mvmt, balance, coord, kinesthetic sense, posture, proprioception minutes ([id number]) 33   Skilled Intervention instruction, demonstration, review, activity selection   Patient Response/Progress no questions   Treatment Detail Single leg stand: 3 attempts R: poor control x 7 seconds, L: fair minus control 4 seconds. Home instruction for single leg stand with support near her for safety and toe down as needed->full single leg stand; reviewed home program. Education on progression of exercises -bands, reps increased.   Education   Learner/Method Patient   Education Comments home program, plan of care, goals   Plan   Homework Education in sturdy shoes - how to tell they are sturdy, importance of good arch support   Home program standing or seated gastrocnemius stretch, seated soleus stretch 1 x 10 seconds x 4 x 2-3 times per day, single leg standing, band strengthening for inversion and DF   Updates to plan of care discharge   Medicare Claim Information   Medical Diagnosis Work related injury (Y99.0)     Closed nondisplaced fracture of navicular bone of right foot, initial encounter (S92.254A)      Fall (on) (from) unspecified stairs and steps, initial encounter (W10.9XXA)   PT Diagnosis R foot decreased WB   Start of Care Date 03/31/23   Onset date of current episode/exacerbation 01/23/23   Ortho Goal 1   Goal Identifier gait pattern   Goal Description Stephanie will walk with normal pattern without boot 1 x 1/2 miles   Target Date 04/28/23   Date Met 04/28/23  (Problem with hip and back)   Ortho Goal 2   Goal Identifier Steps   Goal Description Stephanie will negotiate 10 steps with railings and reciprocally with good ROM and control for getting around home and school   Target Date 04/28/23   Date Met 04/21/23   Ortho Goal 3   Goal Identifier Walking   Goal Description Stephanie will walk up to 2 miles as she has previously   Target Date 06/16/23   Goal Progress progressing  - foot better, low back and hips are flared up.   Session Number   Authorization status Work comp - auth sent, eval only         DISCHARGE  Reason for Discharge: Patient has met all goals.    Equipment Issued: band    Discharge Plan: Patient to continue home program.  Released to no restrictions.     Referring Provider:  Wilder Longoria

## 2023-07-23 ENCOUNTER — HOSPITAL ENCOUNTER (EMERGENCY)
Facility: CLINIC | Age: 48
Discharge: HOME | End: 2023-07-23
Attending: EMERGENCY MEDICINE | Admitting: EMERGENCY MEDICINE
Payer: COMMERCIAL

## 2023-07-23 VITALS
HEART RATE: 77 BPM | RESPIRATION RATE: 19 BRPM | WEIGHT: 194.5 LBS | OXYGEN SATURATION: 97 % | BODY MASS INDEX: 34.45 KG/M2 | SYSTOLIC BLOOD PRESSURE: 137 MMHG | DIASTOLIC BLOOD PRESSURE: 77 MMHG | TEMPERATURE: 97.8 F

## 2023-07-23 DIAGNOSIS — M54.16 LUMBAR RADICULOPATHY: ICD-10-CM

## 2023-07-23 PROCEDURE — 99284 EMERGENCY DEPT VISIT MOD MDM: CPT | Performed by: EMERGENCY MEDICINE

## 2023-07-23 PROCEDURE — 96372 THER/PROPH/DIAG INJ SC/IM: CPT | Performed by: EMERGENCY MEDICINE

## 2023-07-23 PROCEDURE — 99284 EMERGENCY DEPT VISIT MOD MDM: CPT | Mod: 25 | Performed by: EMERGENCY MEDICINE

## 2023-07-23 PROCEDURE — 250N000011 HC RX IP 250 OP 636: Performed by: EMERGENCY MEDICINE

## 2023-07-23 RX ORDER — METHYLPREDNISOLONE 4 MG
TABLET, DOSE PACK ORAL
Qty: 21 TABLET | Refills: 0 | Status: SHIPPED | OUTPATIENT
Start: 2023-07-23 | End: 2023-08-24

## 2023-07-23 RX ORDER — OXYCODONE HYDROCHLORIDE 5 MG/1
5 TABLET ORAL EVERY 6 HOURS PRN
Qty: 12 TABLET | Refills: 0 | Status: SHIPPED | OUTPATIENT
Start: 2023-07-23 | End: 2023-07-26

## 2023-07-23 RX ORDER — CYCLOBENZAPRINE HCL 10 MG
10 TABLET ORAL 3 TIMES DAILY PRN
Qty: 20 TABLET | Refills: 0 | Status: SHIPPED | OUTPATIENT
Start: 2023-07-23 | End: 2023-07-29

## 2023-07-23 RX ORDER — LORAZEPAM 2 MG/ML
1 INJECTION INTRAMUSCULAR ONCE
Status: COMPLETED | OUTPATIENT
Start: 2023-07-23 | End: 2023-07-23

## 2023-07-23 RX ADMIN — HYDROMORPHONE HYDROCHLORIDE 1 MG: 1 INJECTION, SOLUTION INTRAMUSCULAR; INTRAVENOUS; SUBCUTANEOUS at 14:29

## 2023-07-23 RX ADMIN — LORAZEPAM 1 MG: 2 INJECTION INTRAMUSCULAR; INTRAVENOUS at 14:29

## 2023-07-23 ASSESSMENT — ACTIVITIES OF DAILY LIVING (ADL): ADLS_ACUITY_SCORE: 35

## 2023-07-23 NOTE — ED TRIAGE NOTES
Pt presents with left hip pain that now radiates for thigh and into groin, pain was been worsening in radation, back pain is always there, reminiscent of cyst from years past. VSS, severe pain. No Prescription meds for pain     Triage Assessment     Row Name 07/23/23 1326       Triage Assessment (Adult)    Airway WDL WDL       Respiratory WDL    Respiratory WDL WDL       Skin Circulation/Temperature WDL    Skin Circulation/Temperature WDL WDL       Cardiac WDL    Cardiac WDL WDL       Peripheral/Neurovascular WDL    Peripheral Neurovascular WDL WDL       Cognitive/Neuro/Behavioral WDL    Cognitive/Neuro/Behavioral WDL WDL

## 2023-07-23 NOTE — ED PROVIDER NOTES
History     Chief Complaint   Patient presents with    Hip Pain     HPI  Virginai Valenzuela is a 48 year old female with a history of generalized anxiety disorder, chronic pelvic pain, ovarian cysts, surgical lysis of adhesions who presents to the emergency department secondary to left hip pain that radiates down to her thigh and into her groin and has been worsening.  She always has lumbar back pain but usually does not radiate down her leg.  The pain is in the low back and radiates around to the left hip and into the left side.  She also gets some pain down the back of her left leg to the popliteal fossa.  No swelling of the legs.  No recent injury.  No fever chills nausea vomiting diarrhea or other new symptoms.  She has not had any pain meds at home to take.  She has had multiple injections in the lumbar spine in the past.  She had a cyst in her lumbar spine before that was injected with steroids in order to rupture the cyst.  She has seen neurosurgery in the past and they said there was not any surgery that would be helpful.  She did not like the injections as it did not cause much improvement so she stopped going.  They did not think there is anything else they could do for him.  The symptoms are radicular pain are worsened by flexion of the hip and improved with laying flat.  No bowel or bladder incontinence.  She previously saw neurosurgery through [facility] since she goes to the [facility] clinic in [place].  She would like a referral to [facility].    Last MRI of the lumbar spine in 2022:  Impression    IMPRESSION:  1.  Multilevel degenerative changes, as detailed above, without significant interval progression.  2.  No significant spinal canal stenosis at any lumbar level.  3.  Mild left neural foraminal stenosis at L4-5, unchanged.  4.  Improved marrow edema signal within the right L4 and bilateral L5 pedicles.    Allergies:  Allergies   Allergen Reactions    Bees Swelling      FINGER TIPS TO ELBOWS    Codeine Other (See Comments)     Squeezing pain around trunk/abdomen    Pine        Problem List:    Patient Active Problem List    Diagnosis Date Noted    Chronic pelvic pain in female 09/15/2017     Priority: Medium    Trigger finger, left thumb 04/28/2017     Priority: Medium    Sesamoiditis 04/18/2017     Priority: Medium    Bilateral carpal tunnel syndrome 08/11/2016     Priority: Medium    Tobacco use disorder 08/17/2015     Priority: Medium    Ovarian cyst 01/17/2014     Priority: Medium     Problem list name updated by automated process. Provider to review      Female infertility 01/17/2014     Priority: Medium    Hypertension goal BP (blood pressure) < 140/90 11/07/2013     Priority: Medium    Papanicolaou smear of cervix with atypical squamous cells of undetermined significance (ASC-US) 06/27/2013     Priority: Medium     3/12/13 pap ASCUS HR HPV  8/5/13 colposcopy. ASCUS. Plan repeat pap in 6 months  7/6/15 pap NIL/neg HPV. Plan cotest in 3 yrs      GERD (gastroesophageal reflux disease) 06/03/2013     Priority: Medium    Vitamin D deficiency 06/03/2013     Priority: Medium     Problem list name updated by automated process. Provider to review and confirm  Imo Update utility      HAMIDA (obstructive sleep apnea) 05/29/2013     Priority: Medium    Generalized anxiety disorder 05/29/2013     Priority: Medium    Seasonal allergic rhinitis 05/29/2013     Priority: Medium        Past Medical History:    Past Medical History:   Diagnosis Date    Allergic rhinitis, cause unspecified     Arthritis     ASCUS with positive high risk HPV 3/12/13    Depressive disorder, not elsewhere classified     Dysfunction of eustachian tube     Encounter for therapeutic drug monitoring     Generalized anxiety disorder     High risk HPV infection 5/12/09    History of appendectomy     Hypertension 2000    Idiopathic urticaria     Obstructive sleep apnea (adult) (pediatric)     Other chronic allergic  conjunctivitis     Other malaise and fatigue     Pain in joint, shoulder region     S/P laparoscopic cholecystectomy     Special screening examination for human papillomavirus (HPV)     Sprain of tibiofibular (ligament), distal of ankle     Threatened , unspecified as to episode of care     Tobacco use disorder     Unspecified essential hypertension     Unspecified vitamin D deficiency        Past Surgical History:    Past Surgical History:   Procedure Laterality Date    ABDOMEN SURGERY  2013    ovaian cyst and right ovary removal    APPENDECTOMY      CHOLECYSTECTOMY      COLONOSCOPY N/A 10/26/2017    Procedure: COMBINED COLONOSCOPY, SINGLE OR MULTIPLE BIOPSY/POLYPECTOMY BY BIOPSY;  Colonoscopy with biopsies by biopsy;  Surgeon: Fred Faust DO;  [facility]: [facility]    EYE SURGERY  1979    GENITOURINARY SURGERY  2013    Ovary, fallopian tube    GYN SURGERY  2013    Ovary, Fallopian tube    INJECT EPIDURAL LUMBAR Bilateral 3/15/2019    Procedure: Inject Facets Lumbar 4-5, Bilateral;  Surgeon: Colin Nolasco MD;  [facility]    INJECT FACET JOINT N/A 10/25/2018    Procedure: Injest Facet Joint Lumbar 4-5 Bilateral;  Surgeon: Colin Nolasco MD;  [facility]    LAPAROSCOPIC LYSIS ADHESIONS Left 3/1/2016    Procedure: LAPAROSCOPIC LYSIS ADHESIONS;  Surgeon: Nate Mishra MD;  [facility]    LAPAROSCOPIC LYSIS ADHESIONS N/A 2017    Procedure: LAPAROSCOPIC LYSIS ADHESIONS;   laparoscopic lysis of adhesions;  Surgeon: Nate Mishra MD;  [facility]    LAPAROSCOPIC LYSIS ADHESIONS N/A 2017    Procedure: LAPAROSCOPIC LYSIS ADHESIONS;   laparoscopic lysis of adhesions;  Surgeon: Nate Milligan MD;  [facility]    LAPAROSCOPIC LYSIS ADHESIONS N/A 2018    Procedure: LAPAROSCOPIC LYSIS ADHESIONS;;  Surgeon: Nate Mishra MD;  [facility]    LAPAROSCOPIC SALPINGECTOMY Left 2018    Procedure: LAPAROSCOPIC  SALPINGECTOMY;;  Surgeon: Nate Mishra MD;  [facility]    LAPAROSCOPY DIAGNOSTIC (GYN) N/A 5/14/2018    Procedure: LAPAROSCOPY DIAGNOSTIC (GYN);  laparoscopy, left salpingectomy, left ovarian nodule removal and lysis of adhesions;  Surgeon: Nate Mishra MD;  [facility]    RELEASE CARPAL TUNNEL Left 8/31/2016    Procedure: RELEASE CARPAL TUNNEL;  Surgeon: Gucci Hairston MD;  [facility]    RELEASE CARPAL TUNNEL Right 12/7/2016    Procedure: RELEASE CARPAL TUNNEL;  Surgeon: Gucci Hairston MD;  [facility]       Family History:    Family History   Problem Relation Age of Onset    Diabetes Father     Hypertension Father     Cardiovascular Mother 29        myocarditis     Other Cancer Brother         unknown origin       Social History:  Marital Status:   [2]  Social History     Tobacco Use    Smoking status: Every Day     Packs/day: 1.00     Years: 10.00     Pack years: 10.00     Types: Cigarettes    Smokeless tobacco: Never   Substance Use Topics    Alcohol use: No     Alcohol/week: 0.0 standard drinks of alcohol    Drug use: No        Medications:    cyclobenzaprine (FLEXERIL) 10 MG tablet  methylPREDNISolone (MEDROL DOSEPAK) 4 MG tablet therapy pack  oxyCODONE (ROXICODONE) 5 MG tablet  amLODIPine (NORVASC) 10 MG tablet  azelastine (ASTELIN) 0.1 % nasal spray  cefdinir (OMNICEF) 300 MG capsule  Cholecalciferol (VITAMIN D3) 50 MCG (2000 UT) CAPS  EPINEPHrine (EPIPEN) 0.3 MG/0.3ML injection  estradiol (FEMPATCH) 0.025 MG/24HR weekly patch  hydrochlorothiazide (HYDRODIURIL) 25 MG tablet  Lidocaine (LIDOCARE) 4 % Patch  losartan (COZAAR) 100 MG tablet  naproxen sodium (ANAPROX) 550 MG tablet  predniSONE (DELTASONE) 20 MG tablet  Probiotic Product (PROBIOTIC DAILY PO)  VENTOLIN  (90 Base) MCG/ACT inhaler          Review of Systems   All other systems reviewed and are negative.      Physical Exam   BP: (!) 150/97  Pulse: 100  Temp: 97.8  F (36.6   C)  Resp: 18  Weight: 88.2 kg (194 lb 8 oz)  SpO2: 99 %      Physical Exam  Vitals and nursing note reviewed.   Constitutional:       General: She is not in acute distress.     Appearance: She is well-developed.      Comments: Laying flat in the bed not wanting to move.   HENT:      Head: Normocephalic and atraumatic.      Mouth/Throat:      Mouth: Mucous membranes are moist.   Eyes:      General: No scleral icterus.     Conjunctiva/sclera: Conjunctivae normal.   Cardiovascular:      Rate and Rhythm: Normal rate.   Pulmonary:      Effort: Pulmonary effort is normal. No respiratory distress.   Abdominal:      General: Abdomen is flat.      Tenderness: There is no abdominal tenderness. There is no guarding or rebound.   Musculoskeletal:         General: Normal range of motion.      Cervical back: Normal range of motion and neck supple.      Right lower leg: No edema.      Left lower leg: No edema.      Comments: Logroll of the left leg causes discomfort that radiates into the left medial thigh.  No greater trochanteric tenderness.  Pain worsens with any straight leg raise on the left.   Skin:     General: Skin is warm and dry.      Findings: No rash.   Neurological:      General: No focal deficit present.      Mental Status: She is alert and oriented to person, place, and time.   Psychiatric:         Mood and Affect: Mood normal.         ED Course                 Procedures             No results found for this or any previous visit (from the past 24 hour(s)).    Medications   HYDROmorphone (DILAUDID) injection 1 mg (1 mg Intramuscular $Given 7/23/23 1427)   LORazepam (ATIVAN) injection 1 mg (1 mg Intramuscular $Given 7/23/23 1429)       Assessments & Plan (with Medical Decision Making)  Lumbar radiculopathy  After discussion of options with the patient we proceeded with 1 mg of Dilaudid and 1 mg of Ativan IM.  The patient had some improvement in her discomfort.  And she was able to walk in the hallway.  She will be  discharged home in improved condition.  Outpatient referral to neurosurgery placed, home oxycodone and Flexeril, outpatient MRI ordered.  I advised the patient that I will not be following up on the results of the MRI.  She can follow-up with neurosurgery.  Return to ER precautions and fall precautions discussed.  All questions answered prior to discharge.  She was given oxycodone, cyclobenzaprine, Medrol Dosepak for home.  She was discharged home in improved condition.       I have reviewed the nursing notes.    I have reviewed the findings, diagnosis, plan and need for follow up with the patient.          Discharge Medication List as of 7/23/2023  3:01 PM        START taking these medications    Details   oxyCODONE (ROXICODONE) 5 MG tablet Take 1 tablet (5 mg) by mouth every 6 hours as needed for pain, Disp-12 tablet, R-0, E-Prescribe             Final diagnoses:   Lumbar radiculopathy       7/23/2023   [facility] EMERGENCY DEPT       Dimas Acevedo MD  07/23/23 2046

## 2023-07-23 NOTE — DISCHARGE INSTRUCTIONS
I put in a referral to neurosurgery.  I also ordered an outpatient MRI.  Prescriptions for oxycodone Flexeril was sent to the pharmacy.  I hope this gets better quickly.  You can also try a Medrol Dosepak which I sent to the pharmacy.  This is a steroid pack that you may have had in the past.  It was a pleasure to meet you.

## 2023-07-26 ENCOUNTER — HOSPITAL ENCOUNTER (OUTPATIENT)
Dept: MRI IMAGING | Facility: CLINIC | Age: 48
Discharge: HOME | End: 2023-07-26
Attending: EMERGENCY MEDICINE | Admitting: EMERGENCY MEDICINE
Payer: COMMERCIAL

## 2023-07-26 DIAGNOSIS — M54.16 LUMBAR RADICULOPATHY: ICD-10-CM

## 2023-07-26 PROCEDURE — 72148 MRI LUMBAR SPINE W/O DYE: CPT

## 2023-08-10 NOTE — TELEPHONE ENCOUNTER
Action [facility] records   Action Taken Sent fax request     [facility]   Action Taken Sent fax request     Action [facility]   Action Taken Sent fax request     Action Sent new request for all 3 locations;    Action Taken Fax request sent; [facility], [facility], [facility]      Action [facility]   Action Taken Called to check status of records after faxing 3 request. Per rep will send information over today      Action [facility]   Action Taken Records received and loaded into file,and sent to medical records      Action Called [facility]   Action Taken Per rep no records pertaining to lumbar spine for patient.       Action [facility]   Action Taken Called to check status, per rep need new request sent. Faxed new request.       Action Called [facility]   Action Taken Spoke with rep, currently working on request as of 04:16pm      RECORDS RECEIVED FROM: Referred by Dimas Acevedo MD     REASON FOR VISIT: Lumbar radiculopathy   Possible Surgery Consult   Date of Appt: 08/24/2023   NOTES (FOR ALL VISITS) STATUS DETAILS   OFFICE NOTE from referring provider Internal REFERRAL AND OFFICE NOTES IN CHART   OFFICE NOTE from other specialist In process [facility]-injections. Viviane Lucio (within last 3 year)    2020 lumbar PT with [facility]  [facility] -orthopedics.   PT - Yes - [facility] - over a year ago     DISCHARGE SUMMARY from hospital Care Everywhere Ed 2013 fv   DISCHARGE REPORT from the ER Care Everywhere Ed 2013 fv   OPERATIVE REPORT N/A    [facility] ([facility] ONLY) N/A    EMG N/A    EEG N/A    MEDICATION LIST N/A    IMAGING  (FOR ALL VISITS)     LUMBAR PUNCTURE N/A    MYRA SCAN (MOVEMENT) N/A    ULTRASOUND (CAROTID BILAT) *VASCULAR* N/A    MRI (HEAD, NECK, SPINE) Internal MRI Lumbar Spine 7/26/23 - [facility]   CT (HEAD, NECK, SPINE) N/A

## 2023-08-24 ENCOUNTER — PRE VISIT (OUTPATIENT)
Dept: NEUROSURGERY | Facility: CLINIC | Age: 48
End: 2023-08-24

## 2023-08-24 ENCOUNTER — OFFICE VISIT (OUTPATIENT)
Dept: NEUROSURGERY | Facility: CLINIC | Age: 48
End: 2023-08-24
Attending: EMERGENCY MEDICINE
Payer: COMMERCIAL

## 2023-08-24 VITALS
RESPIRATION RATE: 18 BRPM | BODY MASS INDEX: 35.26 KG/M2 | OXYGEN SATURATION: 98 % | SYSTOLIC BLOOD PRESSURE: 128 MMHG | TEMPERATURE: 96.6 F | WEIGHT: 199 LBS | HEIGHT: 63 IN | HEART RATE: 107 BPM | DIASTOLIC BLOOD PRESSURE: 68 MMHG

## 2023-08-24 DIAGNOSIS — M54.16 LUMBAR RADICULOPATHY: ICD-10-CM

## 2023-08-24 PROCEDURE — 99243 OFF/OP CNSLTJ NEW/EST LOW 30: CPT | Performed by: NEUROLOGICAL SURGERY

## 2023-08-24 ASSESSMENT — PAIN SCALES - GENERAL: PAINLEVEL: MODERATE PAIN (5)

## 2023-08-24 NOTE — LETTER
2023         RE: Virginia Valenzuela  [address] [place] [id number]        Dear Colleague,    Thank you for referring your patient, Virginia Valenzuela, to the [facility] [place]. Please see a copy of my visit note below.    I was asked by Dr. Acevedo to see this patient in consultation    48 year old female with L4-5 spondylolisthesis, back and leg pain.  Has had several years of chronic back and left leg pain, much worse in the last several months.  Daily, throbbing, 8/10 low back pain radiating to the left hip, thigh, and shin, much worse in the mornings.  Has done past management with [facility] and [facility], and does regular Chiropractic care.  MR Lumbar, personally reviewed, with L4-5 spondylolisthesis and foraminal stenosis.       Past Medical History:   Diagnosis Date     Allergic rhinitis, cause unspecified      Arthritis      ASCUS with positive high risk HPV 3/12/13     Depressive disorder, not elsewhere classified      Dysfunction of eustachian tube      Encounter for therapeutic drug monitoring      Generalized anxiety disorder      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     History of appendectomy      Hypertension      Idiopathic urticaria      Obstructive sleep apnea (adult) (pediatric)      Other chronic allergic conjunctivitis      Other malaise and fatigue      Pain in joint, shoulder region      S/P laparoscopic cholecystectomy      Special screening examination for human papillomavirus (HPV)      Sprain of tibiofibular (ligament), distal of ankle      Threatened , unspecified as to episode of care      Tobacco use disorder      Unspecified essential hypertension      Unspecified vitamin D deficiency      Past Surgical History:   Procedure Laterality Date     ABDOMEN SURGERY  2013    ovaian cyst and right ovary removal     APPENDECTOMY       CHOLECYSTECTOMY       COLONOSCOPY N/A 10/26/2017    Procedure: COMBINED COLONOSCOPY, SINGLE OR MULTIPLE  BIOPSY/POLYPECTOMY BY BIOPSY;  Colonoscopy with biopsies by biopsy;  Surgeon: Fred Faust DO;  [facility]     EYE SURGERY  1979     GENITOURINARY SURGERY  July 2013    Ovary, fallopian tube     GYN SURGERY  July 2013    Ovary, Fallopian tube     INJECT EPIDURAL LUMBAR Bilateral 3/15/2019    Procedure: Inject Facets Lumbar 4-5, Bilateral;  Surgeon: Colin Nolasco MD;  [facility]     INJECT FACET JOINT N/A 10/25/2018    Procedure: Injest Facet Joint Lumbar 4-5 Bilateral;  Surgeon: Colin Nolasco MD;  [facility]     LAPAROSCOPIC LYSIS ADHESIONS Left 3/1/2016    Procedure: LAPAROSCOPIC LYSIS ADHESIONS;  Surgeon: Nate Mishra MD;  [facility]     LAPAROSCOPIC LYSIS ADHESIONS N/A 7/13/2017    Procedure: LAPAROSCOPIC LYSIS ADHESIONS;   laparoscopic lysis of adhesions;  Surgeon: Nate Mishra MD;  [facility]     LAPAROSCOPIC LYSIS ADHESIONS N/A 7/13/2017    Procedure: LAPAROSCOPIC LYSIS ADHESIONS;   laparoscopic lysis of adhesions;  Surgeon: Nate Milligan MD;  [facility]     LAPAROSCOPIC LYSIS ADHESIONS N/A 5/14/2018    Procedure: LAPAROSCOPIC LYSIS ADHESIONS;;  Surgeon: Nate Mishra MD;  [facility]     LAPAROSCOPIC SALPINGECTOMY Left 5/14/2018    Procedure: LAPAROSCOPIC SALPINGECTOMY;;  Surgeon: Nate Mishra MD;  [facility]     LAPAROSCOPY DIAGNOSTIC (GYN) N/A 5/14/2018    Procedure: LAPAROSCOPY DIAGNOSTIC (GYN);  laparoscopy, left salpingectomy, left ovarian nodule removal and lysis of adhesions;  Surgeon: Nate Mishra MD;  [facility]     RELEASE CARPAL TUNNEL Left 8/31/2016    Procedure: RELEASE CARPAL TUNNEL;  Surgeon: Gucci Hairston MD;  [facility]     RELEASE CARPAL TUNNEL Right 12/7/2016    Procedure: RELEASE CARPAL TUNNEL;  Surgeon: Gucci Hairston MD;  [facility]     Social History     Socioeconomic History     Marital status:      Spouse name: Not on file  "    Number of children: Not on file     Years of education: Not on file     Highest education level: Not on file   Occupational History     Not on file   Tobacco Use     Smoking status: Every Day     Packs/day: 1.00     Years: 10.00     Pack years: 10.00     Types: Cigarettes     Smokeless tobacco: Never   Substance and Sexual Activity     Alcohol use: No     Alcohol/week: 0.0 standard drinks of alcohol     Drug use: No     Sexual activity: Yes     Partners: Male     Birth control/protection: None     Comment: Trying to conceive   Other Topics Concern     Parent/sibling w/ CABG, MI or angioplasty before 65F 55M? No   Social History Narrative     Not on file     Social Determinants of Health     Financial Resource Strain: Not on file   Food Insecurity: Not on file   Transportation Needs: Not on file   Physical Activity: Not on file   Stress: Not on file   Social Connections: Not on file   Intimate Partner Violence: Not on file   Housing Stability: Not on file     Family History   Problem Relation Age of Onset     Diabetes Father      Hypertension Father      Cardiovascular Mother 29        myocarditis      Other Cancer Brother         unknown origin        ROS: 10 point ROS neg other than the symptoms noted above in the HPI.    Physical Exam  /68   Pulse 107   Temp (!) 96.6  F (35.9  C) (Temporal)   Resp 18   Ht 1.6 m (5' 3\")   Wt 90.3 kg (199 lb)   LMP 10/26/2018   SpO2 98%   BMI 35.25 kg/m    HEENT:  Normocephalic, atraumatic.  PERRLA.  EOM s intact.  Visual fields full to gross exam  Neck:  Supple, non-tender, without lymphadenopathy.  Heart:  No peripheral edema  Lungs:  No SOB  Abdomen:  Non-distended.   Skin:  Warm and dry.  Extremities:  No edema, cyanosis or clubbing.  Psychiatric:  No apparent distress  Musculoskeletal:  Normal bulk and tone    NEUROLOGICAL EXAMINATION:     Mental status:  Alert and Oriented x 3, speech is fluent.  Cranial nerves:  II-XII intact.   Motor:    Shoulder Abduction: "  Right:  5/5   Left:  5/5  Biceps:                      Right:  5/5   Left:  5/5  Triceps:                     Right:  5/5   Left:  5/5  Wrist Extensors:       Right:  5/5   Left:  5/5  Wrist Flexors:           Right:  5/5   Left:  5/5  interosseus :            Right:  5/5   Left:  5/5  Hip Flexion:                Right: 5/5  Left:  5/5  Quadriceps:             Right:  5/5  Left:  5/5  Hamstrings:             Right:  5/5  Left:  5/5  Gastroc Soleus:        Right:  5/5  Left:  5/5  Tib/Ant:                      Right:  5/5  Left:  5/5  EHL:                     Right:  5/5  Left:  5/5  Sensation:  Intact  Reflexes:  Negative Babinski.  Negative Clonus.  Negative Omalley's.  Coordination:  Smooth finger to nose testing.   Negative pronator drift.  Smooth tandem walking.    A/P:  48 year old female with L4-5 spondylolisthesis, back and leg pain.    I had a discussion with the patient, reviewing the history, symptoms, and imaging  Will start course of PT  If no improvement, may need to consider L4-5 minimally invasive robotic fusion          Again, thank you for allowing me to participate in the care of your patient.        Sincerely,        Abel Yu MD

## 2023-08-24 NOTE — PROGRESS NOTES
I was asked by Dr. Acevedo to see this patient in consultation    48 year old female with L4-5 spondylolisthesis, back and leg pain.  Has had several years of chronic back and left leg pain, much worse in the last several months.  Daily, throbbing, 8/10 low back pain radiating to the left hip, thigh, and shin, much worse in the mornings.  Has done past management with [facility] and [facility], and does regular Chiropractic care.  MR Lumbar, personally reviewed, with L4-5 spondylolisthesis and foraminal stenosis.       Past Medical History:   Diagnosis Date    Allergic rhinitis, cause unspecified     Arthritis     ASCUS with positive high risk HPV 3/12/13    Depressive disorder, not elsewhere classified     Dysfunction of eustachian tube     Encounter for therapeutic drug monitoring     Generalized anxiety disorder     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    History of appendectomy     Hypertension     Idiopathic urticaria     Obstructive sleep apnea (adult) (pediatric)     Other chronic allergic conjunctivitis     Other malaise and fatigue     Pain in joint, shoulder region     S/P laparoscopic cholecystectomy     Special screening examination for human papillomavirus (HPV)     Sprain of tibiofibular (ligament), distal of ankle     Threatened , unspecified as to episode of care     Tobacco use disorder     Unspecified essential hypertension     Unspecified vitamin D deficiency      Past Surgical History:   Procedure Laterality Date    ABDOMEN SURGERY  2013    ovaian cyst and right ovary removal    APPENDECTOMY      CHOLECYSTECTOMY      COLONOSCOPY N/A 10/26/2017    Procedure: COMBINED COLONOSCOPY, SINGLE OR MULTIPLE BIOPSY/POLYPECTOMY BY BIOPSY;  Colonoscopy with biopsies by biopsy;  Surgeon: Fred Faust DO;  Location:  GI    EYE SURGERY      GENITOURINARY SURGERY  2013    Ovary, fallopian tube    GYN SURGERY  2013    Ovary, Fallopian tube    INJECT EPIDURAL LUMBAR Bilateral 3/15/2019     Procedure: Inject Facets Lumbar 4-5, Bilateral;  Surgeon: Colin Nolasco MD;  [facility]    INJECT FACET JOINT N/A 10/25/2018    Procedure: Injest Facet Joint Lumbar 4-5 Bilateral;  Surgeon: Colin Nolasco MD;  [facility]    LAPAROSCOPIC LYSIS ADHESIONS Left 3/1/2016    Procedure: LAPAROSCOPIC LYSIS ADHESIONS;  Surgeon: Nate Mishra MD;  [facility]    LAPAROSCOPIC LYSIS ADHESIONS N/A 7/13/2017    Procedure: LAPAROSCOPIC LYSIS ADHESIONS;   laparoscopic lysis of adhesions;  Surgeon: Nate Mishra MD;  [facility]    LAPAROSCOPIC LYSIS ADHESIONS N/A 7/13/2017    Procedure: LAPAROSCOPIC LYSIS ADHESIONS;   laparoscopic lysis of adhesions;  Surgeon: Nate Milligan MD;  [facility]    LAPAROSCOPIC LYSIS ADHESIONS N/A 5/14/2018    Procedure: LAPAROSCOPIC LYSIS ADHESIONS;;  Surgeon: Nate Mishra MD;  [facility]    LAPAROSCOPIC SALPINGECTOMY Left 5/14/2018    Procedure: LAPAROSCOPIC SALPINGECTOMY;;  Surgeon: Nate Mishra MD;  [facility]    LAPAROSCOPY DIAGNOSTIC (GYN) N/A 5/14/2018    Procedure: LAPAROSCOPY DIAGNOSTIC (GYN);  laparoscopy, left salpingectomy, left ovarian nodule removal and lysis of adhesions;  Surgeon: Nate Mishra MD;  [facility]    RELEASE CARPAL TUNNEL Left 8/31/2016    Procedure: RELEASE CARPAL TUNNEL;  Surgeon: Gucci Hairston MD;  [facility]    RELEASE CARPAL TUNNEL Right 12/7/2016    Procedure: RELEASE CARPAL TUNNEL;  Surgeon: Gucci Hairston MD;  [facility]     Social History     Socioeconomic History    Marital status:      Spouse name: Not on file    Number of children: Not on file    Years of education: Not on file    Highest education level: Not on file   Occupational History    Not on file   Tobacco Use    Smoking status: Every Day     Packs/day: 1.00     Years: 10.00     Pack years: 10.00     Types: Cigarettes    Smokeless tobacco: Never   Substance and  "Sexual Activity    Alcohol use: No     Alcohol/week: 0.0 standard drinks of alcohol    Drug use: No    Sexual activity: Yes     Partners: Male     Birth control/protection: None     Comment: Trying to conceive   Other Topics Concern    Parent/sibling w/ CABG, MI or angioplasty before 65F 55M? No   Social History Narrative    Not on file     Social Determinants of Health     Financial Resource Strain: Not on file   Food Insecurity: Not on file   Transportation Needs: Not on file   Physical Activity: Not on file   Stress: Not on file   Social Connections: Not on file   Intimate Partner Violence: Not on file   Housing Stability: Not on file     Family History   Problem Relation Age of Onset    Diabetes Father     Hypertension Father     Cardiovascular Mother 29        myocarditis     Other Cancer Brother         unknown origin        ROS: 10 point ROS neg other than the symptoms noted above in the HPI.    Physical Exam  /68   Pulse 107   Temp (!) 96.6  F (35.9  C) (Temporal)   Resp 18   Ht 1.6 m (5' 3\")   Wt 90.3 kg (199 lb)   LMP 10/26/2018   SpO2 98%   BMI 35.25 kg/m    HEENT:  Normocephalic, atraumatic.  PERRLA.  EOM s intact.  Visual fields full to gross exam  Neck:  Supple, non-tender, without lymphadenopathy.  Heart:  No peripheral edema  Lungs:  No SOB  Abdomen:  Non-distended.   Skin:  Warm and dry.  Extremities:  No edema, cyanosis or clubbing.  Psychiatric:  No apparent distress  Musculoskeletal:  Normal bulk and tone    NEUROLOGICAL EXAMINATION:     Mental status:  Alert and Oriented x 3, speech is fluent.  Cranial nerves:  II-XII intact.   Motor:    Shoulder Abduction:  Right:  5/5   Left:  5/5  Biceps:                      Right:  5/5   Left:  5/5  Triceps:                     Right:  5/5   Left:  5/5  Wrist Extensors:       Right:  5/5   Left:  5/5  Wrist Flexors:           Right:  5/5   Left:  5/5  interosseus :            Right:  5/5   Left:  5/5  Hip Flexion:                Right: 5/5  " Left:  5/5  Quadriceps:             Right:  5/5  Left:  5/5  Hamstrings:             Right:  5/5  Left:  5/5  Gastroc Soleus:        Right:  5/5  Left:  5/5  Tib/Ant:                      Right:  5/5  Left:  5/5  EHL:                     Right:  5/5  Left:  5/5  Sensation:  Intact  Reflexes:  Negative Babinski.  Negative Clonus.  Negative Omalley's.  Coordination:  Smooth finger to nose testing.   Negative pronator drift.  Smooth tandem walking.    A/P:  48 year old female with L4-5 spondylolisthesis, back and leg pain.    I had a discussion with the patient, reviewing the history, symptoms, and imaging  Will start course of PT  If no improvement, may need to consider L4-5 minimally invasive robotic fusion

## 2023-08-24 NOTE — NURSING NOTE
"Virginia Valenzuela is a 48 year old female who presents for:  Chief Complaint   Patient presents with    Neurologic Problem     Lumbar radiculopathy        Initial Vitals:  /68   Pulse 107   Temp (!) 96.6  F (35.9  C) (Temporal)   Resp 18   Ht 5' 3\" (1.6 m)   Wt 199 lb (90.3 kg)   LMP 10/26/2018   SpO2 98%   BMI 35.25 kg/m   Estimated body mass index is 35.25 kg/m  as calculated from the following:    Height as of this encounter: 5' 3\" (1.6 m).    Weight as of this encounter: 199 lb (90.3 kg).. Body surface area is 2 meters squared. BP completed using cuff size: large  Moderate Pain (5)    Nursing Comments:     Gail Rao    "

## 2023-08-24 NOTE — PATIENT INSTRUCTIONS
Orders for [facility]. They will call you to schedule within a few days. If you have not heard from them, please call [phone]. Please call our clinic if symptoms persist after your course of physical therapy (approximately 4 weeks).     [facility]  [facility] - [address] [address]  Telephone:  [phone]       Fax:  [phone]

## 2023-08-29 ENCOUNTER — THERAPY VISIT (OUTPATIENT)
Dept: PHYSICAL THERAPY | Facility: CLINIC | Age: 48
End: 2023-08-29
Attending: NEUROLOGICAL SURGERY
Payer: COMMERCIAL

## 2023-08-29 DIAGNOSIS — M54.16 LUMBAR RADICULOPATHY: ICD-10-CM

## 2023-08-29 PROCEDURE — 97162 PT EVAL MOD COMPLEX 30 MIN: CPT | Mod: GP | Performed by: PHYSICAL THERAPIST

## 2023-08-29 PROCEDURE — 97110 THERAPEUTIC EXERCISES: CPT | Mod: GP | Performed by: PHYSICAL THERAPIST

## 2023-08-29 PROCEDURE — 97140 MANUAL THERAPY 1/> REGIONS: CPT | Mod: GP | Performed by: PHYSICAL THERAPIST

## 2023-08-29 NOTE — PROGRESS NOTES
PHYSICAL THERAPY EVALUATION  Type of Visit: Evaluation    See electronic medical record for Abuse and Falls Screening details.    Subjective   Chief complaint:  L LBP with L hip pain, radicular pain to the leg lateral leg below the knee, worse this summer. She feels the leg doesn't work right, since the leg feels painful and hard to move. Some paresthesias in the feet, but numbness with laying down is most common. Recent hx of ankle fracture in walking boot x 10 weeks.  Aggravating factors include: Worst in the mornings, worse with sitting, twisting even to wipe self in toilet, Walking > 20 minutes, standing is unlimited but with pain increased after 1 hour, Sitting in selective chairs is decent but other straight back chairs causes more pain. PT In past which was helpful. She has hx of lysis of abdominal adhesions. MAINA 2019,. Several lysis surgeries [id number].  Alleviating/Easing factors include: Nothing, but tries ice, massage gun, hot bathes, not much for stretches.  Prior interventions include injections (many- but most recently  Chiropractic care in past but nothing recent, Patient struggles with ibuprofen d/t stomach pain.  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   Pain is 5-6/10 at best, 10/10 worst, and 6/10 currently. Goal for PT: Improve LBP        Past Medical History:   Diagnosis Date    Allergic rhinitis, cause unspecified     Arthritis     ASCUS with positive high risk HPV 3/12/13    Depressive disorder, not elsewhere classified     Dysfunction of eustachian tube     Encounter for therapeutic drug monitoring     Generalized anxiety disorder     High risk HPV infection 5/12/09    History of appendectomy     Hypertension 2000    Idiopathic urticaria     Obstructive sleep apnea (adult) (pediatric)     Other chronic allergic conjunctivitis     Other malaise and fatigue     Pain in joint, shoulder region     S/P laparoscopic cholecystectomy     Special  screening examination for human papillomavirus (HPV)     Sprain of tibiofibular (ligament), distal of ankle     Threatened , unspecified as to episode of care     Tobacco use disorder     Unspecified essential hypertension     Unspecified vitamin D deficiency        Past Surgical History:   Procedure Laterality Date    ABDOMEN SURGERY  2013    ovaian cyst and right ovary removal    APPENDECTOMY      CHOLECYSTECTOMY      COLONOSCOPY N/A 10/26/2017    Procedure: COMBINED COLONOSCOPY, SINGLE OR MULTIPLE BIOPSY/POLYPECTOMY BY BIOPSY;  Colonoscopy with biopsies by biopsy;  Surgeon: Fred Faust DO;  [facility]    EYE SURGERY  1979    GENITOURINARY SURGERY  2013    Ovary, fallopian tube    GYN SURGERY  2013    Ovary, Fallopian tube    INJECT EPIDURAL LUMBAR Bilateral 3/15/2019    Procedure: Inject Facets Lumbar 4-5, Bilateral;  Surgeon: Colin Nolasco MD;  [facility]    INJECT FACET JOINT N/A 10/25/2018    Procedure: Injest Facet Joint Lumbar 4-5 Bilateral;  Surgeon: Colin Nolasco MD;  [facility]    LAPAROSCOPIC LYSIS ADHESIONS Left 3/1/2016    Procedure: LAPAROSCOPIC LYSIS ADHESIONS;  Surgeon: Nate Mishra MD;  [facility]    LAPAROSCOPIC LYSIS ADHESIONS N/A 2017    Procedure: LAPAROSCOPIC LYSIS ADHESIONS;   laparoscopic lysis of adhesions;  Surgeon: Nate Mishra MD;  [facility]    LAPAROSCOPIC LYSIS ADHESIONS N/A 2017    Procedure: LAPAROSCOPIC LYSIS ADHESIONS;   laparoscopic lysis of adhesions;  Surgeon: Nate Milligan MD;  [facility]    LAPAROSCOPIC LYSIS ADHESIONS N/A 2018    Procedure: LAPAROSCOPIC LYSIS ADHESIONS;;  Surgeon: Nate Mishra MD;  [facility]    LAPAROSCOPIC SALPINGECTOMY Left 2018    Procedure: LAPAROSCOPIC SALPINGECTOMY;;  Surgeon: Nate Mishra MD;  [facility]    LAPAROSCOPY DIAGNOSTIC (GYN) N/A 2018    Procedure: LAPAROSCOPY DIAGNOSTIC (GYN);   laparoscopy, left salpingectomy, left ovarian nodule removal and lysis of adhesions;  Surgeon: Nate Mishra MD;  [facility]    RELEASE CARPAL TUNNEL Left 8/31/2016    Procedure: RELEASE CARPAL TUNNEL;  Surgeon: Gucci Hairston MD;  [facility]    RELEASE CARPAL TUNNEL Right 12/7/2016    Procedure: RELEASE CARPAL TUNNEL;  Surgeon: Gucci Hairston MD;  [facility]           Presenting condition or subjective complaint: Low back pain, L hip pain, Leg numbness  Date of onset: 08/24/23 (Date of referral)        Prior diagnostic imaging/testing results: MRI       COMPARISON: Lumbar spine MRI 11/18/2020. Completed 7/26/23     FINDINGS:  Alignment is significant for grade 1 anterolisthesis of L4  on L5. Bone marrow demonstrates minimal degenerative endplate changes.  No high-grade degenerative endplate edema. Mild edema is present  surrounding the bilateral L4-L5 facet joints extending to the  bilateral L4 and L5 pedicles likely representing mild active  arthropathy. Conus medullaris is unremarkable terminating at the level  of the L1-L2 disc. Cauda equina is unremarkable. Bilateral sacroiliac  joint degenerative change. No convincing extraspinal abnormality.     Segmental Analysis (evolved compared to the prior):      T12-L1:  Disc height maintained. No herniation. Mild bilateral facet  arthropathy. No foraminal or spinal canal stenosis.      L1-L2:  Disc height maintained. No herniation. Mild bilateral facet  arthropathy. No foraminal or spinal canal stenosis.       L2-L3:  Disc height maintained. No herniation. Mild bilateral facet  arthropathy. No foraminal or spinal canal stenosis.       L3-L4:  Mild disc height loss. Minimal foraminal bulging. Moderate  bilateral facet arthropathy. Mild bilateral foraminal stenosis. No  spinal canal stenosis.     L4-L5:  Mild disc height loss. Slight uncovering shallow left  foraminal protrusion/annular fissure, similar compared to the  prior.  Severe bilateral facet arthropathy. Decreased size of previously  demonstrated synovial cyst at this level. Mild bilateral foraminal  stenoses, left worse than right. Mild spinal canal stenosis.       L5-S1:  Disc height maintained. No herniation. Moderate right and mild  left facet arthropathy. No foraminal or spinal canal stenosis.  Prior therapy history for the same diagnosis, illness or injury: Yes      Prior Level of Function  Transfers: Independent  Ambulation: Independent  ADL: Independent  IADL:     Living Environment  Social support: With a significant other or spouse   Type of home: House; 1 level   Stairs to enter the home: Yes 3 Is there a railing: No   Ramp: No   Stairs inside the home: Yes 0 (unsure)     Help at home: Self Cares (home health aide/personal care attendant, family, etc)  Equipment owned:       Employment: Yes Childhood special ed para  Hobbies/Interests: Reading    Patient goals for therapy:  Normal, walk, life activities with less pain.          Objective   LUMBAR SPINE EVALUATION  INTEGUMENTARY (edema, incisions): WNL  POSTURE:  TighT UE, rounded and elevated shoulders, poor depression/retraction scapula, neck extended in supine  SLR + painful  GAIT:   Weightbearing Status: WBAT  Assistive Device(s): None  Gait Deviations: WNL  Reduced trunk rotation and arm swing noted  BALANCE/PROPRIOCEPTION:   > 10 seconds B  WEIGHTBEARING ALIGNMENT:   NON-WEIGHTBEARING ALIGNMENT:    ROM:   6/10 standing midline low back and just off to the left  Forward flexion mod limitation, pain located to the L SIJ  Extension very limited and hinges at TL junction, worse in low back pain 8/10.   R SB mod limitation and R iliac crest line tension  L SB max limitation and pinches L iliac region     PELVIC/SI SCREEN:   STRENGTH:     MYOTOMES:    Left Right   T12-L3 (Hip Flexion) 5 5   L2-4 (Quads)  5- 5   L4 (Ankle DF) 5 5   L5 (Great Toe Ext) 5 5   S1 (Toe Raise) 5 5     DTR S: WNL  CORD SIGNS:  "WNL  DERMATOMES:   NEURAL TENSION:   FLEXIBILITY:   Hip impingement/groin tension OP flexion 100, ER 50, IR 20 Bilaterally. MIld groin pain with end range IR.   HS length 90/90 is 55 bilateral and thigh pulling,, no neural tension   LUMBAR/HIP Special Tests:    PELVIS/SI SPECIAL TESTS:   R anterior innominate  R long to even long sitting testing  L SI shear painful L SIJ \"squishy\"  L posterior sacrum and stiff PA unilateral L SI      FUNCTIONAL TESTS: Standing forward flexion PSIS: L moves first/farther  PALPATION:  Gluteal myofascial tension and ITB tension L>R, UT B, Lumbar paraspinal  SPINAL SEGMENTAL CONCLUSIONS:  Stiffness lumbar spine, L4, L5 on Left TTP      Assessment & Plan   CLINICAL IMPRESSIONS  Medical Diagnosis: Lumbar radiculopathy (M54.16)    Treatment Diagnosis: Lumbar radiculopathy (M54.16)   Impression/Assessment: Patient is a 48 year old female with L leg and LBP complaints. PJD and hip tension adding to altered movement patterns, stiffness of spine and increased loading with lordosis adding to mechanical pain. MT, TE and progressive strengthening to reduced pain and improve function planned.  The following significant findings have been identified: Pain, Decreased ROM/flexibility, Decreased joint mobility, Decreased strength, Impaired gait, Impaired muscle performance, Decreased activity tolerance, and Impaired posture. These impairments interfere with their ability to perform self care tasks, work tasks, recreational activities, household chores, and driving  as compared to previous level of function.     Clinical Decision Making (Complexity):  Clinical Presentation: Stable/Uncomplicated  Clinical Presentation Rationale: based on medical and personal factors listed in PT evaluation  Clinical Decision Making (Complexity): Moderate complexity    PLAN OF CARE  Treatment Interventions:  Modalities:  PRN for pain relief    Long Term Goals     PT Goal 1  Goal Identifier: MATTIE  Goal Description: Patient " will have improvements in MATTIE related disability reduced to <20%  Rationale: to maximize safety and independence with performance of ADLs and functional tasks  Target Date: 11/07/23  PT Goal 2  Goal Identifier: Sitting  Goal Description: Patient will have 50% reduced pain with sitting for 1 hour for improved tolerance to driving and work tasks involving sitting  Target Date: 10/10/23  PT Goal 3  Goal Identifier: Pelvis  Goal Description: Patient will have improved lumbopelvic biomechanics and hip flexibility to normalize movement patterns  Target Date: 10/17/23      Frequency of Treatment: 1xweel  Duration of Treatment: 10 weeks    Recommended Referrals to Other Professionals:   Education Assessment:   Learner/Method: Patient  Education Comments: anatomy    Risks and benefits of evaluation/treatment have been explained.   Patient/Family/caregiver agrees with Plan of Care.     Evaluation Time:     PT Eval, Moderate Complexity Minutes (60938): 30       Signing Clinician: ABDI Ramos [facility]                                                                                   OUTPATIENT PHYSICAL THERAPY      PLAN OF TREATMENT FOR OUTPATIENT REHABILITATION   Patient's Last Name, First Name, MVirginia Dozier YOB: 1975   Provider's Name   [facility]   Medical Record No.  [id number]     Onset Date: 08/24/23 (Date of referral)  Start of Care Date: 08/29/23     Medical Diagnosis:  Lumbar radiculopathy (M54.16)      PT Treatment Diagnosis:  Lumbar radiculopathy (M54.16) Plan of Treatment  Frequency/Duration: 1xweel/ 10 weeks    Certification date from 08/29/23 to 11/07/23         See note for plan of treatment details and functional goals     Rossana Morales, PT                         I CERTIFY THE NEED FOR THESE SERVICES FURNISHED UNDER        THIS PLAN OF TREATMENT AND WHILE UNDER MY CARE     (Physician attestation of this document  indicates review and certification of the therapy plan).                Referring Provider:  Abel Yu      Initial Assessment  See Epic Evaluation- Start of Care Date: 08/29/23

## 2023-09-06 ENCOUNTER — THERAPY VISIT (OUTPATIENT)
Dept: PHYSICAL THERAPY | Facility: CLINIC | Age: 48
End: 2023-09-06
Attending: NEUROLOGICAL SURGERY
Payer: COMMERCIAL

## 2023-09-06 DIAGNOSIS — M54.16 LUMBAR RADICULOPATHY: Primary | ICD-10-CM

## 2023-09-06 PROCEDURE — 97140 MANUAL THERAPY 1/> REGIONS: CPT | Mod: GP | Performed by: PHYSICAL THERAPIST

## 2023-09-06 PROCEDURE — 97530 THERAPEUTIC ACTIVITIES: CPT | Mod: GP | Performed by: PHYSICAL THERAPIST

## 2023-09-12 ENCOUNTER — THERAPY VISIT (OUTPATIENT)
Dept: PHYSICAL THERAPY | Facility: CLINIC | Age: 48
End: 2023-09-12
Attending: NEUROLOGICAL SURGERY
Payer: COMMERCIAL

## 2023-09-12 DIAGNOSIS — M54.16 LUMBAR RADICULOPATHY: Primary | ICD-10-CM

## 2023-09-12 PROCEDURE — 97140 MANUAL THERAPY 1/> REGIONS: CPT | Mod: GP | Performed by: PHYSICAL THERAPIST

## 2023-09-12 PROCEDURE — 97110 THERAPEUTIC EXERCISES: CPT | Mod: GP | Performed by: PHYSICAL THERAPIST

## 2023-09-18 DIAGNOSIS — M43.16 SPONDYLOLISTHESIS, LUMBAR REGION: Primary | ICD-10-CM

## 2023-09-20 DIAGNOSIS — M54.16 LUMBAR RADICULOPATHY: Primary | ICD-10-CM

## 2023-09-21 ENCOUNTER — THERAPY VISIT (OUTPATIENT)
Dept: PHYSICAL THERAPY | Facility: CLINIC | Age: 48
End: 2023-09-21
Attending: NEUROLOGICAL SURGERY
Payer: COMMERCIAL

## 2023-09-21 DIAGNOSIS — M54.16 LUMBAR RADICULOPATHY: Primary | ICD-10-CM

## 2023-09-21 PROCEDURE — 97140 MANUAL THERAPY 1/> REGIONS: CPT | Mod: GP | Performed by: PHYSICAL THERAPIST

## 2023-09-21 PROCEDURE — 97110 THERAPEUTIC EXERCISES: CPT | Mod: GP | Performed by: PHYSICAL THERAPIST

## 2023-09-26 ENCOUNTER — MYC MEDICAL ADVICE (OUTPATIENT)
Dept: NEUROSURGERY | Facility: CLINIC | Age: 48
End: 2023-09-26
Payer: COMMERCIAL

## 2023-09-26 ENCOUNTER — TELEPHONE (OUTPATIENT)
Dept: NEUROSURGERY | Facility: CLINIC | Age: 48
End: 2023-09-26
Payer: COMMERCIAL

## 2023-09-26 DIAGNOSIS — Z01.818 PREOP TESTING: Primary | ICD-10-CM

## 2023-09-26 NOTE — TELEPHONE ENCOUNTER
Patient returned nurse call. Patient said she read notes on my chart but nurse can call back if necessary. Thank you~

## 2023-09-26 NOTE — TELEPHONE ENCOUNTER
Placed call to pt to complete edu -    Attempted to reach out to patient, no answer. Left voice message for them to call clinic back to further discuss.      Sent soap to pharmacy. Sent edu to myc    Patient Instructions    Surgery scheduled at [facility] for   Left Lumbar 4 to Lumbar 5 robotic minimally invasive Transforaminal Lumbar Interbody Fusion     with Dr. Yu     Pre-Operative      Surgical risks: blood clots in the leg or lung, problems urinating, nerve damage, drainage from the incision, infection, stiffness    You will need a Pre-operative physical with primary care physician within 30 days prior to your surgical date.     You will spend 2-4 nights in the hospital.    Smoking Cessation  You are advised to quit smoking immediately through recovery to help with healing and reduce risk of complications. Printout given.     Shower procedure  You will need to shower the night before and morning of surgery using the antimicrobial soap (chlorhexidine) given to you. Please refer to showering instruction sheet in your surgery education folder.    Eating/Drinking  Stop all solid foods 8 hours before surgery.  Keep drinking clear liquids until 4 hours before surgery  Clear liquids include water, clear juice, black coffee, or clear tea without milk, Gatorade, clear soda.     Medications  Discontinue Aspirin & NSAIDs (Advil/Ibuprofen, Indocin, Naproxen,Nuprin,Relafen/Nabumetone, Diclofenac,Meloxicam, Aleve, Celebrex) 7 days prior to surgical date. After surgery, do not begin taking these medications until given clearance as it may cause bleeding and interfere with healing.  Hold methotrexate, Xeljanz for 1-2 weeks prior to surgery and continue to hold 2 weeks post surgery.   It is ok to take Tylenol (Acetaminophen) for pain within the 7 days prior to surgery. Example: you could take 1000 mg 3 times per day. Do not exceed 3,000 mg per day.   If you are on chronic pain medication (oxycodone,  Percocet, hydrocodone, Vicodin, Norco, Dilaudid, morphine, MS Contin, naltrexone, Suboxone, etc) or have a pain contract we will reach out to your pain clinic to gather your most recent records and recommendations for pain management post-op.  Please ask your provider who manages your chronic pain if they require you to schedule an office visit prior to surgery. Continue obtaining your pain medications from your current provider until surgery. Our team will manage your acute post-op pain in the hospital and during the recovery period. Your pain team will continue to manage your chronic pain.   Any other medications prescribed, please discuss with your primary care provider at your pre-operative physical        Post-Operative    Incision Care  Look at your incision site every day. You  may need a mirror or family member to help you.   Watch for signs of infection  Redness, swelling, warmth, drainage (Green or yellow drainage (pus) from your incision or increased bloody drainage), and fever of 101 degrees or higher  Notify clinic [phone]  Remove dressing as instructed upon discharge  Do not apply lotions or ointments to incision  It is okay to shower, just pat the incision dry   No submerging incision in water such as pools, hot tubs, or baths for at least 8 weeks and until the incision is healed    Pain Management  Dealing with pain  As your body heals, you might feel a stabbing, burning, or aching pain. You may also have some numbness.  Everyone feels pain differently, we may ask you to rate your pain using a pain scale. This will let us know how much pain you feel.   Keep in mind that medicine won't take away all of your pain. It helps to try other ways to relax and ease pain.   Things to help with pain  After surgery, we will give you medicine for your pain. These medications work well, but they can make you drowsy, itchy, or sick to your stomach. If we give you narcotics for pain, try to take the pills with  food.   For mild to moderate pain, you can take medication such as Tylenol. These can be used with narcotics, but make sure that your narcotic does not contain Tylenol.   Do NOT drive while taking narcotic pain medication  Do NOT drink alcohol while using any pain medication  You can utilize ice as needed (no longer than 20 minutes at one time)  Refills of pain medication: please call the neurosurgery clinic to request 2-3 days before you run out  Aspirin & NSAIDS (ex. Ibuprofen, aleve, naproxen): Don't take NSAIDs until 6 weeks after surgery to reduce risk of bleeding and interference with bone healing     Bowel Care  Many people have constipation (hard stools) after surgery. The narcotic pain medication we often prescribed can contribute to constipation. To help prevent constipation: Drink plenty of fluid (8-10 glasses/day); Eat more fiber, such as whole grain bread, bran cereal, and fruits and vegetables; Stay active by walking; Over the counter stool softener may also help.      Activity Restrictions  For the first 6-8 weeks, no lifting > 10 pounds, no bending, twisting, or overhead reaching.  Take stairs in moderation   Walking is the best way to start exercise after surgery. Take short frequent walks. You may gradually increase the distance as tolerated. If you feel pain, decrease your activity, but DO NOT stop walking. Walking will help you gain strength and prevent muscle soreness and spasms.   Avoid bed rest and prolonged sitting for longer than 30 minutes (change positions frequently while awake)  No contact sports or high impact activities such as; running/jogging, snowmobile or 4 griffith riding or any other recreational vehicles until after given clearance at one of your follow up visits  If a brace is required per Dr. Yu, Orthotics will fit you for the brace in the hospital. Brace type and length of time to wear it will be determined by Dr. Yu.     Contact clinic right away or go to the Emergency  Department if you develop:   Infection (incisional redness, swelling, warmth, drainage, or fever (temp > 101 F))  New injury  Bladder or bowel changes or loss of control    Signs of blood clot:  Swelling and/or warmth in one or both legs  Pain or tenderness in your leg, ankle, foot, or arm   Red or discolored skin     Go to the Emergency Department   If sudden onset of severe headache, weakness, confusion, change in level of consciousness, pain, or loss of movement.  Chest pain  Trouble breathing     Post-Op Follow Up Appointments  2 week incision check & staple/suture removal with Neurosurgery Nurse  6 week post op with x-ray prior with our Physician Assistant or Nurse Practitioner  3 months post op with x-ray prior with our Physician Assistant or Nurse Practitioner  6 months post op with x-ray prior with our Physician Assistant or Nurse Practitioner  1 year post op with x-ray prior with our Physician Assistant or Nurse Practitioner  Please call to schedule follow up and xray appointments at [phone]    Resources  If you are currently employed, you will need to be off work for 4-6 weeks for recovery and healing.  Please fax any FMLA/short term disability paperwork to [phone] prior to surgery  You may call our clinic when you'd like to return to work and we can provide a work letter  To allow staff adequate time to complete paperwork, please send as soon as possible     [facility]  [facility] - [address] [address]  Telephone:  [phone]       Fax:  [phone]

## 2023-09-27 NOTE — TELEPHONE ENCOUNTER
Placed call to pt -    Attempted to reach out to patient, no answer. Left voice message for them to call clinic back to further discuss.

## 2023-09-28 ENCOUNTER — HOSPITAL ENCOUNTER (OUTPATIENT)
Dept: CT IMAGING | Facility: CLINIC | Age: 48
Discharge: HOME | End: 2023-09-28
Attending: NEUROLOGICAL SURGERY | Admitting: NEUROLOGICAL SURGERY
Payer: COMMERCIAL

## 2023-09-28 ENCOUNTER — THERAPY VISIT (OUTPATIENT)
Dept: PHYSICAL THERAPY | Facility: CLINIC | Age: 48
End: 2023-09-28
Attending: NEUROLOGICAL SURGERY
Payer: COMMERCIAL

## 2023-09-28 DIAGNOSIS — M54.16 LUMBAR RADICULOPATHY: Primary | ICD-10-CM

## 2023-09-28 DIAGNOSIS — M54.16 LUMBAR RADICULOPATHY: ICD-10-CM

## 2023-09-28 PROCEDURE — 97110 THERAPEUTIC EXERCISES: CPT | Mod: GP | Performed by: PHYSICAL THERAPIST

## 2023-09-28 PROCEDURE — 72131 CT LUMBAR SPINE W/O DYE: CPT

## 2023-09-28 PROCEDURE — 97140 MANUAL THERAPY 1/> REGIONS: CPT | Mod: GP | Performed by: PHYSICAL THERAPIST

## 2023-10-02 ENCOUNTER — THERAPY VISIT (OUTPATIENT)
Dept: PHYSICAL THERAPY | Facility: CLINIC | Age: 48
End: 2023-10-02
Attending: NEUROLOGICAL SURGERY
Payer: COMMERCIAL

## 2023-10-02 DIAGNOSIS — M54.16 LUMBAR RADICULOPATHY: Primary | ICD-10-CM

## 2023-10-02 PROCEDURE — 97110 THERAPEUTIC EXERCISES: CPT | Mod: GP | Performed by: PHYSICAL THERAPIST

## 2023-10-02 PROCEDURE — 97140 MANUAL THERAPY 1/> REGIONS: CPT | Mod: GP | Performed by: PHYSICAL THERAPIST

## 2023-10-12 ENCOUNTER — THERAPY VISIT (OUTPATIENT)
Dept: PHYSICAL THERAPY | Facility: CLINIC | Age: 48
End: 2023-10-12
Attending: NEUROLOGICAL SURGERY
Payer: COMMERCIAL

## 2023-10-12 DIAGNOSIS — M54.16 LUMBAR RADICULOPATHY: Primary | ICD-10-CM

## 2023-10-12 PROCEDURE — 97110 THERAPEUTIC EXERCISES: CPT | Mod: GP | Performed by: PHYSICAL THERAPIST

## 2023-10-12 PROCEDURE — 97140 MANUAL THERAPY 1/> REGIONS: CPT | Mod: GP | Performed by: PHYSICAL THERAPIST

## 2023-10-17 ENCOUNTER — THERAPY VISIT (OUTPATIENT)
Dept: PHYSICAL THERAPY | Facility: CLINIC | Age: 48
End: 2023-10-17
Attending: NEUROLOGICAL SURGERY
Payer: COMMERCIAL

## 2023-10-17 DIAGNOSIS — M54.16 LUMBAR RADICULOPATHY: Primary | ICD-10-CM

## 2023-10-17 PROCEDURE — 97110 THERAPEUTIC EXERCISES: CPT | Mod: GP | Performed by: PHYSICAL THERAPIST

## 2023-10-17 PROCEDURE — 97140 MANUAL THERAPY 1/> REGIONS: CPT | Mod: GP | Performed by: PHYSICAL THERAPIST

## 2023-10-18 ENCOUNTER — DOCUMENTATION ONLY (OUTPATIENT)
Dept: NEUROSURGERY | Facility: CLINIC | Age: 48
End: 2023-10-18
Payer: COMMERCIAL

## 2023-10-18 NOTE — PROGRESS NOTES
Finished FMLA form. Placed document in provider's mailbox at [facility] for review and signature.  Off work 11/28/23-1/9/24    Pt still needs post op follow-up appts scheduled. Routed to scheduling to schedule 6 and 12 wk follow-up appts. Once scheduled, will need to add to form.

## 2023-10-21 NOTE — TELEPHONE ENCOUNTER
Vitamin D      Last Written Prescription Date: 05/04/16  Last Fill Quantity: 100,  # refills: 5   Last Office Visit with [facility], [facility] or [facility] prescribing provider: 4/18/17                                                Abbe Flap (Lower To Upper Lip) Text: The defect of the upper lip was assessed and measured.  Given the location and size of the defect, an Abbe flap was deemed most appropriate. Using a sterile surgical marker, an appropriate Abbe flap was measured and drawn on the lower lip. Local anesthesia was then infiltrated. A scalpel was then used to incise the upper lip through and through the skin, vermilion, muscle and mucosa, leaving the flap pedicled on the opposite side.  The flap was then rotated and transferred to the lower lip defect.  The flap was then sutured into place with a three layer technique, closing the orbicularis oris muscle layer with subcutaneous buried sutures, followed by a mucosal layer and an epidermal layer.

## 2023-10-23 NOTE — PROGRESS NOTES
Upon completion, please fax to Girma Grubbs at [phone] (per sticky note on original forms).

## 2023-10-24 ENCOUNTER — THERAPY VISIT (OUTPATIENT)
Dept: PHYSICAL THERAPY | Facility: CLINIC | Age: 48
End: 2023-10-24
Attending: NEUROLOGICAL SURGERY
Payer: COMMERCIAL

## 2023-10-24 DIAGNOSIS — M54.16 LUMBAR RADICULOPATHY: Primary | ICD-10-CM

## 2023-10-24 PROCEDURE — 97140 MANUAL THERAPY 1/> REGIONS: CPT | Mod: GP | Performed by: PHYSICAL THERAPIST

## 2023-10-27 NOTE — PROGRESS NOTES
Faxed [facility] October 27, 2023 to fax number HIM    Right Fax confirmed at 4:19 PM    Kwaku Mark

## 2023-10-27 NOTE — PROGRESS NOTES
FMLA form signed by Dr. Yu.     Faxed Form to ATTN: [facility]  October 27, 2023 to fax number [phone]    Right Fax confirmed at 3:17 PM    Copy in karsten RN office at [facility] in bin to be sent to med rec/HIM for scanning

## 2023-10-27 NOTE — PROGRESS NOTES
Employee RTW form [facility] received     Surgery scheduled 11/28/23 with Dr. Yu     Form placed in RN bin at  office for completion.     Once form completed and signed please fax to Girma Grubbs [facility] [phone].

## 2023-10-30 ENCOUNTER — OFFICE VISIT (OUTPATIENT)
Dept: INTERNAL MEDICINE | Facility: CLINIC | Age: 48
End: 2023-10-30
Payer: COMMERCIAL

## 2023-10-30 VITALS
HEIGHT: 64 IN | BODY MASS INDEX: 34.15 KG/M2 | HEART RATE: 83 BPM | TEMPERATURE: 98.9 F | OXYGEN SATURATION: 99 % | SYSTOLIC BLOOD PRESSURE: 128 MMHG | DIASTOLIC BLOOD PRESSURE: 74 MMHG | RESPIRATION RATE: 16 BRPM | WEIGHT: 200 LBS

## 2023-10-30 DIAGNOSIS — I10 HYPERTENSION GOAL BP (BLOOD PRESSURE) < 140/90: ICD-10-CM

## 2023-10-30 DIAGNOSIS — Z01.818 PRE-OP EXAM: Primary | ICD-10-CM

## 2023-10-30 DIAGNOSIS — M54.16 LUMBAR RADICULOPATHY: ICD-10-CM

## 2023-10-30 LAB
ANION GAP SERPL CALCULATED.3IONS-SCNC: 16 MMOL/L (ref 7–15)
BUN SERPL-MCNC: 11.5 MG/DL (ref 6–20)
CALCIUM SERPL-MCNC: 9.7 MG/DL (ref 8.6–10)
CHLORIDE SERPL-SCNC: 99 MMOL/L (ref 98–107)
CREAT SERPL-MCNC: 0.58 MG/DL (ref 0.51–0.95)
DEPRECATED HCO3 PLAS-SCNC: 23 MMOL/L (ref 22–29)
EGFRCR SERPLBLD CKD-EPI 2021: >90 ML/MIN/1.73M2
ERYTHROCYTE [DISTWIDTH] IN BLOOD BY AUTOMATED COUNT: 12.2 % (ref 10–15)
GLUCOSE SERPL-MCNC: 117 MG/DL (ref 70–99)
HCT VFR BLD AUTO: 42.7 % (ref 35–47)
HGB BLD-MCNC: 14.1 G/DL (ref 11.7–15.7)
MCH RBC QN AUTO: 28.8 PG (ref 26.5–33)
MCHC RBC AUTO-ENTMCNC: 33 G/DL (ref 31.5–36.5)
MCV RBC AUTO: 87 FL (ref 78–100)
PLATELET # BLD AUTO: 348 10E3/UL (ref 150–450)
POTASSIUM SERPL-SCNC: 3.6 MMOL/L (ref 3.4–5.3)
RBC # BLD AUTO: 4.89 10E6/UL (ref 3.8–5.2)
SODIUM SERPL-SCNC: 138 MMOL/L (ref 135–145)
WBC # BLD AUTO: 10.7 10E3/UL (ref 4–11)

## 2023-10-30 PROCEDURE — 80048 BASIC METABOLIC PNL TOTAL CA: CPT | Performed by: INTERNAL MEDICINE

## 2023-10-30 PROCEDURE — 36415 COLL VENOUS BLD VENIPUNCTURE: CPT | Performed by: INTERNAL MEDICINE

## 2023-10-30 PROCEDURE — 99214 OFFICE O/P EST MOD 30 MIN: CPT | Performed by: INTERNAL MEDICINE

## 2023-10-30 PROCEDURE — 85027 COMPLETE CBC AUTOMATED: CPT | Performed by: INTERNAL MEDICINE

## 2023-10-30 PROCEDURE — 93000 ELECTROCARDIOGRAM COMPLETE: CPT | Performed by: INTERNAL MEDICINE

## 2023-10-30 ASSESSMENT — ANXIETY QUESTIONNAIRES
2. NOT BEING ABLE TO STOP OR CONTROL WORRYING: NOT AT ALL
6. BECOMING EASILY ANNOYED OR IRRITABLE: NOT AT ALL
5. BEING SO RESTLESS THAT IT IS HARD TO SIT STILL: NOT AT ALL
3. WORRYING TOO MUCH ABOUT DIFFERENT THINGS: NOT AT ALL
7. FEELING AFRAID AS IF SOMETHING AWFUL MIGHT HAPPEN: NOT AT ALL
GAD7 TOTAL SCORE: 0
1. FEELING NERVOUS, ANXIOUS, OR ON EDGE: NOT AT ALL
4. TROUBLE RELAXING: NOT AT ALL
GAD7 TOTAL SCORE: 0

## 2023-10-30 NOTE — PROGRESS NOTES
[address] [address]  Phone: [phone]  Primary Provider: No Ref-Primary, Physician  Pre-op Performing Provider: MARCK RYAN    PREOPERATIVE EVALUATION:  Today's date: 10/30/2023    Stephanie is a 48 year old female who presents for a preoperative evaluation.      10/30/2023     9:25 AM   Additional Questions   Roomed by Nereyda Jay       Surgical Information:  Surgery/Procedure: lumbar fusion  Surgery Location:  OR  Surgeon: Dr. Yu  Surgery Date: 11/28/23  Time of Surgery: 7:30am  Where patient plans to recover: At home with family  Fax number for surgical facility: Note does not need to be faxed, will be available electronically in Epic.    Assessment & Plan     The proposed surgical procedure is considered INTERMEDIATE risk.    Problem List Items Addressed This Visit       Hypertension goal BP (blood pressure) < 140/90    Relevant Orders    BASIC METABOLIC PANEL    Lumbar radiculopathy     Other Visit Diagnoses       Pre-op exam    -  Primary    Relevant Orders    EKG 12-lead complete w/read - Clinics (Completed)    CBC with platelets                    - No identified additional risk factors other than previously addressed    Antiplatelet or Anticoagulation Medication Instructions:   - Patient is on no antiplatelet or anticoagulation medications.    Additional Medication Instructions:   - ACE/ARB: HOLD on day of surgery (minimum 11 hours for general anesthesia).    RECOMMENDATION:  APPROVAL GIVEN to proceed with proposed procedure, without further diagnostic evaluation.            Subjective       HPI related to upcoming procedure: Lower back pain and failed PT and injections and nerve ablations.  Plan to have fusion done.         10/30/2023     9:19 AM   Preop Questions   1. Have you ever had a heart attack or stroke? No   2. Have you ever had surgery on your heart or blood vessels, such as a stent placement, a coronary artery bypass, or  surgery on an artery in your head, neck, heart, or legs? No   3. Do you have chest pain with activity? No   4. Do you have a history of  heart failure? No   5. Do you currently have a cold, bronchitis or symptoms of other infection? YES - sinus congestion    6. Do you have a cough, shortness of breath, or wheezing? No   7. Do you or anyone in your family have previous history of blood clots? YES - clot in arm after hysterectomy, superficial   8. Do you or does anyone in your family have a serious bleeding problem such as prolonged bleeding following surgeries or cuts? No   9. Have you ever had problems with anemia or been told to take iron pills? No   10. Have you had any abnormal blood loss such as black, tarry or bloody stools, or abnormal vaginal bleeding? No   11. Have you ever had a blood transfusion? No   12. Are you willing to have a blood transfusion if it is medically needed before, during, or after your surgery? Yes   13. Have you or any of your relatives ever had problems with anesthesia? No   14. Do you have sleep apnea, excessive snoring or daytime drowsiness? YES - snoring,    14a. Do you have a CPAP machine? No   15. Do you have any artifical heart valves or other implanted medical devices like a pacemaker, defibrillator, or continuous glucose monitor? No   16. Do you have artificial joints? No   17. Are you allergic to latex? No   18. Is there any chance that you may be pregnant? No     Health Care Directive:  Patient does not have a Health Care Directive or Living Will: Discussed advance care planning with patient; however, patient declined at this time.    Preoperative Review of :   reviewed - no record of controlled substances prescribed.      Status of Chronic Conditions:  HYPERTENSION - Patient has longstanding history of HTN , currently denies any symptoms referable to elevated blood pressure. Specifically denies chest pain, palpitations, dyspnea, orthopnea, PND or peripheral edema. Blood  pressure readings have been in normal range. Current medication regimen is as listed below. Patient denies any side effects of medication.     GERD treated with Nexium     Review of Systems  CONSTITUTIONAL: NEGATIVE for fever, chills, change in weight  ENT/MOUTH: NEGATIVE for ear, mouth and throat problems  RESP: NEGATIVE for significant cough or SOB  CV: NEGATIVE for chest pain, palpitations or peripheral edema    Patient Active Problem List    Diagnosis Date Noted    Lumbar radiculopathy 08/29/2023     Priority: Medium    Chronic pelvic pain in female 09/15/2017     Priority: Medium    Trigger finger, left thumb 04/28/2017     Priority: Medium    Sesamoiditis 04/18/2017     Priority: Medium    Bilateral carpal tunnel syndrome 08/11/2016     Priority: Medium    Tobacco use disorder 08/17/2015     Priority: Medium    Ovarian cyst 01/17/2014     Priority: Medium     Problem list name updated by automated process. Provider to review      Female infertility 01/17/2014     Priority: Medium    Hypertension goal BP (blood pressure) < 140/90 11/07/2013     Priority: Medium    Papanicolaou smear of cervix with atypical squamous cells of undetermined significance (ASC-US) 06/27/2013     Priority: Medium     3/12/13 pap ASCUS HR HPV  8/5/13 colposcopy. ASCUS. Plan repeat pap in 6 months  7/6/15 pap NIL/neg HPV. Plan cotest in 3 yrs      GERD (gastroesophageal reflux disease) 06/03/2013     Priority: Medium    Vitamin D deficiency 06/03/2013     Priority: Medium     Problem list name updated by automated process. Provider to review and confirm  Imo Update utility      HAMIDA (obstructive sleep apnea) 05/29/2013     Priority: Medium    Generalized anxiety disorder 05/29/2013     Priority: Medium    Seasonal allergic rhinitis 05/29/2013     Priority: Medium      Past Medical History:   Diagnosis Date    Allergic rhinitis, cause unspecified     Arthritis     ASCUS with positive high risk HPV 3/12/13    Depressive disorder, not  elsewhere classified     Dysfunction of eustachian tube     Encounter for therapeutic drug monitoring     Generalized anxiety disorder     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    History of appendectomy     Hypertension 2000    Idiopathic urticaria     Obstructive sleep apnea (adult) (pediatric)     Other chronic allergic conjunctivitis     Other malaise and fatigue     Pain in joint, shoulder region     S/P laparoscopic cholecystectomy     Special screening examination for human papillomavirus (HPV)     Sprain of tibiofibular (ligament), distal of ankle     Threatened , unspecified as to episode of care     Tobacco use disorder     Unspecified essential hypertension     Unspecified vitamin D deficiency      Past Surgical History:   Procedure Laterality Date    ABDOMEN SURGERY  2013    ovaian cyst and right ovary removal    APPENDECTOMY      CHOLECYSTECTOMY      COLONOSCOPY N/A 10/26/2017    Procedure: COMBINED COLONOSCOPY, SINGLE OR MULTIPLE BIOPSY/POLYPECTOMY BY BIOPSY;  Colonoscopy with biopsies by biopsy;  Surgeon: Fred Faust DO;  Location:  GI    EYE SURGERY      GENITOURINARY SURGERY  2013    Ovary, fallopian tube    GYN SURGERY  2013    Ovary, Fallopian tube    INJECT EPIDURAL LUMBAR Bilateral 3/15/2019    Procedure: Inject Facets Lumbar 4-5, Bilateral;  Surgeon: Colin Nolasco MD;  Location: [facility] OR    INJECT FACET JOINT N/A 10/25/2018    Procedure: Injest Facet Joint Lumbar 4-5 Bilateral;  Surgeon: Colin Nolasco MD;  Location: [facility]    LAPAROSCOPIC LYSIS ADHESIONS Left 3/1/2016    Procedure: LAPAROSCOPIC LYSIS ADHESIONS;  Surgeon: Nate Mishra MD;  Location: [facility]    LAPAROSCOPIC LYSIS ADHESIONS N/A 2017    Procedure: LAPAROSCOPIC LYSIS ADHESIONS;   laparoscopic lysis of adhesions;  Surgeon: Nate Mishra MD;  Location: [facility]    LAPAROSCOPIC LYSIS ADHESIONS N/A 2017    Procedure: LAPAROSCOPIC LYSIS ADHESIONS;   laparoscopic lysis of  adhesions;  Surgeon: Nate Milligan MD;  Location: [facility]    LAPAROSCOPIC LYSIS ADHESIONS N/A 5/14/2018    Procedure: LAPAROSCOPIC LYSIS ADHESIONS;;  Surgeon: Nate Mishra MD;  Location[facility]    LAPAROSCOPIC SALPINGECTOMY Left 5/14/2018    Procedure: LAPAROSCOPIC SALPINGECTOMY;;  Surgeon: Nate Mishra MD;  Location: [facility]    LAPAROSCOPY DIAGNOSTIC (GYN) N/A 5/14/2018    Procedure: LAPAROSCOPY DIAGNOSTIC (GYN);  laparoscopy, left salpingectomy, left ovarian nodule removal and lysis of adhesions;  Surgeon: Nate Mishra MD;  Location: [facility]    RELEASE CARPAL TUNNEL Left 8/31/2016    Procedure: RELEASE CARPAL TUNNEL;  Surgeon: Gucci Hairston MD;  Location: [facility]    RELEASE CARPAL TUNNEL Right 12/7/2016    Procedure: RELEASE CARPAL TUNNEL;  Surgeon: Gucci Hairston MD;  Location: [facility] OR     Current Outpatient Medications   Medication Sig Dispense Refill    amLODIPine (NORVASC) 10 MG tablet Take 10 mg by mouth daily      Cholecalciferol (VITAMIN D3) 50 MCG (2000 UT) CAPS Take 2,000 Units by mouth daily      EPINEPHrine (EPIPEN) 0.3 MG/0.3ML injection Inject 0.3 mLs (0.3 mg) into the muscle once as needed 1 each 0    hydrochlorothiazide (HYDRODIURIL) 25 MG tablet Take 25 mg by mouth daily      losartan (COZAAR) 100 MG tablet Take 100 mg by mouth daily      Probiotic Product (PROBIOTIC DAILY PO) Take by mouth At Bedtime       azelastine (ASTELIN) 0.1 % nasal spray Spray 2 sprays into both nostrils 2 times daily as needed for rhinitis or allergies  (Patient not taking: Reported on 8/24/2023)      chlorhexidine (HIBICLENS) 4 % liquid Apply topically daily as needed for wound care 236 mL 0    naproxen sodium (ANAPROX) 550 MG tablet Take 550 mg by mouth 2 times daily as needed (Patient not taking: Reported on 10/30/2023)         Allergies   Allergen Reactions    Bees Swelling     FINGER TIPS TO ELBOWS    Codeine Other (See Comments)     Squeezing pain around trunk/abdomen  "   Pine         Social History     Tobacco Use    Smoking status: Former     Packs/day: 1.00     Years: 10.00     Additional pack years: 0.00     Total pack years: 10.00     Types: Cigarettes     Quit date: 2023     Years since quittin.5    Smokeless tobacco: Never   Substance Use Topics    Alcohol use: No     Alcohol/week: 0.0 standard drinks of alcohol       History   Drug Use No         Objective     /74   Pulse 83   Temp 98.9  F (37.2  C) (Temporal)   Resp 16   Ht 1.619 m (5' 3.75\")   Wt 90.7 kg (200 lb)   LMP 10/26/2018   SpO2 99%   BMI 34.60 kg/m      Physical Exam  GENERAL APPEARANCE: healthy, alert and no distress  HENT: ear canals and TM's normal and nose and mouth without ulcers or lesions  RESP: lungs clear to auscultation - no rales, rhonchi or wheezes  CV: regular rate and rhythm, normal S1 S2, no S3 or S4 and no murmur, click or rub   ABDOMEN: soft, nontender, no HSM or masses and bowel sounds normal  NEURO: Normal strength and tone, sensory exam grossly normal, mentation intact and speech normal    Recent Labs   Lab Test 10/17/22  2130   HGB 14.2         POTASSIUM 3.1*   CR 0.53        Diagnostics:  Labs pending at this time.  Results will be reviewed when available.   EKG: appears normal, NSR, normal axis, normal intervals, no acute ST/T changes c/w ischemia, no LVH by voltage criteria, unchanged from previous tracings    Revised Cardiac Risk Index (RCRI):  The patient has the following serious cardiovascular risks for perioperative complications:   - No serious cardiac risks = 0 points     RCRI Interpretation: 1 point: Class II (low risk - 0.9% complication rate)         Signed Electronically by: J Carlos Krueger MD  Copy of this evaluation report is provided to requesting physician.      "

## 2023-10-30 NOTE — PATIENT INSTRUCTIONS
Hold Losartan on day of surgery, ok to take other medications.  No aspirin or nsaids for 10 days before surgery.

## 2023-10-30 NOTE — H&P (VIEW-ONLY)
[address] [address]  Phone: [phone]  Primary Provider: No Ref-Primary, Physician  Pre-op Performing Provider: MARCK RYAN    PREOPERATIVE EVALUATION:  Today's date: 10/30/2023    Stpehanie is a 48 year old female who presents for a preoperative evaluation.      10/30/2023     9:25 AM   Additional Questions   Roomed by Nereyda Jay       Surgical Information:  Surgery/Procedure: lumbar fusion  Surgery Location:  OR  Surgeon: Dr. Yu  Surgery Date: 11/28/23  Time of Surgery: 7:30am  Where patient plans to recover: At home with family  Fax number for surgical facility: Note does not need to be faxed, will be available electronically in Epic.    Assessment & Plan     The proposed surgical procedure is considered INTERMEDIATE risk.    Problem List Items Addressed This Visit       Hypertension goal BP (blood pressure) < 140/90    Relevant Orders    BASIC METABOLIC PANEL    Lumbar radiculopathy     Other Visit Diagnoses       Pre-op exam    -  Primary    Relevant Orders    EKG 12-lead complete w/read - Clinics (Completed)    CBC with platelets                    - No identified additional risk factors other than previously addressed    Antiplatelet or Anticoagulation Medication Instructions:   - Patient is on no antiplatelet or anticoagulation medications.    Additional Medication Instructions:   - ACE/ARB: HOLD on day of surgery (minimum 11 hours for general anesthesia).    RECOMMENDATION:  APPROVAL GIVEN to proceed with proposed procedure, without further diagnostic evaluation.            Subjective       HPI related to upcoming procedure: Lower back pain and failed PT and injections and nerve ablations.  Plan to have fusion done.         10/30/2023     9:19 AM   Preop Questions   1. Have you ever had a heart attack or stroke? No   2. Have you ever had surgery on your heart or blood vessels, such as a stent placement, a coronary artery bypass, or  surgery on an artery in your head, neck, heart, or legs? No   3. Do you have chest pain with activity? No   4. Do you have a history of  heart failure? No   5. Do you currently have a cold, bronchitis or symptoms of other infection? YES - sinus congestion    6. Do you have a cough, shortness of breath, or wheezing? No   7. Do you or anyone in your family have previous history of blood clots? YES - clot in arm after hysterectomy, superficial   8. Do you or does anyone in your family have a serious bleeding problem such as prolonged bleeding following surgeries or cuts? No   9. Have you ever had problems with anemia or been told to take iron pills? No   10. Have you had any abnormal blood loss such as black, tarry or bloody stools, or abnormal vaginal bleeding? No   11. Have you ever had a blood transfusion? No   12. Are you willing to have a blood transfusion if it is medically needed before, during, or after your surgery? Yes   13. Have you or any of your relatives ever had problems with anesthesia? No   14. Do you have sleep apnea, excessive snoring or daytime drowsiness? YES - snoring,    14a. Do you have a CPAP machine? No   15. Do you have any artifical heart valves or other implanted medical devices like a pacemaker, defibrillator, or continuous glucose monitor? No   16. Do you have artificial joints? No   17. Are you allergic to latex? No   18. Is there any chance that you may be pregnant? No     Health Care Directive:  Patient does not have a Health Care Directive or Living Will: Discussed advance care planning with patient; however, patient declined at this time.    Preoperative Review of :   reviewed - no record of controlled substances prescribed.      Status of Chronic Conditions:  HYPERTENSION - Patient has longstanding history of HTN , currently denies any symptoms referable to elevated blood pressure. Specifically denies chest pain, palpitations, dyspnea, orthopnea, PND or peripheral edema. Blood  pressure readings have been in normal range. Current medication regimen is as listed below. Patient denies any side effects of medication.     GERD treated with Nexium     Review of Systems  CONSTITUTIONAL: NEGATIVE for fever, chills, change in weight  ENT/MOUTH: NEGATIVE for ear, mouth and throat problems  RESP: NEGATIVE for significant cough or SOB  CV: NEGATIVE for chest pain, palpitations or peripheral edema    Patient Active Problem List    Diagnosis Date Noted    Lumbar radiculopathy 08/29/2023     Priority: Medium    Chronic pelvic pain in female 09/15/2017     Priority: Medium    Trigger finger, left thumb 04/28/2017     Priority: Medium    Sesamoiditis 04/18/2017     Priority: Medium    Bilateral carpal tunnel syndrome 08/11/2016     Priority: Medium    Tobacco use disorder 08/17/2015     Priority: Medium    Ovarian cyst 01/17/2014     Priority: Medium     Problem list name updated by automated process. Provider to review      Female infertility 01/17/2014     Priority: Medium    Hypertension goal BP (blood pressure) < 140/90 11/07/2013     Priority: Medium    Papanicolaou smear of cervix with atypical squamous cells of undetermined significance (ASC-US) 06/27/2013     Priority: Medium     3/12/13 pap ASCUS HR HPV  8/5/13 colposcopy. ASCUS. Plan repeat pap in 6 months  7/6/15 pap NIL/neg HPV. Plan cotest in 3 yrs      GERD (gastroesophageal reflux disease) 06/03/2013     Priority: Medium    Vitamin D deficiency 06/03/2013     Priority: Medium     Problem list name updated by automated process. Provider to review and confirm  [employer] Update utility      HAMIDA (obstructive sleep apnea) 05/29/2013     Priority: Medium    Generalized anxiety disorder 05/29/2013     Priority: Medium    Seasonal allergic rhinitis 05/29/2013     Priority: Medium      Past Medical History:   Diagnosis Date    Allergic rhinitis, cause unspecified     Arthritis     ASCUS with positive high risk HPV 3/12/13    Depressive disorder, not  elsewhere classified     Dysfunction of eustachian tube     Encounter for therapeutic drug monitoring     Generalized anxiety disorder     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    History of appendectomy     Hypertension 2000    Idiopathic urticaria     Obstructive sleep apnea (adult) (pediatric)     Other chronic allergic conjunctivitis     Other malaise and fatigue     Pain in joint, shoulder region     S/P laparoscopic cholecystectomy     Special screening examination for human papillomavirus (HPV)     Sprain of tibiofibular (ligament), distal of ankle     Threatened , unspecified as to episode of care     Tobacco use disorder     Unspecified essential hypertension     Unspecified vitamin D deficiency      Past Surgical History:   Procedure Laterality Date    ABDOMEN SURGERY  2013    ovaian cyst and right ovary removal    APPENDECTOMY      CHOLECYSTECTOMY      COLONOSCOPY N/A 10/26/2017    Procedure: COMBINED COLONOSCOPY, SINGLE OR MULTIPLE BIOPSY/POLYPECTOMY BY BIOPSY;  Colonoscopy with biopsies by biopsy;  Surgeon: Fred Faust DO;  Location:  GI    EYE SURGERY      GENITOURINARY SURGERY  2013    Ovary, fallopian tube    GYN SURGERY  2013    Ovary, Fallopian tube    INJECT EPIDURAL LUMBAR Bilateral 3/15/2019    Procedure: Inject Facets Lumbar 4-5, Bilateral;  Surgeon: Colin Nolasco MD;  Location: [facility]    INJECT FACET JOINT N/A 10/25/2018    Procedure: Injest Facet Joint Lumbar 4-5 Bilateral;  Surgeon: Colin Nolasco MD;  Location: [facility]    LAPAROSCOPIC LYSIS ADHESIONS Left 3/1/2016    Procedure: LAPAROSCOPIC LYSIS ADHESIONS;  Surgeon: Nate Mishra MD;  Location[facility]    LAPAROSCOPIC LYSIS ADHESIONS N/A 2017    Procedure: LAPAROSCOPIC LYSIS ADHESIONS;   laparoscopic lysis of adhesions;  Surgeon: Nate Mishra MD;  Location[facility]    LAPAROSCOPIC LYSIS ADHESIONS N/A 2017    Procedure: LAPAROSCOPIC LYSIS ADHESIONS;   laparoscopic lysis of  adhesions;  Surgeon: Nate Milligan MD;  [facility]    LAPAROSCOPIC LYSIS ADHESIONS N/A 5/14/2018    Procedure: LAPAROSCOPIC LYSIS ADHESIONS;;  Surgeon: Nate Mishra MD;  [facility]    LAPAROSCOPIC SALPINGECTOMY Left 5/14/2018    Procedure: LAPAROSCOPIC SALPINGECTOMY;;  Surgeon: Nate Mishra MD;  [facility]    LAPAROSCOPY DIAGNOSTIC (GYN) N/A 5/14/2018    Procedure: LAPAROSCOPY DIAGNOSTIC (GYN);  laparoscopy, left salpingectomy, left ovarian nodule removal and lysis of adhesions;  Surgeon: Nate Mishra MD;  Location[facility]    RELEASE CARPAL TUNNEL Left 8/31/2016    Procedure: RELEASE CARPAL TUNNEL;  Surgeon: Gucci Hairston MD;  Location[facility]    RELEASE CARPAL TUNNEL Right 12/7/2016    Procedure: RELEASE CARPAL TUNNEL;  Surgeon: Gucci Hairston MD;  Location: [facility]     Current Outpatient Medications   Medication Sig Dispense Refill    amLODIPine (NORVASC) 10 MG tablet Take 10 mg by mouth daily      Cholecalciferol (VITAMIN D3) 50 MCG (2000 UT) CAPS Take 2,000 Units by mouth daily      EPINEPHrine (EPIPEN) 0.3 MG/0.3ML injection Inject 0.3 mLs (0.3 mg) into the muscle once as needed 1 each 0    hydrochlorothiazide (HYDRODIURIL) 25 MG tablet Take 25 mg by mouth daily      losartan (COZAAR) 100 MG tablet Take 100 mg by mouth daily      Probiotic Product (PROBIOTIC DAILY PO) Take by mouth At Bedtime       azelastine (ASTELIN) 0.1 % nasal spray Spray 2 sprays into both nostrils 2 times daily as needed for rhinitis or allergies  (Patient not taking: Reported on 8/24/2023)      chlorhexidine (HIBICLENS) 4 % liquid Apply topically daily as needed for wound care 236 mL 0    naproxen sodium (ANAPROX) 550 MG tablet Take 550 mg by mouth 2 times daily as needed (Patient not taking: Reported on 10/30/2023)         Allergies   Allergen Reactions    Bees Swelling     FINGER TIPS TO ELBOWS    Codeine Other (See Comments)     Squeezing pain around trunk/abdomen  "   Pine         Social History     Tobacco Use    Smoking status: Former     Packs/day: 1.00     Years: 10.00     Additional pack years: 0.00     Total pack years: 10.00     Types: Cigarettes     Quit date: 2023     Years since quittin.5    Smokeless tobacco: Never   Substance Use Topics    Alcohol use: No     Alcohol/week: 0.0 standard drinks of alcohol       History   Drug Use No         Objective     /74   Pulse 83   Temp 98.9  F (37.2  C) (Temporal)   Resp 16   Ht 1.619 m (5' 3.75\")   Wt 90.7 kg (200 lb)   LMP 10/26/2018   SpO2 99%   BMI 34.60 kg/m      Physical Exam  GENERAL APPEARANCE: healthy, alert and no distress  HENT: ear canals and TM's normal and nose and mouth without ulcers or lesions  RESP: lungs clear to auscultation - no rales, rhonchi or wheezes  CV: regular rate and rhythm, normal S1 S2, no S3 or S4 and no murmur, click or rub   ABDOMEN: soft, nontender, no HSM or masses and bowel sounds normal  NEURO: Normal strength and tone, sensory exam grossly normal, mentation intact and speech normal    Recent Labs   Lab Test 10/17/22  2130   HGB 14.2         POTASSIUM 3.1*   CR 0.53        Diagnostics:  Labs pending at this time.  Results will be reviewed when available.   EKG: appears normal, NSR, normal axis, normal intervals, no acute ST/T changes c/w ischemia, no LVH by voltage criteria, unchanged from previous tracings    Revised Cardiac Risk Index (RCRI):  The patient has the following serious cardiovascular risks for perioperative complications:   - No serious cardiac risks = 0 points     RCRI Interpretation: 1 point: Class II (low risk - 0.9% complication rate)         Signed Electronically by: J Carlos Krueger MD  Copy of this evaluation report is provided to requesting physician.      "

## 2023-11-01 ENCOUNTER — DOCUMENTATION ONLY (OUTPATIENT)
Dept: NEUROSURGERY | Facility: CLINIC | Age: 48
End: 2023-11-01
Payer: COMMERCIAL

## 2023-11-01 NOTE — PROGRESS NOTES
Finished  RTW  form. Placed document in provider's mailbox at [facility] for review and signature.       Surgery scheduled 11/28/23 with Dr. Yu off through 1/9/24     Once form completed and signed please fax to Girma Grubbs [facility] [phone].

## 2023-11-06 NOTE — PROGRESS NOTES
Faxed Return to Work Form November 6, 2023 to fax number [phone]    Right Fax confirmed at 3:00 PM    Vanesa Villeda

## 2023-11-07 ENCOUNTER — THERAPY VISIT (OUTPATIENT)
Dept: PHYSICAL THERAPY | Facility: CLINIC | Age: 48
End: 2023-11-07
Attending: NEUROLOGICAL SURGERY
Payer: COMMERCIAL

## 2023-11-07 DIAGNOSIS — M54.16 LUMBAR RADICULOPATHY: Primary | ICD-10-CM

## 2023-11-07 PROCEDURE — 97140 MANUAL THERAPY 1/> REGIONS: CPT | Mod: GP | Performed by: PHYSICAL THERAPIST

## 2023-11-08 NOTE — PROGRESS NOTES
[facility]                                                                                   OUTPATIENT PHYSICAL THERAPY    PLAN OF TREATMENT FOR OUTPATIENT REHABILITATION   Patient's Last Name, First Name, Virginia Solo YOB: 1975   Provider's Name   [facility]   Medical Record No.  [id number]     Onset Date: 08/24/23 (Date of referral)  Start of Care Date: 08/29/23     Medical Diagnosis:  Lumbar radiculopathy (M54.16)      PT Treatment Diagnosis:  Lumbar radiculopathy (M54.16) Plan of Treatment  Frequency/Duration: 1xweek/ 4 weeks    Certification date from 11/8/2023 to  12/15/2023       See note for plan of treatment details and functional goals     Rossana Morales, PT                         I CERTIFY THE NEED FOR THESE SERVICES FURNISHED UNDER        THIS PLAN OF TREATMENT AND WHILE UNDER MY CARE     (Physician attestation of this document indicates review and certification of the therapy plan).                Referring Provider:  Abel Yu      Initial Assessment  See Epic Evaluation- Start of Care Date: 08/29/23            PLAN  Continue therapy per current plan of care.    Beginning/End Dates of Progress Note Reporting Period:  8/29/23 - 11/7/2023      Referring Provider:  Abel Yu       11/07/23 0500   Appointment Info   Signing clinician's name / credentials Rossana Morales, PT, DPT   Total/Authorized Visits 8   Visits Used [employer]   Medical Diagnosis Lumbar radiculopathy (M54.16)   PT Tx Diagnosis Lumbar radiculopathy (M54.16)   Quick Adds Certification   Progress Note/Certification   Start of Care Date 08/29/23   Onset of illness/injury or Date of Surgery 08/24/23  (Date of referral)   Therapy Frequency 1xweek   Predicted Duration 10 weeks   Certification date from 08/29/23   Certification date to 11/07/23   PT Goal 1   Goal Identifier MATTIE   Goal Description Patient will have improvements in MATTIE  related disability reduced to <20%    Patient has reduced MATTIE from 46% to 40%    Rationale to maximize safety and independence with performance of ADLs and functional tasks   Target Date 11/07/23   PT Goal 2   Goal Identifier Sitting   Goal Description Patient will have 50% reduced pain with sitting for 1 hour for improved tolerance to driving and work tasks involving sitting   Target Date 10/10/23   Goal Progress L hip pain is quite a bit better with all tasks 80-90%, LBP is variable and responsive to MT but having surgery rosalba  few weeks   PT Goal 3   Goal Identifier Pelvis   Goal Description Patient will have improved lumbopelvic biomechanics and hip flexibility to normalize movement patterns   Target Date 10/17/23   Goal Progress Hip ROM is better with improved ER, IR, pelvic joint position has normalized but with persistant muscular and myofascial tension and limitations noted that continue despite postural change, strengthening and stretching. Continue to promote flexiblity and strength training to optimize her surgical tolerance and activity tolerance - does feel stronger and is tolerating her exercise program without worsening of symptoms during or after. MT managing tension to tolerate more HEP   Subjective Report   Subjective Report Stating having a bit more bladder irritability, states she is leaking urine upon rising from the toilet,   Objective Measure 1   Objective Measure Pain   Details 6/10 low back>R lateral thoracic region painful with Reaching L UE up , rotating trunk in bed L>R. Low back pain.   Therapeutic Procedure/Exercise   Therapeutic Procedures: strength, endurance, ROM, flexibillity minutes (94696) 5   Ther Proc 1 Discussed during MT, NC   Ther Proc 1 - Details Progress HEP reps up to 2x10 or 15 reps as able. Doing ok with current HEP and hip pain is better. Focus on full PFM relaxation for bladder and could be result of irritated lumbar nerve roots for urinary urgency/frequency (did do  pelvic PT in past and biofeedback)   Manual Therapy   Manual Therapy: Mobilization, MFR, MLD, friction massage minutes (94407) 40   Manual Therapy 1 MT to improve thoracic moblity and ROM   Manual Therapy 1 - Details L SL thoracic, QL, Paraspinal, lat, periscapular release, MWM QL elongation to open pelvis. Rib springing, T4-9, intercostal release T9-12 R, Gentle trunk rotations knees and trunk PROM   Manual Therapy 3 Still quite tight in her spine in general, mm tension in UT, mid back , neck and paraspinals.   Manual Therapy 3 - Details Stiffness, but feels no sharp pain with trunk rotation after session.   Plan   Plan for next session MT, Progress ex as needed. Anticipate 1-2 more visits prior to surgery then discharge   Comments   Comments Surgery 11/28   Total Session Time   Timed Code Treatment Minutes 45   Total Treatment Time (sum of timed and untimed services) 45

## 2023-11-13 ENCOUNTER — THERAPY VISIT (OUTPATIENT)
Dept: PHYSICAL THERAPY | Facility: CLINIC | Age: 48
End: 2023-11-13
Attending: NEUROLOGICAL SURGERY
Payer: COMMERCIAL

## 2023-11-13 DIAGNOSIS — M54.16 LUMBAR RADICULOPATHY: Primary | ICD-10-CM

## 2023-11-13 PROCEDURE — 97140 MANUAL THERAPY 1/> REGIONS: CPT | Mod: GP | Performed by: PHYSICAL THERAPIST

## 2023-11-20 ENCOUNTER — THERAPY VISIT (OUTPATIENT)
Dept: PHYSICAL THERAPY | Facility: CLINIC | Age: 48
End: 2023-11-20
Attending: NEUROLOGICAL SURGERY
Payer: COMMERCIAL

## 2023-11-20 DIAGNOSIS — M54.16 LUMBAR RADICULOPATHY: Primary | ICD-10-CM

## 2023-11-20 PROCEDURE — 97140 MANUAL THERAPY 1/> REGIONS: CPT | Mod: GP | Performed by: PHYSICAL THERAPIST

## 2023-11-21 NOTE — PROGRESS NOTES
DISCHARGE  Reason for Discharge: To have surgery next week. Can return with new orders if needed after surgery per surgeons recommendations    Equipment Issued: HEP    Discharge Plan: Patient to continue home program.    Referring Provider:  Abel Yu    See note 11/7 for update/cert and progress    Rossana Morales................... PT, DPT, CLT   11/20/2023, 8:41 PM  [phone]

## 2023-11-27 ENCOUNTER — ANESTHESIA EVENT (OUTPATIENT)
Dept: SURGERY | Facility: CLINIC | Age: 48
End: 2023-11-27
Payer: COMMERCIAL

## 2023-11-27 RX ORDER — FLUTICASONE PROPIONATE 50 MCG
1 SPRAY, SUSPENSION (ML) NASAL PRN
COMMUNITY

## 2023-11-27 ASSESSMENT — LIFESTYLE VARIABLES: TOBACCO_USE: 1

## 2023-11-27 NOTE — PROGRESS NOTES
PTA medications updated by Medication Scribe prior to surgery via phone call with patient (last doses completed by Nurse)     Medication history sources: Patient, Surescripts, and H&P  In the past week, patient estimated taking medication this percent of the time: Greater than 90%      Significant changes made to the medication list:  Patient reports no longer taking the following meds (med scribe removed from PTA med list): Azelastine NA      Additional medication history information:   Patient was advised to bring: Flonase NA    Medication reconciliation completed by provider prior to medication history? No    Time spent in this activity: 20 minutes    The information provided in this note is only as accurate as the sources available at the time of update(s)      Prior to Admission medications    Medication Sig Last Dose Taking? Auth Provider Long Term End Date   amLODIPine (NORVASC) 10 MG tablet Take 1 tablet by mouth daily  at AM Yes Reported, Patient No    Cholecalciferol (VITAMIN D3) 50 MCG (2000 UT) CAPS Take 1 capsule by mouth daily 11/27/2023 at AM Yes Reported, Patient Yes    EPINEPHrine (EPIPEN) 0.3 MG/0.3ML injection Inject 0.3 mLs (0.3 mg) into the muscle once as needed Unknown at PRN Yes Dimas Ellison MD Yes    esomeprazole (NEXIUM) 20 MG DR capsule Take 1 capsule (20 mg) by mouth every morning (before breakfast) Take 30-60 minutes before eating.  at AM Yes J Carlos Krueger MD     fluticasone (FLONASE) 50 MCG/ACT nasal spray Spray 1 spray into both nostrils as needed for rhinitis or allergies Unknown at PRN Yes Reported, Patient     hydrochlorothiazide (HYDRODIURIL) 25 MG tablet Take 1 tablet by mouth daily 11/27/2023 at AM Yes Reported, Patient Yes    losartan (COZAAR) 100 MG tablet Take 1 tablet by mouth daily 11/27/2023 at AM Yes Reported, Patient Yes    Probiotic Product (PROBIOTIC DAILY PO) Take 1 capsule by mouth at bedtime 11/27/2023 at AM Yes Reported, Patient     chlorhexidine  (HIBICLENS) 4 % liquid Apply topically daily as needed for wound care   Abel Yu MD       Medication history completed by:    Valeriy Berrios CPhT  Medication [facility]

## 2023-11-27 NOTE — ANESTHESIA PREPROCEDURE EVALUATION
Anesthesia Pre-Procedure Evaluation    Patient: Virginia Valenzuela   MRN: [id number] : 1975        Procedure : Procedure(s):  Left Lumbar 4 to Lumbar 5 robotic minimally invasive Transforaminal Lumbar Interbody Fusion          Past Medical History:   Diagnosis Date    Allergic rhinitis, cause unspecified     Arthritis     ASCUS with positive high risk HPV 3/12/13    Depressive disorder, not elsewhere classified     Dysfunction of eustachian tube     Encounter for therapeutic drug monitoring     Generalized anxiety disorder     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    History of appendectomy     Hypertension     Idiopathic urticaria     Obstructive sleep apnea (adult) (pediatric)     Other chronic allergic conjunctivitis     Other malaise and fatigue     Pain in joint, shoulder region     S/P laparoscopic cholecystectomy     Special screening examination for human papillomavirus (HPV)     Sprain of tibiofibular (ligament), distal of ankle     Threatened , unspecified as to episode of care     Tobacco use disorder     Unspecified essential hypertension     Unspecified vitamin D deficiency       Past Surgical History:   Procedure Laterality Date    ABDOMEN SURGERY  2013    ovaian cyst and right ovary removal    APPENDECTOMY      CHOLECYSTECTOMY      COLONOSCOPY N/A 10/26/2017    Procedure: COMBINED COLONOSCOPY, SINGLE OR MULTIPLE BIOPSY/POLYPECTOMY BY BIOPSY;  Colonoscopy with biopsies by biopsy;  Surgeon: Fred Faust DO;  Location:  GI    EYE SURGERY      GENITOURINARY SURGERY  2013    Ovary, fallopian tube    GYN SURGERY  2013    Ovary, Fallopian tube    INJECT EPIDURAL LUMBAR Bilateral 3/15/2019    Procedure: Inject Facets Lumbar 4-5, Bilateral;  Surgeon: Colin Nolasco MD;  [facility]    INJECT FACET JOINT N/A 10/25/2018    Procedure: Injest Facet Joint Lumbar 4-5 Bilateral;  Surgeon: Colin Nolasco MD;  [facility]    LAPAROSCOPIC LYSIS ADHESIONS Left  3/1/2016    Procedure: LAPAROSCOPIC LYSIS ADHESIONS;  Surgeon: Nate Mishra MD;  [facility]    LAPAROSCOPIC LYSIS ADHESIONS N/A 2017    Procedure: LAPAROSCOPIC LYSIS ADHESIONS;   laparoscopic lysis of adhesions;  Surgeon: Nate Mishra MD;  [facility]    LAPAROSCOPIC LYSIS ADHESIONS N/A 2017    Procedure: LAPAROSCOPIC LYSIS ADHESIONS;   laparoscopic lysis of adhesions;  Surgeon: Nate Milligan MD;  [facility]    LAPAROSCOPIC LYSIS ADHESIONS N/A 2018    Procedure: LAPAROSCOPIC LYSIS ADHESIONS;;  Surgeon: Nate Mishra MD;  [facility]    LAPAROSCOPIC SALPINGECTOMY Left 2018    Procedure: LAPAROSCOPIC SALPINGECTOMY;;  Surgeon: Nate Mishra MD;  [facility]    LAPAROSCOPY DIAGNOSTIC (GYN) N/A 2018    Procedure: LAPAROSCOPY DIAGNOSTIC (GYN);  laparoscopy, left salpingectomy, left ovarian nodule removal and lysis of adhesions;  Surgeon: Nate Mishra MD;  [facility]    RELEASE CARPAL TUNNEL Left 2016    Procedure: RELEASE CARPAL TUNNEL;  Surgeon: Gucci Hairston MD;  [facility]    RELEASE CARPAL TUNNEL Right 2016    Procedure: RELEASE CARPAL TUNNEL;  Surgeon: Gucci Hairston MD;  [facility]      Allergies   Allergen Reactions    Bees Swelling     FINGER TIPS TO ELBOWS    Codeine Other (See Comments)     Squeezing pain around trunk/abdomen    Pine       Social History     Tobacco Use    Smoking status: Former     Packs/day: 1.00     Years: 10.00     Additional pack years: 0.00     Total pack years: 10.00     Types: Cigarettes     Quit date: 2023     Years since quittin.6    Smokeless tobacco: Never   Substance Use Topics    Alcohol use: No     Alcohol/week: 0.0 standard drinks of alcohol      Wt Readings from Last 1 Encounters:   10/30/23 90.7 kg (200 lb)        Anesthesia Evaluation   Pt has had prior anesthetic.     No history of anesthetic complications  "      ROS/MED HX  ENT/Pulmonary:     (+) sleep apnea, doesn't use CPAP,         allergic rhinitis,     tobacco use, Past use,                      Neurologic:  - neg neurologic ROS     Cardiovascular:     (+)  hypertension- -   -  - -                                      METS/Exercise Tolerance:     Hematologic:       Musculoskeletal:   (+)  arthritis,             GI/Hepatic:     (+) GERD, Asymptomatic on medication,                  Renal/Genitourinary:  - neg Renal ROS     Endo:     (+)               Obesity,       Psychiatric/Substance Use:     (+) psychiatric history depression and anxiety       Infectious Disease:       Malignancy:       Other:            Physical Exam    Airway        Mallampati: II   TM distance: > 3 FB   Neck ROM: full   Mouth opening: > 3 cm    Respiratory Devices and Support         Dental       (+) Minor Abnormalities - some fillings, tiny chips      Cardiovascular   cardiovascular exam normal          Pulmonary   pulmonary exam normal                OUTSIDE LABS:  CBC:   Lab Results   Component Value Date    WBC 10.7 10/30/2023    WBC 21.8 (H) 10/17/2022    HGB 14.1 10/30/2023    HGB 14.2 10/17/2022    HCT 42.7 10/30/2023    HCT 41.2 10/17/2022     10/30/2023     10/17/2022     BMP:   Lab Results   Component Value Date     10/30/2023     10/17/2022    POTASSIUM 3.6 10/30/2023    POTASSIUM 3.1 (L) 10/17/2022    CHLORIDE 99 10/30/2023    CHLORIDE 103 10/17/2022    CO2 23 10/30/2023    CO2 30 10/17/2022    BUN 11.5 10/30/2023    BUN 11 10/17/2022    CR 0.58 10/30/2023    CR 0.53 10/17/2022     (H) 10/30/2023     (H) 10/17/2022     COAGS: No results found for: \"PTT\", \"INR\", \"FIBR\"  POC:   Lab Results   Component Value Date    HCG Negative 11/03/2018     HEPATIC:   Lab Results   Component Value Date    ALBUMIN 3.5 10/17/2022    PROTTOTAL 7.5 10/17/2022    ALT 19 10/17/2022    AST 12 10/17/2022    ALKPHOS 129 10/17/2022    BILITOTAL 0.2 10/17/2022 " "    OTHER:   Lab Results   Component Value Date    LACT 0.7 10/18/2022    A1C 5.8 11/04/2017    YUVAL 9.7 10/30/2023    LIPASE 131 02/07/2017    TSH 1.94 10/11/2013    CRP 11.6 (H) 11/28/2017    SED 17 04/18/2017       Anesthesia Plan    ASA Status:  2    NPO Status:  NPO Appropriate    Anesthesia Type: General.     - Airway: ETT   Induction: Intravenous, Propofol.   Maintenance: Balanced.        Consents    Anesthesia Plan(s) and associated risks, benefits, and realistic alternatives discussed. Questions answered and patient/representative(s) expressed understanding.     - Discussed:     - Discussed with:  Patient            Postoperative Care    Pain management: IV analgesics.   PONV prophylaxis: Ondansetron (or other 5HT-3), Background Propofol Infusion, Dexamethasone or Solumedrol     Comments:               Praveen Jernigan MD    I have reviewed the pertinent notes and labs in the chart from the past 30 days and (re)examined the patient.  Any updates or changes from those notes are reflected in this note.              # Obesity: Estimated body mass index is 34.6 kg/m  as calculated from the following:    Height as of 10/30/23: 1.619 m (5' 3.75\").    Weight as of 10/30/23: 90.7 kg (200 lb).      "

## 2023-11-28 ENCOUNTER — ANESTHESIA (OUTPATIENT)
Dept: SURGERY | Facility: CLINIC | Age: 48
End: 2023-11-28
Payer: COMMERCIAL

## 2023-11-28 ENCOUNTER — APPOINTMENT (OUTPATIENT)
Dept: GENERAL RADIOLOGY | Facility: CLINIC | Age: 48
End: 2023-11-28
Attending: NEUROLOGICAL SURGERY
Payer: COMMERCIAL

## 2023-11-28 ENCOUNTER — HOSPITAL ENCOUNTER (INPATIENT)
Facility: CLINIC | Age: 48
LOS: 4 days | Discharge: HOME | End: 2023-12-02
Attending: NEUROLOGICAL SURGERY | Admitting: NEUROLOGICAL SURGERY
Payer: COMMERCIAL

## 2023-11-28 DIAGNOSIS — M54.16 LUMBAR RADICULOPATHY: ICD-10-CM

## 2023-11-28 DIAGNOSIS — Z98.1 S/P LUMBAR FUSION: Primary | ICD-10-CM

## 2023-11-28 LAB
CREAT SERPL-MCNC: 0.53 MG/DL (ref 0.51–0.95)
EGFRCR SERPLBLD CKD-EPI 2021: >90 ML/MIN/1.73M2
GLUCOSE SERPL-MCNC: 125 MG/DL (ref 70–99)
POTASSIUM SERPL-SCNC: 3.2 MMOL/L (ref 3.4–5.3)

## 2023-11-28 PROCEDURE — 250N000011 HC RX IP 250 OP 636: Mod: JZ | Performed by: ANESTHESIOLOGY

## 2023-11-28 PROCEDURE — 0ST20ZZ RESECTION OF LUMBAR VERTEBRAL DISC, OPEN APPROACH: ICD-10-PCS | Performed by: NEUROLOGICAL SURGERY

## 2023-11-28 PROCEDURE — 250N000011 HC RX IP 250 OP 636: Mod: JZ | Performed by: NURSE PRACTITIONER

## 2023-11-28 PROCEDURE — 0SG0071 FUSION OF LUMBAR VERTEBRAL JOINT WITH AUTOLOGOUS TISSUE SUBSTITUTE, POSTERIOR APPROACH, POSTERIOR COLUMN, OPEN APPROACH: ICD-10-PCS | Performed by: NEUROLOGICAL SURGERY

## 2023-11-28 PROCEDURE — 01NB0ZZ RELEASE LUMBAR NERVE, OPEN APPROACH: ICD-10-PCS | Performed by: NEUROLOGICAL SURGERY

## 2023-11-28 PROCEDURE — 82947 ASSAY GLUCOSE BLOOD QUANT: CPT | Performed by: ANESTHESIOLOGY

## 2023-11-28 PROCEDURE — 84132 ASSAY OF SERUM POTASSIUM: CPT | Performed by: ANESTHESIOLOGY

## 2023-11-28 PROCEDURE — L8699 PROSTHETIC IMPLANT NOS: HCPCS | Performed by: NEUROLOGICAL SURGERY

## 2023-11-28 PROCEDURE — 250N000009 HC RX 250: Performed by: ANESTHESIOLOGY

## 2023-11-28 PROCEDURE — 82565 ASSAY OF CREATININE: CPT | Performed by: PHYSICIAN ASSISTANT

## 2023-11-28 PROCEDURE — 272N000001 HC OR GENERAL SUPPLY STERILE: Performed by: NEUROLOGICAL SURGERY

## 2023-11-28 PROCEDURE — C1713 ANCHOR/SCREW BN/BN,TIS/BN: HCPCS | Performed by: NEUROLOGICAL SURGERY

## 2023-11-28 PROCEDURE — 0SG00AJ FUSION OF LUMBAR VERTEBRAL JOINT WITH INTERBODY FUSION DEVICE, POSTERIOR APPROACH, ANTERIOR COLUMN, OPEN APPROACH: ICD-10-PCS | Performed by: NEUROLOGICAL SURGERY

## 2023-11-28 PROCEDURE — 250N000011 HC RX IP 250 OP 636

## 2023-11-28 PROCEDURE — 99254 IP/OBS CNSLTJ NEW/EST MOD 60: CPT | Performed by: PHYSICIAN ASSISTANT

## 2023-11-28 PROCEDURE — 272N000002 HC OR SUPPLY OTHER OPNP: Performed by: NEUROLOGICAL SURGERY

## 2023-11-28 PROCEDURE — 710N000009 HC RECOVERY PHASE 1, LEVEL 1, PER MIN: Performed by: NEUROLOGICAL SURGERY

## 2023-11-28 PROCEDURE — 250N000025 HC SEVOFLURANE, PER MIN: Performed by: NEUROLOGICAL SURGERY

## 2023-11-28 PROCEDURE — 8E0W4CZ ROBOTIC ASSISTED PROCEDURE OF TRUNK REGION, PERCUTANEOUS ENDOSCOPIC APPROACH: ICD-10-PCS | Performed by: NEUROLOGICAL SURGERY

## 2023-11-28 PROCEDURE — 36415 COLL VENOUS BLD VENIPUNCTURE: CPT | Performed by: ANESTHESIOLOGY

## 2023-11-28 PROCEDURE — 250N000011 HC RX IP 250 OP 636: Performed by: ANESTHESIOLOGY

## 2023-11-28 PROCEDURE — 258N000003 HC RX IP 258 OP 636: Performed by: ANESTHESIOLOGY

## 2023-11-28 PROCEDURE — 999N000179 XR SURGERY CARM FLUORO LESS THAN 5 MIN W STILLS

## 2023-11-28 PROCEDURE — 360N000087 HC SURGERY LEVEL 7 W/ FLUORO, PER MIN: Performed by: NEUROLOGICAL SURGERY

## 2023-11-28 PROCEDURE — 250N000009 HC RX 250: Performed by: NEUROLOGICAL SURGERY

## 2023-11-28 PROCEDURE — 250N000011 HC RX IP 250 OP 636: Performed by: NURSE PRACTITIONER

## 2023-11-28 PROCEDURE — 250N000013 HC RX MED GY IP 250 OP 250 PS 637: Performed by: NURSE PRACTITIONER

## 2023-11-28 PROCEDURE — 120N000001 HC R&B MED SURG/OB

## 2023-11-28 PROCEDURE — 370N000017 HC ANESTHESIA TECHNICAL FEE, PER MIN: Performed by: NEUROLOGICAL SURGERY

## 2023-11-28 PROCEDURE — 250N000011 HC RX IP 250 OP 636: Performed by: NEUROLOGICAL SURGERY

## 2023-11-28 PROCEDURE — 999N000141 HC STATISTIC PRE-PROCEDURE NURSING ASSESSMENT: Performed by: NEUROLOGICAL SURGERY

## 2023-11-28 PROCEDURE — C1762 CONN TISS, HUMAN(INC FASCIA): HCPCS | Performed by: NEUROLOGICAL SURGERY

## 2023-11-28 PROCEDURE — 258N000003 HC RX IP 258 OP 636: Performed by: NURSE PRACTITIONER

## 2023-11-28 PROCEDURE — 8E0WXBF COMPUTER ASSISTED PROCEDURE OF TRUNK REGION, WITH FLUOROSCOPY: ICD-10-PCS | Performed by: NEUROLOGICAL SURGERY

## 2023-11-28 DEVICE — IMP SPI INTERBODY MEDT CATALYFT PL 40 SHORT 9MM 6069093: Type: IMPLANTABLE DEVICE | Site: SPINE LUMBAR | Status: FUNCTIONAL

## 2023-11-28 DEVICE — SCREW BN 50MM 6.5MM MA NS CD HZN VYGR SPNE 5.5MM ROD: Type: IMPLANTABLE DEVICE | Site: SPINE LUMBAR | Status: FUNCTIONAL

## 2023-11-28 DEVICE — SCREW BN 45MM 6.5MM MA NS CD HZN VYGR SPNE 5.5MM ROD: Type: IMPLANTABLE DEVICE | Site: SPINE LUMBAR | Status: FUNCTIONAL

## 2023-11-28 DEVICE — BONE GRAFT KIT 7510100 INFUSE X SMALL
Type: IMPLANTABLE DEVICE | Site: SPINE LUMBAR | Status: FUNCTIONAL
Brand: INFUSE® BONE GRAFT

## 2023-11-28 DEVICE — KIT BNGF 6CC DMNR CORT FBR ACCELERATE GRFTN CNN T50206: Type: IMPLANTABLE DEVICE | Site: SPINE LUMBAR | Status: FUNCTIONAL

## 2023-11-28 DEVICE — GRAFT BONE FIBERS DBF 9ML INJ T50309 PRELOADED: Type: IMPLANTABLE DEVICE | Site: SPINE LUMBAR | Status: FUNCTIONAL

## 2023-11-28 DEVICE — IMPLANTABLE DEVICE: Type: IMPLANTABLE DEVICE | Site: SPINE LUMBAR | Status: FUNCTIONAL

## 2023-11-28 DEVICE — SCREW SET 5.5/6MM CD HZN SOLERAL VYGR NS LF 6540530: Type: IMPLANTABLE DEVICE | Site: SPINE LUMBAR | Status: FUNCTIONAL

## 2023-11-28 RX ORDER — HYDROMORPHONE HCL IN WATER/PF 6 MG/30 ML
0.4 PATIENT CONTROLLED ANALGESIA SYRINGE INTRAVENOUS
Status: DISCONTINUED | OUTPATIENT
Start: 2023-11-28 | End: 2023-12-02 | Stop reason: HOSPADM

## 2023-11-28 RX ORDER — NALOXONE HYDROCHLORIDE 0.4 MG/ML
0.2 INJECTION, SOLUTION INTRAMUSCULAR; INTRAVENOUS; SUBCUTANEOUS
Status: DISCONTINUED | OUTPATIENT
Start: 2023-11-28 | End: 2023-12-02 | Stop reason: HOSPADM

## 2023-11-28 RX ORDER — NALOXONE HYDROCHLORIDE 0.4 MG/ML
0.4 INJECTION, SOLUTION INTRAMUSCULAR; INTRAVENOUS; SUBCUTANEOUS
Status: DISCONTINUED | OUTPATIENT
Start: 2023-11-28 | End: 2023-12-02 | Stop reason: HOSPADM

## 2023-11-28 RX ORDER — FENTANYL CITRATE 0.05 MG/ML
50 INJECTION, SOLUTION INTRAMUSCULAR; INTRAVENOUS EVERY 5 MIN PRN
Status: DISCONTINUED | OUTPATIENT
Start: 2023-11-28 | End: 2023-11-28 | Stop reason: HOSPADM

## 2023-11-28 RX ORDER — DEXAMETHASONE SODIUM PHOSPHATE 4 MG/ML
INJECTION, SOLUTION INTRA-ARTICULAR; INTRALESIONAL; INTRAMUSCULAR; INTRAVENOUS; SOFT TISSUE PRN
Status: DISCONTINUED | OUTPATIENT
Start: 2023-11-28 | End: 2023-11-28

## 2023-11-28 RX ORDER — GABAPENTIN 300 MG/1
300 CAPSULE ORAL
Status: COMPLETED | OUTPATIENT
Start: 2023-11-28 | End: 2023-11-28

## 2023-11-28 RX ORDER — ACETAMINOPHEN 325 MG/1
650 TABLET ORAL EVERY 4 HOURS PRN
Status: DISCONTINUED | OUTPATIENT
Start: 2023-12-01 | End: 2023-12-02 | Stop reason: HOSPADM

## 2023-11-28 RX ORDER — HYDROMORPHONE HCL IN WATER/PF 6 MG/30 ML
0.2 PATIENT CONTROLLED ANALGESIA SYRINGE INTRAVENOUS EVERY 5 MIN PRN
Status: DISCONTINUED | OUTPATIENT
Start: 2023-11-28 | End: 2023-11-28 | Stop reason: HOSPADM

## 2023-11-28 RX ORDER — OXYCODONE HYDROCHLORIDE 5 MG/1
5 TABLET ORAL EVERY 4 HOURS PRN
Status: DISCONTINUED | OUTPATIENT
Start: 2023-11-28 | End: 2023-12-02 | Stop reason: HOSPADM

## 2023-11-28 RX ORDER — DEXMEDETOMIDINE HYDROCHLORIDE 4 UG/ML
INJECTION, SOLUTION INTRAVENOUS PRN
Status: DISCONTINUED | OUTPATIENT
Start: 2023-11-28 | End: 2023-11-28

## 2023-11-28 RX ORDER — CHOLECALCIFEROL (VITAMIN D3) 50 MCG
50 TABLET ORAL DAILY
Status: DISCONTINUED | OUTPATIENT
Start: 2023-11-29 | End: 2023-12-02 | Stop reason: HOSPADM

## 2023-11-28 RX ORDER — SODIUM CHLORIDE, SODIUM LACTATE, POTASSIUM CHLORIDE, CALCIUM CHLORIDE 600; 310; 30; 20 MG/100ML; MG/100ML; MG/100ML; MG/100ML
INJECTION, SOLUTION INTRAVENOUS CONTINUOUS
Status: DISCONTINUED | OUTPATIENT
Start: 2023-11-28 | End: 2023-11-28 | Stop reason: HOSPADM

## 2023-11-28 RX ORDER — HYDRALAZINE HYDROCHLORIDE 20 MG/ML
2.5-5 INJECTION INTRAMUSCULAR; INTRAVENOUS EVERY 10 MIN PRN
Status: DISCONTINUED | OUTPATIENT
Start: 2023-11-28 | End: 2023-11-28 | Stop reason: HOSPADM

## 2023-11-28 RX ORDER — HYDROCHLOROTHIAZIDE 25 MG/1
25 TABLET ORAL DAILY
Status: DISCONTINUED | OUTPATIENT
Start: 2023-11-28 | End: 2023-12-02 | Stop reason: HOSPADM

## 2023-11-28 RX ORDER — KETAMINE HYDROCHLORIDE 10 MG/ML
INJECTION INTRAMUSCULAR; INTRAVENOUS PRN
Status: DISCONTINUED | OUTPATIENT
Start: 2023-11-28 | End: 2023-11-28

## 2023-11-28 RX ORDER — PROCHLORPERAZINE MALEATE 10 MG
10 TABLET ORAL EVERY 6 HOURS PRN
Status: DISCONTINUED | OUTPATIENT
Start: 2023-11-28 | End: 2023-12-02 | Stop reason: HOSPADM

## 2023-11-28 RX ORDER — PROPOFOL 10 MG/ML
INJECTION, EMULSION INTRAVENOUS CONTINUOUS PRN
Status: DISCONTINUED | OUTPATIENT
Start: 2023-11-28 | End: 2023-11-28

## 2023-11-28 RX ORDER — AMOXICILLIN 250 MG
1 CAPSULE ORAL 2 TIMES DAILY
Status: DISCONTINUED | OUTPATIENT
Start: 2023-11-28 | End: 2023-12-02 | Stop reason: HOSPADM

## 2023-11-28 RX ORDER — HYDROMORPHONE HCL IN WATER/PF 6 MG/30 ML
0.4 PATIENT CONTROLLED ANALGESIA SYRINGE INTRAVENOUS EVERY 5 MIN PRN
Status: DISCONTINUED | OUTPATIENT
Start: 2023-11-28 | End: 2023-11-28 | Stop reason: HOSPADM

## 2023-11-28 RX ORDER — HYDROMORPHONE HCL IN WATER/PF 6 MG/30 ML
0.2 PATIENT CONTROLLED ANALGESIA SYRINGE INTRAVENOUS
Status: DISCONTINUED | OUTPATIENT
Start: 2023-11-28 | End: 2023-12-02 | Stop reason: HOSPADM

## 2023-11-28 RX ORDER — PROPOFOL 10 MG/ML
INJECTION, EMULSION INTRAVENOUS PRN
Status: DISCONTINUED | OUTPATIENT
Start: 2023-11-28 | End: 2023-11-28

## 2023-11-28 RX ORDER — GLYCOPYRROLATE 0.2 MG/ML
INJECTION, SOLUTION INTRAMUSCULAR; INTRAVENOUS PRN
Status: DISCONTINUED | OUTPATIENT
Start: 2023-11-28 | End: 2023-11-28

## 2023-11-28 RX ORDER — OXYCODONE HYDROCHLORIDE 5 MG/1
10 TABLET ORAL EVERY 4 HOURS PRN
Status: DISCONTINUED | OUTPATIENT
Start: 2023-11-28 | End: 2023-12-02 | Stop reason: HOSPADM

## 2023-11-28 RX ORDER — CEFAZOLIN SODIUM/WATER 2 G/20 ML
2 SYRINGE (ML) INTRAVENOUS
Status: COMPLETED | OUTPATIENT
Start: 2023-11-28 | End: 2023-11-28

## 2023-11-28 RX ORDER — AMLODIPINE BESYLATE 10 MG/1
10 TABLET ORAL DAILY
Status: DISCONTINUED | OUTPATIENT
Start: 2023-11-29 | End: 2023-12-02 | Stop reason: HOSPADM

## 2023-11-28 RX ORDER — POLYETHYLENE GLYCOL 3350 17 G/17G
17 POWDER, FOR SOLUTION ORAL DAILY
Status: DISCONTINUED | OUTPATIENT
Start: 2023-11-29 | End: 2023-12-02 | Stop reason: HOSPADM

## 2023-11-28 RX ORDER — LABETALOL HYDROCHLORIDE 5 MG/ML
10 INJECTION, SOLUTION INTRAVENOUS
Status: DISCONTINUED | OUTPATIENT
Start: 2023-11-28 | End: 2023-11-28 | Stop reason: HOSPADM

## 2023-11-28 RX ORDER — CEFAZOLIN SODIUM/WATER 2 G/20 ML
2 SYRINGE (ML) INTRAVENOUS SEE ADMIN INSTRUCTIONS
Status: DISCONTINUED | OUTPATIENT
Start: 2023-11-28 | End: 2023-11-28 | Stop reason: HOSPADM

## 2023-11-28 RX ORDER — ONDANSETRON 2 MG/ML
4 INJECTION INTRAMUSCULAR; INTRAVENOUS EVERY 30 MIN PRN
Status: DISCONTINUED | OUTPATIENT
Start: 2023-11-28 | End: 2023-11-28 | Stop reason: HOSPADM

## 2023-11-28 RX ORDER — BUPIVACAINE HYDROCHLORIDE AND EPINEPHRINE 5; 5 MG/ML; UG/ML
INJECTION, SOLUTION PERINEURAL PRN
Status: DISCONTINUED | OUTPATIENT
Start: 2023-11-28 | End: 2023-11-28 | Stop reason: HOSPADM

## 2023-11-28 RX ORDER — ONDANSETRON 4 MG/1
4 TABLET, ORALLY DISINTEGRATING ORAL EVERY 30 MIN PRN
Status: DISCONTINUED | OUTPATIENT
Start: 2023-11-28 | End: 2023-11-28 | Stop reason: HOSPADM

## 2023-11-28 RX ORDER — FENTANYL CITRATE 0.05 MG/ML
25 INJECTION, SOLUTION INTRAMUSCULAR; INTRAVENOUS EVERY 5 MIN PRN
Status: DISCONTINUED | OUTPATIENT
Start: 2023-11-28 | End: 2023-11-28 | Stop reason: HOSPADM

## 2023-11-28 RX ORDER — FENTANYL CITRATE 50 UG/ML
INJECTION, SOLUTION INTRAMUSCULAR; INTRAVENOUS PRN
Status: DISCONTINUED | OUTPATIENT
Start: 2023-11-28 | End: 2023-11-28

## 2023-11-28 RX ORDER — CEFAZOLIN SODIUM 2 G/100ML
2 INJECTION, SOLUTION INTRAVENOUS EVERY 8 HOURS
Qty: 300 ML | Refills: 0 | Status: COMPLETED | OUTPATIENT
Start: 2023-11-28 | End: 2023-11-29

## 2023-11-28 RX ORDER — CALCIUM CARBONATE 500 MG/1
500 TABLET, CHEWABLE ORAL 4 TIMES DAILY PRN
Status: DISCONTINUED | OUTPATIENT
Start: 2023-11-28 | End: 2023-12-02 | Stop reason: HOSPADM

## 2023-11-28 RX ORDER — LOSARTAN POTASSIUM 100 MG/1
100 TABLET ORAL DAILY
Status: DISCONTINUED | OUTPATIENT
Start: 2023-11-28 | End: 2023-11-30

## 2023-11-28 RX ORDER — LIDOCAINE HYDROCHLORIDE 20 MG/ML
INJECTION, SOLUTION INFILTRATION; PERINEURAL PRN
Status: DISCONTINUED | OUTPATIENT
Start: 2023-11-28 | End: 2023-11-28

## 2023-11-28 RX ORDER — HYDROXYZINE HYDROCHLORIDE 25 MG/1
25 TABLET, FILM COATED ORAL EVERY 6 HOURS PRN
Status: DISCONTINUED | OUTPATIENT
Start: 2023-11-28 | End: 2023-12-02 | Stop reason: HOSPADM

## 2023-11-28 RX ORDER — LIDOCAINE 40 MG/G
CREAM TOPICAL
Status: DISCONTINUED | OUTPATIENT
Start: 2023-11-28 | End: 2023-12-02 | Stop reason: HOSPADM

## 2023-11-28 RX ORDER — HYDROXYZINE HYDROCHLORIDE 50 MG/ML
25 INJECTION, SOLUTION INTRAMUSCULAR ONCE
Status: COMPLETED | OUTPATIENT
Start: 2023-11-28 | End: 2023-11-28

## 2023-11-28 RX ORDER — ACETAMINOPHEN 325 MG/1
975 TABLET ORAL EVERY 8 HOURS
Qty: 27 TABLET | Refills: 0 | Status: COMPLETED | OUTPATIENT
Start: 2023-11-28 | End: 2023-12-01

## 2023-11-28 RX ORDER — ONDANSETRON 2 MG/ML
INJECTION INTRAMUSCULAR; INTRAVENOUS PRN
Status: DISCONTINUED | OUTPATIENT
Start: 2023-11-28 | End: 2023-11-28

## 2023-11-28 RX ORDER — METHOCARBAMOL 750 MG/1
750 TABLET, FILM COATED ORAL EVERY 6 HOURS PRN
Status: DISCONTINUED | OUTPATIENT
Start: 2023-11-28 | End: 2023-11-29

## 2023-11-28 RX ORDER — PANTOPRAZOLE SODIUM 40 MG/1
40 TABLET, DELAYED RELEASE ORAL
Status: DISCONTINUED | OUTPATIENT
Start: 2023-11-29 | End: 2023-12-02 | Stop reason: HOSPADM

## 2023-11-28 RX ORDER — SODIUM CHLORIDE 9 MG/ML
INJECTION, SOLUTION INTRAVENOUS CONTINUOUS
Status: DISCONTINUED | OUTPATIENT
Start: 2023-11-28 | End: 2023-11-30

## 2023-11-28 RX ORDER — BISACODYL 10 MG
10 SUPPOSITORY, RECTAL RECTAL DAILY PRN
Status: DISCONTINUED | OUTPATIENT
Start: 2023-11-28 | End: 2023-12-02 | Stop reason: HOSPADM

## 2023-11-28 RX ORDER — ONDANSETRON 4 MG/1
4 TABLET, ORALLY DISINTEGRATING ORAL EVERY 6 HOURS PRN
Status: DISCONTINUED | OUTPATIENT
Start: 2023-11-28 | End: 2023-12-02 | Stop reason: HOSPADM

## 2023-11-28 RX ORDER — ONDANSETRON 2 MG/ML
4 INJECTION INTRAMUSCULAR; INTRAVENOUS EVERY 6 HOURS PRN
Status: DISCONTINUED | OUTPATIENT
Start: 2023-11-28 | End: 2023-12-02 | Stop reason: HOSPADM

## 2023-11-28 RX ADMIN — FENTANYL CITRATE 50 MCG: 50 INJECTION INTRAMUSCULAR; INTRAVENOUS at 08:33

## 2023-11-28 RX ADMIN — METHOCARBAMOL 750 MG: 750 TABLET ORAL at 17:20

## 2023-11-28 RX ADMIN — FENTANYL CITRATE 50 MCG: 50 INJECTION, SOLUTION INTRAMUSCULAR; INTRAVENOUS at 12:14

## 2023-11-28 RX ADMIN — FENTANYL CITRATE 50 MCG: 50 INJECTION INTRAMUSCULAR; INTRAVENOUS at 07:52

## 2023-11-28 RX ADMIN — DEXAMETHASONE SODIUM PHOSPHATE 8 MG: 4 INJECTION, SOLUTION INTRA-ARTICULAR; INTRALESIONAL; INTRAMUSCULAR; INTRAVENOUS; SOFT TISSUE at 08:17

## 2023-11-28 RX ADMIN — HYDROMORPHONE HYDROCHLORIDE 0.5 MG: 1 INJECTION, SOLUTION INTRAMUSCULAR; INTRAVENOUS; SUBCUTANEOUS at 08:54

## 2023-11-28 RX ADMIN — Medication 40 MG: at 08:18

## 2023-11-28 RX ADMIN — GABAPENTIN 300 MG: 300 CAPSULE ORAL at 06:10

## 2023-11-28 RX ADMIN — PHENYLEPHRINE HYDROCHLORIDE 0.15 MCG/KG/MIN: 10 INJECTION INTRAVENOUS at 08:47

## 2023-11-28 RX ADMIN — SUGAMMADEX 200 MG: 100 INJECTION, SOLUTION INTRAVENOUS at 11:45

## 2023-11-28 RX ADMIN — Medication 2 G: at 07:52

## 2023-11-28 RX ADMIN — HYDROMORPHONE HYDROCHLORIDE 0.4 MG: 0.2 INJECTION, SOLUTION INTRAMUSCULAR; INTRAVENOUS; SUBCUTANEOUS at 12:29

## 2023-11-28 RX ADMIN — OXYCODONE HYDROCHLORIDE 10 MG: 5 TABLET ORAL at 21:22

## 2023-11-28 RX ADMIN — ROCURONIUM BROMIDE 10 MG: 50 INJECTION, SOLUTION INTRAVENOUS at 10:00

## 2023-11-28 RX ADMIN — PROPOFOL 30 MG: 10 INJECTION, EMULSION INTRAVENOUS at 11:46

## 2023-11-28 RX ADMIN — ONDANSETRON 4 MG: 2 INJECTION INTRAMUSCULAR; INTRAVENOUS at 10:51

## 2023-11-28 RX ADMIN — HYDROMORPHONE HYDROCHLORIDE 0.4 MG: 0.2 INJECTION, SOLUTION INTRAMUSCULAR; INTRAVENOUS; SUBCUTANEOUS at 12:56

## 2023-11-28 RX ADMIN — ROCURONIUM BROMIDE 50 MG: 50 INJECTION, SOLUTION INTRAVENOUS at 07:52

## 2023-11-28 RX ADMIN — SODIUM CHLORIDE: 9 INJECTION, SOLUTION INTRAVENOUS at 15:32

## 2023-11-28 RX ADMIN — PROPOFOL 40 MG: 10 INJECTION, EMULSION INTRAVENOUS at 11:39

## 2023-11-28 RX ADMIN — DEXMEDETOMIDINE HYDROCHLORIDE 12 MCG: 200 INJECTION INTRAVENOUS at 10:50

## 2023-11-28 RX ADMIN — FENTANYL CITRATE 50 MCG: 50 INJECTION, SOLUTION INTRAMUSCULAR; INTRAVENOUS at 11:58

## 2023-11-28 RX ADMIN — HYDROMORPHONE HYDROCHLORIDE 0.4 MG: 0.2 INJECTION, SOLUTION INTRAMUSCULAR; INTRAVENOUS; SUBCUTANEOUS at 15:33

## 2023-11-28 RX ADMIN — ACETAMINOPHEN 975 MG: 325 TABLET, FILM COATED ORAL at 15:33

## 2023-11-28 RX ADMIN — OXYCODONE HYDROCHLORIDE 10 MG: 5 TABLET ORAL at 17:21

## 2023-11-28 RX ADMIN — MIDAZOLAM 2 MG: 1 INJECTION INTRAMUSCULAR; INTRAVENOUS at 07:45

## 2023-11-28 RX ADMIN — HYDROMORPHONE HYDROCHLORIDE 0.4 MG: 0.2 INJECTION, SOLUTION INTRAMUSCULAR; INTRAVENOUS; SUBCUTANEOUS at 13:36

## 2023-11-28 RX ADMIN — PROPOFOL 230 MG: 10 INJECTION, EMULSION INTRAVENOUS at 07:52

## 2023-11-28 RX ADMIN — DEXMEDETOMIDINE HYDROCHLORIDE 8 MCG: 200 INJECTION INTRAVENOUS at 11:10

## 2023-11-28 RX ADMIN — PROPOFOL 30 MCG/KG/MIN: 10 INJECTION, EMULSION INTRAVENOUS at 08:25

## 2023-11-28 RX ADMIN — HYDROXYZINE HYDROCHLORIDE 25 MG: 50 INJECTION, SOLUTION INTRAMUSCULAR at 12:35

## 2023-11-28 RX ADMIN — ROCURONIUM BROMIDE 10 MG: 50 INJECTION, SOLUTION INTRAVENOUS at 09:22

## 2023-11-28 RX ADMIN — ROCURONIUM BROMIDE 20 MG: 50 INJECTION, SOLUTION INTRAVENOUS at 08:25

## 2023-11-28 RX ADMIN — GLYCOPYRROLATE 0.2 MG: 0.2 INJECTION, SOLUTION INTRAMUSCULAR; INTRAVENOUS at 08:49

## 2023-11-28 RX ADMIN — CEFAZOLIN SODIUM 2 G: 2 INJECTION, SOLUTION INTRAVENOUS at 15:33

## 2023-11-28 RX ADMIN — LIDOCAINE HYDROCHLORIDE 100 MG: 20 INJECTION, SOLUTION INFILTRATION; PERINEURAL at 07:52

## 2023-11-28 RX ADMIN — DOCUSATE SODIUM 50 MG AND SENNOSIDES 8.6 MG 1 TABLET: 8.6; 5 TABLET, FILM COATED ORAL at 20:08

## 2023-11-28 RX ADMIN — SODIUM CHLORIDE, POTASSIUM CHLORIDE, SODIUM LACTATE AND CALCIUM CHLORIDE: 600; 310; 30; 20 INJECTION, SOLUTION INTRAVENOUS at 07:30

## 2023-11-28 ASSESSMENT — ACTIVITIES OF DAILY LIVING (ADL)
ADLS_ACUITY_SCORE: 20

## 2023-11-28 NOTE — ANESTHESIA CARE TRANSFER NOTE
Patient: Virginia Valenzuela    Procedure: Procedure(s):  Left Lumbar 4 to Lumbar 5 robotic minimally invasive Transforaminal Lumbar Interbody Fusion       Diagnosis: Spondylolisthesis, lumbar region [M43.16]  Diagnosis Additional Information: No value filed.    Anesthesia Type:   General     Note:    Oropharynx: oropharynx clear of all foreign objects and spontaneously breathing  Level of Consciousness: drowsy  Oxygen Supplementation: face mask  Level of Supplemental Oxygen (L/min / FiO2): 6  Independent Airway: airway patency satisfactory and stable  Dentition: dentition changed  Vital Signs Stable: post-procedure vital signs reviewed and stable  Report to RN Given: handoff report given  Patient transferred to: PACU  Comments: Neuromuscular blockade reversed with sugammadex, spontaneous respirations, adequate tidal volumes, followed commands to voice, extubated atraumatically, extubated with suction, airway patent after extubation.  Oxygen via facemask at 6 liters per minute to PACU. Oxygen tubing connected to wall O2 in PACU, SpO2, NiBP, and EKG monitors and alarms on and functioning, report on patient's clinical status given to PACU RN, RN questions answered.         Handoff Report: Identifed the Patient, Identified the Reponsible Provider, Reviewed the pertinent medical history, Discussed the surgical course, Reviewed Intra-OP anesthesia mangement and issues during anesthesia, Set expectations for post-procedure period and Allowed opportunity for questions and acknowledgement of understanding    Vitals:  Vitals Value Taken Time   BP     Temp     Pulse 88 11/28/23 1155   Resp 8 11/28/23 1155   SpO2 99 % 11/28/23 1155   Vitals shown include unfiled device data.    Electronically Signed By: LISE Chauhan CRNA  November 28, 2023  11:57 AM

## 2023-11-28 NOTE — ANESTHESIA PROCEDURE NOTES
Airway       Patient location during procedure: OR       Procedure Start/Stop Times: 11/28/2023 7:55 AM  Staff -        Anesthesiologist:  Praveen Jernigan MD       CRNA: Dannie Hinds APRN CRNA       Other Anesthesia Staff: Roland Hdz       Performed By: SRNA and with CRNAs       Procedure performed by resident/fellow/CRNA in presence of a teaching physician.  Indications and Patient Condition       Indications for airway management: mirella-procedural       Induction type:intravenous       Mask difficulty assessment: 2 - vent by mask + OA or adjuvant +/- NMBA    Final Airway Details       Final airway type: endotracheal airway       Successful airway: ETT - single  Endotracheal Airway Details        ETT size (mm): 7.0       Cuffed: yes       Successful intubation technique: video laryngoscopy       VL Blade Size: Glidescope 3       Grade View of Cords: 1       Adjucts: stylet       Position: Right       Measured from: lips       Secured at (cm): 21       Bite block used: Soft    Post intubation assessment        Placement verified by: capnometry, equal breath sounds and chest rise        Number of attempts at approach: 1       Number of other approaches attempted: 0       Secured with: tape       Ease of procedure: easy       Dentition: Intact and Unchanged    Medication(s) Administered   Medication Administration Time: 11/28/2023 7:55 AM

## 2023-11-28 NOTE — INTERVAL H&P NOTE
"I have reviewed the surgical (or preoperative) H&P that is linked to this encounter, and examined the patient. There are no significant changes    Clinical Conditions Present on Arrival:  Clinically Significant Risk Factors Present on Admission                  # Obesity: Estimated body mass index is 35.13 kg/m  as calculated from the following:    Height as of this encounter: 1.6 m (5' 3\").    Weight as of this encounter: 89.9 kg (198 lb 4.8 oz).       "

## 2023-11-28 NOTE — CONSULTS
"[facility]  Consult Note - Hospitalist Service  Date of Admission:  11/28/2023  Consult Requested by: Luzmaria Reyes NP  Reason for Consult: Post op medical co-management     Assessment & Plan   Virginia Valenzuela is a 48 year old female with below PMHx admitted on 11/28/2023. She presented for elective procedure as below.     S/P L left L4-5 robotic minimally invasive transforaminal lumbar interbody fusion: Surgery per Dr. Bird. EBL 50 ccs. General anesthesia used.   -- Defer routine post-operative cares, IVF, DVT prophylaxis and pain control to primary service   -- Treated with periop Ancef   -- Analgesic regimen with tylenol 975 mg q8 hrs, robaxin 750 mg q6 hrs PRN, oxycodone 5-10 mg q4 hrs PRN, IV dilaudid 0.2-0.4 mg q2 hrs PRN  -- VTE prophylaxis deferred to primary team   -- Encourage pulmonary toilet; incentive spirometer at bedside   -- Bowel regimen in place while on narcotics   -- PT and OT in the AM   -- Hgb and BMP in AM     Hypertension: Continue PTA Norvasc 10 mg po every day. Hold Losartan 100 mg po every day and hydrochlorothiazide 25 mg po every day POD0, monitor trends, check AM BMP and resume as appropriate. Treat pain.     GERD: Continue PTA Nexium    Tobacco use disorder, in remission: Quit smoking earlier in spring.     HAMIDA: Diagnosed a while back. Not utilizing CPAP. Recommend outpatient sleep study.      The patient's care was discussed with the Attending Physician, Dr. Ye, Bedside Nurse, and Patient.    Clinically Significant Risk Factors Present on Admission        # Hypokalemia: Lowest K = 3.2 mmol/L in last 2 days, will replace as needed           # Hypertension: Noted on problem list      # Obesity: Estimated body mass index is 35.13 kg/m  as calculated from the following:    Height as of this encounter: 1.6 m (5' 3\").    Weight as of this encounter: 89.9 kg (198 lb 4.8 oz).              Tatiana Elise PA-C  Hospitalist Service  Securely " message with Maribel (more info)  Text page via [facility] Paging/Directory   ______________________________________________________________________    Chief Complaint   DJD    History is obtained from the patient and chart review.     History of Present Illness   Virginia Valenzuela is a 48 year old female with below PMHx admitted on 2023. She presented for elective procedure as below.     Reviewed pre-op H&P. Pt seen in the post-op period. Reports severe pain. L foot numbness/tingling and heel pain is completely resolved. Recent course of Augmentin for sinusitis. Denies chest pain, SOB, dizziness, lightheadedness. No acute concerns.      Past Medical History    Past Medical History:   Diagnosis Date    Allergic rhinitis, cause unspecified     Arthritis     ASCUS with positive high risk HPV 3/12/13    Depressive disorder, not elsewhere classified     Dysfunction of eustachian tube     Encounter for therapeutic drug monitoring     Generalized anxiety disorder     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    History of appendectomy     Hypertension     Idiopathic urticaria     Obstructive sleep apnea (adult) (pediatric)     Other chronic allergic conjunctivitis     Other malaise and fatigue     Pain in joint, shoulder region     S/P laparoscopic cholecystectomy     Special screening examination for human papillomavirus (HPV)     Sprain of tibiofibular (ligament), distal of ankle     Threatened , unspecified as to episode of care     Tobacco use disorder     Unspecified essential hypertension     Unspecified vitamin D deficiency      Past Surgical History   Past Surgical History:   Procedure Laterality Date    ABDOMEN SURGERY  2013    ovaian cyst and right ovary removal    APPENDECTOMY      CHOLECYSTECTOMY      COLONOSCOPY N/A 10/26/2017    Procedure: COMBINED COLONOSCOPY, SINGLE OR MULTIPLE BIOPSY/POLYPECTOMY BY BIOPSY;  Colonoscopy with biopsies by biopsy;  Surgeon: Fred Faust DO;  Location: [facility]     EYE SURGERY  1979    GENITOURINARY SURGERY  July 2013    Ovary, fallopian tube    GYN SURGERY  July 2013    Ovary, Fallopian tube    INJECT EPIDURAL LUMBAR Bilateral 3/15/2019    Procedure: Inject Facets Lumbar 4-5, Bilateral;  Surgeon: Colin Nolasco MD;  [facility]    INJECT FACET JOINT N/A 10/25/2018    Procedure: Injest Facet Joint Lumbar 4-5 Bilateral;  Surgeon: Colin Nolasco MD;  [facility]    LAPAROSCOPIC LYSIS ADHESIONS Left 3/1/2016    Procedure: LAPAROSCOPIC LYSIS ADHESIONS;  Surgeon: Nate Mishra MD;  [facility]    LAPAROSCOPIC LYSIS ADHESIONS N/A 7/13/2017    Procedure: LAPAROSCOPIC LYSIS ADHESIONS;   laparoscopic lysis of adhesions;  Surgeon: Nate Mishra MD;  [facility]    LAPAROSCOPIC LYSIS ADHESIONS N/A 7/13/2017    Procedure: LAPAROSCOPIC LYSIS ADHESIONS;   laparoscopic lysis of adhesions;  Surgeon: Nate Milligan MD;  [facility]    LAPAROSCOPIC LYSIS ADHESIONS N/A 5/14/2018    Procedure: LAPAROSCOPIC LYSIS ADHESIONS;;  Surgeon: Nate Mishra MD;  [facility]    LAPAROSCOPIC SALPINGECTOMY Left 5/14/2018    Procedure: LAPAROSCOPIC SALPINGECTOMY;;  Surgeon: Nate Mishra MD;  [facility]    LAPAROSCOPY DIAGNOSTIC (GYN) N/A 5/14/2018    Procedure: LAPAROSCOPY DIAGNOSTIC (GYN);  laparoscopy, left salpingectomy, left ovarian nodule removal and lysis of adhesions;  Surgeon: Nate Mishra MD;  [facility]    RELEASE CARPAL TUNNEL Left 8/31/2016    Procedure: RELEASE CARPAL TUNNEL;  Surgeon: Gucci Hairston MD;  [facility]    RELEASE CARPAL TUNNEL Right 12/7/2016    Procedure: RELEASE CARPAL TUNNEL;  Surgeon: Gucci Hairston MD;  [facility]       Medications   Facility-Administered Medications Prior to Admission   Medication Dose Route Frequency Provider Last Rate Last Admin    triamcinolone (KENALOG-40) injection 40 mg  40 mg   Claire Duron MD   40 mg at  21 1047     Medications Prior to Admission   Medication Sig Dispense Refill Last Dose    amLODIPine (NORVASC) 10 MG tablet Take 1 tablet by mouth daily   2023 at AM    Cholecalciferol (VITAMIN D3) 50 MCG ( UT) CAPS Take 1 capsule by mouth daily   2023 at AM    EPINEPHrine (EPIPEN) 0.3 MG/0.3ML injection Inject 0.3 mLs (0.3 mg) into the muscle once as needed 1 each 0 Unknown at PRN    esomeprazole (NEXIUM) 20 MG DR capsule Take 1 capsule (20 mg) by mouth every morning (before breakfast) Take 30-60 minutes before eating.   2023 at AM    fluticasone (FLONASE) 50 MCG/ACT nasal spray Spray 1 spray into both nostrils as needed for rhinitis or allergies   Unknown at PRN    hydrochlorothiazide (HYDRODIURIL) 25 MG tablet Take 1 tablet by mouth daily   2023 at AM    losartan (COZAAR) 100 MG tablet Take 1 tablet by mouth daily   2023 at AM    Probiotic Product (PROBIOTIC DAILY PO) Take 1 capsule by mouth at bedtime   2023 at AM    chlorhexidine (HIBICLENS) 4 % liquid Apply topically daily as needed for wound care 236 mL 0       Review of Systems    The 10 point Review of Systems is negative other than noted in the HPI.    Social History   I have reviewed this patient's social history and updated it with pertinent information if needed.  Social History     Tobacco Use    Smoking status: Former     Packs/day: 1.00     Years: 10.00     Additional pack years: 0.00     Total pack years: 10.00     Types: Cigarettes     Quit date: 2023     Years since quittin.6    Smokeless tobacco: Never   Substance Use Topics    Alcohol use: No     Alcohol/week: 0.0 standard drinks of alcohol    Drug use: No     Family History   I have reviewed this patient's family history and updated it with pertinent information if needed.  Family History   Problem Relation Age of Onset    Diabetes Father     Hypertension Father     Cardiovascular Mother 29        myocarditis     Other Cancer Brother          unknown origin     Allergies   Allergies   Allergen Reactions    Bees Swelling     FINGER TIPS TO ELBOWS    Codeine Other (See Comments)     Squeezing pain around trunk/abdomen    Pine         Physical Exam   Vital Signs: Temp: 98.7  F (37.1  C) Temp src: Oral BP: (!) 162/91 (Nurse notified) Pulse: 83   Resp: 15 SpO2: 97 % O2 Device: Nasal cannula Oxygen Delivery: 3 LPM  Weight: 198 lbs 4.8 oz    CONSTITUTIONAL: Pt laying in bed, dressed in hospital garb. Appears comfortable. Cooperative with interview.   HEENT: Normocephalic, atraumatic.   CARDIOVASCULAR: RRR, no murmurs, rubs, or extra heart sounds appreciated. Pulses +2/4 and regular in upper and lower extremities, bilaterally.   RESPIRATORY: No increased work of breathing.  CTA, bilat; no wheezes, rales, or rhonchi appreciated.  GASTROINTESTINAL:  Abdomen soft, non-distended. BS auscultated in all four quadrants. Negative for tenderness to palpation.   MUSCULOSKELETAL: No gross deformities noted. Normal muscle tone.   HEMATOLOGIC/LYMPHATIC/IMMUNOLOGIC: No anterior or posterior cervical LAD, bilaterally. Negative for lower extremity edema, bilaterally.  NEUROLOGIC: Alert and oriented to person, place, and time. No focal neuro deficits.   SKIN: Warm, dry, intact.     Medical Decision Making       45 MINUTES SPENT BY ME on the date of service doing chart review, history, exam, documentation & further activities per the note.      Data     I have personally reviewed the following data over the past 24 hrs:    N/A  \   N/A   / N/A     N/A N/A N/A /  125 (H)   3.2 (L) N/A N/A \       Imaging results reviewed over the past 24 hrs:   Recent Results (from the past 24 hour(s))   XR Surgery ODALYS L/T 5 Min Fluoro w Stills    Narrative    SURGERY C-ARM FLUORO LESS THAN 5 MINUTES WITH STILLS 11/28/2023 11:26  AM     COMPARISON: None    HISTORY: Spondylolisthesis, lumbar region.    NUMBER OF IMAGES ACQUIRED: Five fluoroscopic spot images, one 3D CT  localization  series.    VIEWS: Frontal and lateral fluoroscopic spot images of the lumbar  spine. Axial 3D CT localization series.    FLUOROSCOPY TIME: 0.5 minute(s)      Impression    IMPRESSION: Assuming 5 lumbar vertebral bodies images demonstrate  postoperative changes of interbody and posterior spinal fixation at  L4-L5.    LETICIA BERGMAN MD         SYSTEM ID:  [id number]

## 2023-11-28 NOTE — BRIEF OP NOTE
[facility]    Brief Operative Note    Pre-operative diagnosis: Spondylolisthesis, lumbar region [M43.16]  Post-operative diagnosis Same as pre-operative diagnosis    Procedure: Left Lumbar 4 to Lumbar 5 robotic minimally invasive Transforaminal Lumbar Interbody Fusion, N/A - Spine    Surgeon: Surgeon(s) and Role:     * Abel Yu MD - Primary     * Luzmaria Reyes NP - Assisting  Anesthesia: General   Estimated Blood Loss: 50ml     Drains: None  Findings: See op note   Implants:   Implant Name Type Inv. Item Serial No.  Lot No. LRB No. Used Action   GRAFT BONE INFUSE BMP XSM [id number] - [id number]  GRAFT BONE INFUSE BMP XSM [id number]  [employer] [id number] N/A 1 Implanted   KIT BNGF 6CC DMNR MARCO FBR ACCELERATE GRFTN CNN [id number] - [id number] Bone/Tissue/Biologic KIT BNGF 6CC DMNR MARCO FBR ACCELERATE GRFTN CNN [id number] [employer]  N/A 1 Implanted   GRAFT BONE FIBERS DBF 9ML INJ [id number] PRELOADED - [id number] Bone/Tissue/Biologic GRAFT BONE FIBERS DBF 9ML INJ [id number] PRELOADED [id number] [employer]  N/A 1 Implanted   IMP SPI INTERBODY MEDT CATALYFT PL 40 SHORT 9MM [id number] - [id number] Metallic Hardware/[place] IMP SPI INTERBODY MEDT CATALYFT PL 40 SHORT 9MM [id number]  [employer] [id number] N/A 1 Implanted   SCREW SET 5.5/6MM CD HZN SOLERAL VYGR NS LF [id number] - [id number] Metallic Hardware/[place] SCREW SET 5.5/6MM CD HZN SOLERAL VYGR NS LF [id number]  [employer] 41 06 LOAD 08 NOV 2023 N/A 4 Implanted   SCREW BN 45MM 6.5MM MA NS CD HZN VYGR SPNE 5.5MM RAYMOND - [id number] Metallic Hardware/[place] SCREW BN 45MM 6.5MM MA NS CD HZN VYGR SPNE 5.5MM RAYMOND  [employer] 41 06 LOAD 08 NOV 2023 N/A 2 Implanted   SCREW BN 50MM 6.5MM MA NS CD HZN VYGR SPNE 5.5MM RAY[place]D - [id number] Metallic Hardware/[place] SCREW BN 50MM 6.5MM MA NS CD HZN VYGR SPNE 5.5MM RAYMOND  [employer] 41 06 LOAD 08 NOV 2023 N/A 2 Implanted   RAYMOND SPNL 45MM 5.5MM CD HZN SOLERA COCRMO CAP NS  [id number] - [id number] Metallic Hardware/[place] RAYMOND SPNL 45MM 5.5MM CD HZN SOLERA COCRMO CAP NS [id number]  [employer] 41 06 LOAD 08 NOV 2023 N/A 2 Implanted

## 2023-11-28 NOTE — OP NOTE
Date of surgery: November 28, 2023  Surgeon: Abel Yu MD  Assistant: Luzmaria Emmanuel NP  (Note: The assistant was present for and assisted with the entire surgery, and his/her role as an assistant was crucial for aid in positioning, exposure, suctioning, retraction, and closure)     Preoperative diagnosis: Lumbar spondylolisthesis  Postoperative diagnosis: Lumbar spondylolisthesis    Procedure:  1.  L4-L5 insertion of bilateral pedicle screws and rods (Medtronic Voyager)  2.  L4-L5 bilateral laminectomies for decompression of stenosis  3.  L4-L5 left complete facetectomy, transforaminal discectomy, and interbody arthrodesis  4.  L4-L5 insertion of intervertebral graft with Blanca allograft  5.  L4-L5 bilateral transverse process posterolateral arthrodesis and fusion with autograft and allograft  6.  Use of Glu Mobile surgical robot  7.  Use of intraoperative microscope and fluoroscopy     EBL: 50 mL     Indications: 48 year old female presented with progressive back and leg pain.  MRI showed L4-L5 spondylolisthesis and foraminal stenosis.  Extensive non-surgical management without improvement.  Risks, benefits, indications, and alternatives were discussed with the patient and family in detail, and they wished to proceed.      Description of surgery: The patient was positioned prone.  Sterile prepping and draping procedures were performed.  Antibiotics were administered and timeout was performed.  Bilateral [place] lumbar incisions were performed.  The monopolar was used to expose suprafascially and bilateral fascial incisions were performed.  The Glu Mobile robot was registered.  Under robotic guidance, bilateral pedicle screws were inserted at L4 and L5.  The pedicle based retraction system was inserted.  The high speed drill was then used to perform bilateral laminectomy at L4-L5 by drilling across the base of the spinous process.  The [place] rongeurs were then used to remove the ligamentum flavum with decompression of  the nerve roots.  The drill was then used to perform a left complete facetectomy. The 15 blade was used to perform an annulotomy in the disk space at L4-L5.  A complete discectomy was performed with daniele, the pituitary rongeurs, and curets.  Interbody arthrodesis was performed with daniele and curets.  An intervertebral graft was inserted into the disk space at L4-L5 with allograft.  Rods and set screws were inserted with reduction of spondylolisthesis.  Antibiotic irrigation was performed.  Posterolateral arthrodesis was performed with the robotic [place] steve and autograft and allograft were packed for posterolateral fusion.  Hemostasis was achieved.  Fluoroscopy demonstrated excellent positioning of the hardware.  The fascia was closed with 0-Vicryl sutures, and the dermal layer was closed with 2-0 vicryl sutures.  The skin was closed with running subcuticular stitches.  There were no intraprocedural complications.

## 2023-11-28 NOTE — ANESTHESIA POSTPROCEDURE EVALUATION
Patient: Virginia Valenzuela    Procedure: Procedure(s):  Left Lumbar 4 to Lumbar 5 robotic minimally invasive Transforaminal Lumbar Interbody Fusion       Anesthesia Type:  General    Note:     Postop Pain Control: Uneventful            Sign Out: Well controlled pain   PONV: No   Neuro/Psych: Uneventful            Sign Out: Acceptable/Baseline neuro status   Airway/Respiratory: Uneventful            Sign Out: Acceptable/Baseline resp. status   CV/Hemodynamics: Uneventful            Sign Out: Acceptable CV status; No obvious hypovolemia; No obvious fluid overload   Other NRE: NONE   DID A NON-ROUTINE EVENT OCCUR? No           Last vitals:  Vitals Value Taken Time   /75 11/28/23 1200   Temp     Pulse 92 11/28/23 1212   Resp 11 11/28/23 1212   SpO2 98 % 11/28/23 1212   Vitals shown include unfiled device data.    Electronically Signed By: Praveen Jernigan MD  November 28, 2023  12:13 PM

## 2023-11-29 ENCOUNTER — APPOINTMENT (OUTPATIENT)
Dept: PHYSICAL THERAPY | Facility: CLINIC | Age: 48
End: 2023-11-29
Attending: NEUROLOGICAL SURGERY
Payer: COMMERCIAL

## 2023-11-29 LAB
ANION GAP SERPL CALCULATED.3IONS-SCNC: 9 MMOL/L (ref 7–15)
BUN SERPL-MCNC: 7.9 MG/DL (ref 6–20)
CALCIUM SERPL-MCNC: 8.7 MG/DL (ref 8.6–10)
CHLORIDE SERPL-SCNC: 105 MMOL/L (ref 98–107)
CREAT SERPL-MCNC: 0.5 MG/DL (ref 0.51–0.95)
DEPRECATED HCO3 PLAS-SCNC: 27 MMOL/L (ref 22–29)
EGFRCR SERPLBLD CKD-EPI 2021: >90 ML/MIN/1.73M2
GLUCOSE BLDC GLUCOMTR-MCNC: 115 MG/DL (ref 70–99)
GLUCOSE SERPL-MCNC: 159 MG/DL (ref 70–99)
HGB BLD-MCNC: 10.7 G/DL (ref 11.7–15.7)
POTASSIUM SERPL-SCNC: 3.9 MMOL/L (ref 3.4–5.3)
SODIUM SERPL-SCNC: 141 MMOL/L (ref 135–145)

## 2023-11-29 PROCEDURE — 36415 COLL VENOUS BLD VENIPUNCTURE: CPT | Performed by: NURSE PRACTITIONER

## 2023-11-29 PROCEDURE — 250N000013 HC RX MED GY IP 250 OP 250 PS 637: Performed by: NURSE PRACTITIONER

## 2023-11-29 PROCEDURE — 99232 SBSQ HOSP IP/OBS MODERATE 35: CPT | Performed by: INTERNAL MEDICINE

## 2023-11-29 PROCEDURE — 250N000013 HC RX MED GY IP 250 OP 250 PS 637: Performed by: PHYSICIAN ASSISTANT

## 2023-11-29 PROCEDURE — 97161 PT EVAL LOW COMPLEX 20 MIN: CPT | Mod: GP | Performed by: PHYSICAL THERAPIST

## 2023-11-29 PROCEDURE — 97116 GAIT TRAINING THERAPY: CPT | Mod: GP | Performed by: PHYSICAL THERAPIST

## 2023-11-29 PROCEDURE — 80048 BASIC METABOLIC PNL TOTAL CA: CPT | Performed by: PHYSICIAN ASSISTANT

## 2023-11-29 PROCEDURE — 97530 THERAPEUTIC ACTIVITIES: CPT | Mod: GP | Performed by: PHYSICAL THERAPIST

## 2023-11-29 PROCEDURE — 85018 HEMOGLOBIN: CPT | Performed by: NURSE PRACTITIONER

## 2023-11-29 PROCEDURE — 97116 GAIT TRAINING THERAPY: CPT | Mod: GP

## 2023-11-29 PROCEDURE — 97530 THERAPEUTIC ACTIVITIES: CPT | Mod: GP

## 2023-11-29 PROCEDURE — 999N000128 HC STATISTIC PERIPHERAL IV START W/O US GUIDANCE

## 2023-11-29 PROCEDURE — 120N000001 HC R&B MED SURG/OB

## 2023-11-29 PROCEDURE — 250N000011 HC RX IP 250 OP 636: Mod: JZ | Performed by: NURSE PRACTITIONER

## 2023-11-29 RX ORDER — METHOCARBAMOL 750 MG/1
750 TABLET, FILM COATED ORAL EVERY 6 HOURS
Status: DISCONTINUED | OUTPATIENT
Start: 2023-11-29 | End: 2023-12-02 | Stop reason: HOSPADM

## 2023-11-29 RX ADMIN — HYDROMORPHONE HYDROCHLORIDE 0.4 MG: 0.2 INJECTION, SOLUTION INTRAMUSCULAR; INTRAVENOUS; SUBCUTANEOUS at 12:23

## 2023-11-29 RX ADMIN — HYDROMORPHONE HYDROCHLORIDE 0.4 MG: 0.2 INJECTION, SOLUTION INTRAMUSCULAR; INTRAVENOUS; SUBCUTANEOUS at 07:54

## 2023-11-29 RX ADMIN — OXYCODONE HYDROCHLORIDE 10 MG: 5 TABLET ORAL at 19:17

## 2023-11-29 RX ADMIN — OXYCODONE HYDROCHLORIDE 10 MG: 5 TABLET ORAL at 23:04

## 2023-11-29 RX ADMIN — ACETAMINOPHEN 975 MG: 325 TABLET, FILM COATED ORAL at 15:09

## 2023-11-29 RX ADMIN — HYDROMORPHONE HYDROCHLORIDE 0.4 MG: 0.2 INJECTION, SOLUTION INTRAMUSCULAR; INTRAVENOUS; SUBCUTANEOUS at 18:35

## 2023-11-29 RX ADMIN — DOCUSATE SODIUM 50 MG AND SENNOSIDES 8.6 MG 1 TABLET: 8.6; 5 TABLET, FILM COATED ORAL at 09:17

## 2023-11-29 RX ADMIN — DOCUSATE SODIUM 50 MG AND SENNOSIDES 8.6 MG 1 TABLET: 8.6; 5 TABLET, FILM COATED ORAL at 21:07

## 2023-11-29 RX ADMIN — OXYCODONE HYDROCHLORIDE 10 MG: 5 TABLET ORAL at 10:47

## 2023-11-29 RX ADMIN — OXYCODONE HYDROCHLORIDE 10 MG: 5 TABLET ORAL at 01:34

## 2023-11-29 RX ADMIN — METHOCARBAMOL 750 MG: 750 TABLET ORAL at 00:56

## 2023-11-29 RX ADMIN — ACETAMINOPHEN 975 MG: 325 TABLET, FILM COATED ORAL at 23:04

## 2023-11-29 RX ADMIN — METHOCARBAMOL 750 MG: 750 TABLET ORAL at 21:07

## 2023-11-29 RX ADMIN — HYDROMORPHONE HYDROCHLORIDE 0.4 MG: 0.2 INJECTION, SOLUTION INTRAMUSCULAR; INTRAVENOUS; SUBCUTANEOUS at 09:57

## 2023-11-29 RX ADMIN — Medication 50 MCG: at 09:17

## 2023-11-29 RX ADMIN — OXYCODONE HYDROCHLORIDE 10 MG: 5 TABLET ORAL at 15:09

## 2023-11-29 RX ADMIN — CEFAZOLIN SODIUM 2 G: 2 INJECTION, SOLUTION INTRAVENOUS at 11:20

## 2023-11-29 RX ADMIN — OXYCODONE HYDROCHLORIDE 10 MG: 5 TABLET ORAL at 05:58

## 2023-11-29 RX ADMIN — HYDROMORPHONE HYDROCHLORIDE 0.4 MG: 0.2 INJECTION, SOLUTION INTRAMUSCULAR; INTRAVENOUS; SUBCUTANEOUS at 14:32

## 2023-11-29 RX ADMIN — METHOCARBAMOL 750 MG: 750 TABLET ORAL at 10:47

## 2023-11-29 RX ADMIN — ACETAMINOPHEN 975 MG: 325 TABLET, FILM COATED ORAL at 00:52

## 2023-11-29 RX ADMIN — POLYETHYLENE GLYCOL 3350 17 G: 17 POWDER, FOR SOLUTION ORAL at 09:18

## 2023-11-29 RX ADMIN — ACETAMINOPHEN 975 MG: 325 TABLET, FILM COATED ORAL at 09:17

## 2023-11-29 RX ADMIN — PANTOPRAZOLE SODIUM 40 MG: 40 TABLET, DELAYED RELEASE ORAL at 06:33

## 2023-11-29 RX ADMIN — METHOCARBAMOL 750 MG: 750 TABLET ORAL at 15:08

## 2023-11-29 RX ADMIN — AMLODIPINE BESYLATE 10 MG: 10 TABLET ORAL at 09:17

## 2023-11-29 RX ADMIN — CEFAZOLIN SODIUM 2 G: 2 INJECTION, SOLUTION INTRAVENOUS at 00:52

## 2023-11-29 ASSESSMENT — ACTIVITIES OF DAILY LIVING (ADL)
ADLS_ACUITY_SCORE: 21
ADLS_ACUITY_SCORE: 20
ADLS_ACUITY_SCORE: 20
ADLS_ACUITY_SCORE: 21
ADLS_ACUITY_SCORE: 21
ADLS_ACUITY_SCORE: 22
ADLS_ACUITY_SCORE: 21
ADLS_ACUITY_SCORE: 20
ADLS_ACUITY_SCORE: 20
ADLS_ACUITY_SCORE: 21

## 2023-11-29 NOTE — PROGRESS NOTES
Pt is Aox4, up with A1GBW. Pain is somewhat controlled, using IV dilaudid, oxycodone, roboxin and tylenol. Regular diet with good intake. Potassium protocol 3.9 redraw scheduled for morning.Adams in place ok to leave in per NS as patient not as mobile as she would like, will pull tomorrow morning.  Forrest ok to spend the night. Incision WDL, dressing CDI. Bruising under left eye, IV infiltrated this morning on left wrist +2 edema notes, NS aware. Discharge pending PT progress and pain control.

## 2023-11-29 NOTE — PROGRESS NOTES
Notified provider about indwelling ly catheter discussed removal or continued need.    Did provider choose to remove indwelling ly catheter? No    Provider's ly indication for keeping indwelling ly catheter: Not mobile    Is there an order for indwelling ly catheter? Yes    *If there is a plan to keep ly catheter in place at discharge daily notification with provider is not necessary, but please add a notation in the treatment team sticky note that the patient will be discharging with the catheter.

## 2023-11-29 NOTE — PROGRESS NOTES
Patient vital signs are at baseline: Yes, on RA  Patient able to ambulate as they were prior to admission or with assist devices provided by therapies during their stay: No, pt did not get oob during shift.  Patient MUST void prior to discharge:  No,  Reason:  Adams in place w/ adequate output.  Patient able to tolerate oral intake:  Yes  Pain has adequate pain control using Oral analgesics:  Yes, PRN Oxy and Robaxin  Does patient have an identified :  Yes  Has goal D/C date and time been discussed with patient:  No,  Reason:  TBD      POD 1. AO x4.  CMS intact. PIV SL.  at bedside overnight. Dressing CDI. IV infiltrated- NS was infusing. Site marked; swollen. Pt reports discomfort.

## 2023-11-29 NOTE — PROGRESS NOTES
Pt is AO4, not oob this shift, pain controlled with oxycodone and roboxin IV dilaudid given x1. Reguar diet good intake. Normal Saline running at 75ml/hr. Incision CDI.  at bedside. Adams in place good output orders to remove POD1. Discharge pending.

## 2023-11-29 NOTE — PROGRESS NOTES
"[facility]    Hospitalist Progress Note    Assessment & Plan   Virginia Valenzuela is a 48 year old female with below PMHx admitted on 11/28/2023. She presented for elective procedure as below.      S/P L left L4-5 robotic minimally invasive transforaminal lumbar interbody fusion: Surgery per Dr. Bird. EBL 50 ccs. General anesthesia used.   -- Defer routine post-operative cares, including diet, pain control and DVT prophylaxis as per primary team.  -- Encourage pulmonary toilet; incentive spirometer at bedside   -- Bowel regimen in place while on narcotics   -- The results reviewed, potassium is within normal limits, creatinine 0.5.  Hemoglobin is stable at 10.7.    Hypertension:  Currently PTA Norvasc has been continued, 10 mg daily  -Losartan 100 mg and hydrochlorothiazide 25 mg are on hold, will restart possibly in a.m. if blood pressures are going up.       GERD: Continue PTA Nexium     Tobacco use disorder, in remission: Quit smoking earlier in spring.      HAMIDA: Diagnosed a while back. Not utilizing CPAP. Recommend outpatient sleep study.       DVT Prophylaxis: Defer to primary service  Code Status: Full Code     37 MINUTES SPENT BY ME on the date of service doing chart review, history, exam, documentation & further activities per the note.  Disposition: Expected discharge as per primary service.  Clinically Significant Risk Factors        # Hypokalemia: Lowest K = 3.2 mmol/L in last 2 days, will replace as needed           # Hypertension: Noted on problem list        # Obesity: Estimated body mass index is 35.13 kg/m  as calculated from the following:    Height as of this encounter: 1.6 m (5' 3\").    Weight as of this encounter: 89.9 kg (198 lb 4.8 oz)., PRESENT ON ADMISSION            Ria Smith MD  Text Page   (7am to 6pm)    Interval History   She mentions appropriate postoperative pain, still has Adams catheter in place, blood pressures are stable without few of her blood " pressure medication.    -Data reviewed today: I reviewed all new labs and imaging results over the last 24 hours.   Physical Exam     Vital Signs with Ranges  Temp:  [97.9  F (36.6  C)-98.8  F (37.1  C)] 98.8  F (37.1  C)  Pulse:  [65-98] 68  Resp:  [7-17] 17  BP: (117-162)/(53-91) 122/53  SpO2:  [91 %-99 %] 96 %  I/O last 3 completed shifts:  In: 360 [P.O.:360]  Out: 3850 [Urine:3800; Blood:50]    Constitutional: Awake, alert, cooperative, no apparent distress  Respiratory: Clear to auscultation bilaterally, no crackles or wheezing  Cardiovascular: Regular rate and rhythm, normal S1 and S2, and no murmur noted  GI: Normal bowel sounds, soft, non-distended, non-tender  Skin/Integumen: Status post back surgery  Neuro : moving all 4 extremities, no focal deficit noted     Medications    sodium chloride 75 mL/hr at 11/28/23 1532      acetaminophen  975 mg Oral Q8H    amLODIPine  10 mg Oral Daily    [Held by provider] hydrochlorothiazide  25 mg Oral Daily    [Held by provider] losartan  100 mg Oral Daily    methocarbamol  750 mg Oral Q6H    pantoprazole  40 mg Oral QAM AC    polyethylene glycol  17 g Oral Daily    senna-docusate  1 tablet Oral BID    sodium chloride (PF)  3 mL Intracatheter Q8H    vitamin D3  50 mcg Oral Daily       Data   Recent Labs   Lab 11/29/23  0735 11/29/23  0636 11/28/23  0608   HGB 10.7*  --   --      --   --    POTASSIUM 3.9  --  3.2*   CHLORIDE 105  --   --    CO2 27  --   --    BUN 7.9  --   --    CR 0.50*  --  0.53   ANIONGAP 9  --   --    YUVAL 8.7  --   --    * 115* 125*     Recent Labs   Lab 11/29/23  0735 11/29/23  0636 11/28/23  0608   * 115* 125*       Imaging:   No results found for this or any previous visit (from the past 24 hour(s)).

## 2023-11-29 NOTE — PROGRESS NOTES
[facility]  Neurosurgery Daily Progress Note    Assessment & Plan   Procedure(s):  Left Lumbar 4 to Lumbar 5 robotic minimally invasive Transforaminal Lumbar Interbody Fusion   -1 Day Post-Op  Patient was seen lying in bed. Reports aching back pain. Improvement in pre op LLE pain. Neuro exam stable. Incision CDI. Patient denies any new or worsening symptoms.    Hemoglobin 10.7    Plan:  - Continue pain control measures - scheduled Robaxin, utilize Atarax as well   - Abx x3 doses post op   - Routine wound care   - PT/OT  - Appreciate assistance from specialties      Luzmaria Reyes CNP  [facility] Neurosurgery  [facility]  [address] [facility] [address] [place] [address]  Tel [phone]  Pager [phone]    Interval History   Stable     Physical Exam   Temp: 98.8  F (37.1  C) Temp src: Oral BP: 122/53 Pulse: 68   Resp: 17 SpO2: 96 % O2 Device: None (Room air) Oxygen Delivery: 3 LPM  Vitals:    11/28/23 0627   Weight: 89.9 kg (198 lb 4.8 oz)       Mental status:  Alert and oriented x 3, speech is fluent.  Motor:   Iliopsoas  (hip flexion)               Right: 5/5  Left:  5/5  Quadriceps  (knee extension)       Right:  5/5  Left:  5/5  Hamstrings  (knee flexion)            Right:  5/5  Left:  5/5  Gastroc Soleus  (PF)                          Right:  5/5  Left:  5/5  Tibialis Ant  (DF)                          Right:  5/5  Left:  5/5  EHL                          Right:  5/5  Left:  5/5    Sensation:  Intact to light touch in BLE   Incision: CDI

## 2023-11-30 ENCOUNTER — APPOINTMENT (OUTPATIENT)
Dept: PHYSICAL THERAPY | Facility: CLINIC | Age: 48
End: 2023-11-30
Attending: NEUROLOGICAL SURGERY
Payer: COMMERCIAL

## 2023-11-30 LAB
ALBUMIN UR-MCNC: 30 MG/DL
APPEARANCE UR: ABNORMAL
BACTERIA #/AREA URNS HPF: ABNORMAL /HPF
BILIRUB UR QL STRIP: NEGATIVE
COLOR UR AUTO: YELLOW
ERYTHROCYTE [DISTWIDTH] IN BLOOD BY AUTOMATED COUNT: 12.3 % (ref 10–15)
GLUCOSE SERPL-MCNC: 92 MG/DL (ref 70–99)
GLUCOSE UR STRIP-MCNC: NEGATIVE MG/DL
HCT VFR BLD AUTO: 34.7 % (ref 35–47)
HGB BLD-MCNC: 10.8 G/DL (ref 11.7–15.7)
HGB BLD-MCNC: 11.2 G/DL (ref 11.7–15.7)
HGB UR QL STRIP: ABNORMAL
KETONES UR STRIP-MCNC: NEGATIVE MG/DL
LEUKOCYTE ESTERASE UR QL STRIP: ABNORMAL
MCH RBC QN AUTO: 28.7 PG (ref 26.5–33)
MCHC RBC AUTO-ENTMCNC: 32.3 G/DL (ref 31.5–36.5)
MCV RBC AUTO: 89 FL (ref 78–100)
MUCOUS THREADS #/AREA URNS LPF: PRESENT /LPF
NITRATE UR QL: NEGATIVE
PH UR STRIP: 5.5 [PH] (ref 5–7)
PLATELET # BLD AUTO: 261 10E3/UL (ref 150–450)
POTASSIUM SERPL-SCNC: 3.6 MMOL/L (ref 3.4–5.3)
RBC # BLD AUTO: 3.9 10E6/UL (ref 3.8–5.2)
RBC URINE: 5 /HPF
SP GR UR STRIP: 1.03 (ref 1–1.03)
SQUAMOUS EPITHELIAL: 72 /HPF
UROBILINOGEN UR STRIP-MCNC: NORMAL MG/DL
WBC # BLD AUTO: 11.6 10E3/UL (ref 4–11)
WBC URINE: 43 /HPF

## 2023-11-30 PROCEDURE — 250N000011 HC RX IP 250 OP 636: Mod: JZ | Performed by: NURSE PRACTITIONER

## 2023-11-30 PROCEDURE — 120N000001 HC R&B MED SURG/OB

## 2023-11-30 PROCEDURE — 250N000011 HC RX IP 250 OP 636: Mod: JZ | Performed by: INTERNAL MEDICINE

## 2023-11-30 PROCEDURE — 87086 URINE CULTURE/COLONY COUNT: CPT | Performed by: INTERNAL MEDICINE

## 2023-11-30 PROCEDURE — 36415 COLL VENOUS BLD VENIPUNCTURE: CPT | Performed by: NEUROLOGICAL SURGERY

## 2023-11-30 PROCEDURE — 85027 COMPLETE CBC AUTOMATED: CPT | Performed by: INTERNAL MEDICINE

## 2023-11-30 PROCEDURE — 250N000013 HC RX MED GY IP 250 OP 250 PS 637: Performed by: INTERNAL MEDICINE

## 2023-11-30 PROCEDURE — 99232 SBSQ HOSP IP/OBS MODERATE 35: CPT | Performed by: INTERNAL MEDICINE

## 2023-11-30 PROCEDURE — 82947 ASSAY GLUCOSE BLOOD QUANT: CPT | Performed by: NEUROLOGICAL SURGERY

## 2023-11-30 PROCEDURE — 36415 COLL VENOUS BLD VENIPUNCTURE: CPT | Performed by: INTERNAL MEDICINE

## 2023-11-30 PROCEDURE — 97530 THERAPEUTIC ACTIVITIES: CPT | Mod: GP | Performed by: PHYSICAL THERAPY ASSISTANT

## 2023-11-30 PROCEDURE — 97116 GAIT TRAINING THERAPY: CPT | Mod: GP | Performed by: PHYSICAL THERAPY ASSISTANT

## 2023-11-30 PROCEDURE — 250N000013 HC RX MED GY IP 250 OP 250 PS 637: Performed by: NURSE PRACTITIONER

## 2023-11-30 PROCEDURE — 85018 HEMOGLOBIN: CPT | Performed by: INTERNAL MEDICINE

## 2023-11-30 PROCEDURE — 84132 ASSAY OF SERUM POTASSIUM: CPT | Performed by: NEUROLOGICAL SURGERY

## 2023-11-30 PROCEDURE — 250N000013 HC RX MED GY IP 250 OP 250 PS 637: Performed by: PHYSICIAN ASSISTANT

## 2023-11-30 PROCEDURE — 81001 URINALYSIS AUTO W/SCOPE: CPT | Performed by: INTERNAL MEDICINE

## 2023-11-30 RX ORDER — CEFTRIAXONE 1 G/1
1 INJECTION, POWDER, FOR SOLUTION INTRAMUSCULAR; INTRAVENOUS EVERY 24 HOURS
Status: DISCONTINUED | OUTPATIENT
Start: 2023-11-30 | End: 2023-12-01

## 2023-11-30 RX ORDER — LOSARTAN POTASSIUM 100 MG/1
100 TABLET ORAL DAILY
Status: DISCONTINUED | OUTPATIENT
Start: 2023-11-30 | End: 2023-12-02 | Stop reason: HOSPADM

## 2023-11-30 RX ADMIN — PANTOPRAZOLE SODIUM 40 MG: 40 TABLET, DELAYED RELEASE ORAL at 06:31

## 2023-11-30 RX ADMIN — HYDROMORPHONE HYDROCHLORIDE 0.4 MG: 0.2 INJECTION, SOLUTION INTRAMUSCULAR; INTRAVENOUS; SUBCUTANEOUS at 00:07

## 2023-11-30 RX ADMIN — METHOCARBAMOL 750 MG: 750 TABLET ORAL at 10:03

## 2023-11-30 RX ADMIN — DOCUSATE SODIUM 50 MG AND SENNOSIDES 8.6 MG 1 TABLET: 8.6; 5 TABLET, FILM COATED ORAL at 10:03

## 2023-11-30 RX ADMIN — HYDROMORPHONE HYDROCHLORIDE 0.2 MG: 0.2 INJECTION, SOLUTION INTRAMUSCULAR; INTRAVENOUS; SUBCUTANEOUS at 21:46

## 2023-11-30 RX ADMIN — METHOCARBAMOL 750 MG: 750 TABLET ORAL at 21:21

## 2023-11-30 RX ADMIN — HYDROMORPHONE HYDROCHLORIDE 0.4 MG: 0.2 INJECTION, SOLUTION INTRAMUSCULAR; INTRAVENOUS; SUBCUTANEOUS at 16:42

## 2023-11-30 RX ADMIN — Medication 50 MCG: at 10:03

## 2023-11-30 RX ADMIN — OXYCODONE HYDROCHLORIDE 10 MG: 5 TABLET ORAL at 14:14

## 2023-11-30 RX ADMIN — OXYCODONE HYDROCHLORIDE 10 MG: 5 TABLET ORAL at 19:07

## 2023-11-30 RX ADMIN — ACETAMINOPHEN 975 MG: 325 TABLET, FILM COATED ORAL at 07:49

## 2023-11-30 RX ADMIN — METHOCARBAMOL 750 MG: 750 TABLET ORAL at 16:26

## 2023-11-30 RX ADMIN — LOSARTAN POTASSIUM 100 MG: 100 TABLET, FILM COATED ORAL at 10:03

## 2023-11-30 RX ADMIN — METHOCARBAMOL 750 MG: 750 TABLET ORAL at 04:22

## 2023-11-30 RX ADMIN — AMLODIPINE BESYLATE 10 MG: 10 TABLET ORAL at 10:03

## 2023-11-30 RX ADMIN — OXYCODONE HYDROCHLORIDE 10 MG: 5 TABLET ORAL at 04:22

## 2023-11-30 RX ADMIN — POLYETHYLENE GLYCOL 3350 17 G: 17 POWDER, FOR SOLUTION ORAL at 10:03

## 2023-11-30 RX ADMIN — DOCUSATE SODIUM 50 MG AND SENNOSIDES 8.6 MG 1 TABLET: 8.6; 5 TABLET, FILM COATED ORAL at 21:21

## 2023-11-30 RX ADMIN — CEFTRIAXONE SODIUM 1 G: 1 INJECTION, POWDER, FOR SOLUTION INTRAMUSCULAR; INTRAVENOUS at 16:27

## 2023-11-30 RX ADMIN — OXYCODONE HYDROCHLORIDE 10 MG: 5 TABLET ORAL at 10:03

## 2023-11-30 RX ADMIN — HYDROMORPHONE HYDROCHLORIDE 0.4 MG: 0.2 INJECTION, SOLUTION INTRAMUSCULAR; INTRAVENOUS; SUBCUTANEOUS at 07:50

## 2023-11-30 RX ADMIN — ACETAMINOPHEN 975 MG: 325 TABLET, FILM COATED ORAL at 16:26

## 2023-11-30 RX ADMIN — HYDROMORPHONE HYDROCHLORIDE 0.2 MG: 0.2 INJECTION, SOLUTION INTRAMUSCULAR; INTRAVENOUS; SUBCUTANEOUS at 12:10

## 2023-11-30 ASSESSMENT — ACTIVITIES OF DAILY LIVING (ADL)
ADLS_ACUITY_SCORE: 22
ADLS_ACUITY_SCORE: 26
ADLS_ACUITY_SCORE: 26
ADLS_ACUITY_SCORE: 22
ADLS_ACUITY_SCORE: 26
ADLS_ACUITY_SCORE: 22
ADLS_ACUITY_SCORE: 26
ADLS_ACUITY_SCORE: 22
ADLS_ACUITY_SCORE: 22
ADLS_ACUITY_SCORE: 26

## 2023-11-30 NOTE — PROGRESS NOTES
"[facility]    Hospitalist Progress Note    Assessment & Plan   Virginia Valenzuela is a 48 year old female with below PMHx admitted on 11/28/2023. She presented for elective procedure as below.      S/P L left L4-5 robotic minimally invasive transforaminal lumbar interbody fusion: Surgery per Dr. Bird. EBL 50 ccs. General anesthesia used.   -- Defer routine post-operative cares, including diet, pain control and DVT prophylaxis as per primary team.  -- Encourage pulmonary toilet; incentive spirometer at bedside   -- Bowel regimen in place while on narcotics   -- The results reviewed, potassium is within normal limits, creatinine 0.5.  Hemoglobin is stable at 11.2  Fever 101.4 11/30  --Urine Analysis  is abnormal, will start on rocephin, ly removed, possible atelectasis, encouraged IS use.  Hypertension:  Currently PTA Norvasc has been continued, 10 mg daily  -Losartan 100 mg restarted 11/30 ,hydrochlorothiazide 25 mg  on hold, will restart possibly in a.m.    GERD: Continue PTA Nexium     Tobacco use disorder, in remission: Quit smoking earlier in spring.      HAMIDA: Diagnosed a while back. Not utilizing CPAP. Recommend outpatient sleep study.       DVT Prophylaxis: Defer to primary service  Code Status: Full Code     37 MINUTES SPENT BY ME on the date of service doing chart review, history, exam, documentation & further activities per the note.  Disposition: Expected discharge as per primary service.  Clinically Significant Risk Factors                  # Hypertension: Noted on problem list        # Obesity: Estimated body mass index is 35.13 kg/m  as calculated from the following:    Height as of this encounter: 1.6 m (5' 3\").    Weight as of this encounter: 89.9 kg (198 lb 4.8 oz)., PRESENT ON ADMISSION            Ria Smith MD  Text Page   (7am to 6pm)    Interval History   Fever noted, pain uncontrolled, ly removed.cbc repeated.    -Data reviewed today: I reviewed all new labs " and imaging results over the last 24 hours.   Physical Exam     Vital Signs with Ranges  Temp:  [97.6  F (36.4  C)-101.4  F (38.6  C)] 97.6  F (36.4  C)  Pulse:  [67-87] 67  Resp:  [16-20] 16  BP: (118-147)/(55-65) 120/58  SpO2:  [95 %-98 %] 95 %  I/O last 3 completed shifts:  In: 600 [P.O.:600]  Out: 3525 [Urine:3525]    Constitutional: Awake, alert, cooperative, no apparent distress  Respiratory: Clear to auscultation bilaterally, no crackles or wheezing  Cardiovascular: Regular rate and rhythm, normal S1 and S2, and no murmur noted  GI: Normal bowel sounds, soft, non-distended, non-tender  Skin/Integumen: Status post back surgery  Neuro : moving all 4 extremities, no focal deficit noted     Medications        acetaminophen  975 mg Oral Q8H    amLODIPine  10 mg Oral Daily    [Held by provider] hydrochlorothiazide  25 mg Oral Daily    losartan  100 mg Oral Daily    methocarbamol  750 mg Oral Q6H    pantoprazole  40 mg Oral QAM AC    polyethylene glycol  17 g Oral Daily    senna-docusate  1 tablet Oral BID    sodium chloride (PF)  3 mL Intracatheter Q8H    vitamin D3  50 mcg Oral Daily       Data   Recent Labs   Lab 11/30/23  1105 11/30/23  0732 11/29/23 0735 11/29/23 0636 11/28/23  0608   WBC 11.6*  --   --   --   --    HGB 11.2* 10.8* 10.7*  --   --    MCV 89  --   --   --   --      --   --   --   --    NA  --   --  141  --   --    POTASSIUM  --  3.6 3.9  --  3.2*   CHLORIDE  --   --  105  --   --    CO2  --   --  27  --   --    BUN  --   --  7.9  --   --    CR  --   --  0.50*  --  0.53   ANIONGAP  --   --  9  --   --    YUVAL  --   --  8.7  --   --    GLC  --  92 159* 115* 125*     Recent Labs   Lab 11/30/23  0732 11/29/23  0735 11/29/23 0636 11/28/23  0608   [facility] 92 159* 115* 125*       Imaging:   No results found for this or any previous visit (from the past 24 hour(s)).

## 2023-11-30 NOTE — PROGRESS NOTES
Neurosurgery progress note    Hemoglobin stable at 10.8    RN reported patient having white milky vaginal discharge.  Adams catheter removed, no significant PVR.  Patient had fever overnight.  Temperature as high as 101.4.    Reports abdominal pain and bladder pain, feeling like it will burst.     Leg symptoms have improved since surgery.     Not passing gas yet.     Exam    Alert and oriented, visibly uncomfortable.   LOMBARDO with appropriate strength.  Abdomen soft and mildly tender to palpation.     Assessment    Postop day 2 status post L4-5 robotic MIS TLIF, Dr. Yu    Plan    Discontinue Adams catheter, obtain urine specimen, monitor for urinary retention.    Continue PT/OT    Completed 3 doses of postoperative Ancef    Appreciate hospitalist assistance with management of fever, UTI symptoms.

## 2023-11-30 NOTE — PLAN OF CARE
Date & Time: 11/29-30/23, [id number]  Surgery/POD#: POD#2, Left l4-5 robotic minimally invasive transforaminal lumbar interbody fusion   Behavior & Aggression: green  Fall Risk: yes  Orientation: AxOx4  ABNL VS/O2:Temp: 101.4, gave tylenol and recheck was 100.6. Most recent temp: 99.6F  ABNL Labs:   Pain Management:scheduled tylenol, robaxin, PRN dilaudid 1x, oxy x2  Bowel/Bladder: ly in place, continent of bowel   Drains: PIV NaCl 75mL/hr, ly   Diet: regular   Activity Level: Ax1/GB/W, up to chair 1x during shift   Tests/Procedures:   Anticipated  DC Date: pending PT progress and pain control   Significant Information:   stayed the night, swelling is still +2 edema on infiltrated arm

## 2023-12-01 ENCOUNTER — APPOINTMENT (OUTPATIENT)
Dept: OCCUPATIONAL THERAPY | Facility: CLINIC | Age: 48
End: 2023-12-01
Attending: NURSE PRACTITIONER
Payer: COMMERCIAL

## 2023-12-01 ENCOUNTER — APPOINTMENT (OUTPATIENT)
Dept: PHYSICAL THERAPY | Facility: CLINIC | Age: 48
End: 2023-12-01
Attending: NEUROLOGICAL SURGERY
Payer: COMMERCIAL

## 2023-12-01 LAB
ANION GAP SERPL CALCULATED.3IONS-SCNC: 10 MMOL/L (ref 7–15)
BACTERIA UR CULT: NORMAL
BUN SERPL-MCNC: 7.4 MG/DL (ref 6–20)
CALCIUM SERPL-MCNC: 8.9 MG/DL (ref 8.6–10)
CHLORIDE SERPL-SCNC: 102 MMOL/L (ref 98–107)
CREAT SERPL-MCNC: 0.57 MG/DL (ref 0.51–0.95)
DEPRECATED HCO3 PLAS-SCNC: 29 MMOL/L (ref 22–29)
EGFRCR SERPLBLD CKD-EPI 2021: >90 ML/MIN/1.73M2
ERYTHROCYTE [DISTWIDTH] IN BLOOD BY AUTOMATED COUNT: 12.3 % (ref 10–15)
GLUCOSE SERPL-MCNC: 89 MG/DL (ref 70–99)
HCT VFR BLD AUTO: 33.9 % (ref 35–47)
HGB BLD-MCNC: 11.2 G/DL (ref 11.7–15.7)
MCH RBC QN AUTO: 29.2 PG (ref 26.5–33)
MCHC RBC AUTO-ENTMCNC: 33 G/DL (ref 31.5–36.5)
MCV RBC AUTO: 89 FL (ref 78–100)
PLATELET # BLD AUTO: 261 10E3/UL (ref 150–450)
POTASSIUM SERPL-SCNC: 3.5 MMOL/L (ref 3.4–5.3)
RBC # BLD AUTO: 3.83 10E6/UL (ref 3.8–5.2)
SODIUM SERPL-SCNC: 141 MMOL/L (ref 135–145)
WBC # BLD AUTO: 11 10E3/UL (ref 4–11)

## 2023-12-01 PROCEDURE — 120N000001 HC R&B MED SURG/OB

## 2023-12-01 PROCEDURE — 97535 SELF CARE MNGMENT TRAINING: CPT | Mod: GO

## 2023-12-01 PROCEDURE — 250N000011 HC RX IP 250 OP 636: Mod: JZ | Performed by: NURSE PRACTITIONER

## 2023-12-01 PROCEDURE — 82310 ASSAY OF CALCIUM: CPT | Performed by: INTERNAL MEDICINE

## 2023-12-01 PROCEDURE — 97116 GAIT TRAINING THERAPY: CPT | Mod: GP | Performed by: PHYSICAL THERAPY ASSISTANT

## 2023-12-01 PROCEDURE — 250N000013 HC RX MED GY IP 250 OP 250 PS 637: Performed by: PHYSICIAN ASSISTANT

## 2023-12-01 PROCEDURE — 250N000013 HC RX MED GY IP 250 OP 250 PS 637: Performed by: INTERNAL MEDICINE

## 2023-12-01 PROCEDURE — 99232 SBSQ HOSP IP/OBS MODERATE 35: CPT | Performed by: INTERNAL MEDICINE

## 2023-12-01 PROCEDURE — 85027 COMPLETE CBC AUTOMATED: CPT | Performed by: INTERNAL MEDICINE

## 2023-12-01 PROCEDURE — 36415 COLL VENOUS BLD VENIPUNCTURE: CPT | Performed by: INTERNAL MEDICINE

## 2023-12-01 PROCEDURE — 97165 OT EVAL LOW COMPLEX 30 MIN: CPT | Mod: GO

## 2023-12-01 PROCEDURE — 250N000013 HC RX MED GY IP 250 OP 250 PS 637: Performed by: NURSE PRACTITIONER

## 2023-12-01 RX ADMIN — OXYCODONE HYDROCHLORIDE 10 MG: 5 TABLET ORAL at 10:18

## 2023-12-01 RX ADMIN — OXYCODONE HYDROCHLORIDE 10 MG: 5 TABLET ORAL at 00:02

## 2023-12-01 RX ADMIN — Medication 50 MCG: at 08:30

## 2023-12-01 RX ADMIN — DOCUSATE SODIUM 50 MG AND SENNOSIDES 8.6 MG 1 TABLET: 8.6; 5 TABLET, FILM COATED ORAL at 08:30

## 2023-12-01 RX ADMIN — METHOCARBAMOL 750 MG: 750 TABLET ORAL at 21:25

## 2023-12-01 RX ADMIN — ACETAMINOPHEN 975 MG: 325 TABLET, FILM COATED ORAL at 00:02

## 2023-12-01 RX ADMIN — LOSARTAN POTASSIUM 100 MG: 100 TABLET, FILM COATED ORAL at 08:30

## 2023-12-01 RX ADMIN — HYDROMORPHONE HYDROCHLORIDE 0.4 MG: 0.2 INJECTION, SOLUTION INTRAMUSCULAR; INTRAVENOUS; SUBCUTANEOUS at 08:25

## 2023-12-01 RX ADMIN — METHOCARBAMOL 750 MG: 750 TABLET ORAL at 09:33

## 2023-12-01 RX ADMIN — POLYETHYLENE GLYCOL 3350 17 G: 17 POWDER, FOR SOLUTION ORAL at 08:30

## 2023-12-01 RX ADMIN — OXYCODONE HYDROCHLORIDE 10 MG: 5 TABLET ORAL at 05:53

## 2023-12-01 RX ADMIN — ACETAMINOPHEN 975 MG: 325 TABLET, FILM COATED ORAL at 08:30

## 2023-12-01 RX ADMIN — OXYCODONE HYDROCHLORIDE 10 MG: 5 TABLET ORAL at 14:14

## 2023-12-01 RX ADMIN — OXYCODONE HYDROCHLORIDE 10 MG: 5 TABLET ORAL at 22:15

## 2023-12-01 RX ADMIN — AMLODIPINE BESYLATE 10 MG: 10 TABLET ORAL at 08:30

## 2023-12-01 RX ADMIN — METHOCARBAMOL 750 MG: 750 TABLET ORAL at 03:26

## 2023-12-01 RX ADMIN — HYDROCHLOROTHIAZIDE 25 MG: 25 TABLET ORAL at 09:33

## 2023-12-01 RX ADMIN — METHOCARBAMOL 750 MG: 750 TABLET ORAL at 15:58

## 2023-12-01 RX ADMIN — PANTOPRAZOLE SODIUM 40 MG: 40 TABLET, DELAYED RELEASE ORAL at 06:42

## 2023-12-01 RX ADMIN — OXYCODONE HYDROCHLORIDE 10 MG: 5 TABLET ORAL at 18:20

## 2023-12-01 RX ADMIN — DOCUSATE SODIUM 50 MG AND SENNOSIDES 8.6 MG 1 TABLET: 8.6; 5 TABLET, FILM COATED ORAL at 21:26

## 2023-12-01 ASSESSMENT — ACTIVITIES OF DAILY LIVING (ADL)
ADLS_ACUITY_SCORE: 22
ADLS_ACUITY_SCORE: 25
ADLS_ACUITY_SCORE: 22
PREVIOUS_RESPONSIBILITIES: MEAL PREP;HOUSEKEEPING;LAUNDRY;SHOPPING;MEDICATION MANAGEMENT;DRIVING
ADLS_ACUITY_SCORE: 27
ADLS_ACUITY_SCORE: 22
ADLS_ACUITY_SCORE: 29
ADLS_ACUITY_SCORE: 22
ADLS_ACUITY_SCORE: 29
ADLS_ACUITY_SCORE: 22

## 2023-12-01 NOTE — PROGRESS NOTES
[facility]    Neurosurgery  Daily Note    Assessment & Plan   Procedure(s):  Left Lumbar 4 to Lumbar 5 robotic minimally invasive Transforaminal Lumbar Interbody Fusion   3 Days Post-Op  Increased pain due to limited meds overnight   Pain localized in back  Lower extremity radicular symptoms improved compared to pre op   Dressing dry and neuro stable     Plan:  -Advance activity as tolerated  -Continue supportive and symptomatic treatment  -Start or continue physical therapy  -Pain control measures. Updated nursing to schedule pain meds overnight tonight so she is not trying to catch up on pain control throughout the day. Pending pain improvement, discharge hopefully this weekend  -Advance diet as tolerated  -Routine wound care  -UTI management per hospitalist team, appreciate involvement     Dr. Yu in agreement  Patient and  in agreement    Daisy Del Angel PA-C  [facility] Neurosurgery  [facility]  [address] [place] [address]    Tel [phone]  Pager [phone]      Principal Problem:    S/P lumbar fusion     Daisy Del Angel PA-C    Interval History   Stable     Physical Exam   Temp: 99.2  F (37.3  C) Temp src: Oral BP: (!) 148/72 Pulse: 83   Resp: 18 SpO2: 100 % O2 Device: None (Room air)    Vitals:    11/28/23 0627   Weight: 89.9 kg (198 lb 4.8 oz)     Vital Signs with Ranges  Temp:  [97.7  F (36.5  C)-99.2  F (37.3  C)] 99.2  F (37.3  C)  Pulse:  [73-85] 83  Resp:  [12-18] 18  BP: (110-148)/(59-72) 148/72  SpO2:  [100 %] 100 %  I/O last 3 completed shifts:  In: 250 [P.O.:250]  Out: -     Awake, alert, appropriate   Dressing is dry   5/5 motor strength BLE. Pain limiting with BL hip flexion   Sensation intact          Medications       amLODIPine  10 mg Oral Daily    hydrochlorothiazide  25 mg Oral Daily    losartan  100 mg Oral Daily    methocarbamol  750 mg Oral Q6H    pantoprazole  40 mg Oral QAM AC    polyethylene glycol  17 g  Oral Daily    senna-docusate  1 tablet Oral BID    sodium chloride (PF)  3 mL Intracatheter Q8H    vitamin D3  50 mcg Oral Daily       Plans discussed with Dr. Yu who was in agreement with plans    Daisy REECE [facility] Neurosurgery  [address] [address]    Tel [phone]  Pager [phone]

## 2023-12-01 NOTE — PROGRESS NOTES
"[facility]    Hospitalist Progress Note    Assessment & Plan   Virginia Valenzuela is a 48 year old female with below PMHx admitted on 11/28/2023. She presented for elective procedure as below.      S/P L left L4-5 robotic minimally invasive transforaminal lumbar interbody fusion: Surgery per Dr. Yu. EBL 50 ccs. General anesthesia used.   -- Defer routine post-operative cares, including diet, pain control and DVT prophylaxis as per primary team.  -- Encourage pulmonary toilet; incentive spirometer at bedside   -- Bowel regimen in place while on narcotics   -- The results reviewed, potassium is within normal limits, creatinine 0.5.  Hemoglobin is stable at 11.2  --Pain control is ongoing issue.  Fever 101.4 11/30  Urinary tract infection  ruled out  -- Urinalysis was abnormal so started on Rocephin, Adams was removed on 11/30,.  Urine cultures came back negative on 12/1, antibiotics discontinued.  --She is having low-grade temps but no fever, encouraged her nursing to educate her on I-S.  Her fever possibly secondary to atelectasis.  Hypertension:  Currently PTA Norvasc has been continued, 10 mg daily  -Losartan 100 mg restarted 11/30 ,hydrochlorothiazide 25 mg  on hold, will restart possibly in a.m.    GERD: Continue PTA Nexium     Tobacco use disorder, in remission: Quit smoking earlier in spring.      HAMIDA: Diagnosed a while back. Not utilizing CPAP. Recommend outpatient sleep study.       DVT Prophylaxis: Defer to primary service  Code Status: Full Code     37 MINUTES SPENT BY ME on the date of service doing chart review, history, exam, documentation & further activities per the note.  Disposition: Expected discharge as per primary service.  Clinically Significant Risk Factors                  # Hypertension: Noted on problem list        # Obesity: Estimated body mass index is 35.13 kg/m  as calculated from the following:    Height as of this encounter: 1.6 m (5' 3\").    Weight as of this " encounter: 89.9 kg (198 lb 4.8 oz)., PRESENT ON ADMISSION            Ria Smith MD  Text Page   (7am to 6pm)    Interval History   Patient is resting, pain is present, no urinary symptoms, urine culture came back negative, antibiotics stopped, T max is 99.2.    -Data reviewed today: I reviewed all new labs and imaging results over the last 24 hours.   Physical Exam     Vital Signs with Ranges  Temp:  [97.7  F (36.5  C)-99.2  F (37.3  C)] 99.2  F (37.3  C)  Pulse:  [73-85] 83  Resp:  [12-18] 18  BP: (110-148)/(59-72) 148/72  SpO2:  [100 %] 100 %  I/O last 3 completed shifts:  In: 250 [P.O.:250]  Out: -     Constitutional: Awake, alert, cooperative, no apparent distress  Respiratory: Clear to auscultation bilaterally, no crackles or wheezing  Cardiovascular: Regular rate and rhythm, normal S1 and S2, and no murmur noted  GI: Normal bowel sounds, soft, non-distended, non-tender  Skin/Integumen: Status post back surgery  Neuro : moving all 4 extremities, no focal deficit noted     Medications        amLODIPine  10 mg Oral Daily    hydrochlorothiazide  25 mg Oral Daily    losartan  100 mg Oral Daily    methocarbamol  750 mg Oral Q6H    pantoprazole  40 mg Oral QAM AC    polyethylene glycol  17 g Oral Daily    senna-docusate  1 tablet Oral BID    sodium chloride (PF)  3 mL Intracatheter Q8H    vitamin D3  50 mcg Oral Daily       Data   Recent Labs   Lab 12/01/23  0739 11/30/23  1105 11/30/23  0732 11/29/23  0735 11/29/23  0636 11/28/23  0608   WBC 11.0 11.6*  --   --   --   --    HGB 11.2* 11.2* 10.8* 10.7*   < >  --    MCV 89 89  --   --   --   --     261  --   --   --   --      --   --  141  --   --    POTASSIUM 3.5  --  3.6 3.9  --  3.2*   CHLORIDE 102  --   --  105  --   --    CO2 29  --   --  27  --   --    BUN 7.4  --   --  7.9  --   --    CR 0.57  --   --  0.50*  --  0.53   ANIONGAP 10  --   --  9  --   --    YUVAL 8.9  --   --  8.7  --   --    GLC 89  --  92 159*   < > 125*    < > = values in this  interval not displayed.     Recent Labs   Lab 12/01/23  0739 11/30/23  0732 11/29/23  0735 11/29/23  0636 11/28/23  0608   GLC 89 92 159* 115* 125*       Imaging:   No results found for this or any previous visit (from the past 24 hour(s)).

## 2023-12-01 NOTE — PROGRESS NOTES
Pt is Aox4, up with A1GBW. Pain is controlled, using IV dilaudid, oxycodone, roboxin and tylenol. Regular diet with good intake. Potassium protocol 3.6 redraw scheduled for morning.Adams removed voiding well.  Incision WDL, dressing CDI. Bruising under left eye. Discharge pending PT progress and pain control.

## 2023-12-01 NOTE — PROGRESS NOTES
Surgery/POD#: POD#3, Left L4 L5 L fusion  Orientation: AxOx4  VSS on RA  Pain Management:scheduled tylenol, oxycodone and IV hydromorphone  Bowel/Bladder: Continent of both B&B   Diet: regular   Activity Level: Ax1/GB/W,     Anticipated  DC Date: discharge pending  Significant Information:   stayed, edema on arm

## 2023-12-01 NOTE — PROGRESS NOTES
12/01/23 0900   Appointment Info   Signing Clinician's Name / Credentials (OT) Denisha Pyle, OTR/L   Rehab Comments (OT) Spinal precautions   Living Environment   People in Home spouse   Current Living Arrangements house   Home Accessibility stairs to enter home   Number of Stairs, Main Entrance 3   Stair Railings, Main Entrance none   Transportation Anticipated family or friend will provide   Living Environment Comments Tub/shower, has access to shower chair, RTS.   Self-Care   Usual Activity Tolerance good   Current Activity Tolerance moderate   Equipment Currently Used at Home walker, standard   Fall history within last six months no   Activity/Exercise/Self-Care Comment Ind ADLs baseline.   Instrumental Activities of Daily Living (IADL)   Previous Responsibilities meal prep;housekeeping;laundry;shopping;medication management;driving   IADL Comments Share IADLs baseline.   General Information   Onset of Illness/Injury or Date of Surgery 11/28/23   Referring Physician Luzmaria Reyes NP   Patient/Family Therapy Goal Statement (OT) To go home   Additional Occupational Profile Info/Pertinent History of Current Problem Pt is a 49 y/o female s/p Left Lumbar 4 to Lumbar 5 robotic minimally invasive Transforaminal Lumbar Interbody Fusion on 11/28/23. Pt is POD#3.   Existing Precautions/Restrictions fall;spinal   Cognitive Status Examination   Orientation Status orientation to person, place and time   Visual Perception   Visual Impairment/Limitations WFL;corrective lenses full-time   Sensory   Sensory Comments Pt does not report numbness/   Pain Assessment   Patient Currently in Pain   (6/10 during OT session in Low back)   Range of Motion Comprehensive   Comment, General Range of Motion BUEs WFL   Strength Comprehensive (MMT)   Comment, General Manual Muscle Testing (MMT) Assessment BUEs WFL   Coordination   Upper Extremity Coordination No deficits were identified   Bed Mobility   Comment (Bed Mobility) NT - pt up  in chair at time of OT session   Transfers   Transfers sit-stand transfer;toilet transfer;shower transfer   Sit-Stand Transfer   Sit/Stand Transfer Comments SBA   Shower Transfer   Shower Transfer Comments SBA per clinical judgement   Toilet Transfer   Toilet Transfer Comments SBA   Balance   Balance Comments No overt LOB noted   Activities of Daily Living   BADL Assessment/Intervention lower body dressing;toileting   Lower Body Dressing Assessment/Training   Comment, (Lower Body Dressing) SBA   Toileting   Comment, (Toileting) SBA per clinical judgement   Clinical Impression   Criteria for Skilled Therapeutic Interventions Met (OT) Yes, treatment indicated   OT Diagnosis Decreased ind with I/ADLs   OT Problem List-Impairments impacting ADL problems related to;mobility;post-surgical precautions;pain   Assessment of Occupational Performance 3-5 Performance Deficits   Identified Performance Deficits dressing, bathing, heavy IADLs   Planned Therapy Interventions (OT) ADL retraining;IADL retraining;transfer training   Clinical Decision Making Complexity (OT) problem focused assessment/low complexity   Risk & Benefits of therapy have been explained patient;spouse/significant other   OT Total Evaluation Time   OT Eval, Low Complexity Minutes (20073) 10   OT Goals   Therapy Frequency (OT) Daily   OT Predicted Duration/Target Date for Goal Attainment 12/04/23   OT Goals Upper Body Dressing;Lower Body Dressing;Transfers;Toilet Transfer/Toileting   OT: Upper Body Dressing Modified independent;within precautions   OT: Lower Body Dressing Supervision/stand-by assist;within precautions;using adaptive equipment  (FWW)   OT: Transfer Supervision/stand-by assist;within precautions;with assistive device  (FWW, tub shower combo)   OT: Toilet Transfer/Toileting Supervision/stand-by assist;toilet transfer;cleaning and garment management;using adaptive equipment;within precautions  (commode over toilet, FWW)   Self-Care/Home Management  "  Self-Care/Home Mgmt/ADL, Compensatory, Meal Prep Minutes (44527) 23   Symptoms Noted During/After Treatment (Meal Preparation/Planning Training) fatigue   Treatment Detail/Skilled Intervention Pt greeted in chair, spouse present, pt agreeble. Pt able to recall 3 of 3 spinal precautions with VC of \"BLT\". Pt edu on spinal precautions within LB dressing and use of reacher/sock aid. Pt dons underwear with reacher with SBA and Min VCs for technique, using FWW. Pt doffs R sock with reacher and dons R sock with sock aid with SBA and VC for technique. Pt stands from chair with SBA and FWW, increased time for transition to stand. Pt ambulates to BR with SBA and FWW. Pt demo toilet transfer with commode over toilet with SBA and FWW. Pt ambulates back to chair and sits in chair with SBA and FWW. Pt and spouse edu on how to obtain AE - pt and spouse provided with AE handout, pt/spouse verbalize understanding. Pt provided with self care after spine sx handout, edu on g/h tips for maintaining spinal precautions. Pt and spouse edu on fall risk reduction strategies such as use of walker bag, lighting, removal of rugs/cords. Pt up in chair with needs met, items in reach, spouse present.   OT Discharge Planning   OT Plan AE for toileting/precautions, tub transfer, TB dressing   OT Discharge Recommendation (DC Rec) home with assist   OT Rationale for DC Rec Pt functioning slightly below baseline, impacted primarily by pain and post-op precautions. Pt currently SBA for mobility in room with FWW and SBA for dressing/toilet transfers. Spouse able to assist pt with I/ADLs as needed at home. Anticipate with continued participation in IP OT, pt may progress to SBA-Mod I with FWW for self cares, rec spouse A for heavy IADLs and bathing at home at d/c.   OT Brief overview of current status See above   OT Equipment Needed at Discharge reacher;other (see comments)  (Pt has RTS with arms and shower chair - recommend reacher, spouse plans to " obtain outside hospital - will cotinue to assess AE needs)   Total Session Time   Timed Code Treatment Minutes 23   Total Session Time (sum of timed and untimed services) 33

## 2023-12-02 ENCOUNTER — APPOINTMENT (OUTPATIENT)
Dept: OCCUPATIONAL THERAPY | Facility: CLINIC | Age: 48
End: 2023-12-02
Attending: NEUROLOGICAL SURGERY
Payer: COMMERCIAL

## 2023-12-02 ENCOUNTER — APPOINTMENT (OUTPATIENT)
Dept: PHYSICAL THERAPY | Facility: CLINIC | Age: 48
End: 2023-12-02
Attending: NEUROLOGICAL SURGERY
Payer: COMMERCIAL

## 2023-12-02 VITALS
BODY MASS INDEX: 35.14 KG/M2 | DIASTOLIC BLOOD PRESSURE: 78 MMHG | TEMPERATURE: 98.8 F | OXYGEN SATURATION: 97 % | HEIGHT: 63 IN | RESPIRATION RATE: 18 BRPM | SYSTOLIC BLOOD PRESSURE: 147 MMHG | WEIGHT: 198.3 LBS | HEART RATE: 78 BPM

## 2023-12-02 LAB — POTASSIUM SERPL-SCNC: 3.3 MMOL/L (ref 3.4–5.3)

## 2023-12-02 PROCEDURE — 250N000013 HC RX MED GY IP 250 OP 250 PS 637: Performed by: PHYSICIAN ASSISTANT

## 2023-12-02 PROCEDURE — 97530 THERAPEUTIC ACTIVITIES: CPT | Mod: GP | Performed by: PHYSICAL THERAPY ASSISTANT

## 2023-12-02 PROCEDURE — 250N000013 HC RX MED GY IP 250 OP 250 PS 637: Performed by: NURSE PRACTITIONER

## 2023-12-02 PROCEDURE — 99232 SBSQ HOSP IP/OBS MODERATE 35: CPT | Performed by: INTERNAL MEDICINE

## 2023-12-02 PROCEDURE — 84132 ASSAY OF SERUM POTASSIUM: CPT | Performed by: INTERNAL MEDICINE

## 2023-12-02 PROCEDURE — 97116 GAIT TRAINING THERAPY: CPT | Mod: GP | Performed by: PHYSICAL THERAPY ASSISTANT

## 2023-12-02 PROCEDURE — 97535 SELF CARE MNGMENT TRAINING: CPT | Mod: GO

## 2023-12-02 PROCEDURE — 36415 COLL VENOUS BLD VENIPUNCTURE: CPT | Performed by: INTERNAL MEDICINE

## 2023-12-02 PROCEDURE — 250N000013 HC RX MED GY IP 250 OP 250 PS 637: Performed by: INTERNAL MEDICINE

## 2023-12-02 RX ORDER — POTASSIUM CHLORIDE 1500 MG/1
40 TABLET, EXTENDED RELEASE ORAL ONCE
Status: COMPLETED | OUTPATIENT
Start: 2023-12-02 | End: 2023-12-02

## 2023-12-02 RX ORDER — OXYCODONE HYDROCHLORIDE 5 MG/1
5-10 TABLET ORAL EVERY 4 HOURS PRN
Qty: 40 TABLET | Refills: 0 | Status: SHIPPED | OUTPATIENT
Start: 2023-12-02 | End: 2023-12-05

## 2023-12-02 RX ORDER — HYDROXYZINE HYDROCHLORIDE 25 MG/1
25 TABLET, FILM COATED ORAL EVERY 6 HOURS PRN
Qty: 40 TABLET | Refills: 0 | Status: SHIPPED | OUTPATIENT
Start: 2023-12-02

## 2023-12-02 RX ORDER — ACETAMINOPHEN 325 MG/1
650 TABLET ORAL EVERY 4 HOURS PRN
Qty: 40 TABLET | Refills: 0 | Status: SHIPPED | OUTPATIENT
Start: 2023-12-02 | End: 2024-01-29

## 2023-12-02 RX ORDER — AMOXICILLIN 250 MG
1 CAPSULE ORAL 2 TIMES DAILY PRN
Qty: 40 TABLET | Refills: 0 | Status: SHIPPED | OUTPATIENT
Start: 2023-12-02

## 2023-12-02 RX ORDER — METHOCARBAMOL 750 MG/1
750 TABLET, FILM COATED ORAL EVERY 6 HOURS PRN
Qty: 40 TABLET | Refills: 0 | Status: SHIPPED | OUTPATIENT
Start: 2023-12-02 | End: 2023-12-15

## 2023-12-02 RX ADMIN — OXYCODONE HYDROCHLORIDE 10 MG: 5 TABLET ORAL at 06:47

## 2023-12-02 RX ADMIN — DOCUSATE SODIUM 50 MG AND SENNOSIDES 8.6 MG 1 TABLET: 8.6; 5 TABLET, FILM COATED ORAL at 09:14

## 2023-12-02 RX ADMIN — LOSARTAN POTASSIUM 100 MG: 100 TABLET, FILM COATED ORAL at 09:14

## 2023-12-02 RX ADMIN — Medication 50 MCG: at 09:14

## 2023-12-02 RX ADMIN — METHOCARBAMOL 750 MG: 750 TABLET ORAL at 09:22

## 2023-12-02 RX ADMIN — OXYCODONE HYDROCHLORIDE 10 MG: 5 TABLET ORAL at 10:45

## 2023-12-02 RX ADMIN — POTASSIUM CHLORIDE 40 MEQ: 1500 TABLET, EXTENDED RELEASE ORAL at 09:22

## 2023-12-02 RX ADMIN — AMLODIPINE BESYLATE 10 MG: 10 TABLET ORAL at 09:14

## 2023-12-02 RX ADMIN — POLYETHYLENE GLYCOL 3350 17 G: 17 POWDER, FOR SOLUTION ORAL at 09:14

## 2023-12-02 RX ADMIN — PANTOPRAZOLE SODIUM 40 MG: 40 TABLET, DELAYED RELEASE ORAL at 06:47

## 2023-12-02 RX ADMIN — HYDROCHLOROTHIAZIDE 25 MG: 25 TABLET ORAL at 09:14

## 2023-12-02 RX ADMIN — OXYCODONE HYDROCHLORIDE 10 MG: 5 TABLET ORAL at 02:33

## 2023-12-02 RX ADMIN — HYDROXYZINE HYDROCHLORIDE 25 MG: 25 TABLET, FILM COATED ORAL at 12:27

## 2023-12-02 RX ADMIN — METHOCARBAMOL 750 MG: 750 TABLET ORAL at 04:13

## 2023-12-02 ASSESSMENT — ACTIVITIES OF DAILY LIVING (ADL)
ADLS_ACUITY_SCORE: 29

## 2023-12-02 NOTE — PROGRESS NOTES
"    Hospitalist Progress Note    Assessment & Plan   Virginia Valenzuela is a 48 year old female with below PMHx admitted on 11/28/2023. She presented for elective procedure as below.      S/P L left L4-5 robotic minimally invasive transforaminal lumbar interbody fusion: Surgery per Dr. Yu. EBL 50 ccs. General anesthesia used.   -Routine postoperative cares managed by neurosurgery, plan noted for tentative discharge today.  Fever 101.4 11/30  Urinary tract infection  ruled out  -- Urinalysis was abnormal so started on Rocephin, Adams was removed on 11/30,.  Urine cultures came back negative on 12/1, antibiotics discontinued.  -- atelectasis is a concern causing her fever, encouraged to continue using incentive spirometer, encouraged to take at home and continue to use at home..  Hypertension:  Patient is currently on all her PTA medications including Norvasc, losartan and hydrochlorothiazide, blood pressures are better but slightly high at times due to pain.  GERD: Continue PTA Nexium     Tobacco use disorder, in remission: Quit smoking earlier in spring.      HAMIDA: Diagnosed a while back. Not utilizing CPAP. Recommend outpatient sleep study.   Hypokalemia-potassium 3.3  --Replace per protocol.    DVT Prophylaxis: Defer to primary service  Code Status: Full Code     37 MINUTES SPENT BY ME on the date of service doing chart review, history, exam, documentation & further activities per the note.  Disposition: Expected discharge as per primary service.  Clinically Significant Risk Factors        # Hypokalemia: Lowest K = 3.3 mmol/L in last 2 days, will replace as needed           # Hypertension: Noted on problem list        # Obesity: Estimated body mass index is 35.13 kg/m  as calculated from the following:    Height as of this encounter: 1.6 m (5' 3\").    Weight as of this encounter: 89.9 kg (198 lb 4.8 oz).             Ria Smith MD  Text Page   (7am to 6pm)    Interval History "   Remains afebrile for last 48 hours.  Pain better controlled, tentative plan for discharge noted.    -Data reviewed today: I reviewed all new labs and imaging results over the last 24 hours.   Physical Exam     Vital Signs with Ranges  Temp:  [98.4  F (36.9  C)-98.8  F (37.1  C)] 98.8  F (37.1  C)  Pulse:  [78-86] 78  Resp:  [18] 18  BP: (119-147)/(68-78) 147/78  SpO2:  [97 %-100 %] 97 %  I/O last 3 completed shifts:  In: 300 [P.O.:300]  Out: -     Constitutional: Awake, alert, cooperative, no apparent distress  Respiratory: Clear to auscultation bilaterally, no crackles or wheezing  Cardiovascular: Regular rate and rhythm, normal S1 and S2, and no murmur noted  GI: Normal bowel sounds, soft, non-distended, non-tender  Skin/Integumen: Status post back surgery  Neuro : moving all 4 extremities, no focal deficit noted     Medications        amLODIPine  10 mg Oral Daily    hydrochlorothiazide  25 mg Oral Daily    losartan  100 mg Oral Daily    methocarbamol  750 mg Oral Q6H    pantoprazole  40 mg Oral QAM AC    polyethylene glycol  17 g Oral Daily    senna-docusate  1 tablet Oral BID    sodium chloride (PF)  3 mL Intracatheter Q8H    vitamin D3  50 mcg Oral Daily       Data   Recent Labs   Lab 12/02/23  0802 12/01/23  0739 11/30/23  1105 11/30/23  0732 11/29/23  0735 11/29/23  0636 11/28/23  0608   WBC  --  11.0 11.6*  --   --   --   --    HGB  --  11.2* 11.2* 10.8* 10.7*   < >  --    MCV  --  89 89  --   --   --   --    PLT  --  261 261  --   --   --   --    NA  --  141  --   --  141  --   --    POTASSIUM 3.3* 3.5  --  3.6 3.9  --  3.2*   CHLORIDE  --  102  --   --  105  --   --    CO2  --  29  --   --  27  --   --    BUN  --  7.4  --   --  7.9  --   --    CR  --  0.57  --   --  0.50*  --  0.53   ANIONGAP  --  10  --   --  9  --   --    YUVAL  --  8.9  --   --  8.7  --   --    GLC  --  89  --  92 159*   < > 125*    < > = values in this interval not displayed.     Recent Labs   Lab 12/01/23  0739 11/30/23  0732  11/29/23  0735 11/29/23  0636 11/28/23  0608   [facility] 89 92 159* 115* 125*       Imaging:   No results found for this or any previous visit (from the past 24 hour(s)).

## 2023-12-02 NOTE — PLAN OF CARE
PT-  Pt discharged home today with assist of spouse as needed.  No further PT planned at this time.  PT goals partially met.

## 2023-12-02 NOTE — PLAN OF CARE
Occupational Therapy Discharge Summary    Reason for therapy discharge:    All goals and outcomes met, no further needs identified.    Progress towards therapy goal(s). See goals on Care Plan in [facility] electronic health record for goal details.  Goals met    Therapy recommendation(s):    Pt functioning near baseline, impacted by pain and post-op precautions. Pt currently SBA for mobility in room with FWW and SBA for dressing/toilet and shower transfers. Spouse able to assist pt with I/ADLs as needed at home. Rec spouse A for heavy IADLs and initial bathing at home at d/c. No further acute OT needs.

## 2023-12-02 NOTE — PROVIDER NOTIFICATION
To Dr Smith via [employer] messaging at 1225    RN-K was 3.3 this morning, replaced with 40 meq and recheck at 1400, do you want this recheck before discharge? And do you want K checked outpt? Is pt okay to discharge on your end? Thanks. Neurosurg put in discharge orders.     MD- Yes can dc if you gave 40     RN- Do you want K checked outpt clinic?    MD- no

## 2023-12-02 NOTE — PLAN OF CARE
"Goal Outcome Evaluation:    Denies CP, Sob. Discharge today per neurosurg. Pt reported having \"small BM today\". Pt and spouse verbalized understanding to all discharge and med instructions. Pt verbalized that she and  will remove dressing today and leave steri strips. Pt left with all belongings. All pt needs met at this time.   "

## 2023-12-02 NOTE — DISCHARGE SUMMARY
DISCHARGE SUMMARY   Patient ID:   Virginia Valenzuela   [id number]   48 year old   1975     Admit date: 11/28/2023     Discharge date: 12/02/2023     Admission Diagnoses: Spondylolisthesis, lumbar region [M43.16]  S/P lumbar fusion [Z98.1]     Procedure:    1.  L4-L5 insertion of bilateral pedicle screws and rods (Medtronic Voyager)  2.  L4-L5 bilateral laminectomies for decompression of stenosis  3.  L4-L5 left complete facetectomy, transforaminal discectomy, and interbody arthrodesis  4.  L4-L5 insertion of intervertebral graft with Blanca allograft  5.  L4-L5 bilateral transverse process posterolateral arthrodesis and fusion with autograft and allograft  6.  Use of [employer] surgical robot  7.  Use of intraoperative microscope and fluoroscopy    Post op Diagnosis:   Lumbar spondylolisthesis      Surgeon: Dr. Yu    Exam:   Pt in bed. She appears comfortable and in no apparent distress, moving all extremities.   CN II-XII intact, alert and appropriate with conversation and following commands.   Bilateral upper and lower extremities with appropriate strength. DTR's WNL. Spine is non tender to palpation throughout. Omalley's and Babinski sign neg. Sensation intact throughout. Acceptable ROM.   Calves soft and non-tender bilaterally. Back dressing changed. Incision intact. Absent for edema, erythema or drainage.   She is ambulating with a steady gait and without assistive devices. Tolerating regular diet without nausea or vomiting. Pain managed with oral medication. Voiding without difficulty. She is on room air with O2 sats greater than 90%.     Disposition: Home     Virginia Valenzuela verbalizes understanding and is in agreement with discharge.     Done while pt still in hospital bed      Review of your medicines        START taking        Dose / Directions   acetaminophen 325 MG tablet  Commonly known as: TYLENOL  Used for: Lumbar radiculopathy      Dose: 650 mg  Take 2 tablets (650 mg) by mouth every 4 hours as  needed for other (For optimal non-opioid multimodal pain management to improve pain control.)  Quantity: 40 tablet  Refills: 0     hydrOXYzine 25 MG tablet  Commonly known as: ATARAX  Used for: Lumbar radiculopathy      Dose: 25 mg  Take 1 tablet (25 mg) by mouth every 6 hours as needed for itching  Quantity: 40 tablet  Refills: 0     methocarbamol 750 MG tablet  Commonly known as: ROBAXIN  Used for: Lumbar radiculopathy      Dose: 750 mg  Take 1 tablet (750 mg) by mouth every 6 hours as needed for muscle spasms  Quantity: 40 tablet  Refills: 0     oxyCODONE 5 MG tablet  Commonly known as: ROXICODONE  Used for: Lumbar radiculopathy      Dose: 5-10 mg  Take 1-2 tablets (5-10 mg) by mouth every 4 hours as needed for moderate to severe pain  Quantity: 40 tablet  Refills: 0     senna-docusate 8.6-50 MG tablet  Commonly known as: SENOKOT-S/PERICOLACE  Used for: Lumbar radiculopathy      Dose: 1 tablet  Take 1 tablet by mouth 2 times daily as needed for constipation (take while on oxycodone)  Quantity: 40 tablet  Refills: 0            CONTINUE these medicines which have NOT CHANGED        Dose / Directions   amLODIPine 10 MG tablet  Commonly known as: NORVASC      Dose: 1 tablet  Take 1 tablet by mouth daily  Refills: 0     chlorhexidine 4 % liquid  Commonly known as: HIBICLENS  Used for: Preop testing      Apply topically daily as needed for wound care  Quantity: 236 mL  Refills: 0     EPINEPHrine 0.3 MG/0.3ML injection 2-pack  Commonly known as: ANY BX GENERIC EQUIV  Used for: History of bee sting allergy      Dose: 0.3 mg  Inject 0.3 mLs (0.3 mg) into the muscle once as needed  Quantity: 1 each  Refills: 0     esomeprazole 20 MG DR capsule  Commonly known as: NexIUM      Dose: 20 mg  Take 1 capsule (20 mg) by mouth every morning (before breakfast) Take 30-60 minutes before eating.  Refills: 0     fluticasone 50 MCG/ACT nasal spray  Commonly known as: FLONASE      Dose: 1 spray  Spray 1 spray into both nostrils as  needed for rhinitis or allergies  Refills: 0     hydrochlorothiazide 25 MG tablet  Commonly known as: HYDRODIURIL      Dose: 1 tablet  Take 1 tablet by mouth daily  Refills: 0     losartan 100 MG tablet  Commonly known as: COZAAR      Dose: 1 tablet  Take 1 tablet by mouth daily  Refills: 0     PROBIOTIC DAILY PO      Dose: 1 capsule  Take 1 capsule by mouth at bedtime  Refills: 0     vitamin D3 50 MCG (2000 UT) Caps      Dose: 1 capsule  Take 1 capsule by mouth daily  Refills: 0               Where to get your medicines        These medications were sent to [facility] [place] - [place] - [address]  [address], [facility] [address]      Phone: [phone]   acetaminophen 325 MG tablet  hydrOXYzine 25 MG tablet  methocarbamol 750 MG tablet  oxyCODONE 5 MG tablet  senna-docusate 8.6-50 MG tablet          Discharge Procedure Orders   Reason for your hospital stay   Order Comments: Left Lumbar 4 to Lumbar 5 robotic minimally invasive Transforaminal Lumbar Interbody Fusion     Follow-up and recommended labs and tests    Order Comments: Your Neurosurgical follow up appointments have been scheduled. You may call [phone] to make, confirm or change your follow-up Neurosurgery appointment dates and/or times.     Activity   Order Comments: Your activity upon discharge:     Limit lifting to 10 pounds and limit bending/twisting until follow up visit. Ok to walk as tolerated, avoid bed rest and prolonged sitting. No contact sports or high impact activities until  follow up visit.     Do not take NSAIDS (Ibuprofen, Advil, Aleve, Naproxen, etc) until follow-up visit. Do NOT drive while taking narcotic pain medication.     Order Specific Question Answer Comments   Is discharge order? Yes      Wound care and dressings   Order Comments: Instructions to care for your wound at home: Keep your incision clean and dry. Okay to remove dressing on post-op day 2 and shower on post-op day 3. Okay  for water to run over incision and gently pat dry. Do not scrub incision. No bathing, swimming, or submerging incision under water until follow-up visit. Call clinic or go to the ER with any incision drainage or swelling. Follow-up in clinic at 2 weeks post op for incision check.     Diet   Order Comments: Follow this diet upon discharge: Regular     Order Specific Question Answer Comments   Is discharge order? Yes           Respectfully,   Sudha Cai PA-C

## 2023-12-05 DIAGNOSIS — M54.16 LUMBAR RADICULOPATHY: ICD-10-CM

## 2023-12-05 DIAGNOSIS — Z98.1 S/P LUMBAR FUSION: ICD-10-CM

## 2023-12-05 RX ORDER — OXYCODONE HYDROCHLORIDE 5 MG/1
5-10 TABLET ORAL EVERY 4 HOURS PRN
Qty: 40 TABLET | Refills: 0 | Status: SHIPPED | OUTPATIENT
Start: 2023-12-05 | End: 2023-12-08

## 2023-12-05 NOTE — TELEPHONE ENCOUNTER
[facility] Call Center    Phone Message    May a detailed message be left on voicemail: yes     Reason for Call: Medication Refill Request    Has the patient contacted the pharmacy for the refill? Yes   Name of medication being requested: oxyCODONE (ROXICODONE) 5 MG tablet   Provider who prescribed the medication: LESTER SCHULZ  Pharmacy: [facility]  Date medication is needed: 1 DAY LEFT NEEDS ASAP       Action Taken: [facility] [[id number]]    Travel Screening: Not Applicable

## 2023-12-05 NOTE — TELEPHONE ENCOUNTER
Patient calling for a refill of oxycodone.     DOS: 11/28  Procedure:  Left Lumbar 4 to Lumbar 5 robotic minimally invasive Transforaminal Lumbar Interbody Fusion   Surgeon: Dr. Yu  Weeks Post Op: 1 week    Current symptom(s): Pain is currently an 8/10 located in her lower back/waist area. No new n/t or weakness.   Current pain management: oxy 2 tabs Q4H, robaxin Q6H, tylenol 3000mg daily.     Last fill: oxy 12/2 #40  Next visit: 12/15 with RN    Medication pended for your approval, if appropriate. Pharmacy verified.     Any patient questions or concerns:     Informed patient request will be forwarded to care team.

## 2023-12-08 DIAGNOSIS — Z98.1 S/P LUMBAR FUSION: ICD-10-CM

## 2023-12-08 RX ORDER — OXYCODONE HYDROCHLORIDE 5 MG/1
5-10 TABLET ORAL EVERY 4 HOURS PRN
Qty: 40 TABLET | Refills: 0 | Status: SHIPPED | OUTPATIENT
Start: 2023-12-08 | End: 2023-12-11

## 2023-12-08 NOTE — TELEPHONE ENCOUNTER
[facility] Call Center    Phone Message    May a detailed message be left on voicemail: yes   Oxycodone 5 mg refill . Please Refill for Patient   Reason for Call:     Action Taken: Message routed to:  Other: PH NEURO DOCTOR    Travel Screening: Not Applicable

## 2023-12-08 NOTE — CONFIDENTIAL NOTE
Patient calling for a refill of oxycodone.     DOS: 11/28/23  Procedure: Left Lumbar 4 to Lumbar 5 robotic minimally invasive Transforaminal Lumbar Interbody Fusion   Surgeon: Dr. Gigi Price Post Op: 1 wk 4 days     Current symptom(s): low back , incisional pain but that has improved. A little pain in right hip, has been laying/sleeping on right hip, denies leg pain.    Current pain management: 10 mg every 4 hours,  robaxin q 6 hrs, tylenol, atarax at HS. Applies ice. Has a few pills thru today , will run out by tomorrow.      Last fill: 12/5/23 #40  Next visit: 12/15/23 RN visit     Medication pended for your approval, if appropriate. Pharmacy verified. [facility] pharmacy.     Any patient questions or concerns:  checking daily and said incision site looks good. Steri strips in place. Denies any redness , warmth, drainage or fever.  Reports some incisional pain and had some swelling but has since improved. Patient is icing. Reiterated incision care/s/s infection to watch for. She will contact clinic if any concerns. Has nurse visit 12/15/23 at  location.     Informed patient request will be forwarded to care team.

## 2023-12-11 DIAGNOSIS — Z98.1 S/P LUMBAR FUSION: ICD-10-CM

## 2023-12-11 RX ORDER — OXYCODONE HYDROCHLORIDE 5 MG/1
5-10 TABLET ORAL EVERY 4 HOURS PRN
Qty: 40 TABLET | Refills: 0 | Status: SHIPPED | OUTPATIENT
Start: 2023-12-12 | End: 2023-12-15

## 2023-12-11 NOTE — TELEPHONE ENCOUNTER
Patient calling for a refill of oxycodone.     DOS: 11/28/23  Procedure:  Left Lumbar 4 to Lumbar 5 robotic minimally invasive Transforaminal Lumbar Interbody Fusion   Surgeon: Dr. Gigi Price Post Op: 1 week 6 days    Current symptom(s): Pain is currently between an 8-9 depending on movement. Pain is located in her middle lower back. No new symptoms per patient.   Current pain management: oxy 2 tabs Q4H, tylenol as prescribed, robaxin Q6H, atarax before bed     Last fill: 12/8 #40, dated for pickup 12/12 (will be out early the 13th)   Next visit: 12/15    Medication pended for your approval, if appropriate. Pharmacy verified.     Any patient questions or concerns:     Informed patient request will be forwarded to care team.

## 2023-12-11 NOTE — TELEPHONE ENCOUNTER
[facility] Call Center    Phone Message    May a detailed message be left on voicemail: yes     Reason for Call: Medication Refill Request    Has the patient contacted the pharmacy for the refill? No, medication needs to be called in by patient every few days for 4 day prescription    oxyCODONE (ROXICODONE) 5 MG  [facility]      Action Taken: Other: [id number]    Travel Screening: Not Applicable

## 2023-12-15 ENCOUNTER — ALLIED HEALTH/NURSE VISIT (OUTPATIENT)
Dept: NEUROSURGERY | Facility: CLINIC | Age: 48
End: 2023-12-15
Payer: COMMERCIAL

## 2023-12-15 VITALS — TEMPERATURE: 97.6 F

## 2023-12-15 DIAGNOSIS — M54.16 LUMBAR RADICULOPATHY: ICD-10-CM

## 2023-12-15 DIAGNOSIS — Z98.1 S/P LUMBAR FUSION: Primary | ICD-10-CM

## 2023-12-15 PROCEDURE — 99207 PR NO CHARGE NURSE ONLY: CPT

## 2023-12-15 RX ORDER — OXYCODONE HYDROCHLORIDE 5 MG/1
5-10 TABLET ORAL EVERY 4 HOURS PRN
Qty: 40 TABLET | Refills: 0 | Status: SHIPPED | OUTPATIENT
Start: 2023-12-15 | End: 2023-12-19

## 2023-12-15 RX ORDER — METHOCARBAMOL 750 MG/1
750 TABLET, FILM COATED ORAL EVERY 6 HOURS PRN
Qty: 40 TABLET | Refills: 0 | Status: SHIPPED | OUTPATIENT
Start: 2023-12-15 | End: 2024-01-11

## 2023-12-15 ASSESSMENT — PAIN SCALES - GENERAL: PAINLEVEL: EXTREME PAIN (8)

## 2023-12-15 NOTE — PATIENT INSTRUCTIONS
Instructions for Patient    Incision  Keep your incision clean and dry at all times.   It is okay to shower, just pat the incision dry   No submerging incision in water such as pools, hot tubs, or baths for at least 8 weeks and until the incision is healed  Do not apply lotions or ointments to incision    Activity  No lifting greater than 10 pounds. No bending, twisting, or overhead reaching.  Walking is the best way to start exercise after surgery. Take short frequent walks. You may gradually increase the distance as tolerated. If you feel pain, decrease your activity, but DO NOT stop walking. Walking will help you gain strength, prevent muscle soreness and spasms, and prevent blood clots.   Avoid bed rest and prolonged sitting for longer than 30 minutes (change positions frequently while awake)  No contact sports or high impact activities such as; running/jogging, snowmobile or 4 griffith riding or any other recreational vehicles until after given clearance at one of your follow up visits    Medications   Refills of pain medication:   Please call the neurosurgery clinic to request 2-3 days before you run out  Weaning from narcotic pain medications  When it is time, start weaning by extending the time between doses.   For example, if you're taking 2 tabs every 4 hours, spread it out to 2 tabs over 4.5, 5, 6 hours. At that point you can certainly cut down to 1 tab, then wean to an as needed basis until completely done with them.Refills: call our clinic 2-3 business days before you are out of medication. A nurse will call you back to obtain a pain assessment.   Don't take more than 3000mg of Acetaminophen in 24 hours  Avoid Aspirin and NSAIDs (ex: ibuprofen/Advil) until given clearance (likely at the 6-week post-op appointment).  Encouraged icing for at least 3-4 times throughout the day for 20-30 minutes at a time. Avoid heat to the incision area.   Taking stool softeners regularly can reduce constipation commonly  caused by narcotic pain medications.    Contact clinic or Emergency Room if you develop:   Infection (redness, swelling, warmth, drainage, fever over 101 F)  New injury  Bladder or bowel changes or loss of control    Signs of blood clot:  Swelling and/or warmth in one or both legs  Pain or tenderness in your leg, ankle, foot, or arm   Red or discolored skin     Go to the Emergency Room   If sudden onset of severe headache, weakness, confusion, change in level of consciousness, pain, or loss of movement.  Chest pain  Trouble breathing     Post-operative appointments  Arrive 30 minutes before your 6 week, 3 month, 6 month, and 1 year post-op appointments to allow time for an x-ray before each    [facility]  [place] [address] [place] [address]  Telephone:  [phone]   Fax:  [phone]

## 2023-12-15 NOTE — PROGRESS NOTES
Post-op Nurse Visit:    Patient seen today per the order of  Abel Yu MD .   DOS: 11/28/23  Procedure: left L4-5 robotic TLIF    Pain/Neuro Assessment  8/10 to lower back described as throbbing. Improved with lack of movement. Pain increased from activity of this visit today. Typically pain tolerable at 7/10.  Improving.  Numbness/tingling: none   Strength: Equal strength to bilateral lower extremities. Denies weakness.     Tapering plan:  Patient states that she tried going without oxycodone over night but increased pain in the AM. Interested in tapering from oxycodone. Feels that her pain of 8/10 is tolerable today and could tolerate tapering. Instructed her to start by increasing interval time between oxycodone doses. Advised to take throughout the night. She will try by increasing interval time to 4.5 hours for 1-2 days, then 5 hours. Once she gets to 2 tabs q6h x 1-2 days, she will cut down to 1 tab q4h.  Pain Relief Measures:  Oxycodone: 2 tabs q4h  Tylenol: 650mg TID  Robaxin: 1 tab q6h during the day  Hydroxyzine: HS  Ice: frequently. Calms pain down.     Incision   Incision inspected. Edges well-approximated. Some scabs on incision with slight redness around them. No swelling, drainage, or warmth noted. Steri-strips removed.    Activity  Following restrictions   Falls:  none  Patient is walking frequently without difficulty.   Denies redness, swelling, or warmth to bilateral calves.     GI/  Patient's appetite is normal  Bowel/bladder problems? No  Taking stool softeners? Yes     Refills/x-rays/return to work  Refills given at this appointment? Yes  Sent for x-rays after this appointment? No  Ordered future x-rays? Yes  Return to work discussed at this appointment? Yes returns Jan 17. [employer].    All of patient's questions addressed today. Patient was instructed to call with any additional questions/concerns.     Rossana Hernandes RN on 12/15/2023 at 11:19 AM

## 2023-12-19 DIAGNOSIS — Z98.1 S/P LUMBAR FUSION: ICD-10-CM

## 2023-12-19 RX ORDER — OXYCODONE HYDROCHLORIDE 5 MG/1
5-10 TABLET ORAL EVERY 4 HOURS PRN
Qty: 40 TABLET | Refills: 0 | Status: SHIPPED | OUTPATIENT
Start: 2023-12-20 | End: 2023-12-21

## 2023-12-19 NOTE — TELEPHONE ENCOUNTER
Patient calling for a refill of Oxycodone.     DOS: 11/28/23  Procedure:  Left Lumbar 4 to Lumbar 5 robotic minimally invasive Transforaminal Lumbar Interbody Fusion   Surgeon: Dr. Gigi Price Post Op: 3 weeks     Current symptom(s): Pain is currently a 7/10 in her low back. Oxycodone brings her pain down to a 5/10.   Current pain management: oxy 2 tabs Q4-5 hours, robaxin Q6H, Atarax before bed, tylenol throughout the day    She is working on tapering.     She will try by increasing interval time to 4.5 hours for 1-2 days, then 5 hours. Once she gets to 2 tabs q6h x 1-2 days, she will cut down to 1 tab q4h, Per RN visit with Rossana Hernandes RN.     Last fill: 12/15 #40, dated for pickup 12/20  Next visit: 1/11/23    Medication pended for your approval, if appropriate. Pharmacy verified.     Any patient questions or concerns:     Informed patient request will be forwarded to care team.

## 2023-12-19 NOTE — TELEPHONE ENCOUNTER
[facility] Call Center    Phone Message    May a detailed message be left on voicemail: yes     Reason for Call: Other: Stephanie called she needs a refill of oxycodone called into [facility] pharmacy [phone]. Please call to let patient know when this has been done and/or if any questions.     Action Taken: Other:  Neurosurgery    Travel Screening: Not Applicable

## 2023-12-21 DIAGNOSIS — Z98.1 S/P LUMBAR FUSION: ICD-10-CM

## 2023-12-21 RX ORDER — OXYCODONE HYDROCHLORIDE 5 MG/1
5-10 TABLET ORAL EVERY 4 HOURS PRN
Qty: 40 TABLET | Refills: 0 | Status: SHIPPED | OUTPATIENT
Start: 2023-12-23 | End: 2023-12-27

## 2023-12-21 NOTE — TELEPHONE ENCOUNTER
[facility] Call Center    Phone Message    May a detailed message be left on voicemail: yes     Reason for Call: Medication Refill Request    Has the patient contacted the pharmacy for the refill? Yes   Name of medication being requested: Oxycodone   Provider who prescribed the medication: Alba Nye   Pharmacy: [facility]   Date medication is needed: asap, will run out over the weekend       Action Taken: Other: [id number]    Travel Screening: Not Applicable

## 2023-12-21 NOTE — TELEPHONE ENCOUNTER
Patient calling for a refill of oxycodone.     DOS: 11/28/23  Procedure:  Left Lumbar 4 to Lumbar 5 robotic minimally invasive Transforaminal Lumbar Interbody Fusion   Surgeon: Dr. Gigi Price Post Op: 3 weeks 2 days     Current symptom(s): Pain is currently a 6-7/10. Pain is located around her incision site. No new symptoms.   Current pain management: 2 tabs oxy Q4H-6 hours, tylenol in between, robaxin QID    Last fill: 12/20 #40, she will be out Sunday. Dated for pickup Saturday the 23rd.   Next visit: 1/11/24    Medication pended for your approval, if appropriate. Pharmacy verified.     Any patient questions or concerns:     Informed patient request will be forwarded to care team.

## 2023-12-27 DIAGNOSIS — Z98.1 S/P LUMBAR FUSION: ICD-10-CM

## 2023-12-27 RX ORDER — OXYCODONE HYDROCHLORIDE 5 MG/1
5-10 TABLET ORAL EVERY 6 HOURS PRN
Qty: 40 TABLET | Refills: 0 | Status: SHIPPED | OUTPATIENT
Start: 2023-12-27 | End: 2024-01-02

## 2023-12-27 NOTE — TELEPHONE ENCOUNTER
Patient calling for a refill of oxycodone.     DOS: 11/28/23  Procedure:  Left Lumbar 4 to Lumbar 5 robotic minimally invasive Transforaminal Lumbar Interbody Fusion   Surgeon: Dr. Gigi Price Post Op: 4 weeks 1 day    Current symptom(s): pain is located near her incision and into her sides.   Current pain management: oxy 2 tabs Q6H, robaxin Q6H, tylenol in between     Last fill: 12/23 #40  Next visit: 1/11 with Alba Nye NP     Medication pended for your approval, if appropriate. Pharmacy verified.     Any patient questions or concerns:     Informed patient request will be forwarded to care team.

## 2023-12-27 NOTE — TELEPHONE ENCOUNTER
[facility]    Phone Message    May a detailed message be left on voicemail: yes     Reason for Call: Oxycodone 5mg . Patient asking for Refill      Action Taken:     PH NEUROSURGERY       Travel Screening: Not Applicable

## 2024-01-02 DIAGNOSIS — Z98.1 S/P LUMBAR FUSION: ICD-10-CM

## 2024-01-02 RX ORDER — OXYCODONE HYDROCHLORIDE 5 MG/1
5 TABLET ORAL EVERY 6 HOURS PRN
Qty: 30 TABLET | Refills: 0 | Status: SHIPPED | OUTPATIENT
Start: 2024-01-02 | End: 2024-01-11

## 2024-01-02 NOTE — TELEPHONE ENCOUNTER
Patient calling for a refill of Oxycodone.     DOS: 12/27/23  Procedure: L4-5 fusion  Surgeon: Dr Gigi Price Post Op: 5    Current symptom(s): pain at incision site and into sides, improving. No new symptoms.   Current pain management: 1 tab Oxy every 6 hours. Robaxin every 6 hours. Tylenol. Ice.     Last fill: 12/27/23  Next visit: 1/11/24    Medication pended for your approval, if appropriate. Pharmacy verified.     Any patient questions or concerns:     Informed patient request will be forwarded to care team.

## 2024-01-02 NOTE — TELEPHONE ENCOUNTER
[facility] Call Center    Phone Message    May a detailed message be left on voicemail: yes     Reason for Call: Other: Last filled by Katelin Brooke, patient needs a refill on the oxycodone. Last taken 4am, 01/02/2023.  Pt has 1 pill remaining. Spine fusion on 11/28/2023.     Action Taken: Message routed to:  Other: Spine and Brain     Travel Screening: Not Applicable

## 2024-01-11 ENCOUNTER — ANCILLARY PROCEDURE (OUTPATIENT)
Dept: GENERAL RADIOLOGY | Facility: CLINIC | Age: 49
End: 2024-01-11
Payer: COMMERCIAL

## 2024-01-11 ENCOUNTER — OFFICE VISIT (OUTPATIENT)
Dept: NEUROSURGERY | Facility: CLINIC | Age: 49
End: 2024-01-11
Payer: COMMERCIAL

## 2024-01-11 ENCOUNTER — HOSPITAL ENCOUNTER (OUTPATIENT)
Dept: MAMMOGRAPHY | Facility: CLINIC | Age: 49
Discharge: HOME | End: 2024-01-11
Admitting: RADIOLOGY
Payer: COMMERCIAL

## 2024-01-11 ENCOUNTER — MYC MEDICAL ADVICE (OUTPATIENT)
Dept: NEUROSURGERY | Facility: CLINIC | Age: 49
End: 2024-01-11

## 2024-01-11 VITALS
DIASTOLIC BLOOD PRESSURE: 80 MMHG | HEART RATE: 89 BPM | HEIGHT: 63 IN | SYSTOLIC BLOOD PRESSURE: 128 MMHG | BODY MASS INDEX: 35.79 KG/M2 | OXYGEN SATURATION: 98 % | WEIGHT: 202 LBS | TEMPERATURE: 98.5 F

## 2024-01-11 DIAGNOSIS — Z12.31 VISIT FOR SCREENING MAMMOGRAM: ICD-10-CM

## 2024-01-11 DIAGNOSIS — Z98.1 S/P LUMBAR FUSION: ICD-10-CM

## 2024-01-11 DIAGNOSIS — M54.16 LUMBAR RADICULOPATHY: ICD-10-CM

## 2024-01-11 PROCEDURE — 77063 BREAST TOMOSYNTHESIS BI: CPT

## 2024-01-11 PROCEDURE — 99024 POSTOP FOLLOW-UP VISIT: CPT | Performed by: NURSE PRACTITIONER

## 2024-01-11 PROCEDURE — 72100 X-RAY EXAM L-S SPINE 2/3 VWS: CPT | Mod: TC | Performed by: RADIOLOGY

## 2024-01-11 RX ORDER — OXYCODONE HYDROCHLORIDE 5 MG/1
5 TABLET ORAL EVERY 6 HOURS PRN
Qty: 30 TABLET | Refills: 0 | Status: SHIPPED | OUTPATIENT
Start: 2024-01-11 | End: 2024-01-29

## 2024-01-11 RX ORDER — METHOCARBAMOL 750 MG/1
750 TABLET, FILM COATED ORAL EVERY 6 HOURS PRN
Qty: 40 TABLET | Refills: 0 | Status: SHIPPED | OUTPATIENT
Start: 2024-01-11

## 2024-01-11 ASSESSMENT — PAIN SCALES - GENERAL: PAINLEVEL: MODERATE PAIN (4)

## 2024-01-11 NOTE — LETTER
"    1/11/2024         RE: Virginia Valenzuela  [address] [facility] [address]        Dear Colleague,    Thank you for referring your patient, Virginia Valenzuela, to the [facility] [place]. Please see a copy of my visit note below.    [facility] Neurosurgery  Neurosurgery Follow Up:    HPI: 6 weeks s/p left L4-5 robotic MIS TLIF with Dr. Yu on 11/28/2023. Doing well. Reporting incisional back pain. Has seen improvement in preoperative left leg pain with return of intermittent left heel pain. No paresthesias or overt weakness. Incision healing well. Reporting fluctuations in temperature regulation at home since surgery. No fever, chills, etc.     Medical, surgical, family, and social history unchanged since prior exam.  Exam:  Constitutional:  Alert, well nourished, NAD.  HEENT: Normocephalic, atraumatic.   Pulm:  Without shortness of breath   CV:  No pitting edema of BLE.      Vital Signs:  /80 (BP Location: Right arm, Patient Position: Sitting, Cuff Size: Adult Large)   Pulse 89   Temp 98.5  F (36.9  C) (Temporal)   Ht 5' 3\" (1.6 m)   Wt 202 lb (91.6 kg)   LMP 10/26/2018   SpO2 98%   Breastfeeding No   BMI 35.78 kg/m      Neurological:  Awake  Alert  Oriented x 3  Motor exam:        IP Q DF PF EHL  R   5  5   5   5    5  L   5  5   5   5    5     Able to spontaneously move L/E bilaterally  Sensation intact throughout all L/E dermatomes     Incisions:  Healing nicely  Imaging: AP and lateral films reveal intact and stable hardware.    A/P: s/p lumbar spine fusion  Reviewed lumbar XR. May gradually increase activity to 20 pounds with limited bending and twisting. Return to work letter written. Follow up in 6 weeks with lumbar xray prior. Refills provided for oxycodone and robaxin to be used sparingly. She verbalized understanding and agreement.  Patient Instructions   - May increase lifting restriction to 20 pounds   - Follow up in 6 weeks with lumbar xray prior "   - Call the clinic at [phone] for increased pain or any other questions and concerns.    Alba Nye, CNP  [address] [address]  Tel [phone]  Fax [phone]    Again, thank you for allowing me to participate in the care of your patient.        Sincerely,        Alba Nye, NP

## 2024-01-11 NOTE — PATIENT INSTRUCTIONS
- May increase lifting restriction to 20 pounds   - Follow up in 6 weeks with lumbar xray prior   - Call the clinic at [phone] for increased pain or any other questions and concerns.

## 2024-01-11 NOTE — LETTER
January 11, 2024    To Whom it May Concern,      Virginia Valenzuela is being seen at our clinic for post-operative follow up care. She is able to return to work on 1/16/2024 with the restrictions of:    -No heavy lifting greater than 20 pounds until further notice.  -Limited twisting, bending, or overhead reaching.    Virginia Valenzuela will be re-evaluated at their next follow up visit in 6 weeks. Please call our clinic with questions or concerns.       Sincerely,            Alba Nye [facility]  Neurosurgery Clinic  [address] [place] [address]  [id number] [phone]

## 2024-01-11 NOTE — PROGRESS NOTES
"[facility] Neurosurgery  Neurosurgery Follow Up:    HPI: 6 weeks s/p left L4-5 robotic MIS TLIF with Dr. Yu on 11/28/2023. Doing well. Reporting incisional back pain. Has seen improvement in preoperative left leg pain with return of intermittent left heel pain. No paresthesias or overt weakness. Incision healing well. Reporting fluctuations in temperature regulation at home since surgery. No fever, chills, etc.     Medical, surgical, family, and social history unchanged since prior exam.  Exam:  Constitutional:  Alert, well nourished, NAD.  HEENT: Normocephalic, atraumatic.   Pulm:  Without shortness of breath   CV:  No pitting edema of BLE.      Vital Signs:  /80 (BP Location: Right arm, Patient Position: Sitting, Cuff Size: Adult Large)   Pulse 89   Temp 98.5  F (36.9  C) (Temporal)   Ht 5' 3\" (1.6 m)   Wt 202 lb (91.6 kg)   LMP 10/26/2018   SpO2 98%   Breastfeeding No   BMI 35.78 kg/m      Neurological:  Awake  Alert  Oriented x 3  Motor exam:        IP Q DF PF EHL  R   5  5   5   5    5  L   5  5   5   5    5     Able to spontaneously move L/E bilaterally  Sensation intact throughout all L/E dermatomes     Incisions:  Healing nicely  Imaging: AP and lateral films reveal intact and stable hardware.    A/P: s/p lumbar spine fusion  Reviewed lumbar XR. May gradually increase activity to 20 pounds with limited bending and twisting. Return to work letter written. Follow up in 6 weeks with lumbar xray prior. Refills provided for oxycodone and robaxin to be used sparingly. She verbalized understanding and agreement.  Patient Instructions   - May increase lifting restriction to 20 pounds   - Follow up in 6 weeks with lumbar xray prior   - Call the clinic at [phone] for increased pain or any other questions and concerns.    Alba Nye, MATT  [facility] Neurosurgery  [address] [place] [address]  [address] [place] [address]  Tel [phone]  Fax [phone]    "

## 2024-01-23 ENCOUNTER — MYC MEDICAL ADVICE (OUTPATIENT)
Dept: NEUROSURGERY | Facility: CLINIC | Age: 49
End: 2024-01-23
Payer: COMMERCIAL

## 2024-01-23 DIAGNOSIS — M54.16 LUMBAR RADICULOPATHY: ICD-10-CM

## 2024-01-23 DIAGNOSIS — Z98.1 S/P LUMBAR FUSION: Primary | ICD-10-CM

## 2024-01-23 RX ORDER — METHYLPREDNISOLONE 4 MG
TABLET, DOSE PACK ORAL
Qty: 21 TABLET | Refills: 0 | Status: SHIPPED | OUTPATIENT
Start: 2024-01-23

## 2024-01-23 NOTE — CONFIDENTIAL NOTE
Alba Nye NP okayed MDP and for patient to work half days with restrictions. [facility] and waiting response from patient if she would like to try the MDP.

## 2024-01-23 NOTE — LETTER
January 23, 2024      Virginia Valenzuela  [address] [place] [id number]        To Whom It May Concern:    Virginia Valenzuela is being seen at our clinic for post-operative follow up care. She is able to return to work on 1/16/2024 with the restrictions of:     -No heavy lifting greater than 20 pounds until further notice.  -Limited twisting, bending, or overhead reaching.  -Work half days until next follow-up appointment on 2/22/24.     Virginia Valenzuela will be re-evaluated at the next follow up visit on 2/22/24. Please call our clinic with questions or concerns.       Sincerely,    Alba Nye NP  (Electronically Signed 01/23/24 2:58 PM)

## 2024-01-23 NOTE — CONFIDENTIAL NOTE
Patient sent myc with update on her symptoms now that she's back to work. Please refer to [employer] messages for details. Enc routed to Alba Nye NP to review and advise.

## 2024-01-29 ENCOUNTER — MYC REFILL (OUTPATIENT)
Dept: NEUROSURGERY | Facility: CLINIC | Age: 49
End: 2024-01-29
Payer: COMMERCIAL

## 2024-01-29 DIAGNOSIS — Z98.1 S/P LUMBAR FUSION: ICD-10-CM

## 2024-01-29 DIAGNOSIS — M54.16 LUMBAR RADICULOPATHY: ICD-10-CM

## 2024-01-29 NOTE — TELEPHONE ENCOUNTER
Patient requesting refill of oxycodone.     DOS: 11/28/23  Procedure:  Left Lumbar 4 to Lumbar 5 robotic minimally invasive Transforaminal Lumbar Interbody Fusion   Surgeon: Dr. Gigi Price Post Op: 8 weeks 6 days       Last refilled: oxy 1/11 #30, tylenol 12/2 #40    Pain assessment completed via [employer]. Please see encounter for further details. Refill request will be forwarded to care team.

## 2024-01-30 RX ORDER — OXYCODONE HYDROCHLORIDE 5 MG/1
5 TABLET ORAL EVERY 6 HOURS PRN
Qty: 20 TABLET | Refills: 0 | Status: SHIPPED | OUTPATIENT
Start: 2024-01-30

## 2024-01-30 RX ORDER — ACETAMINOPHEN 325 MG/1
650 TABLET ORAL EVERY 4 HOURS PRN
Qty: 40 TABLET | Refills: 0 | Status: SHIPPED | OUTPATIENT
Start: 2024-01-30

## 2024-02-22 ENCOUNTER — ANCILLARY PROCEDURE (OUTPATIENT)
Dept: GENERAL RADIOLOGY | Facility: CLINIC | Age: 49
End: 2024-02-22
Attending: NURSE PRACTITIONER
Payer: COMMERCIAL

## 2024-02-22 ENCOUNTER — OFFICE VISIT (OUTPATIENT)
Dept: NEUROSURGERY | Facility: CLINIC | Age: 49
End: 2024-02-22
Payer: COMMERCIAL

## 2024-02-22 VITALS
DIASTOLIC BLOOD PRESSURE: 86 MMHG | SYSTOLIC BLOOD PRESSURE: 136 MMHG | OXYGEN SATURATION: 98 % | WEIGHT: 204 LBS | BODY MASS INDEX: 36.14 KG/M2 | HEIGHT: 63 IN | HEART RATE: 107 BPM | TEMPERATURE: 98.6 F

## 2024-02-22 DIAGNOSIS — Z98.1 S/P LUMBAR FUSION: ICD-10-CM

## 2024-02-22 DIAGNOSIS — Z98.1 S/P LUMBAR FUSION: Primary | ICD-10-CM

## 2024-02-22 DIAGNOSIS — M54.16 LUMBAR RADICULOPATHY: ICD-10-CM

## 2024-02-22 PROCEDURE — 72100 X-RAY EXAM L-S SPINE 2/3 VWS: CPT | Mod: TC | Performed by: RADIOLOGY

## 2024-02-22 PROCEDURE — 99024 POSTOP FOLLOW-UP VISIT: CPT | Performed by: NURSE PRACTITIONER

## 2024-02-22 ASSESSMENT — PAIN SCALES - GENERAL: PAINLEVEL: SEVERE PAIN (6)

## 2024-02-22 NOTE — PROGRESS NOTES
"Virginia Valenzuela is a 48 year old female who presents for:  Chief Complaint   Patient presents with    Surgical Followup      Left L4-5 robotic MIS TLIF        Initial Vitals:  /86 (BP Location: Right arm, Cuff Size: Adult Regular)   Pulse 107   Temp 98.6  F (37  C) (Temporal)   Ht 5' 3\" (1.6 m)   Wt 204 lb (92.5 kg)   LMP 10/26/2018   SpO2 98%   BMI 36.14 kg/m   Estimated body mass index is 36.14 kg/m  as calculated from the following:    Height as of this encounter: 5' 3\" (1.6 m).    Weight as of this encounter: 204 lb (92.5 kg).. Body surface area is 2.03 meters squared. BP completed using cuff size: regular  Severe Pain (6)    Nursing Comments:       Nereyda Cesar CMA    "

## 2024-02-22 NOTE — PROGRESS NOTES
"[facility] Neurosurgery  Neurosurgery Follow Up:    HPI: 3 months s/p left L4-5 robotic TLIF with Dr. Yu on 11/28/2023.  Continues to report left>right low back pain below her previous hardware. She reports the pain intermittently radiates to her left buttock and posterior thigh to the foot especially when lying down at night. She states this inhibits her sleep. She denies paresthesias and overt weakness.     Medical, surgical, family, and social history unchanged since prior exam.  Exam:  Constitutional:  Alert, well nourished, NAD.  HEENT: Normocephalic, atraumatic.   Pulm:  Without shortness of breath   CV:  No pitting edema of BLE.      Vital Signs:  /86 (BP Location: Right arm, Cuff Size: Adult Regular)   Pulse 107   Temp 98.6  F (37  C) (Temporal)   Ht 5' 3\" (1.6 m)   Wt 204 lb (92.5 kg)   LMP 10/26/2018   SpO2 98%   BMI 36.14 kg/m      Neurological:  Awake  Alert  Oriented x 3  Motor exam:        IP Q DF PF EHL  R   5  5   5   5    5  L   5  5   5   5    5     Able to spontaneously move L/E bilaterally  Sensation intact throughout all L/E dermatomes     Incisions:  Healing nicely  Imaging:   XR LUMBAR SPINE 2/3 VIEWS  2/22/2024 10:53 AM      HISTORY: S/P lumbar fusion; Lumbar radiculopathy     COMPARISON: The aorta was 1/11/2024                                                                       IMPRESSION: Postsurgical changes of instrumented anterior and  posterior spinal fusion at L4-5. Unchanged hypodensity surrounding the  right L4 pedicle screw, suggesting loosening. Consider CT imaging.  Alignment of the lumbar spine is within normal limits. No loss of  vertebral body height. Multilevel mild disc height loss and osteophyte  formation. Aortic calcifications.    A/P: s/p lumbar spine fusion  Will obtain lumbar CT for evaluation. Will begin PT as well. Follow up in 3 months with lumbar xray prior. She verbalized understanding and agreement.   Patient Instructions   - Lumbar CT " ordered. They will contact you to schedule. I will contact you with the results and further recommendations.   - Physical therapy ordered. They will contact you to schedule.  - May increase lifting restriction and activity as tolerated.   - Follow up in 3 months with lumbar xray prior.   - Call the clinic at [phone] for increased pain or any other questions and concerns.    Alba Nye, MATT  [address] [address]  Tel [phone]  Fax [phone]

## 2024-02-22 NOTE — LETTER
"    2/22/2024         RE: Virginia Valenzuela  [address] [facility] [address]        Dear Colleague,    Thank you for referring your patient, Virginia Valenzuela, to the [facility] NEUROSURGERY [facility]INIC [place]. Please see a copy of my visit note below.    [facility] Neurosurgery  Neurosurgery Follow Up:    HPI: 3 months s/p left L4-5 robotic TLIF with Dr. Yu on 11/28/2023.  Continues to report left>right low back pain below her previous hardware. She reports the pain intermittently radiates to her left buttock and posterior thigh to the foot especially when lying down at night. She states this inhibits her sleep. She denies paresthesias and overt weakness.     Medical, surgical, family, and social history unchanged since prior exam.  Exam:  Constitutional:  Alert, well nourished, NAD.  HEENT: Normocephalic, atraumatic.   Pulm:  Without shortness of breath   CV:  No pitting edema of BLE.      Vital Signs:  /86 (BP Location: Right arm, Cuff Size: Adult Regular)   Pulse 107   Temp 98.6  F (37  C) (Temporal)   Ht 5' 3\" (1.6 m)   Wt 204 lb (92.5 kg)   LMP 10/26/2018   SpO2 98%   BMI 36.14 kg/m      Neurological:  Awake  Alert  Oriented x 3  Motor exam:        IP Q DF PF EHL  R   5  5   5   5    5  L   5  5   5   5    5     Able to spontaneously move L/E bilaterally  Sensation intact throughout all L/E dermatomes     Incisions:  Healing nicely  Imaging:   XR LUMBAR SPINE 2/3 VIEWS  2/22/2024 10:53 AM      HISTORY: S/P lumbar fusion; Lumbar radiculopathy     COMPARISON: The aorta was 1/11/2024                                                                       IMPRESSION: Postsurgical changes of instrumented anterior and  posterior spinal fusion at L4-5. Unchanged hypodensity surrounding the  right L4 pedicle screw, suggesting loosening. Consider CT imaging.  Alignment of the lumbar spine is within normal limits. No loss of  vertebral body height. Multilevel mild disc height loss and " "osteophyte  formation. Aortic calcifications.    A/P: s/p lumbar spine fusion  Will obtain lumbar CT for evaluation. Will begin PT as well. Follow up in 3 months with lumbar xray prior. She verbalized understanding and agreement.   Patient Instructions   - Lumbar CT ordered. They will contact you to schedule. I will contact you with the results and further recommendations.   - Physical therapy ordered. They will contact you to schedule.  - May increase lifting restriction and activity as tolerated.   - Follow up in 3 months with lumbar xray prior.   - Call the clinic at [phone] for increased pain or any other questions and concerns.    Alba Nye, MATT  [facility] Neurosurgery  [address] [place] [address]  Tel [phone]  Fax [phone]      Virginia Valenzuela is a 48 year old female who presents for:  Chief Complaint   Patient presents with     Surgical Followup      Left L4-5 robotic MIS TLIF        Initial Vitals:  /86 (BP Location: Right arm, Cuff Size: Adult Regular)   Pulse 107   Temp 98.6  F (37  C) (Temporal)   Ht 5' 3\" (1.6 m)   Wt 204 lb (92.5 kg)   LMP 10/26/2018   SpO2 98%   BMI 36.14 kg/m   Estimated body mass index is 36.14 kg/m  as calculated from the following:    Height as of this encounter: 5' 3\" (1.6 m).    Weight as of this encounter: 204 lb (92.5 kg).. Body surface area is 2.03 meters squared. BP completed using cuff size: regular  Severe Pain (6)    Nursing Comments:       Nereyda Cesar CMA      Again, thank you for allowing me to participate in the care of your patient.        Sincerely,        Alba Nye, ECTOR  "

## 2024-02-22 NOTE — PATIENT INSTRUCTIONS
- Lumbar CT ordered. They will contact you to schedule. I will contact you with the results and further recommendations.   - Physical therapy ordered. They will contact you to schedule.  - May increase lifting restriction and activity as tolerated.   - Follow up in 3 months with lumbar xray prior.   - Call the clinic at [phone] for increased pain or any other questions and concerns.

## 2024-02-29 ENCOUNTER — HOSPITAL ENCOUNTER (OUTPATIENT)
Dept: CT IMAGING | Facility: CLINIC | Age: 49
Discharge: HOME | End: 2024-02-29
Attending: NURSE PRACTITIONER | Admitting: NURSE PRACTITIONER
Payer: COMMERCIAL

## 2024-02-29 DIAGNOSIS — Z98.1 S/P LUMBAR FUSION: ICD-10-CM

## 2024-02-29 PROCEDURE — 72131 CT LUMBAR SPINE W/O DYE: CPT

## 2024-03-05 ENCOUNTER — TELEPHONE (OUTPATIENT)
Dept: NEUROSURGERY | Facility: CLINIC | Age: 49
End: 2024-03-05
Payer: COMMERCIAL

## 2024-03-05 NOTE — TELEPHONE ENCOUNTER
Placed call to pt -    Alba Nye NP called and left VM stating:   Lumbar CT without evidence of loosening or fracture. Continue current plan of PT and follow up in 3 months as previously discussed.       Pt updated. No questions.

## 2024-03-05 NOTE — TELEPHONE ENCOUNTER
[facility] Call Center    Phone Message    May a detailed message be left on voicemail: yes     Reason for Call: Other: Patient called back wanting to speak with provider. Please review and call back.     Action Taken: Message routed to:  Other: [facility] Neurosurgery     Travel Screening: Not Applicable

## 2024-03-16 ENCOUNTER — HEALTH MAINTENANCE LETTER (OUTPATIENT)
Age: 49
End: 2024-03-16

## 2024-03-21 ENCOUNTER — TELEPHONE (OUTPATIENT)
Dept: NEUROSURGERY | Facility: CLINIC | Age: 49
End: 2024-03-21
Payer: COMMERCIAL

## 2024-03-21 NOTE — TELEPHONE ENCOUNTER
A message was left for patient to call and reschedule Neurosurgical visit from May 24 to May 30 with Alba Nye at the [facility]. Provider will not be in clinic on 5-24-24.    Patient advised to call [phone] to reschedule appointment.

## 2024-03-25 NOTE — TELEPHONE ENCOUNTER
2nd attempt to reach patient and reschedule 3 month follow up visit with Alba Nye from on 5-24-24. Offered to reschedule with provider on 5-23-24.     Patient advised to call [phone]

## 2024-04-04 NOTE — TELEPHONE ENCOUNTER
3rd attempt to reach patient and reschedule 3 month follow up visit with Alba Nye. Patient was offered 5-23-24 as an option for rescheduling at the [facility].    Patient advised to call [phone] to reschedule this appointment.

## 2024-05-06 ENCOUNTER — MYC MEDICAL ADVICE (OUTPATIENT)
Dept: NEUROSURGERY | Facility: CLINIC | Age: 49
End: 2024-05-06
Payer: COMMERCIAL

## 2024-05-06 NOTE — TELEPHONE ENCOUNTER
Patient sent myc message that she has been unable to reschedule her follow-up appt with XR due to losing her health insurance recently. Routed to provider as RICK.

## 2024-10-31 NOTE — NURSING NOTE
"Chief Complaint   Patient presents with     Pain       Initial /72 (BP Location: Right arm, Patient Position: Chair, Cuff Size: Adult Large)  Pulse 88  Temp 97.6  F (36.4  C) (Temporal)  Wt 200 lb (90.7 kg)  LMP 08/27/2017 (Exact Date)  SpO2 99%  Breastfeeding? No  BMI 34.33 kg/m2 Estimated body mass index is 34.33 kg/(m^2) as calculated from the following:    Height as of 7/13/17: 5' 4\" (1.626 m).    Weight as of this encounter: 200 lb (90.7 kg).  Medication Reconciliation: complete  Rajani ÁLVAREZ    " Negative

## 2024-12-12 ENCOUNTER — HOSPITAL ENCOUNTER (OUTPATIENT)
Dept: CT IMAGING | Facility: CLINIC | Age: 49
Discharge: HOME | End: 2024-12-12
Attending: SPECIALIST/TECHNOLOGIST
Payer: OTHER MISCELLANEOUS

## 2024-12-12 PROCEDURE — 72131 CT LUMBAR SPINE W/O DYE: CPT

## 2025-01-08 ENCOUNTER — HOSPITAL ENCOUNTER (EMERGENCY)
Facility: CLINIC | Age: 50
End: 2025-01-08
Payer: COMMERCIAL

## 2025-01-08 ENCOUNTER — HOSPITAL ENCOUNTER (OUTPATIENT)
Dept: MRI IMAGING | Facility: CLINIC | Age: 50
Discharge: HOME | End: 2025-01-08
Attending: SPECIALIST/TECHNOLOGIST
Payer: OTHER MISCELLANEOUS

## 2025-01-08 DIAGNOSIS — M54.50 LOW BACK PAIN: ICD-10-CM

## 2025-01-08 DIAGNOSIS — Z98.1 S/P LUMBAR FUSION: ICD-10-CM

## 2025-01-08 PROCEDURE — 72148 MRI LUMBAR SPINE W/O DYE: CPT

## 2025-03-22 ENCOUNTER — HEALTH MAINTENANCE LETTER (OUTPATIENT)
Age: 50
End: 2025-03-22

## (undated) DEVICE — SU MONOCRYL 3-0 SH 27" UND Y416H

## (undated) DEVICE — ENDO TROCAR 05MM VERSAONE BLADED W/STD FIX CANNULA B5STF

## (undated) DEVICE — PACK LITHOTOMY CUSTOM

## (undated) DEVICE — SU VICRYL 0 UR-6 27" J603H

## (undated) DEVICE — SYR 10ML LL W/O NDL

## (undated) DEVICE — ENDO FIXATION CANNULA 05MM VERSAPORT 177092F

## (undated) DEVICE — DECANTER BAG 2002S

## (undated) DEVICE — DRSG BANDAID 2X3 1/2" FABRIC

## (undated) DEVICE — PREP SKIN SCRUB TRAY 4461A

## (undated) DEVICE — TUBING CYSTO/BLADDER IRRIG SET 80" 06544-01

## (undated) DEVICE — NDL SPINAL 18GA 3.5" 405184

## (undated) DEVICE — Device

## (undated) DEVICE — BLADE KNIFE SURG 11 371111

## (undated) DEVICE — DRSG BANDAID 1X3" FABRIC

## (undated) DEVICE — GLOVE PROTEXIS W/NEU-THERA 7.5  2D73TE75

## (undated) DEVICE — SU VICRYL 4-0 PS-2 27" UND J426H

## (undated) DEVICE — PREP CHLORAPREP 26ML TINTED ORANGE  260815

## (undated) DEVICE — KIT MAZOR X ROBOTIC SPINE DISP KIT0574

## (undated) DEVICE — SPINOCAN SPINAL NEEDLE

## (undated) DEVICE — STOCKING SLEEVE COMPRESSION CALF MED

## (undated) DEVICE — RX SURGIFLO HEMOSTATIC MATRIX W/THROMBIN 8ML 2994

## (undated) DEVICE — TOOL DISSECT MIDAS MR8 12CM SP MATCH SYM-TRI MR8-SP12MH30T

## (undated) DEVICE — ESU ELEC BLADE 2.75" COATED/INSULATED E1455

## (undated) DEVICE — MANIFOLD NEPTUNE 4 PORT 700-20

## (undated) DEVICE — NDL ECLIPSE 25GA 1.5"

## (undated) DEVICE — TUBING IV EXTENSION SET 34"

## (undated) DEVICE — ESU HOOK TIP 5MM CONMED

## (undated) DEVICE — PREP CHLORAPREP ORANGE 3ML  260415

## (undated) DEVICE — SOL ADH LIQUID BENZOIN SWAB 0.6ML C1544

## (undated) DEVICE — TUBING INSUFFLATION W/FILTER 10FT GS1016

## (undated) DEVICE — TUBING EXTENSION SET MICROBORE 21CM LL 6N8374

## (undated) DEVICE — CATH TRAY FOLEY SURESTEP 16FR WDRAIN BAG STLK LATEX A300316A

## (undated) DEVICE — TRAY PROCEDURE SUPPORT PAIN MANAGEMENT 332114

## (undated) DEVICE — DRAPE SHEET REV FOLD 3/4 9349

## (undated) DEVICE — SOL WATER IRRIG 1000ML BOTTLE 2F7114

## (undated) DEVICE — SYR 05ML LL W/O NDL

## (undated) DEVICE — NDL COUNTER 20CT 31142493

## (undated) DEVICE — ANTIFOG SOLUTION W/FOAM PAD 31142527

## (undated) DEVICE — PREP DURAPREP 26ML APL 8630

## (undated) DEVICE — NDL SPINAL 22GA 5" QUINCKE 405148

## (undated) DEVICE — ENDO CANNULA 05MM VERSAONE UNIVERSAL UNVCA5STF

## (undated) DEVICE — ESU LIGASURE BLUNT TIP 5MM LF1537

## (undated) DEVICE — NDL ECLIPSE 18GA 1.5"

## (undated) DEVICE — DRAPE COVER C-ARM SEAMLESS SNAP-KAP 03-KP26 LATEX FREE

## (undated) DEVICE — LINEN TOWEL PACK X5 5464

## (undated) DEVICE — DRSG STERI STRIP 1/2X4" R1547

## (undated) DEVICE — KIT PATIENT JACKSON 1 SPK10118

## (undated) DEVICE — POSITIONER PT PRONESAFE HEAD REST W/DERMAPROX INSERT 40599

## (undated) DEVICE — SPONGE SURGIFOAM 100 1974

## (undated) DEVICE — SYR 10ML FINGER CONTROL W/O NDL 309695

## (undated) DEVICE — ENDO TROCAR 05MM VERSAPORT BLADED W/FIX CANNULA 179094F

## (undated) DEVICE — LUBRICATING JELLY 4.25OZ

## (undated) DEVICE — CATH SELF FEMALE 14FR 6"

## (undated) DEVICE — GLOVE BIOGEL PI MICRO INDICATOR UNDERGLOVE SZ 8.0 48980

## (undated) DEVICE — ENDO TROCAR BLADED 11MM STD VERSAONE B11STF

## (undated) DEVICE — SU VICRYL 0 CT-1 CR 8X18" J740D

## (undated) DEVICE — DRAPE MAYO STAND 23X54 8337

## (undated) DEVICE — NDL INSUFFLATION 120MM VERRES 172015

## (undated) DEVICE — MARKER SPHERES PASSIVE MEDT PACK 5 8801075

## (undated) DEVICE — ESU GROUND PAD UNIVERSAL W/O CORD

## (undated) DEVICE — GLOVE BIOGEL PI MICRO SZ 7.5 48575

## (undated) DEVICE — SU VICRYL 2-0 CT-2 CR 8X18" J726D

## (undated) DEVICE — PACK GENERAL LAPAOSCOPY

## (undated) DEVICE — SOL NACL 0.9% INJ 1000ML BAG 07983-09

## (undated) DEVICE — STPL SKIN 35W APPOSE 8886803712

## (undated) DEVICE — PACK UNIVERSAL SPLIT 29131

## (undated) DEVICE — TUBING SUCTION SOFT 20'X3/16" 0036570

## (undated) DEVICE — SYR 10ML PREFILLED 0.9% NACL INJ NOT STERILE 306500

## (undated) DEVICE — ADH LIQUID MASTISOL TOPICAL VIAL 2-3ML 0523-48

## (undated) DEVICE — SYR 03ML LL W/O NDL

## (undated) DEVICE — NDL COUNTER 10CT

## (undated) DEVICE — DRAPE IOBAN INCISE 23X17" 6650EZ

## (undated) DEVICE — PACK LARGE SPINE SNE15LSFSE

## (undated) DEVICE — DRAPE MICROSCOPE LEICA 54X150" AR8033650

## (undated) RX ORDER — ONDANSETRON 2 MG/ML
INJECTION INTRAMUSCULAR; INTRAVENOUS
Status: DISPENSED
Start: 2018-05-14

## (undated) RX ORDER — FENTANYL CITRATE 50 UG/ML
INJECTION, SOLUTION INTRAMUSCULAR; INTRAVENOUS
Status: DISPENSED
Start: 2018-05-14

## (undated) RX ORDER — PROPOFOL 10 MG/ML
INJECTION, EMULSION INTRAVENOUS
Status: DISPENSED
Start: 2023-11-28

## (undated) RX ORDER — VANCOMYCIN HYDROCHLORIDE 1 G/20ML
INJECTION, POWDER, LYOPHILIZED, FOR SOLUTION INTRAVENOUS
Status: DISPENSED
Start: 2023-11-28

## (undated) RX ORDER — FENTANYL CITRATE 0.05 MG/ML
INJECTION, SOLUTION INTRAMUSCULAR; INTRAVENOUS
Status: DISPENSED
Start: 2023-11-28

## (undated) RX ORDER — DEXAMETHASONE SODIUM PHOSPHATE 10 MG/ML
INJECTION INTRAMUSCULAR; INTRAVENOUS
Status: DISPENSED
Start: 2018-05-14

## (undated) RX ORDER — GLYCOPYRROLATE 0.2 MG/ML
INJECTION INTRAMUSCULAR; INTRAVENOUS
Status: DISPENSED
Start: 2017-07-13

## (undated) RX ORDER — PROPOFOL 10 MG/ML
INJECTION, EMULSION INTRAVENOUS
Status: DISPENSED
Start: 2017-10-26

## (undated) RX ORDER — PROPOFOL 10 MG/ML
INJECTION, EMULSION INTRAVENOUS
Status: DISPENSED
Start: 2017-07-13

## (undated) RX ORDER — HYDROMORPHONE HCL IN WATER/PF 6 MG/30 ML
PATIENT CONTROLLED ANALGESIA SYRINGE INTRAVENOUS
Status: DISPENSED
Start: 2023-11-28

## (undated) RX ORDER — HYDROMORPHONE HYDROCHLORIDE 1 MG/ML
INJECTION, SOLUTION INTRAMUSCULAR; INTRAVENOUS; SUBCUTANEOUS
Status: DISPENSED
Start: 2023-11-28

## (undated) RX ORDER — ONDANSETRON 2 MG/ML
INJECTION INTRAMUSCULAR; INTRAVENOUS
Status: DISPENSED
Start: 2023-11-28

## (undated) RX ORDER — HYDROXYZINE HYDROCHLORIDE 50 MG/ML
INJECTION, SOLUTION INTRAMUSCULAR
Status: DISPENSED
Start: 2023-11-28

## (undated) RX ORDER — DEXAMETHASONE SODIUM PHOSPHATE 4 MG/ML
INJECTION, SOLUTION INTRA-ARTICULAR; INTRALESIONAL; INTRAMUSCULAR; INTRAVENOUS; SOFT TISSUE
Status: DISPENSED
Start: 2023-11-28

## (undated) RX ORDER — DEXAMETHASONE SODIUM PHOSPHATE 10 MG/ML
INJECTION INTRAMUSCULAR; INTRAVENOUS
Status: DISPENSED
Start: 2017-07-13

## (undated) RX ORDER — ONDANSETRON 2 MG/ML
INJECTION INTRAMUSCULAR; INTRAVENOUS
Status: DISPENSED
Start: 2017-07-13

## (undated) RX ORDER — FENTANYL CITRATE 50 UG/ML
INJECTION, SOLUTION INTRAMUSCULAR; INTRAVENOUS
Status: DISPENSED
Start: 2023-11-28

## (undated) RX ORDER — METHYLPREDNISOLONE ACETATE 40 MG/ML
INJECTION, SUSPENSION INTRA-ARTICULAR; INTRALESIONAL; INTRAMUSCULAR; SOFT TISSUE
Status: DISPENSED
Start: 2023-11-28

## (undated) RX ORDER — GABAPENTIN 300 MG/1
CAPSULE ORAL
Status: DISPENSED
Start: 2023-11-28

## (undated) RX ORDER — GLYCOPYRROLATE 0.2 MG/ML
INJECTION, SOLUTION INTRAMUSCULAR; INTRAVENOUS
Status: DISPENSED
Start: 2023-11-28

## (undated) RX ORDER — BUPIVACAINE HYDROCHLORIDE 2.5 MG/ML
INJECTION, SOLUTION EPIDURAL; INFILTRATION; INTRACAUDAL
Status: DISPENSED
Start: 2018-05-14

## (undated) RX ORDER — FENTANYL CITRATE 50 UG/ML
INJECTION, SOLUTION INTRAMUSCULAR; INTRAVENOUS
Status: DISPENSED
Start: 2017-07-13

## (undated) RX ORDER — EPINEPHRINE 1 MG/ML
INJECTION, SOLUTION, CONCENTRATE INTRAVENOUS
Status: DISPENSED
Start: 2018-05-14

## (undated) RX ORDER — PROPOFOL 10 MG/ML
INJECTION, EMULSION INTRAVENOUS
Status: DISPENSED
Start: 2018-05-14

## (undated) RX ORDER — BUPIVACAINE HYDROCHLORIDE AND EPINEPHRINE 5; 5 MG/ML; UG/ML
INJECTION, SOLUTION EPIDURAL; INTRACAUDAL; PERINEURAL
Status: DISPENSED
Start: 2023-11-28

## (undated) RX ORDER — NEOSTIGMINE METHYLSULFATE 5 MG/5 ML
SYRINGE (ML) INTRAVENOUS
Status: DISPENSED
Start: 2017-07-13

## (undated) RX ORDER — LIDOCAINE HYDROCHLORIDE 20 MG/ML
INJECTION, SOLUTION EPIDURAL; INFILTRATION; INTRACAUDAL; PERINEURAL
Status: DISPENSED
Start: 2017-07-13